# Patient Record
Sex: MALE | Race: BLACK OR AFRICAN AMERICAN | NOT HISPANIC OR LATINO | Employment: FULL TIME | URBAN - METROPOLITAN AREA
[De-identification: names, ages, dates, MRNs, and addresses within clinical notes are randomized per-mention and may not be internally consistent; named-entity substitution may affect disease eponyms.]

---

## 2019-04-19 ENCOUNTER — HOSPITAL ENCOUNTER (EMERGENCY)
Facility: OTHER | Age: 54
Discharge: HOME | End: 2019-04-19
Attending: EMERGENCY MEDICINE

## 2019-04-19 VITALS
DIASTOLIC BLOOD PRESSURE: 89 MMHG | OXYGEN SATURATION: 99 % | SYSTOLIC BLOOD PRESSURE: 164 MMHG | HEART RATE: 68 BPM | TEMPERATURE: 98 F | WEIGHT: 315 LBS | RESPIRATION RATE: 17 BRPM | BODY MASS INDEX: 41.25 KG/M2

## 2019-04-19 DIAGNOSIS — L03.011 PARONYCHIA OF FINGER OF RIGHT HAND: Primary | ICD-10-CM

## 2019-04-19 DIAGNOSIS — R73.9 HYPERGLYCEMIA: ICD-10-CM

## 2019-04-19 DIAGNOSIS — I10 HYPERTENSION, UNSPECIFIED TYPE: ICD-10-CM

## 2019-04-19 DIAGNOSIS — E66.9 OBESITY, UNSPECIFIED CLASSIFICATION, UNSPECIFIED OBESITY TYPE, UNSPECIFIED WHETHER SERIOUS COMORBIDITY PRESENT: ICD-10-CM

## 2019-04-19 LAB
ALBUMIN SERPL BCP-MCNC: 3.9 G/DL (ref 3.5–5.2)
ALP SERPL-CCNC: 116 U/L (ref 55–135)
ALT SERPL W/O P-5'-P-CCNC: 12 U/L (ref 10–44)
ANION GAP SERPL CALC-SCNC: 9 MMOL/L (ref 8–16)
AST SERPL-CCNC: 10 U/L (ref 10–40)
BASOPHILS # BLD AUTO: 0.03 K/UL (ref 0–0.2)
BASOPHILS NFR BLD: 0.4 % (ref 0–1.9)
BILIRUB SERPL-MCNC: 0.5 MG/DL (ref 0.1–1)
BUN SERPL-MCNC: 14 MG/DL (ref 6–20)
CALCIUM SERPL-MCNC: 9.9 MG/DL (ref 8.7–10.5)
CHLORIDE SERPL-SCNC: 101 MMOL/L (ref 95–110)
CO2 SERPL-SCNC: 28 MMOL/L (ref 23–29)
CREAT SERPL-MCNC: 1.1 MG/DL (ref 0.5–1.4)
DIFFERENTIAL METHOD: ABNORMAL
EOSINOPHIL # BLD AUTO: 0.2 K/UL (ref 0–0.5)
EOSINOPHIL NFR BLD: 2.4 % (ref 0–8)
ERYTHROCYTE [DISTWIDTH] IN BLOOD BY AUTOMATED COUNT: 12.7 % (ref 11.5–14.5)
EST. GFR  (AFRICAN AMERICAN): >60 ML/MIN/1.73 M^2
EST. GFR  (NON AFRICAN AMERICAN): >60 ML/MIN/1.73 M^2
GLUCOSE SERPL-MCNC: 335 MG/DL (ref 70–110)
HCT VFR BLD AUTO: 44.4 % (ref 40–54)
HGB BLD-MCNC: 15.2 G/DL (ref 14–18)
LYMPHOCYTES # BLD AUTO: 2.4 K/UL (ref 1–4.8)
LYMPHOCYTES NFR BLD: 29 % (ref 18–48)
MCH RBC QN AUTO: 27.8 PG (ref 27–31)
MCHC RBC AUTO-ENTMCNC: 34.2 G/DL (ref 32–36)
MCV RBC AUTO: 81 FL (ref 82–98)
MONOCYTES # BLD AUTO: 0.7 K/UL (ref 0.3–1)
MONOCYTES NFR BLD: 7.7 % (ref 4–15)
NEUTROPHILS # BLD AUTO: 5.1 K/UL (ref 1.8–7.7)
NEUTROPHILS NFR BLD: 60.4 % (ref 38–73)
PLATELET # BLD AUTO: 267 K/UL (ref 150–350)
PMV BLD AUTO: 10.6 FL (ref 9.2–12.9)
POCT GLUCOSE: 323 MG/DL (ref 70–110)
POTASSIUM SERPL-SCNC: 4.1 MMOL/L (ref 3.5–5.1)
PROT SERPL-MCNC: 8.5 G/DL (ref 6–8.4)
RBC # BLD AUTO: 5.47 M/UL (ref 4.6–6.2)
SODIUM SERPL-SCNC: 138 MMOL/L (ref 136–145)
WBC # BLD AUTO: 8.4 K/UL (ref 3.9–12.7)

## 2019-04-19 PROCEDURE — 90471 IMMUNIZATION ADMIN: CPT | Performed by: EMERGENCY MEDICINE

## 2019-04-19 PROCEDURE — 90715 TDAP VACCINE 7 YRS/> IM: CPT | Performed by: EMERGENCY MEDICINE

## 2019-04-19 PROCEDURE — 80053 COMPREHEN METABOLIC PANEL: CPT

## 2019-04-19 PROCEDURE — 99283 EMERGENCY DEPT VISIT LOW MDM: CPT | Mod: 25

## 2019-04-19 PROCEDURE — 85025 COMPLETE CBC W/AUTO DIFF WBC: CPT

## 2019-04-19 PROCEDURE — 82962 GLUCOSE BLOOD TEST: CPT

## 2019-04-19 PROCEDURE — 25000003 PHARM REV CODE 250: Performed by: EMERGENCY MEDICINE

## 2019-04-19 PROCEDURE — 10060 I&D ABSCESS SIMPLE/SINGLE: CPT

## 2019-04-19 PROCEDURE — S0077 INJECTION, CLINDAMYCIN PHOSP: HCPCS | Performed by: EMERGENCY MEDICINE

## 2019-04-19 PROCEDURE — 63600175 PHARM REV CODE 636 W HCPCS: Performed by: EMERGENCY MEDICINE

## 2019-04-19 RX ORDER — SULFAMETHOXAZOLE AND TRIMETHOPRIM 800; 160 MG/1; MG/1
1 TABLET ORAL 2 TIMES DAILY
Qty: 14 TABLET | Refills: 0 | Status: SHIPPED | OUTPATIENT
Start: 2019-04-19 | End: 2019-04-26

## 2019-04-19 RX ORDER — CLINDAMYCIN PHOSPHATE 600 MG/50ML
600 INJECTION, SOLUTION INTRAVENOUS
Status: COMPLETED | OUTPATIENT
Start: 2019-04-19 | End: 2019-04-19

## 2019-04-19 RX ORDER — HYDROCHLOROTHIAZIDE 12.5 MG/1
25 TABLET ORAL DAILY
Qty: 60 TABLET | Refills: 0 | Status: ON HOLD | OUTPATIENT
Start: 2019-04-19 | End: 2022-08-03 | Stop reason: CLARIF

## 2019-04-19 RX ORDER — CLINDAMYCIN PHOSPHATE 900 MG/50ML
900 INJECTION, SOLUTION INTRAVENOUS
Status: DISCONTINUED | OUTPATIENT
Start: 2019-04-19 | End: 2019-04-19

## 2019-04-19 RX ORDER — LIDOCAINE HYDROCHLORIDE 10 MG/ML
5 INJECTION INFILTRATION; PERINEURAL
Status: COMPLETED | OUTPATIENT
Start: 2019-04-19 | End: 2019-04-19

## 2019-04-19 RX ORDER — SULFAMETHOXAZOLE AND TRIMETHOPRIM 800; 160 MG/1; MG/1
1 TABLET ORAL
Status: COMPLETED | OUTPATIENT
Start: 2019-04-19 | End: 2019-04-19

## 2019-04-19 RX ORDER — AMLODIPINE BESYLATE 5 MG/1
10 TABLET ORAL
Status: COMPLETED | OUTPATIENT
Start: 2019-04-19 | End: 2019-04-19

## 2019-04-19 RX ADMIN — SULFAMETHOXAZOLE AND TRIMETHOPRIM 1 TABLET: 800; 160 TABLET ORAL at 02:04

## 2019-04-19 RX ADMIN — LIDOCAINE HYDROCHLORIDE 5 ML: 10 INJECTION, SOLUTION INFILTRATION; PERINEURAL at 01:04

## 2019-04-19 RX ADMIN — CLINDAMYCIN IN 5 PERCENT DEXTROSE 600 MG: 12 INJECTION, SOLUTION INTRAVENOUS at 02:04

## 2019-04-19 RX ADMIN — BACITRACIN ZINC, NEOMYCIN SULFATE, AND POLYMYXIN B SULFATE 1 EACH: 400; 3.5; 5 OINTMENT TOPICAL at 02:04

## 2019-04-19 RX ADMIN — AMLODIPINE BESYLATE 10 MG: 5 TABLET ORAL at 01:04

## 2019-04-19 RX ADMIN — SODIUM CHLORIDE 1000 ML: 0.9 INJECTION, SOLUTION INTRAVENOUS at 01:04

## 2019-04-19 RX ADMIN — CLOSTRIDIUM TETANI TOXOID ANTIGEN (FORMALDEHYDE INACTIVATED), CORYNEBACTERIUM DIPHTHERIAE TOXOID ANTIGEN (FORMALDEHYDE INACTIVATED), BORDETELLA PERTUSSIS TOXOID ANTIGEN (GLUTARALDEHYDE INACTIVATED), BORDETELLA PERTUSSIS FILAMENTOUS HEMAGGLUTININ ANTIGEN (FORMALDEHYDE INACTIVATED), BORDETELLA PERTUSSIS PERTACTIN ANTIGEN, AND BORDETELLA PERTUSSIS FIMBRIAE 2/3 ANTIGEN 0.5 ML: 5; 2; 2.5; 5; 3; 5 INJECTION, SUSPENSION INTRAMUSCULAR at 01:04

## 2019-04-19 NOTE — ED PROVIDER NOTES
Encounter Date: 4/19/2019    SCRIBE #1 NOTE: IAna, am scribing for, and in the presence of, Dr. Restrepo.       History     Chief Complaint   Patient presents with    paronychia     with swelling, drainage and redness x 1 week     Time seen by provider: 12:50 PM    This is a 53 y.o. male who presents with complaint of wound to right index finger s/p fall 6 days ago. Swelling got worse in last 2 days, and pt tried to tra He reports associated swelling. He has had similar episode with I&D. He denies fever or chills. He took ibuprofen last night. He is right hand dominant. He is not UTD on tetanus.    The history is provided by the patient.     Review of patient's allergies indicates:  No Known Allergies  History reviewed. No pertinent past medical history.  Past Surgical History:   Procedure Laterality Date    LEG SURGERY Left      History reviewed. No pertinent family history.  Social History     Tobacco Use    Smoking status: Never Smoker    Smokeless tobacco: Never Used   Substance Use Topics    Alcohol use: Yes    Drug use: No     Review of Systems   Constitutional: Negative for fever.   HENT: Negative for sore throat.    Respiratory: Negative for shortness of breath.    Cardiovascular: Negative for chest pain.   Gastrointestinal: Negative for nausea.   Genitourinary: Negative for dysuria.   Musculoskeletal: Negative for back pain.        Positive for finger swelling.   Skin: Positive for wound. Negative for rash.   Neurological: Negative for weakness.   Hematological: Does not bruise/bleed easily.       Physical Exam     Initial Vitals [04/19/19 1225]   BP Pulse Resp Temp SpO2   (!) 216/100 80 16 97.5 °F (36.4 °C) 97 %      MAP       --         Physical Exam    Nursing note and vitals reviewed.  Constitutional: He appears well-developed and well-nourished. He is not diaphoretic.  Non-toxic appearance. No distress.   Morbidly obese.   HENT:   Head: Normocephalic and atraumatic.   Neck: Normal  range of motion. Neck supple.   Pulmonary/Chest: No respiratory distress.   Musculoskeletal:   Diffuse edema to right index finger with fluctuant swelling distal to DIP joint, no tenderness on palpation of flexor tendon, no proximal streaking.   Neurological: He is alert and oriented to person, place, and time.   Skin: Skin is warm and dry.   Psychiatric: He has a normal mood and affect.         ED Course   I & D - Incision and Drainage  Date/Time: 4/19/2019 2:04 PM  Performed by: Nancy Restrepo MD  Authorized by: Nancy Restrepo MD   Consent Done: Not Needed  Type: abscess  Body area: upper extremity  Location details: right index finger  Anesthesia: local infiltration    Anesthesia:  Local Anesthetic: lidocaine 1% without epinephrine  Anesthetic total: 6 mL  Patient sedated: no  Scalpel size: 11  Incision type: single straight  Complexity: simple  Drainage: purulent  Drainage amount: moderate (4 cc)  Wound treatment: incision and  drainage (antibiotic ointment and gauze dressing)  Packing material: none  Complications: No  Specimens: No  Implants: No  Patient tolerance: Patient tolerated the procedure well with no immediate complications        Labs Reviewed   CBC W/ AUTO DIFFERENTIAL - Abnormal; Notable for the following components:       Result Value    MCV 81 (*)     All other components within normal limits   COMPREHENSIVE METABOLIC PANEL - Abnormal; Notable for the following components:    Glucose 335 (*)     Total Protein 8.5 (*)     All other components within normal limits   POCT GLUCOSE - Abnormal; Notable for the following components:    POCT Glucose 323 (*)     All other components within normal limits          Imaging Results    None          Medical Decision Making:   Initial Assessment:   54 yo M with remote hx of htn and dm but no longer on meds (reportedly not needed after loosing weight) here with painful swelling to R index finger. Pt is R hand dominant, fell on the hand recently and since  developed an abscess which he tried to drain. Pt is afebrile, morbidly obese, non toxic, paronychia present without proximal streaking or concern for tenoxynovitis. BP elevated and pt treated w bp meds, labs without dka and given dose IV abx sp I+D.   Clinical Tests:   Lab Tests: Reviewed and Ordered  ED Management:  Home meds restarted, pt encouraged to seek pcp follow up for wound care and bp moitoring.             Scribe Attestation:   Scribe #1: I performed the above scribed service and the documentation accurately describes the services I performed. I attest to the accuracy of the note.    Attending Attestation:           Physician Attestation for Scribe:  Physician Attestation Statement for Scribe #1: I, Dr. Restrepo, reviewed documentation, as scribed by Ana Araujo in my presence, and it is both accurate and complete.                    Clinical Impression:     1. Paronychia of finger of right hand    2. Hyperglycemia    3. Hypertension, unspecified type    4. Obesity, unspecified classification, unspecified obesity type, unspecified whether serious comorbidity present          Disposition:   Disposition: Discharged  Condition: Patti Restrepo MD  04/19/19 6367

## 2019-04-19 NOTE — ED NOTES
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 Pt unsure of what he scraped finger on. Pt with swelling and discoloration to R 2nd distal finger, nail remains in place. Pt AAOx4 and appropriate at this time. Respirations even and unlabored. No acute distress noted.   "

## 2022-08-02 ENCOUNTER — HOSPITAL ENCOUNTER (INPATIENT)
Facility: HOSPITAL | Age: 57
LOS: 13 days | Discharge: HOME HEALTH | DRG: 629 | End: 2022-08-16
Attending: EMERGENCY MEDICINE | Admitting: HOSPITALIST
Payer: COMMERCIAL

## 2022-08-02 DIAGNOSIS — R73.9 HYPERGLYCEMIA WITHOUT KETOSIS: ICD-10-CM

## 2022-08-02 DIAGNOSIS — E66.01 SEVERE OBESITY (BMI >= 40): ICD-10-CM

## 2022-08-02 DIAGNOSIS — S91.103A: ICD-10-CM

## 2022-08-02 DIAGNOSIS — M86.179 OTHER ACUTE OSTEOMYELITIS, UNSPECIFIED ANKLE AND FOOT: ICD-10-CM

## 2022-08-02 DIAGNOSIS — M86.9 OSTEOMYELITIS OF GREAT TOE OF RIGHT FOOT: Primary | ICD-10-CM

## 2022-08-02 DIAGNOSIS — Z01.810 PREOP CARDIOVASCULAR EXAM: ICD-10-CM

## 2022-08-02 DIAGNOSIS — M79.604 PAIN AND SWELLING OF RIGHT LOWER EXTREMITY: ICD-10-CM

## 2022-08-02 DIAGNOSIS — L03.115 CELLULITIS OF RIGHT LOWER EXTREMITY: ICD-10-CM

## 2022-08-02 DIAGNOSIS — R07.9 CHEST PAIN: ICD-10-CM

## 2022-08-02 DIAGNOSIS — M79.89 SWELLING OF RIGHT UPPER EXTREMITY: ICD-10-CM

## 2022-08-02 DIAGNOSIS — I10 PRIMARY HYPERTENSION: ICD-10-CM

## 2022-08-02 DIAGNOSIS — L02.413 ABSCESS OF RIGHT FOREARM: ICD-10-CM

## 2022-08-02 DIAGNOSIS — R22.31 LOCALIZED SWELLING OF RIGHT FOREARM: ICD-10-CM

## 2022-08-02 DIAGNOSIS — E11.65 UNCONTROLLED TYPE 2 DIABETES MELLITUS WITH HYPERGLYCEMIA, WITHOUT LONG-TERM CURRENT USE OF INSULIN: ICD-10-CM

## 2022-08-02 DIAGNOSIS — M79.89 PAIN AND SWELLING OF RIGHT LOWER EXTREMITY: ICD-10-CM

## 2022-08-02 LAB
ALBUMIN SERPL BCP-MCNC: 2.6 G/DL (ref 3.5–5.2)
ALP SERPL-CCNC: 138 U/L (ref 55–135)
ALT SERPL W/O P-5'-P-CCNC: 59 U/L (ref 10–44)
ANION GAP SERPL CALC-SCNC: 14 MMOL/L (ref 8–16)
ANISOCYTOSIS BLD QL SMEAR: SLIGHT
AST SERPL-CCNC: 54 U/L (ref 10–40)
BACTERIA #/AREA URNS AUTO: ABNORMAL /HPF
BASOPHILS # BLD AUTO: 0.06 K/UL (ref 0–0.2)
BASOPHILS NFR BLD: 0.5 % (ref 0–1.9)
BILIRUB SERPL-MCNC: 0.5 MG/DL (ref 0.1–1)
BILIRUB UR QL STRIP: NEGATIVE
BUN SERPL-MCNC: 18 MG/DL (ref 6–20)
CALCIUM SERPL-MCNC: 9.6 MG/DL (ref 8.7–10.5)
CHLORIDE SERPL-SCNC: 95 MMOL/L (ref 95–110)
CLARITY UR REFRACT.AUTO: ABNORMAL
CO2 SERPL-SCNC: 26 MMOL/L (ref 23–29)
COLOR UR AUTO: ABNORMAL
CREAT SERPL-MCNC: 1.1 MG/DL (ref 0.5–1.4)
CRP SERPL-MCNC: 167.8 MG/L (ref 0–8.2)
DIFFERENTIAL METHOD: ABNORMAL
EOSINOPHIL # BLD AUTO: 0.2 K/UL (ref 0–0.5)
EOSINOPHIL NFR BLD: 1.5 % (ref 0–8)
ERYTHROCYTE [DISTWIDTH] IN BLOOD BY AUTOMATED COUNT: 14.3 % (ref 11.5–14.5)
ERYTHROCYTE [SEDIMENTATION RATE] IN BLOOD BY PHOTOMETRIC METHOD: >120 MM/HR (ref 0–23)
EST. GFR  (NO RACE VARIABLE): >60 ML/MIN/1.73 M^2
GLUCOSE SERPL-MCNC: 319 MG/DL (ref 70–110)
GLUCOSE SERPL-MCNC: 338 MG/DL (ref 70–110)
GLUCOSE UR QL STRIP: ABNORMAL
HCT VFR BLD AUTO: 38.1 % (ref 40–54)
HGB BLD-MCNC: 12.9 G/DL (ref 14–18)
HGB UR QL STRIP: ABNORMAL
HYALINE CASTS UR QL AUTO: 0 /LPF
IMM GRANULOCYTES # BLD AUTO: 0.13 K/UL (ref 0–0.04)
IMM GRANULOCYTES NFR BLD AUTO: 1 % (ref 0–0.5)
KETONES UR QL STRIP: ABNORMAL
LACTATE SERPL-SCNC: 2.5 MMOL/L (ref 0.5–2.2)
LEUKOCYTE ESTERASE UR QL STRIP: ABNORMAL
LYMPHOCYTES # BLD AUTO: 2.3 K/UL (ref 1–4.8)
LYMPHOCYTES NFR BLD: 17.7 % (ref 18–48)
MAGNESIUM SERPL-MCNC: 1.5 MG/DL (ref 1.6–2.6)
MCH RBC QN AUTO: 27.3 PG (ref 27–31)
MCHC RBC AUTO-ENTMCNC: 33.9 G/DL (ref 32–36)
MCV RBC AUTO: 81 FL (ref 82–98)
MICROSCOPIC COMMENT: ABNORMAL
MONOCYTES # BLD AUTO: 1 K/UL (ref 0.3–1)
MONOCYTES NFR BLD: 7.3 % (ref 4–15)
NEUTROPHILS # BLD AUTO: 9.5 K/UL (ref 1.8–7.7)
NEUTROPHILS NFR BLD: 72 % (ref 38–73)
NITRITE UR QL STRIP: NEGATIVE
NRBC BLD-RTO: 0 /100 WBC
PH UR STRIP: 5 [PH] (ref 5–8)
PHOSPHATE SERPL-MCNC: 2.5 MG/DL (ref 2.7–4.5)
PLATELET # BLD AUTO: 437 K/UL (ref 150–450)
PLATELET BLD QL SMEAR: ABNORMAL
PMV BLD AUTO: 10 FL (ref 9.2–12.9)
POLYCHROMASIA BLD QL SMEAR: ABNORMAL
POTASSIUM SERPL-SCNC: 4.3 MMOL/L (ref 3.5–5.1)
PROT SERPL-MCNC: 9 G/DL (ref 6–8.4)
PROT UR QL STRIP: ABNORMAL
RBC # BLD AUTO: 4.73 M/UL (ref 4.6–6.2)
RBC #/AREA URNS AUTO: 55 /HPF (ref 0–4)
SMUDGE CELLS BLD QL SMEAR: PRESENT
SODIUM SERPL-SCNC: 135 MMOL/L (ref 136–145)
SP GR UR STRIP: 1.02 (ref 1–1.03)
SQUAMOUS #/AREA URNS AUTO: 5 /HPF
TOXIC GRANULES BLD QL SMEAR: PRESENT
URN SPEC COLLECT METH UR: ABNORMAL
WBC # BLD AUTO: 13.12 K/UL (ref 3.9–12.7)
WBC #/AREA URNS AUTO: 45 /HPF (ref 0–5)
WBC TOXIC VACUOLES BLD QL SMEAR: PRESENT

## 2022-08-02 PROCEDURE — 96365 THER/PROPH/DIAG IV INF INIT: CPT

## 2022-08-02 PROCEDURE — 99285 EMERGENCY DEPT VISIT HI MDM: CPT | Mod: ,,, | Performed by: EMERGENCY MEDICINE

## 2022-08-02 PROCEDURE — 86803 HEPATITIS C AB TEST: CPT | Performed by: PHYSICIAN ASSISTANT

## 2022-08-02 PROCEDURE — 63600175 PHARM REV CODE 636 W HCPCS: Performed by: EMERGENCY MEDICINE

## 2022-08-02 PROCEDURE — 83735 ASSAY OF MAGNESIUM: CPT | Performed by: EMERGENCY MEDICINE

## 2022-08-02 PROCEDURE — 87088 URINE BACTERIA CULTURE: CPT | Performed by: EMERGENCY MEDICINE

## 2022-08-02 PROCEDURE — 87086 URINE CULTURE/COLONY COUNT: CPT | Performed by: EMERGENCY MEDICINE

## 2022-08-02 PROCEDURE — 87389 HIV-1 AG W/HIV-1&-2 AB AG IA: CPT | Performed by: PHYSICIAN ASSISTANT

## 2022-08-02 PROCEDURE — 80053 COMPREHEN METABOLIC PANEL: CPT | Performed by: EMERGENCY MEDICINE

## 2022-08-02 PROCEDURE — 81001 URINALYSIS AUTO W/SCOPE: CPT | Performed by: EMERGENCY MEDICINE

## 2022-08-02 PROCEDURE — 96360 HYDRATION IV INFUSION INIT: CPT | Mod: 59

## 2022-08-02 PROCEDURE — 83605 ASSAY OF LACTIC ACID: CPT | Performed by: EMERGENCY MEDICINE

## 2022-08-02 PROCEDURE — 84100 ASSAY OF PHOSPHORUS: CPT | Performed by: EMERGENCY MEDICINE

## 2022-08-02 PROCEDURE — 96361 HYDRATE IV INFUSION ADD-ON: CPT | Mod: 59

## 2022-08-02 PROCEDURE — 99285 PR EMERGENCY DEPT VISIT,LEVEL V: ICD-10-PCS | Mod: ,,, | Performed by: EMERGENCY MEDICINE

## 2022-08-02 PROCEDURE — 85025 COMPLETE CBC W/AUTO DIFF WBC: CPT | Performed by: EMERGENCY MEDICINE

## 2022-08-02 PROCEDURE — 87077 CULTURE AEROBIC IDENTIFY: CPT | Performed by: EMERGENCY MEDICINE

## 2022-08-02 PROCEDURE — 87040 BLOOD CULTURE FOR BACTERIA: CPT | Mod: 59 | Performed by: EMERGENCY MEDICINE

## 2022-08-02 PROCEDURE — 25000003 PHARM REV CODE 250: Performed by: EMERGENCY MEDICINE

## 2022-08-02 PROCEDURE — 99285 EMERGENCY DEPT VISIT HI MDM: CPT | Mod: 25

## 2022-08-02 PROCEDURE — 85652 RBC SED RATE AUTOMATED: CPT | Performed by: EMERGENCY MEDICINE

## 2022-08-02 PROCEDURE — 86140 C-REACTIVE PROTEIN: CPT | Performed by: EMERGENCY MEDICINE

## 2022-08-02 PROCEDURE — 87186 SC STD MICRODIL/AGAR DIL: CPT | Performed by: EMERGENCY MEDICINE

## 2022-08-02 RX ORDER — AMLODIPINE BESYLATE 5 MG/1
5 TABLET ORAL
Status: COMPLETED | OUTPATIENT
Start: 2022-08-02 | End: 2022-08-02

## 2022-08-02 RX ORDER — INSULIN ASPART 100 [IU]/ML
6 INJECTION, SOLUTION INTRAVENOUS; SUBCUTANEOUS
Status: COMPLETED | OUTPATIENT
Start: 2022-08-03 | End: 2022-08-03

## 2022-08-02 RX ADMIN — SODIUM CHLORIDE, SODIUM LACTATE, POTASSIUM CHLORIDE, AND CALCIUM CHLORIDE 1000 ML: .6; .31; .03; .02 INJECTION, SOLUTION INTRAVENOUS at 07:08

## 2022-08-02 RX ADMIN — AMLODIPINE BESYLATE 5 MG: 5 TABLET ORAL at 11:08

## 2022-08-02 RX ADMIN — CEFTRIAXONE 1 G: 1 INJECTION, SOLUTION INTRAVENOUS at 07:08

## 2022-08-02 RX ADMIN — SODIUM CHLORIDE, SODIUM LACTATE, POTASSIUM CHLORIDE, AND CALCIUM CHLORIDE 1000 ML: .6; .31; .03; .02 INJECTION, SOLUTION INTRAVENOUS at 11:08

## 2022-08-02 NOTE — ED PROVIDER NOTES
Encounter Date: 8/2/2022    SCRIBE #1 NOTE: I, Mariia Araiza, am scribing for, and in the presence of,  Henry Garvin MD. I have scribed the following portions of the note - Other sections scribed: HPI, ROS.       History     Chief Complaint   Patient presents with    Swelling     Right arm and leg swelling that started Saturday. Denies pain. Denies medical hx.     Time patient was seen by the provider: 6:37 PM      The patient is a 56 y.o. male with co-morbidities including: DM who presents to the ED with a complaint of right forearm and leg swelling with onset 3 days ago. He reports he had a cold last week with congestion, fevers, and chills. The fevers and chills have resolved but he is still having some congestion. He got up 3 days ago and noticed his right leg and foot was swollen and warm. He also noticed his right forearm was swollen after sleeping on it. He has had previous infections to some of his fingers. He has a lesion on his toe which was a corn that fell off a few days ago with some drainage. He has not checked his blood sugars recently. He denies any diarrhea, thigh pain, SOB, pain or trauma to the hand, or history of blood clots. He has been urinating less but reports he has been drinking water less but reports he is thirsty more.    The history is provided by the patient and medical records. No  was used.     Review of patient's allergies indicates:  No Known Allergies  History reviewed. No pertinent past medical history.  Past Surgical History:   Procedure Laterality Date    LEG SURGERY Left      History reviewed. No pertinent family history.  Social History     Tobacco Use    Smoking status: Never Smoker    Smokeless tobacco: Never Used   Substance Use Topics    Alcohol use: Yes    Drug use: No     Review of Systems   Constitutional: Negative for chills and fever.   HENT: Positive for congestion. Negative for sore throat.    Respiratory: Negative for shortness of  breath.    Cardiovascular: Positive for leg swelling (right). Negative for chest pain.   Gastrointestinal: Negative for diarrhea and nausea.   Genitourinary: Negative for dysuria.   Musculoskeletal: Negative for arthralgias, back pain and myalgias.        + Right arm swelling   Skin: Positive for wound (right toe). Negative for rash.   Neurological: Negative for weakness.   Hematological: Does not bruise/bleed easily.       Physical Exam     Initial Vitals [08/02/22 1819]   BP Pulse Resp Temp SpO2   (!) 171/100 110 20 98.3 °F (36.8 °C) 97 %      MAP       --         Physical Exam    Vitals reviewed.  Constitutional:   56-year-old  man, no acute distress noted   HENT:   Head: Normocephalic and atraumatic.   Multiple dental erosions to the gingiva without sarah abscess, patient tolerating secretions   Eyes: EOM are normal. Pupils are equal, round, and reactive to light.   Neck: No tracheal deviation present.   Cardiovascular:   Mild tachycardia is noted with diminished bilateral pedal pulses   Pulmonary/Chest: Breath sounds normal. No stridor. No respiratory distress.   Abdominal:   Obese, soft, nondistended, nontender   Musculoskeletal:      Comments: Left lower extremity does not reveal any evidence of swelling or injury; right lower extremity exhibits 2+ pitting edema tracking to the level of the knee with associated induration and erythema; the right foot exhibits gross enlargement of the great toe with a 0.5 x 0.5 cm wound of the plantar surface of the great toe with clear drainage noted; there is a noted erosive lesion to the dorsum of the right 5th toe with serous drainage noted; the right upper extremity exhibits nontender swelling of the right forearm without external evidence of trauma or external skin change     Neurological: He is alert and oriented to person, place, and time.   Skin:   Erythema/induration noted to the right lower extremity tracking from the foot to the level of the knee    Psychiatric: He has a normal mood and affect. Thought content normal.         ED Course   Procedures  Labs Reviewed   CBC W/ AUTO DIFFERENTIAL - Abnormal; Notable for the following components:       Result Value    WBC 13.12 (*)     Hemoglobin 12.9 (*)     Hematocrit 38.1 (*)     MCV 81 (*)     Immature Granulocytes 1.0 (*)     Gran # (ANC) 9.5 (*)     Immature Grans (Abs) 0.13 (*)     Lymph % 17.7 (*)     Platelet Estimate Increased (*)     All other components within normal limits   URINALYSIS, REFLEX TO URINE CULTURE - Abnormal; Notable for the following components:    Appearance, UA Hazy (*)     Protein, UA 2+ (*)     Glucose, UA 2+ (*)     Ketones, UA Trace (*)     Occult Blood UA 3+ (*)     Leukocytes, UA 2+ (*)     All other components within normal limits    Narrative:     Specimen Source->Urine   LACTIC ACID, PLASMA - Abnormal; Notable for the following components:    Lactate (Lactic Acid) 2.5 (*)     All other components within normal limits   SEDIMENTATION RATE - Abnormal; Notable for the following components:    Sed Rate >120 (*)     All other components within normal limits   COMPREHENSIVE METABOLIC PANEL - Abnormal; Notable for the following components:    Sodium 135 (*)     Glucose 319 (*)     Total Protein 9.0 (*)     Albumin 2.6 (*)     Alkaline Phosphatase 138 (*)     AST 54 (*)     ALT 59 (*)     All other components within normal limits   C-REACTIVE PROTEIN - Abnormal; Notable for the following components:    .8 (*)     All other components within normal limits   MAGNESIUM - Abnormal; Notable for the following components:    Magnesium 1.5 (*)     All other components within normal limits   PHOSPHORUS - Abnormal; Notable for the following components:    Phosphorus 2.5 (*)     All other components within normal limits   URINALYSIS MICROSCOPIC - Abnormal; Notable for the following components:    RBC, UA 55 (*)     WBC, UA 45 (*)     All other components within normal limits    Narrative:      Specimen Source->Urine   CULTURE, BLOOD   CULTURE, BLOOD   CULTURE, URINE   HIV 1 / 2 ANTIBODY   HEPATITIS C ANTIBODY   POCT GLUCOSE MONITORING CONTINUOUS          Imaging Results          US Lower Extremity Veins Right (Final result)  Result time 08/02/22 21:32:11    Final result by Carlitos Duran MD (08/02/22 21:32:11)                 Impression:      No evidence of deep venous thrombosis in the right lower extremity.    Nonspecific mildly prominent right inguinal lymph node.      Electronically signed by: Carlitos Duran MD  Date:    08/02/2022  Time:    21:32             Narrative:    EXAMINATION:  US LOWER EXTREMITY VEINS RIGHT    CLINICAL HISTORY:  Pain in right leg    TECHNIQUE:  Duplex and color flow Doppler evaluation and graded compression of the right lower extremity veins was performed.    COMPARISON:  None    FINDINGS:  Right thigh veins: The common femoral, femoral, popliteal, upper greater saphenous, and deep femoral veins are patent and free of thrombus. The veins are normally compressible and have normal phasic flow and augmentation response.    Right calf veins: The visualized calf veins are patent.    Contralateral CFV: The contralateral (left) common femoral vein is patent and free of thrombus.    Miscellaneous: There is a mildly prominent right inguinal lymph node measuring 2.1 x 1.1 x 1.8 cm.                               US Upper Extremity Veins Right (Final result)  Result time 08/02/22 21:32:11    Final result by Renee Jones MD (08/02/22 21:32:11)                 Impression:      No evidence of right deep venous thrombosis.      Electronically signed by: Renee Jones  Date:    08/02/2022  Time:    21:32             Narrative:    EXAMINATION:  UPPER EXTREMITY VEINS RIGHT    CLINICAL HISTORY:  Right arm swelling.    TECHNIQUE:  Grayscale imaging of the right upper extremity to evaluate the deep and superficial venous system with color Doppler interrogation and Spectral  Doppler wave form analysis was performed.    COMPARISON:  None.    FINDINGS:  There is normal color Doppler interrogation, compression and distal augmentation of the right  internal jugular, subclavian, axillary, cephalic, and basilic veins.                               X-Ray Foot Complete Right (Final result)  Result time 08/02/22 20:51:19    Final result by Jesse Heredia DO (08/02/22 20:51:19)                 Impression:      Acute osteomyelitis of the great toe with suspected overlying fracture.    Soft tissue edema and soft tissue gas in the 5th toe concerning for infection with a gas-forming organism.    Consider further evaluation with MRI as clinically indicated.      Electronically signed by: Jesse Heredia  Date:    08/02/2022  Time:    20:51             Narrative:    EXAMINATION:  XR FOOT COMPLETE 3 VIEW RIGHT    CLINICAL HISTORY:  . Unspecified open wound of unspecified great toe without damage to nail, initial encounter    TECHNIQUE:  AP, lateral, and oblique views of the right foot were performed.    COMPARISON:  None    FINDINGS:  There are erosive changes of the right great toe compatible with acute osteomyelitis, with a suspected superimposed fracture.  There are severe degenerative changes of the great toe metatarsophalangeal joint    There is soft tissue edema of the great toe and the 5th toe.  There is soft tissue gas within the 5th toe.  There is dorsal osteophytic spurring.  There is soft tissue edema, predominantly dorsally.  There are vascular calcifications.                                 Medications   lactated ringers bolus 1,000 mL (1,000 mLs Intravenous New Bag 8/2/22 2315)   lactated ringers bolus 1,000 mL (0 mLs Intravenous Stopped 8/2/22 2307)   cefTRIAXone (ROCEPHIN) 1 g/50 mL D5W IVPB (0 g Intravenous Stopped 8/2/22 2042)   amLODIPine tablet 5 mg (5 mg Oral Given 8/2/22 2351)   insulin aspart U-100 pen 6 Units (6 Units Subcutaneous Given 8/3/22 0031)     Medical Decision Making:    History:   Old Medical Records: I decided to obtain old medical records.  Differential Diagnosis:   Sepsis, JOVANY, hyperglycemia, osteomyelitis, DVT  Clinical Tests:   Lab Tests: Ordered and Reviewed  Radiological Study: Ordered and Reviewed          Scribe Attestation:   Scribe #1: I performed the above scribed service and the documentation accurately describes the services I performed. I attest to the accuracy of the note.    Attending Attestation:             Attending ED Notes:   Emergency department evaluation reveals evidence of leukocytosis without a significant associated granulocytosis.  Urinalysis does reveal evidence of ketonuria as well as hematuria without sarah evidence of infectious process.  Metabolic profile reveals evidence of hyperglycemia without associated ketosis with a mild transaminitis but with preserved renal function.  The inflammatory markers ESR and CRP are significantly elevated in this patient presenting with diabetic foot wound in drainage.  Plain films obtained of the right foot reveal evidence of acute osteomyelitis of the great toe as well as concerns for gas in the 5th toe.  General surgery has urgently evaluated the patient the ED for potential necrotizing fasciitis which they do not feel is present at this time.  Blood cultures have been obtained, and Rocephin IVPB has been administered.  Ultrasound of the upper and lower extremities do not reveal presence of embolic disease.  Findings and concerns have been discussed with Hospital Medicine, and the patient will be admitted to their service in fair condition for further therapy and management.               Clinical Impression:   Final diagnoses:  [S91.103A] Open wound of great toe without damage to nail, initial encounter  [M79.604, M79.89] Pain and swelling of right lower extremity  [R22.31] Localized swelling of right forearm (Primary)  [M86.9] Osteomyelitis of great toe of right foot  [R73.9] Hyperglycemia without  ketosis          ED Disposition Condition    Admit               Henry Garvin MD  08/03/22 0053

## 2022-08-03 PROBLEM — M79.89 SWELLING OF RIGHT UPPER EXTREMITY: Status: ACTIVE | Noted: 2022-08-03

## 2022-08-03 PROBLEM — M86.9 OSTEOMYELITIS OF GREAT TOE OF RIGHT FOOT: Status: ACTIVE | Noted: 2022-08-03

## 2022-08-03 PROBLEM — L03.031 CELLULITIS OF TOE OF RIGHT FOOT: Status: ACTIVE | Noted: 2022-08-03

## 2022-08-03 PROBLEM — E11.65 UNCONTROLLED TYPE 2 DIABETES MELLITUS WITH HYPERGLYCEMIA, WITHOUT LONG-TERM CURRENT USE OF INSULIN: Status: ACTIVE | Noted: 2022-08-03

## 2022-08-03 PROBLEM — L03.115 CELLULITIS OF RIGHT LOWER EXTREMITY: Status: ACTIVE | Noted: 2022-08-03

## 2022-08-03 PROBLEM — I10 PRIMARY HYPERTENSION: Status: ACTIVE | Noted: 2022-08-03

## 2022-08-03 LAB
ALBUMIN SERPL BCP-MCNC: 2.3 G/DL (ref 3.5–5.2)
ALP SERPL-CCNC: 127 U/L (ref 55–135)
ALT SERPL W/O P-5'-P-CCNC: 55 U/L (ref 10–44)
ANION GAP SERPL CALC-SCNC: 11 MMOL/L (ref 8–16)
AST SERPL-CCNC: 54 U/L (ref 10–40)
BASOPHILS # BLD AUTO: 0.05 K/UL (ref 0–0.2)
BASOPHILS NFR BLD: 0.4 % (ref 0–1.9)
BILIRUB SERPL-MCNC: 0.5 MG/DL (ref 0.1–1)
BUN SERPL-MCNC: 17 MG/DL (ref 6–20)
CALCIUM SERPL-MCNC: 9.1 MG/DL (ref 8.7–10.5)
CHLORIDE SERPL-SCNC: 96 MMOL/L (ref 95–110)
CO2 SERPL-SCNC: 28 MMOL/L (ref 23–29)
CREAT SERPL-MCNC: 1 MG/DL (ref 0.5–1.4)
DIFFERENTIAL METHOD: ABNORMAL
EOSINOPHIL # BLD AUTO: 0.2 K/UL (ref 0–0.5)
EOSINOPHIL NFR BLD: 1.3 % (ref 0–8)
ERYTHROCYTE [DISTWIDTH] IN BLOOD BY AUTOMATED COUNT: 13.9 % (ref 11.5–14.5)
EST. GFR  (NO RACE VARIABLE): >60 ML/MIN/1.73 M^2
ESTIMATED AVG GLUCOSE: 309 MG/DL (ref 68–131)
GLUCOSE SERPL-MCNC: 325 MG/DL (ref 70–110)
GRAM STN SPEC: NORMAL
HBA1C MFR BLD: 12.4 % (ref 4–5.6)
HCT VFR BLD AUTO: 34.5 % (ref 40–54)
HCV AB SERPL QL IA: NEGATIVE
HGB BLD-MCNC: 11.4 G/DL (ref 14–18)
HIV 1+2 AB+HIV1 P24 AG SERPL QL IA: NORMAL
IMM GRANULOCYTES # BLD AUTO: 0.2 K/UL (ref 0–0.04)
IMM GRANULOCYTES NFR BLD AUTO: 1.5 % (ref 0–0.5)
LYMPHOCYTES # BLD AUTO: 1.6 K/UL (ref 1–4.8)
LYMPHOCYTES NFR BLD: 11.9 % (ref 18–48)
MAGNESIUM SERPL-MCNC: 1.6 MG/DL (ref 1.6–2.6)
MCH RBC QN AUTO: 27 PG (ref 27–31)
MCHC RBC AUTO-ENTMCNC: 33 G/DL (ref 32–36)
MCV RBC AUTO: 82 FL (ref 82–98)
MONOCYTES # BLD AUTO: 1.1 K/UL (ref 0.3–1)
MONOCYTES NFR BLD: 8.5 % (ref 4–15)
NEUTROPHILS # BLD AUTO: 10.2 K/UL (ref 1.8–7.7)
NEUTROPHILS NFR BLD: 76.4 % (ref 38–73)
NRBC BLD-RTO: 0 /100 WBC
PHOSPHATE SERPL-MCNC: 3 MG/DL (ref 2.7–4.5)
PLATELET # BLD AUTO: 391 K/UL (ref 150–450)
PMV BLD AUTO: 9.6 FL (ref 9.2–12.9)
POCT GLUCOSE: 265 MG/DL (ref 70–110)
POCT GLUCOSE: 293 MG/DL (ref 70–110)
POCT GLUCOSE: 294 MG/DL (ref 70–110)
POCT GLUCOSE: 311 MG/DL (ref 70–110)
POCT GLUCOSE: 337 MG/DL (ref 70–110)
POCT GLUCOSE: 338 MG/DL (ref 70–110)
POCT GLUCOSE: 424 MG/DL (ref 70–110)
POTASSIUM SERPL-SCNC: 3.9 MMOL/L (ref 3.5–5.1)
PROT SERPL-MCNC: 7.8 G/DL (ref 6–8.4)
RBC # BLD AUTO: 4.22 M/UL (ref 4.6–6.2)
SODIUM SERPL-SCNC: 135 MMOL/L (ref 136–145)
WBC # BLD AUTO: 13.32 K/UL (ref 3.9–12.7)

## 2022-08-03 PROCEDURE — 99223 PR INITIAL HOSPITAL CARE,LEVL III: ICD-10-PCS | Mod: ,,, | Performed by: INTERNAL MEDICINE

## 2022-08-03 PROCEDURE — 25000003 PHARM REV CODE 250: Performed by: INTERNAL MEDICINE

## 2022-08-03 PROCEDURE — A9585 GADOBUTROL INJECTION: HCPCS | Performed by: HOSPITALIST

## 2022-08-03 PROCEDURE — 87205 SMEAR GRAM STAIN: CPT

## 2022-08-03 PROCEDURE — 85025 COMPLETE CBC W/AUTO DIFF WBC: CPT | Performed by: INTERNAL MEDICINE

## 2022-08-03 PROCEDURE — 25000003 PHARM REV CODE 250: Performed by: EMERGENCY MEDICINE

## 2022-08-03 PROCEDURE — 63600175 PHARM REV CODE 636 W HCPCS: Performed by: EMERGENCY MEDICINE

## 2022-08-03 PROCEDURE — 25500020 PHARM REV CODE 255: Performed by: HOSPITALIST

## 2022-08-03 PROCEDURE — 87206 SMEAR FLUORESCENT/ACID STAI: CPT

## 2022-08-03 PROCEDURE — 87070 CULTURE OTHR SPECIMN AEROBIC: CPT

## 2022-08-03 PROCEDURE — 87015 SPECIMEN INFECT AGNT CONCNTJ: CPT

## 2022-08-03 PROCEDURE — 99223 1ST HOSP IP/OBS HIGH 75: CPT | Mod: ,,, | Performed by: PODIATRIST

## 2022-08-03 PROCEDURE — 99223 1ST HOSP IP/OBS HIGH 75: CPT | Mod: ,,, | Performed by: PHYSICIAN ASSISTANT

## 2022-08-03 PROCEDURE — 80053 COMPREHEN METABOLIC PANEL: CPT | Performed by: INTERNAL MEDICINE

## 2022-08-03 PROCEDURE — 11000001 HC ACUTE MED/SURG PRIVATE ROOM

## 2022-08-03 PROCEDURE — 87116 MYCOBACTERIA CULTURE: CPT

## 2022-08-03 PROCEDURE — 83036 HEMOGLOBIN GLYCOSYLATED A1C: CPT | Performed by: INTERNAL MEDICINE

## 2022-08-03 PROCEDURE — 83735 ASSAY OF MAGNESIUM: CPT | Performed by: INTERNAL MEDICINE

## 2022-08-03 PROCEDURE — 63600175 PHARM REV CODE 636 W HCPCS: Performed by: INTERNAL MEDICINE

## 2022-08-03 PROCEDURE — 87075 CULTR BACTERIA EXCEPT BLOOD: CPT

## 2022-08-03 PROCEDURE — 99223 PR INITIAL HOSPITAL CARE,LEVL III: ICD-10-PCS | Mod: ,,, | Performed by: PHYSICIAN ASSISTANT

## 2022-08-03 PROCEDURE — 99223 PR INITIAL HOSPITAL CARE,LEVL III: ICD-10-PCS | Mod: ,,, | Performed by: PODIATRIST

## 2022-08-03 PROCEDURE — 84100 ASSAY OF PHOSPHORUS: CPT | Performed by: INTERNAL MEDICINE

## 2022-08-03 PROCEDURE — 63600175 PHARM REV CODE 636 W HCPCS: Performed by: HOSPITALIST

## 2022-08-03 PROCEDURE — 99223 1ST HOSP IP/OBS HIGH 75: CPT | Mod: ,,, | Performed by: INTERNAL MEDICINE

## 2022-08-03 PROCEDURE — 96372 THER/PROPH/DIAG INJ SC/IM: CPT | Performed by: EMERGENCY MEDICINE

## 2022-08-03 PROCEDURE — C9399 UNCLASSIFIED DRUGS OR BIOLOG: HCPCS | Performed by: HOSPITALIST

## 2022-08-03 PROCEDURE — 25000003 PHARM REV CODE 250: Performed by: HOSPITALIST

## 2022-08-03 RX ORDER — GADOBUTROL 604.72 MG/ML
10 INJECTION INTRAVENOUS
Status: COMPLETED | OUTPATIENT
Start: 2022-08-03 | End: 2022-08-03

## 2022-08-03 RX ORDER — IBUPROFEN 200 MG
16 TABLET ORAL
Status: DISCONTINUED | OUTPATIENT
Start: 2022-08-03 | End: 2022-08-16 | Stop reason: HOSPADM

## 2022-08-03 RX ORDER — ACETAMINOPHEN 325 MG/1
975 TABLET ORAL 2 TIMES DAILY PRN
Status: ON HOLD | COMMUNITY
End: 2022-08-12 | Stop reason: HOSPADM

## 2022-08-03 RX ORDER — METRONIDAZOLE 500 MG/1
500 TABLET ORAL EVERY 8 HOURS
Status: DISCONTINUED | OUTPATIENT
Start: 2022-08-03 | End: 2022-08-03

## 2022-08-03 RX ORDER — ONDANSETRON 4 MG/1
4 TABLET, ORALLY DISINTEGRATING ORAL EVERY 8 HOURS PRN
Status: DISCONTINUED | OUTPATIENT
Start: 2022-08-03 | End: 2022-08-16 | Stop reason: HOSPADM

## 2022-08-03 RX ORDER — SODIUM CHLORIDE 0.9 % (FLUSH) 0.9 %
10 SYRINGE (ML) INJECTION
Status: DISCONTINUED | OUTPATIENT
Start: 2022-08-03 | End: 2022-08-16 | Stop reason: HOSPADM

## 2022-08-03 RX ORDER — PROCHLORPERAZINE EDISYLATE 5 MG/ML
5 INJECTION INTRAMUSCULAR; INTRAVENOUS EVERY 6 HOURS PRN
Status: DISCONTINUED | OUTPATIENT
Start: 2022-08-03 | End: 2022-08-16 | Stop reason: HOSPADM

## 2022-08-03 RX ORDER — NALOXONE HCL 0.4 MG/ML
0.02 VIAL (ML) INJECTION
Status: DISCONTINUED | OUTPATIENT
Start: 2022-08-03 | End: 2022-08-16 | Stop reason: HOSPADM

## 2022-08-03 RX ORDER — POLYETHYLENE GLYCOL 3350 17 G/17G
17 POWDER, FOR SOLUTION ORAL 2 TIMES DAILY PRN
Status: DISCONTINUED | OUTPATIENT
Start: 2022-08-03 | End: 2022-08-16 | Stop reason: HOSPADM

## 2022-08-03 RX ORDER — AMLODIPINE BESYLATE 5 MG/1
5 TABLET ORAL DAILY
Status: DISCONTINUED | OUTPATIENT
Start: 2022-08-03 | End: 2022-08-07

## 2022-08-03 RX ORDER — IBUPROFEN 200 MG
400 TABLET ORAL 2 TIMES DAILY PRN
Status: ON HOLD | COMMUNITY
End: 2022-08-12 | Stop reason: HOSPADM

## 2022-08-03 RX ORDER — LISINOPRIL 5 MG/1
5 TABLET ORAL DAILY
Status: DISCONTINUED | OUTPATIENT
Start: 2022-08-03 | End: 2022-08-05

## 2022-08-03 RX ORDER — INSULIN ASPART 100 [IU]/ML
0-5 INJECTION, SOLUTION INTRAVENOUS; SUBCUTANEOUS
Status: DISCONTINUED | OUTPATIENT
Start: 2022-08-03 | End: 2022-08-06

## 2022-08-03 RX ORDER — IBUPROFEN 200 MG
24 TABLET ORAL
Status: DISCONTINUED | OUTPATIENT
Start: 2022-08-03 | End: 2022-08-16 | Stop reason: HOSPADM

## 2022-08-03 RX ORDER — HYDRALAZINE HYDROCHLORIDE 25 MG/1
25 TABLET, FILM COATED ORAL EVERY 8 HOURS PRN
Status: DISCONTINUED | OUTPATIENT
Start: 2022-08-03 | End: 2022-08-16 | Stop reason: HOSPADM

## 2022-08-03 RX ORDER — GLUCAGON 1 MG
1 KIT INJECTION
Status: DISCONTINUED | OUTPATIENT
Start: 2022-08-03 | End: 2022-08-16 | Stop reason: HOSPADM

## 2022-08-03 RX ORDER — SIMETHICONE 80 MG
1 TABLET,CHEWABLE ORAL 4 TIMES DAILY PRN
Status: DISCONTINUED | OUTPATIENT
Start: 2022-08-03 | End: 2022-08-16 | Stop reason: HOSPADM

## 2022-08-03 RX ORDER — ACETAMINOPHEN 325 MG/1
650 TABLET ORAL EVERY 8 HOURS PRN
Status: DISCONTINUED | OUTPATIENT
Start: 2022-08-03 | End: 2022-08-16 | Stop reason: HOSPADM

## 2022-08-03 RX ORDER — IPRATROPIUM BROMIDE AND ALBUTEROL SULFATE 2.5; .5 MG/3ML; MG/3ML
3 SOLUTION RESPIRATORY (INHALATION) EVERY 6 HOURS PRN
Status: DISCONTINUED | OUTPATIENT
Start: 2022-08-03 | End: 2022-08-16 | Stop reason: HOSPADM

## 2022-08-03 RX ORDER — ENOXAPARIN SODIUM 100 MG/ML
40 INJECTION SUBCUTANEOUS EVERY 24 HOURS
Status: DISCONTINUED | OUTPATIENT
Start: 2022-08-03 | End: 2022-08-03

## 2022-08-03 RX ORDER — TALC
6 POWDER (GRAM) TOPICAL NIGHTLY PRN
Status: DISCONTINUED | OUTPATIENT
Start: 2022-08-03 | End: 2022-08-16 | Stop reason: HOSPADM

## 2022-08-03 RX ORDER — ENOXAPARIN SODIUM 100 MG/ML
40 INJECTION SUBCUTANEOUS EVERY 12 HOURS
Status: DISCONTINUED | OUTPATIENT
Start: 2022-08-03 | End: 2022-08-16 | Stop reason: HOSPADM

## 2022-08-03 RX ORDER — INSULIN ASPART 100 [IU]/ML
5 INJECTION, SOLUTION INTRAVENOUS; SUBCUTANEOUS
Status: DISCONTINUED | OUTPATIENT
Start: 2022-08-03 | End: 2022-08-05

## 2022-08-03 RX ADMIN — AMLODIPINE BESYLATE 5 MG: 5 TABLET ORAL at 09:08

## 2022-08-03 RX ADMIN — METRONIDAZOLE 500 MG: 500 TABLET ORAL at 03:08

## 2022-08-03 RX ADMIN — ENOXAPARIN SODIUM 40 MG: 40 INJECTION SUBCUTANEOUS at 07:08

## 2022-08-03 RX ADMIN — INSULIN ASPART 5 UNITS: 100 INJECTION, SOLUTION INTRAVENOUS; SUBCUTANEOUS at 11:08

## 2022-08-03 RX ADMIN — VANCOMYCIN HYDROCHLORIDE 2000 MG: 500 INJECTION, POWDER, LYOPHILIZED, FOR SOLUTION INTRAVENOUS at 03:08

## 2022-08-03 RX ADMIN — INSULIN ASPART 5 UNITS: 100 INJECTION, SOLUTION INTRAVENOUS; SUBCUTANEOUS at 04:08

## 2022-08-03 RX ADMIN — INSULIN ASPART 4 UNITS: 100 INJECTION, SOLUTION INTRAVENOUS; SUBCUTANEOUS at 11:08

## 2022-08-03 RX ADMIN — INSULIN DETEMIR 10 UNITS: 100 INJECTION, SOLUTION SUBCUTANEOUS at 10:08

## 2022-08-03 RX ADMIN — INSULIN ASPART 3 UNITS: 100 INJECTION, SOLUTION INTRAVENOUS; SUBCUTANEOUS at 05:08

## 2022-08-03 RX ADMIN — INSULIN ASPART 3 UNITS: 100 INJECTION, SOLUTION INTRAVENOUS; SUBCUTANEOUS at 08:08

## 2022-08-03 RX ADMIN — LISINOPRIL 5 MG: 5 TABLET ORAL at 03:08

## 2022-08-03 RX ADMIN — CEFTRIAXONE 2 G: 2 INJECTION, SOLUTION INTRAVENOUS at 07:08

## 2022-08-03 RX ADMIN — HYDRALAZINE HYDROCHLORIDE 25 MG: 25 TABLET, FILM COATED ORAL at 02:08

## 2022-08-03 RX ADMIN — INSULIN ASPART 6 UNITS: 100 INJECTION, SOLUTION INTRAVENOUS; SUBCUTANEOUS at 12:08

## 2022-08-03 RX ADMIN — INSULIN ASPART 3 UNITS: 100 INJECTION, SOLUTION INTRAVENOUS; SUBCUTANEOUS at 07:08

## 2022-08-03 RX ADMIN — GADOBUTROL 10 ML: 604.72 INJECTION INTRAVENOUS at 03:08
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
## 2022-08-03 NOTE — ASSESSMENT & PLAN NOTE
Mr. Caceres is a 55 y/o male with poorly controlled DMII, HTN admitted for osteomyelitis of right hallux and 5th toe.     Recommendations  1. Continue empiric vanc/ceftriaxone at this time   2. Agree with podiatry evaluation. Please send bone cultures for gram stain, aerobic, anaerobic, fungal, afb if able of 5th digit. Review MRI today.   3. Consider u/s soft tissue of RUE r/o deep infx   4. General surgery following, necrotizing fasciitis unlikely at this time  5. Glucose mgmt per primary

## 2022-08-03 NOTE — PROGRESS NOTES
Pharmacokinetic Initial Assessment: IV Vancomycin    Assessment/Plan:    Initiate intravenous vancomycin with loading dose of 2000 mg once followed by a maintenance dose of vancomycin 2000mg IV every 12 hours  Desired empiric serum trough concentration is 15 to 20 mcg/mL  Draw vancomycin trough level 60 min prior to fourth dose on 8/4 at approximately 1430  Pharmacy will continue to follow and monitor vancomycin.      Please contact pharmacy at extension [phone] with any questions regarding this assessment.     Thank you for the consult,   Becca Kenyon       Patient brief summary:  Renee Caceres is a 56 y.o. male initiated on antimicrobial therapy with IV Vancomycin for treatment of suspected bone/joint infection    Drug Allergies:   Review of patient's allergies indicates:  No Known Allergies    Actual Body Weight:   136.1kg    Renal Function:   Estimated Creatinine Clearance: 114.1 mL/min (based on SCr of 1.1 mg/dL).,     Dialysis Method (if applicable):  N/A    CBC (last 72 hours):  Recent Labs   Lab Result Units 08/02/22 1927 08/03/22  0423   WBC K/uL 13.12* 13.32*   Hemoglobin g/dL 12.9* 11.4*   Hematocrit % 38.1* 34.5*   Platelets K/uL 437 391   Gran % % 72.0 76.4*   Lymph % % 17.7* 11.9*   Mono % % 7.3 8.5   Eosinophil % % 1.5 1.3   Basophil % % 0.5 0.4   Differential Method  Automated Automated       Metabolic Panel (last 72 hours):  Recent Labs   Lab Result Units 08/02/22 2000 08/02/22  2215   Sodium mmol/L  --  135*   Potassium mmol/L  --  4.3   Chloride mmol/L  --  95   CO2 mmol/L  --  26   Glucose mg/dL  --  319*   Glucose, UA  2+*  --    BUN mg/dL  --  18   Creatinine mg/dL  --  1.1   Albumin g/dL  --  2.6*   Total Bilirubin mg/dL  --  0.5   Alkaline Phosphatase U/L  --  138*   AST U/L  --  54*   ALT U/L  --  59*   Magnesium mg/dL  --  1.5*   Phosphorus mg/dL  --  2.5*       Drug levels (last 3 results):  No results for input(s): VANCOMYCINRA, VANCORANDOM, VANCOMYCINPE, VANCOPEAK, VANCOMYCINTR,  [facility] in the last 72 hours.    Microbiologic Results:  Microbiology Results (last 7 days)     Procedure Component Value Units Date/Time    Blood culture #1 [[id number]] Collected: 08/02/22 1935    Order Status: Completed Specimen: Blood from Peripheral, Wrist, Left Updated: 08/03/22 0315     Blood Culture, Routine No Growth to date    Narrative:      Blood Culture #1    Blood culture #2 [[id number]] Collected: 08/02/22 1927    Order Status: Completed Specimen: Blood from Peripheral, Hand, Left Updated: 08/03/22 0315     Blood Culture, Routine No Growth to date    Narrative:      Blood Culture #2    Urine culture [[id number]] Collected: 08/02/22 2000    Order Status: No result Specimen: Urine Updated: 08/02/22 2031

## 2022-08-03 NOTE — HPI
Mr. Caceres is a 55 y/o male with poorly controlled DMII, HTN admitted for right 5th toe osteomyelitis. He works as a  at a restaurant and developed this wound in the last few weeks. He denies having any fever chills orn ight sweats. He was seen by general surgery and necrotizing  fasciitis was ruled out. No fevers. WBC 13K on admit. Podiatry consulted. He is on empiric abx at this time.

## 2022-08-03 NOTE — ED NOTES
Tried to draw more blood for hemolyzed specimen. IV came out . Transport here to take to [facility]. Pt transported to [facility] at this time. Will restart IV and get labs when pt returns

## 2022-08-03 NOTE — PROGRESS NOTES
Progress Note  Hospital Medicine      Patient Name: Renee Caceres  MRN: [id number]  Patient Class: IP- Inpatient  Admission Date: 8/2/2022    SUBJECTIVE:     Follow-up For:  Osteomyelitis of great toe of right foot     Interval history/ROS:   8/3: new admission for toe osteo - appears acute    HPI: 55 yo male with diabetes, HTN presenting 2/2 R foot swelling that started acutely 3 days ago and progressively has gotten worse. He states he had a wound on his right great toe and 5th toe that has been there for a while. Today it had not gotten any better so he decided to come in for evaluation. He has not been on any antibiotics for it. He states that he was on medications at one point but no longer takes them as he thought he had gotten them under control. In the ED, concern with elevated WBC, ESR, CRP, xr of foot showed osteo of great toe. General surgery consulted to rule nec fasc 2/2 gas in the 5th toe. Medicine called for admission. Rocephin started.        OBJECTIVE:     Body mass index is 43.65 kg/m².    Vital Signs Range (Last 24H):  Temp:  [97.9 °F (36.6 °C)-98.4 °F (36.9 °C)]   Pulse:  []   Resp:  [16-20]   BP: (140-193)/()   SpO2:  [93 %-98 %]     I & O (Last 24H):    Intake/Output Summary (Last 24 hours) at 8/3/2022 0932  Last data filed at 8/3/2022 0514  Gross per 24 hour   Intake --   Output 450 ml   Net -450 ml        Physical Exam:  Vitals reviewed.   Constitutional:       General: He is not in acute distress.     Appearance: He is well-developed.   HENT:      Head: Normocephalic and atraumatic.      Nose: Nose normal. No rhinorrhea.      Mouth/Throat:      Mouth: Mucous membranes are moist.   Eyes:      General: No scleral icterus.        Right eye: No discharge.         Left eye: No discharge.      Pupils: Pupils are equal, round, and reactive to light.   Neck:      Vascular: No JVD.   Cardiovascular:      Rate and Rhythm: Normal rate and regular rhythm.      Heart sounds: Normal heart  sounds. No murmur heard.    No friction rub.   Pulmonary:      Effort: Pulmonary effort is normal. No respiratory distress.      Breath sounds: Normal breath sounds. No wheezing.   Abdominal:      General: Bowel sounds are normal. There is no distension.      Palpations: Abdomen is soft.      Tenderness: There is no abdominal tenderness.   Musculoskeletal:         General: No deformity. Normal range of motion.      Cervical back: Normal range of motion and neck supple.   Skin:     General: Skin is warm and dry.      Comments: Right great toe with wound on bottom, right 5th toe with wound on lateral part of toe, Right upper extremity as well with swelling, no obvious skin changes   Neurological:      General: No focal deficit present.      Mental Status: He is alert and oriented to person, place, and time.   Psychiatric:         Mood and Affect: Mood normal.         Behavior: Behavior normal.            CRANIAL NERVES      CN III, IV, VI   Pupils are equal, round, and reactive to light.    Recent Labs   Lab 08/02/22 2215 08/03/22 0423   * 135*   K 4.3 3.9   CL 95 96   CO2 26 28   BUN 18 17   CREATININE 1.1 1.0   * 325*   CALCIUM 9.6 9.1   MG 1.5* 1.6   PHOS 2.5* 3.0     Recent Labs   Lab 08/02/22 2215 08/03/22  0423   ALKPHOS 138* 127   ALT 59* 55*   AST 54* 54*   ALBUMIN 2.6* 2.3*   PROT 9.0* 7.8   BILITOT 0.5 0.5       Recent Labs   Lab 08/02/22 1927 08/03/22  0423   WBC 13.12* 13.32*   HGB 12.9* 11.4*   HCT 38.1* 34.5*    391   GRAN 72.0  9.5* 76.4*  10.2*   LYMPH 17.7*  2.3 11.9*  1.6   MONO 7.3  1.0 8.5  1.1*       Recent Labs   Lab 08/02/22  2331 08/03/22  0251 08/03/22  0421 08/03/22  0740   POCTGLUCOSE 338* 265* 311* 294*       No results for input(s): TROPONINI in the last 168 hours.    Diagnostic Results:  Labs: Reviewed    amLODIPine, 5 mg, Oral, Daily  cefTRIAXone (ROCEPHIN) IVPB, 2 g, Intravenous, Q24H  enoxaparin, 40 mg, Subcutaneous, Q12H  insulin aspart U-100, 5 Units,  "Subcutaneous, TIDWM  insulin detemir U-100, 10 Units, Subcutaneous, Daily  lisinopriL, 5 mg, Oral, Daily  metroNIDAZOLE, 500 mg, Oral, Q8H  vancomycin (VANCOCIN) IVPB, 2,000 mg, Intravenous, Q12H        As Needed acetaminophen, albuterol-ipratropium, dextrose 10%, dextrose 10%, glucagon (human recombinant), glucose, glucose, hydrALAZINE, insulin aspart U-100, melatonin, naloxone, ondansetron, polyethylene glycol, prochlorperazine, simethicone, sodium chloride 0.9%, Pharmacy to dose Vancomycin consult **AND** vancomycin - pharmacy to dose    ASSESSMENT/PLAN:     Assessment: Renee Caceres is a 56 y.o. male here with:     Active Hospital Problems    Diagnosis  POA    *Osteomyelitis of great toe of right foot [M86.9]  Yes    Cellulitis of right lower extremity [L03.115]  Yes    Primary hypertension [I10]  Yes    Uncontrolled type 2 diabetes mellitus with hyperglycemia, without long-term current use of insulin [E11.65]  Yes    Swelling of right upper extremity [M79.89]  Yes      Resolved Hospital Problems   No resolved problems to display.        Plan:     Osteomyelitis of great toe of right foot  As per surgery recs,  "- No evidence of a necrotizing infection at physical exam.   - Patient with a chronic wound opened to air, this may be the reason there is some soft tissue gas seen in the foot Xray.   - Mild leukocytosis without a left shift. No crepitus at physical exam, no tenderness, no induration, no acute skin changes.   - Wound looks chronic in nature.   - Patient will need antibiotics for great toe osteomyelitis and RLE cellulitis   - Will need a podiatry consult for 5th toe and great toe wounds.   - Pitting edema may be related to cellulitis.   - No acute intervention needed, please call surgery for any questions or concerns."  Continue rocephin at this time, add vanc and flagyl   - podiatry consult  - ID assessment  - MRI   - vascular ultrasound SANDEE      Swelling of right upper extremity  U/S clear, " will continue abx, raise arm as well        Uncontrolled type 2 diabetes mellitus with hyperglycemia, without long-term current use of insulin  Patient's FSGs are uncontrolled due to hyperglycemia on current medication regimen.  Last A1c reviewed-         Lab Results   Component Value Date     HGBA1C 15.0 (H) 03/19/2015      Most recent fingerstick glucose reviewed-       Recent Labs   Lab 08/02/22  2331   POCTGLUCOSE 338*      Current correctional scale  Low  Maintain anti-hyperglycemic dose as follows-   Levemir 10  Novolog 5 TID     Primary hypertension  Uncontrolled, not on medication  Will start amlodipine and lisinopril   PRNs if needed  Urine protein ratio         Cellulitis of right lower extremity  Acute, recs per surgery as above, Continue rocephin add vanc and flagyl  U/S performed without clots        HIGH RISK CONDITION(S):  Patient has a condition that poses threat to life and bodily function: Sepsis      DVT ppx: lovenox      Manuela Amezcua MD

## 2022-08-03 NOTE — ASSESSMENT & PLAN NOTE
"As per surgery recs,  "- No evidence of a necrotizing infection at physical exam.   - Patient with a chronic wound opened to air, this may be the reason there is some soft tissue gas seen in the foot Xray.   - Mild leukocytosis without a left shift. No crepitus at physical exam, no tenderness, no induration, no acute skin changes.   - Wound looks chronic in nature.   - Patient will need antibiotics for great toe osteomyelitis and RLE cellulitis   - Will need a podiatry consult for 5th toe and great toe wounds.   - Pitting edema may be related to cellulitis.   - No acute intervention needed, please call surgery for any questions or concerns."  Continue rocephin at this time, add vanc  "

## 2022-08-03 NOTE — SUBJECTIVE & OBJECTIVE
"Past Medical History:   Diagnosis Date    Primary hypertension 8/3/2022       Past Surgical History:   Procedure Laterality Date    LEG SURGERY Left        Review of patient's allergies indicates:  No Known Allergies    Medications:  No medications prior to admission.     Antibiotics (From admission, onward)                Start     Stop Route Frequency Ordered    08/03/22 1900  cefTRIAXone (ROCEPHIN) 2 g/50 mL D5W IVPB        Question:  Is the patient competent?  Answer:  Yes    -- IV Every 24 hours (non-standard times) 08/03/22 0141    08/03/22 1530  vancomycin 2 g in dextrose 5 % 500 mL IVPB         -- IV Every 12 hours (non-standard times) 08/03/22 0456    08/03/22 1400  metroNIDAZOLE tablet 500 mg         -- Oral Every 8 hours 08/03/22 0935    08/03/22 0255  vancomycin - pharmacy to dose  (vancomycin IVPB)        "And" Linked Group Details    -- IV pharmacy to manage frequency 08/03/22 0155          Antifungals (From admission, onward)                None          Antivirals (From admission, onward)      None             Immunization History   Administered Date(s) Administered    COVID-19 Vaccine 08/16/2021, 09/28/2021    Tdap 04/19/2019       Family History       Problem Relation (Age of Onset)    Hypertension Brother          Social History     Socioeconomic History    Marital status: Single   Tobacco Use    Smoking status: Never Smoker    Smokeless tobacco: Never Used   Substance and Sexual Activity    Alcohol use: Yes    Drug use: No     Review of Systems   Constitutional:  Negative for activity change, appetite change, chills, diaphoresis, fatigue and fever.   Respiratory:  Negative for cough and shortness of breath.    Gastrointestinal:  Negative for abdominal pain, diarrhea, nausea and vomiting.   Genitourinary:  Negative for dysuria.   Musculoskeletal:  Negative for back pain.   Skin:  Positive for color change and wound. Negative for rash.   Neurological:  Negative for dizziness and headaches. "   Objective:     Vital Signs (Most Recent):  Temp: 97.5 °F (36.4 °C) (08/03/22 1120)  Pulse: 82 (08/03/22 1120)  Resp: 18 (08/03/22 1120)  BP: (!) 186/87 (08/03/22 1120)  SpO2: (!) 93 % (08/03/22 1120)   Vital Signs (24h Range):  Temp:  [97.5 °F (36.4 °C)-98.4 °F (36.9 °C)] 97.5 °F (36.4 °C)  Pulse:  [] 82  Resp:  [16-20] 18  SpO2:  [93 %-98 %] 93 %  BP: (140-193)/() 186/87     Weight: (!) 158.4 kg (349 lb 3.3 oz)  Body mass index is 43.65 kg/m².    Estimated Creatinine Clearance: 136.3 mL/min (based on SCr of 1 mg/dL).    Physical Exam  Vitals and nursing note reviewed.   Constitutional:       General: He is not in acute distress.     Appearance: He is well-developed. He is obese. He is not diaphoretic.   HENT:      Head: Normocephalic and atraumatic.   Eyes:      Pupils: Pupils are equal, round, and reactive to light.   Cardiovascular:      Rate and Rhythm: Normal rate and regular rhythm.      Heart sounds: Normal heart sounds. No murmur heard.    No friction rub. No gallop.   Pulmonary:      Effort: Pulmonary effort is normal. No respiratory distress.      Breath sounds: Normal breath sounds. No wheezing or rales.   Chest:      Chest wall: No tenderness.   Abdominal:      General: Bowel sounds are normal. There is no distension.      Palpations: There is no mass.      Tenderness: There is no abdominal tenderness. There is no guarding.   Musculoskeletal:         General: Swelling and tenderness (RUE) present. No deformity. Normal range of motion.      Cervical back: Normal range of motion and neck supple.      Right lower leg: Edema present.   Skin:     General: Skin is warm and dry.      Findings: Erythema (RLE) present. No rash.      Comments: Ascending cellulitis RLE   Neurological:      General: No focal deficit present.      Mental Status: He is alert and oriented to person, place, and time.   Psychiatric:         Behavior: Behavior normal.         Thought Content: Thought content normal.          Judgment: Judgment normal.             Significant Labs: All pertinent labs within the past 24 hours have been reviewed.    Significant Imaging: I have reviewed all pertinent imaging results/findings within the past 24 hours.

## 2022-08-03 NOTE — ASSESSMENT & PLAN NOTE
Patient's FSGs are uncontrolled due to hyperglycemia on current medication regimen.  Last A1c reviewed-   Lab Results   Component Value Date    HGBA1C 15.0 (H) 03/19/2015     Most recent fingerstick glucose reviewed-   Recent Labs   Lab 08/02/22  2331   POCTGLUCOSE 338*     Current correctional scale  Low  Maintain anti-hyperglycemic dose as follows-   Antihyperglycemics (From admission, onward)            Start     Stop Route Frequency Ordered    08/03/22 0240  insulin aspart U-100 pen 0-5 Units         -- SubQ Before meals & nightly PRN 08/03/22 0141        Hold Oral hypoglycemics while patient is in the hospital.

## 2022-08-03 NOTE — PLAN OF CARE
[facility] - Med Surg  Initial Discharge Assessment       Primary Care Provider: Primary Doctor No    Admission Diagnosis: Hyperglycemia without ketosis [R73.9]  Chest pain [R07.9]  Pain and swelling of right lower extremity [M79.604, M79.89]  Open wound of great toe without damage to nail, initial encounter [S91.103A]  Osteomyelitis of great toe of right foot [M86.9]  Localized swelling of right forearm [R22.31]    Admission Date: 8/2/2022  Expected Discharge Date:     Discharge Barriers Identified: None    Payor: BLUE CROSS BLUE SHIELD / Plan: [place] [place] PPO / Product Type: PPO /     Extended Emergency Contact Information  Primary Emergency Contact: Radha Caceres   [place]  Mobile Phone: [phone]  Relation: Mother    Discharge Plan A: Home with family  Discharge Plan B: Home with family      [id number] [address] [facility]  [address]  [facility] [address]  Phone: [phone] Fax: [phone]      Initial Assessment (most recent)     Adult Discharge Assessment - 08/03/22 0921        Discharge Assessment    Assessment Type Discharge Planning Assessment     Confirmed/corrected address, phone number and insurance Yes     Confirmed Demographics Correct on Facesheet     Source of Information patient     Communicated JOSÉ ANTONIO with patient/caregiver Yes     Reason For Admission osteomyelitis     Lives With other (see comments)   roommate    Do you expect to return to your current living situation? Yes     Do you have help at home or someone to help you manage your care at home? No     Prior to hospitilization cognitive status: Alert/Oriented     Current cognitive status: Alert/Oriented     Walking or Climbing Stairs Difficulty none     Dressing/Bathing Difficulty none     Equipment Currently Used at Home none     Readmission within 30 days? No     Patient currently being followed by outpatient case management? No      Do you currently have service(s) that help you manage your care at home? No     Do you take prescription medications? Yes     Do you have prescription coverage? Yes     Coverage bcbs     Do you have any problems affording any of your prescribed medications? No     Is the patient taking medications as prescribed? yes     Who is going to help you get home at discharge? n/a     How do you get to doctors appointments? family or friend will provide;car, drives self     Are you on dialysis? No     Do you take coumadin? No     Discharge Plan A Home with family     Discharge Plan B Home with family     DME Needed Upon Discharge  walker, rolling     Discharge Plan discussed with: Patient     Discharge Barriers Identified None                    Cm spoke to patient in the room.  He lives with a roommate at the address on the facesheet.  No home health. No home o2.  No dme.  But he would like a rolling walker at [place].  Cm will continue to follow-up. Pt drove here and will drive himself home.     DCP: home    Charu Moran RN [facility] [phone]

## 2022-08-03 NOTE — H&P
[facility] - Emergency Dept  [facility] Medicine  History & Physical    Patient Name: Renee Caceres  MRN: [id number]  Patient Class: IP- Inpatient  Admission Date: 8/2/2022  Attending Physician: Henry Garvin MD   Primary Care Provider: Primary Doctor No       Patient information was obtained from patient, past medical records and ER records.     Subjective:     Principal Problem:Osteomyelitis of great toe of right foot    Chief Complaint:   Chief Complaint   Patient presents with    Swelling     Right arm and leg swelling that started Saturday. Denies pain. Denies medical hx.        HPI: 55 yo male with diabetes, HTN presenting 2/2 R foot swelling that started acutely 3 days ago and progressively has gotten worse. He states he had a wound on his right great toe and 5th toe that has been there for a while. Today it had not gotten any better so he decided to come in for evaluation. He has not been on any antibiotics for it. He states that he was on medications at one point but no longer takes them as he thought he had gotten them under control. In the ED, concern with elevated WBC, ESR, CRP, xr of foot showed osteo of great toe. General surgery consulted to rule nec fasc 2/2 gas in the 5th toe. Medicine called for admission. Rocephin started.         Past Medical History:   Diagnosis Date    Primary hypertension 8/3/2022       Past Surgical History:   Procedure Laterality Date    LEG SURGERY Left        Review of patient's allergies indicates:  No Known Allergies    No current facility-administered medications on file prior to encounter.     Current Outpatient Medications on File Prior to Encounter   Medication Sig    hydroCHLOROthiazide (HYDRODIURIL) 12.5 MG Tab Take 2 tablets (25 mg total) by mouth once daily.    hydrocodone-acetaminophen 5-325mg (NORCO) 5-325 mg per tablet Take 2 tablets by mouth every 4 (four) hours as needed for Pain.    lisinopril (PRINIVIL,ZESTRIL) 5 MG tablet Take 1 tablet (5 mg  total) by mouth once daily.    metformin (GLUCOPHAGE) 500 MG tablet Take 1 tablet (500 mg total) by mouth 2 (two) times daily with meals.     Family History       Problem Relation (Age of Onset)    Hypertension Brother          Tobacco Use    Smoking status: Never Smoker    Smokeless tobacco: Never Used   Substance and Sexual Activity    Alcohol use: Yes    Drug use: No    Sexual activity: Not on file     Review of Systems   Constitutional:  Negative for activity change, appetite change, chills and fever.   HENT:  Negative for congestion, hearing loss and rhinorrhea.    Eyes:  Negative for discharge, itching and visual disturbance.   Respiratory:  Negative for apnea, cough and shortness of breath.    Cardiovascular:  Negative for chest pain, palpitations and leg swelling.   Gastrointestinal:  Negative for abdominal distention, abdominal pain, constipation, diarrhea, nausea and vomiting.   Endocrine: Negative for cold intolerance and heat intolerance.   Genitourinary:  Negative for dysuria and hematuria.   Musculoskeletal:  Negative for back pain, neck pain and neck stiffness.   Skin:  Positive for wound. Negative for rash.   Neurological:  Negative for dizziness, seizures, light-headedness and headaches.   Psychiatric/Behavioral:  Negative for agitation, confusion and suicidal ideas.    Objective:     Vital Signs (Most Recent):  Temp: 98.4 °F (36.9 °C) (08/02/22 2319)  Pulse: 83 (08/02/22 2319)  Resp: 16 (08/02/22 2319)  BP: (S) (!) 190/87 (MD notified) (08/02/22 2319)  SpO2: 98 % (08/02/22 2319)   Vital Signs (24h Range):  Temp:  [98.3 °F (36.8 °C)-98.4 °F (36.9 °C)] 98.4 °F (36.9 °C)  Pulse:  [] 83  Resp:  [16-20] 16  SpO2:  [97 %-98 %] 98 %  BP: (171-190)/() 190/87     Weight: 136.1 kg (300 lb)  Body mass index is 37.5 kg/m².    Physical Exam  Vitals reviewed.   Constitutional:       General: He is not in acute distress.     Appearance: He is well-developed.   HENT:      Head: Normocephalic and  atraumatic.      Nose: Nose normal. No rhinorrhea.      Mouth/Throat:      Mouth: Mucous membranes are moist.   Eyes:      General: No scleral icterus.        Right eye: No discharge.         Left eye: No discharge.      Pupils: Pupils are equal, round, and reactive to light.   Neck:      Vascular: No JVD.   Cardiovascular:      Rate and Rhythm: Normal rate and regular rhythm.      Heart sounds: Normal heart sounds. No murmur heard.    No friction rub.   Pulmonary:      Effort: Pulmonary effort is normal. No respiratory distress.      Breath sounds: Normal breath sounds. No wheezing.   Abdominal:      General: Bowel sounds are normal. There is no distension.      Palpations: Abdomen is soft.      Tenderness: There is no abdominal tenderness.   Musculoskeletal:         General: No deformity. Normal range of motion.      Cervical back: Normal range of motion and neck supple.   Skin:     General: Skin is warm and dry.      Comments: Right great toe with wound on bottom, right 5th toe with wound on lateral part of toe, Right upper extremity as well with swelling, no obvious skin changes   Neurological:      General: No focal deficit present.      Mental Status: He is alert and oriented to person, place, and time.   Psychiatric:         Mood and Affect: Mood normal.         Behavior: Behavior normal.         CRANIAL NERVES     CN III, IV, VI   Pupils are equal, round, and reactive to light.     Significant Labs: All pertinent labs within the past 24 hours have been reviewed.    CBC:   Recent Labs   Lab 08/02/22  1927   WBC 13.12*   HGB 12.9*   HCT 38.1*        CMP:   Recent Labs   Lab 08/02/22  2215   *   K 4.3   CL 95   CO2 26   *   BUN 18   CREATININE 1.1   CALCIUM 9.6   PROT 9.0*   ALBUMIN 2.6*   BILITOT 0.5   ALKPHOS 138*   AST 54*   ALT 59*   ANIONGAP 14     POCT Glucose:   Recent Labs   Lab 08/02/22  2331   POCTGLUCOSE 338*       Significant Imaging: I have reviewed all pertinent imaging  "results/findings within the past 24 hours.    Assessment/Plan:     * Osteomyelitis of great toe of right foot  As per surgery recs,  "- No evidence of a necrotizing infection at physical exam.   - Patient with a chronic wound opened to air, this may be the reason there is some soft tissue gas seen in the foot Xray.   - Mild leukocytosis without a left shift. No crepitus at physical exam, no tenderness, no induration, no acute skin changes.   - Wound looks chronic in nature.   - Patient will need antibiotics for great toe osteomyelitis and RLE cellulitis   - Will need a podiatry consult for 5th toe and great toe wounds.   - Pitting edema may be related to cellulitis.   - No acute intervention needed, please call surgery for any questions or concerns."  Continue rocephin at this time, add vanc    Swelling of right upper extremity  U/S clear, will continue abx, raise arm as well      Uncontrolled type 2 diabetes mellitus with hyperglycemia, without long-term current use of insulin  Patient's FSGs are uncontrolled due to hyperglycemia on current medication regimen.  Last A1c reviewed-   Lab Results   Component Value Date    HGBA1C 15.0 (H) 03/19/2015     Most recent fingerstick glucose reviewed-   Recent Labs   Lab 08/02/22  2331   POCTGLUCOSE 338*     Current correctional scale  Low  Maintain anti-hyperglycemic dose as follows-   Antihyperglycemics (From admission, onward)            Start     Stop Route Frequency Ordered    08/03/22 0240  insulin aspart U-100 pen 0-5 Units         -- SubQ Before meals & nightly PRN 08/03/22 0141        Hold Oral hypoglycemics while patient is in the hospital.    Primary hypertension  Uncontrolled, not on medication  Will start amlodipine  PRNs if needed      Cellulitis of right lower extremity  Acute, recs per surgery as above, Continue rocephin add vanc  U/S performed without clots    VTE Risk Mitigation (From admission, onward)         Ordered     enoxaparin injection 40 mg  Daily   "       08/03/22 0141     IP VTE HIGH RISK PATIENT  Once         08/03/22 0141     Place sequential compression device  Until discontinued         08/03/22 0141                   Artie Martin MD  Department of Hospital Medicine   [facility] - Emergency Dept

## 2022-08-03 NOTE — PHARMACY MED REC
"Admission Medication History     The home medication history was taken by Sanjuana Olvera.    You may go to "Admission" then "Reconcile Home Medications" tabs to review and/or act upon these items.      The home medication list has been updated by the Pharmacy department.    Please read ALL comments highlighted in yellow.    Please address this information as you see fit.     Feel free to contact us if you have any questions or require assistance.        Medications listed below were obtained from: Patient/family  PTA Medications   Medication Sig    acetaminophen (TYLENOL) 325 MG tablet Take 975 mg by mouth 2 (two) times daily as needed for Pain.    ibuprofen (ADVIL,MOTRIN) 200 MG tablet Take 400 mg by mouth 2 (two) times daily as needed for Pain.         Sanjuana Olvera  [id number]                  .          "

## 2022-08-03 NOTE — HPI
55 yo male with diabetes, HTN presenting 2/2 R foot swelling that started acutely 3 days ago and progressively has gotten worse. He states he had a wound on his right great toe and 5th toe that has been there for a while. Today it had not gotten any better so he decided to come in for evaluation. He has not been on any antibiotics for it. He states that he was on medications at one point but no longer takes them as he thought he had gotten them under control. In the ED, concern with elevated WBC, ESR, CRP, xr of foot showed osteo of great toe. General surgery consulted to rule nec fasc 2/2 gas in the 5th toe. Medicine called for admission. Rocephin started.

## 2022-08-03 NOTE — CONSULTS
[facility] Surg  Infectious Disease  Consult Note    Patient Name: Renee Caceres  MRN: [id number]  Admission Date: 8/2/2022  Hospital Length of Stay: 0 days  Attending Physician: Manuela Amezcua MD  Primary Care Provider: Primary Doctor No     Isolation Status: No active isolations    Inpatient consult to Infectious Diseases  Consult performed by: Zi Serna PA-C  Consult ordered by: Manuela Amezcua MD        Assessment/Plan:     * Osteomyelitis of fifth toe of right foot  Mr. Caceres is a 55 y/o male with poorly controlled DMII, HTN admitted for osteomyelitis of right hallux and 5th toe.     Recommendations  1. Continue empiric vanc/ceftriaxone at this time   2. Agree with podiatry evaluation. Please send bone cultures for gram stain, aerobic, anaerobic, fungal, afb if able of 5th digit. Review MRI today.   3. Consider u/s soft tissue of RUE r/o deep infx   4. General surgery following, necrotizing fasciitis unlikely at this time  5. Glucose mgmt per primary         Thank you for your consult. I will follow-up with patient. Please contact us if you have any additional questions.    Zi Serna PA-C  Infectious Disease  [facility] Surg    Subjective:     Principal Problem: Osteomyelitis of fifth toe of right foot    HPI: Mr. Caceres is a 55 y/o male with poorly controlled DMII, HTN admitted for right 5th toe osteomyelitis. He works as a  at a restaurant and developed this wound in the last few weeks. He denies having any fever chills orn ight sweats. He was seen by general surgery and necrotizing  fasciitis was ruled out. No fevers. WBC 13K on admit. Podiatry consulted. He is on empiric abx at this time.       Past Medical History:   Diagnosis Date    Primary hypertension 8/3/2022       Past Surgical History:   Procedure Laterality Date    LEG SURGERY Left        Review of patient's allergies indicates:  No Known Allergies    Medications:  No medications prior to admission.  "    Antibiotics (From admission, onward)                Start     Stop Route Frequency Ordered    08/03/22 1900  cefTRIAXone (ROCEPHIN) 2 g/50 mL D5W IVPB        Question:  Is the patient competent?  Answer:  Yes    -- IV Every 24 hours (non-standard times) 08/03/22 0141    08/03/22 1530  vancomycin 2 g in dextrose 5 % 500 mL IVPB         -- IV Every 12 hours (non-standard times) 08/03/22 0456    08/03/22 1400  metroNIDAZOLE tablet 500 mg         -- Oral Every 8 hours 08/03/22 0935    08/03/22 0255  vancomycin - pharmacy to dose  (vancomycin IVPB)        "And" Linked Group Details    -- IV pharmacy to manage frequency 08/03/22 0155          Antifungals (From admission, onward)                None          Antivirals (From admission, onward)      None             Immunization History   Administered Date(s) Administered    COVID-19 Vaccine 08/16/2021, 09/28/2021    Tdap 04/19/2019       Family History       Problem Relation (Age of Onset)    Hypertension Brother          Social History     Socioeconomic History    Marital status: Single   Tobacco Use    Smoking status: Never Smoker    Smokeless tobacco: Never Used   Substance and Sexual Activity    Alcohol use: Yes    Drug use: No     Review of Systems   Constitutional:  Negative for activity change, appetite change, chills, diaphoresis, fatigue and fever.   Respiratory:  Negative for cough and shortness of breath.    Gastrointestinal:  Negative for abdominal pain, diarrhea, nausea and vomiting.   Genitourinary:  Negative for dysuria.   Musculoskeletal:  Negative for back pain.   Skin:  Positive for color change and wound. Negative for rash.   Neurological:  Negative for dizziness and headaches.   Objective:     Vital Signs (Most Recent):  Temp: 97.5 °F (36.4 °C) (08/03/22 1120)  Pulse: 82 (08/03/22 1120)  Resp: 18 (08/03/22 1120)  BP: (!) 186/87 (08/03/22 1120)  SpO2: (!) 93 % (08/03/22 1120)   Vital Signs (24h Range):  Temp:  [97.5 °F (36.4 °C)-98.4 °F (36.9 " °C)] 97.5 °F (36.4 °C)  Pulse:  [] 82  Resp:  [16-20] 18  SpO2:  [93 %-98 %] 93 %  BP: (140-193)/() 186/87     Weight: (!) 158.4 kg (349 lb 3.3 oz)  Body mass index is 43.65 kg/m².    Estimated Creatinine Clearance: 136.3 mL/min (based on SCr of 1 mg/dL).    Physical Exam  Vitals and nursing note reviewed.   Constitutional:       General: He is not in acute distress.     Appearance: He is well-developed. He is obese. He is not diaphoretic.   HENT:      Head: Normocephalic and atraumatic.   Eyes:      Pupils: Pupils are equal, round, and reactive to light.   Cardiovascular:      Rate and Rhythm: Normal rate and regular rhythm.      Heart sounds: Normal heart sounds. No murmur heard.    No friction rub. No gallop.   Pulmonary:      Effort: Pulmonary effort is normal. No respiratory distress.      Breath sounds: Normal breath sounds. No wheezing or rales.   Chest:      Chest wall: No tenderness.   Abdominal:      General: Bowel sounds are normal. There is no distension.      Palpations: There is no mass.      Tenderness: There is no abdominal tenderness. There is no guarding.   Musculoskeletal:         General: Swelling and tenderness (RUE) present. No deformity. Normal range of motion.      Cervical back: Normal range of motion and neck supple.      Right lower leg: Edema present.   Skin:     General: Skin is warm and dry.      Findings: Erythema (RLE) present. No rash.      Comments: Ascending cellulitis RLE   Neurological:      General: No focal deficit present.      Mental Status: He is alert and oriented to person, place, and time.   Psychiatric:         Behavior: Behavior normal.         Thought Content: Thought content normal.         Judgment: Judgment normal.             Significant Labs: All pertinent labs within the past 24 hours have been reviewed.    Significant Imaging: I have reviewed all pertinent imaging results/findings within the past 24 hours.

## 2022-08-03 NOTE — SUBJECTIVE & OBJECTIVE
Past Medical History:   Diagnosis Date    Primary hypertension 8/3/2022       Past Surgical History:   Procedure Laterality Date    LEG SURGERY Left        Review of patient's allergies indicates:  No Known Allergies    No current facility-administered medications on file prior to encounter.     Current Outpatient Medications on File Prior to Encounter   Medication Sig    hydroCHLOROthiazide (HYDRODIURIL) 12.5 MG Tab Take 2 tablets (25 mg total) by mouth once daily.    hydrocodone-acetaminophen 5-325mg (NORCO) 5-325 mg per tablet Take 2 tablets by mouth every 4 (four) hours as needed for Pain.    lisinopril (PRINIVIL,ZESTRIL) 5 MG tablet Take 1 tablet (5 mg total) by mouth once daily.    metformin (GLUCOPHAGE) 500 MG tablet Take 1 tablet (500 mg total) by mouth 2 (two) times daily with meals.     Family History       Problem Relation (Age of Onset)    Hypertension Brother          Tobacco Use    Smoking status: Never Smoker    Smokeless tobacco: Never Used   Substance and Sexual Activity    Alcohol use: Yes    Drug use: No    Sexual activity: Not on file     Review of Systems   Constitutional:  Negative for activity change, appetite change, chills and fever.   HENT:  Negative for congestion, hearing loss and rhinorrhea.    Eyes:  Negative for discharge, itching and visual disturbance.   Respiratory:  Negative for apnea, cough and shortness of breath.    Cardiovascular:  Negative for chest pain, palpitations and leg swelling.   Gastrointestinal:  Negative for abdominal distention, abdominal pain, constipation, diarrhea, nausea and vomiting.   Endocrine: Negative for cold intolerance and heat intolerance.   Genitourinary:  Negative for dysuria and hematuria.   Musculoskeletal:  Negative for back pain, neck pain and neck stiffness.   Skin:  Positive for wound. Negative for rash.   Neurological:  Negative for dizziness, seizures, light-headedness and headaches.   Psychiatric/Behavioral:  Negative for agitation,  confusion and suicidal ideas.    Objective:     Vital Signs (Most Recent):  Temp: 98.4 °F (36.9 °C) (08/02/22 2319)  Pulse: 83 (08/02/22 2319)  Resp: 16 (08/02/22 2319)  BP: (S) (!) 190/87 (MD notified) (08/02/22 2319)  SpO2: 98 % (08/02/22 2319)   Vital Signs (24h Range):  Temp:  [98.3 °F (36.8 °C)-98.4 °F (36.9 °C)] 98.4 °F (36.9 °C)  Pulse:  [] 83  Resp:  [16-20] 16  SpO2:  [97 %-98 %] 98 %  BP: (171-190)/() 190/87     Weight: 136.1 kg (300 lb)  Body mass index is 37.5 kg/m².    Physical Exam  Vitals reviewed.   Constitutional:       General: He is not in acute distress.     Appearance: He is well-developed.   HENT:      Head: Normocephalic and atraumatic.      Nose: Nose normal. No rhinorrhea.      Mouth/Throat:      Mouth: Mucous membranes are moist.   Eyes:      General: No scleral icterus.        Right eye: No discharge.         Left eye: No discharge.      Pupils: Pupils are equal, round, and reactive to light.   Neck:      Vascular: No JVD.   Cardiovascular:      Rate and Rhythm: Normal rate and regular rhythm.      Heart sounds: Normal heart sounds. No murmur heard.    No friction rub.   Pulmonary:      Effort: Pulmonary effort is normal. No respiratory distress.      Breath sounds: Normal breath sounds. No wheezing.   Abdominal:      General: Bowel sounds are normal. There is no distension.      Palpations: Abdomen is soft.      Tenderness: There is no abdominal tenderness.   Musculoskeletal:         General: No deformity. Normal range of motion.      Cervical back: Normal range of motion and neck supple.   Skin:     General: Skin is warm and dry.      Comments: Right great toe with wound on bottom, right 5th toe with wound on lateral part of toe, Right upper extremity as well with swelling, no obvious skin changes   Neurological:      General: No focal deficit present.      Mental Status: He is alert and oriented to person, place, and time.   Psychiatric:         Mood and Affect: Mood normal.          Behavior: Behavior normal.         CRANIAL NERVES     CN III, IV, VI   Pupils are equal, round, and reactive to light.     Significant Labs: All pertinent labs within the past 24 hours have been reviewed.    CBC:   Recent Labs   Lab 08/02/22  1927   WBC 13.12*   HGB 12.9*   HCT 38.1*        CMP:   Recent Labs   Lab 08/02/22  2215   *   K 4.3   CL 95   CO2 26   *   BUN 18   CREATININE 1.1   CALCIUM 9.6   PROT 9.0*   ALBUMIN 2.6*   BILITOT 0.5   ALKPHOS 138*   AST 54*   ALT 59*   ANIONGAP 14     POCT Glucose:   Recent Labs   Lab 08/02/22  2331   POCTGLUCOSE 338*       Significant Imaging: I have reviewed all pertinent imaging results/findings within the past 24 hours.

## 2022-08-04 LAB
ALBUMIN SERPL BCP-MCNC: 2.3 G/DL (ref 3.5–5.2)
ALP SERPL-CCNC: 142 U/L (ref 55–135)
ALT SERPL W/O P-5'-P-CCNC: 52 U/L (ref 10–44)
ANION GAP SERPL CALC-SCNC: 10 MMOL/L (ref 8–16)
AST SERPL-CCNC: 42 U/L (ref 10–40)
BASOPHILS # BLD AUTO: 0.07 K/UL (ref 0–0.2)
BASOPHILS NFR BLD: 0.5 % (ref 0–1.9)
BILIRUB SERPL-MCNC: 0.4 MG/DL (ref 0.1–1)
BUN SERPL-MCNC: 11 MG/DL (ref 6–20)
CALCIUM SERPL-MCNC: 9.3 MG/DL (ref 8.7–10.5)
CHLORIDE SERPL-SCNC: 96 MMOL/L (ref 95–110)
CO2 SERPL-SCNC: 28 MMOL/L (ref 23–29)
CREAT SERPL-MCNC: 1 MG/DL (ref 0.5–1.4)
DIFFERENTIAL METHOD: ABNORMAL
EOSINOPHIL # BLD AUTO: 0.3 K/UL (ref 0–0.5)
EOSINOPHIL NFR BLD: 1.9 % (ref 0–8)
ERYTHROCYTE [DISTWIDTH] IN BLOOD BY AUTOMATED COUNT: 14.2 % (ref 11.5–14.5)
EST. GFR  (NO RACE VARIABLE): >60 ML/MIN/1.73 M^2
GLUCOSE SERPL-MCNC: 264 MG/DL (ref 70–110)
GRAM STN SPEC: NORMAL
GRAM STN SPEC: NORMAL
HCT VFR BLD AUTO: 34.7 % (ref 40–54)
HGB BLD-MCNC: 11.4 G/DL (ref 14–18)
IMM GRANULOCYTES # BLD AUTO: 0.15 K/UL (ref 0–0.04)
IMM GRANULOCYTES NFR BLD AUTO: 1 % (ref 0–0.5)
LYMPHOCYTES # BLD AUTO: 2.1 K/UL (ref 1–4.8)
LYMPHOCYTES NFR BLD: 14.1 % (ref 18–48)
MAGNESIUM SERPL-MCNC: 1.6 MG/DL (ref 1.6–2.6)
MCH RBC QN AUTO: 27 PG (ref 27–31)
MCHC RBC AUTO-ENTMCNC: 32.9 G/DL (ref 32–36)
MCV RBC AUTO: 82 FL (ref 82–98)
MONOCYTES # BLD AUTO: 1.3 K/UL (ref 0.3–1)
MONOCYTES NFR BLD: 8.4 % (ref 4–15)
NEUTROPHILS # BLD AUTO: 11.2 K/UL (ref 1.8–7.7)
NEUTROPHILS NFR BLD: 74.1 % (ref 38–73)
NRBC BLD-RTO: 0 /100 WBC
PHOSPHATE SERPL-MCNC: 3.6 MG/DL (ref 2.7–4.5)
PLATELET # BLD AUTO: 397 K/UL (ref 150–450)
PMV BLD AUTO: 9.3 FL (ref 9.2–12.9)
POCT GLUCOSE: 219 MG/DL (ref 70–110)
POCT GLUCOSE: 236 MG/DL (ref 70–110)
POCT GLUCOSE: 256 MG/DL (ref 70–110)
POCT GLUCOSE: 256 MG/DL (ref 70–110)
POTASSIUM SERPL-SCNC: 3.9 MMOL/L (ref 3.5–5.1)
PROT SERPL-MCNC: 7.9 G/DL (ref 6–8.4)
RBC # BLD AUTO: 4.23 M/UL (ref 4.6–6.2)
SODIUM SERPL-SCNC: 134 MMOL/L (ref 136–145)
URATE SERPL-MCNC: 8.2 MG/DL (ref 3.4–7)
VANCOMYCIN TROUGH SERPL-MCNC: 21.7 UG/ML (ref 10–22)
WBC # BLD AUTO: 15.04 K/UL (ref 3.9–12.7)

## 2022-08-04 PROCEDURE — 84100 ASSAY OF PHOSPHORUS: CPT | Performed by: INTERNAL MEDICINE

## 2022-08-04 PROCEDURE — 99233 PR SUBSEQUENT HOSPITAL CARE,LEVL III: ICD-10-PCS | Mod: ,,, | Performed by: PHYSICIAN ASSISTANT

## 2022-08-04 PROCEDURE — 87102 FUNGUS ISOLATION CULTURE: CPT

## 2022-08-04 PROCEDURE — 20220 BONE BIOPSY TROCAR/NDL SUPFC: CPT | Mod: ,,, | Performed by: PODIATRIST

## 2022-08-04 PROCEDURE — 99233 PR SUBSEQUENT HOSPITAL CARE,LEVL III: ICD-10-PCS | Mod: ,,, | Performed by: STUDENT IN AN ORGANIZED HEALTH CARE EDUCATION/TRAINING PROGRAM

## 2022-08-04 PROCEDURE — 87206 SMEAR FLUORESCENT/ACID STAI: CPT

## 2022-08-04 PROCEDURE — 85025 COMPLETE CBC W/AUTO DIFF WBC: CPT | Performed by: INTERNAL MEDICINE

## 2022-08-04 PROCEDURE — 99233 PR SUBSEQUENT HOSPITAL CARE,LEVL III: ICD-10-PCS | Mod: 25,,, | Performed by: PODIATRIST

## 2022-08-04 PROCEDURE — 94761 N-INVAS EAR/PLS OXIMETRY MLT: CPT

## 2022-08-04 PROCEDURE — 36415 COLL VENOUS BLD VENIPUNCTURE: CPT

## 2022-08-04 PROCEDURE — 11000001 HC ACUTE MED/SURG PRIVATE ROOM

## 2022-08-04 PROCEDURE — 80202 ASSAY OF VANCOMYCIN: CPT | Performed by: INTERNAL MEDICINE

## 2022-08-04 PROCEDURE — 99233 SBSQ HOSP IP/OBS HIGH 50: CPT | Mod: ,,, | Performed by: PHYSICIAN ASSISTANT

## 2022-08-04 PROCEDURE — 25000003 PHARM REV CODE 250: Performed by: STUDENT IN AN ORGANIZED HEALTH CARE EDUCATION/TRAINING PROGRAM

## 2022-08-04 PROCEDURE — 99233 SBSQ HOSP IP/OBS HIGH 50: CPT | Mod: 25,,, | Performed by: PODIATRIST

## 2022-08-04 PROCEDURE — 84550 ASSAY OF BLOOD/URIC ACID: CPT

## 2022-08-04 PROCEDURE — 83735 ASSAY OF MAGNESIUM: CPT | Performed by: INTERNAL MEDICINE

## 2022-08-04 PROCEDURE — 25000003 PHARM REV CODE 250: Performed by: INTERNAL MEDICINE

## 2022-08-04 PROCEDURE — 87070 CULTURE OTHR SPECIMN AEROBIC: CPT

## 2022-08-04 PROCEDURE — 80053 COMPREHEN METABOLIC PANEL: CPT | Performed by: INTERNAL MEDICINE

## 2022-08-04 PROCEDURE — 87116 MYCOBACTERIA CULTURE: CPT

## 2022-08-04 PROCEDURE — 87205 SMEAR GRAM STAIN: CPT

## 2022-08-04 PROCEDURE — 63600175 PHARM REV CODE 636 W HCPCS: Performed by: STUDENT IN AN ORGANIZED HEALTH CARE EDUCATION/TRAINING PROGRAM

## 2022-08-04 PROCEDURE — 20220 PR BONE BIOPSY,TROCAR/NEEDLE SUPERF: ICD-10-PCS | Mod: ,,, | Performed by: PODIATRIST

## 2022-08-04 PROCEDURE — 36415 COLL VENOUS BLD VENIPUNCTURE: CPT | Performed by: INTERNAL MEDICINE

## 2022-08-04 PROCEDURE — 87077 CULTURE AEROBIC IDENTIFY: CPT

## 2022-08-04 PROCEDURE — C9399 UNCLASSIFIED DRUGS OR BIOLOG: HCPCS | Performed by: STUDENT IN AN ORGANIZED HEALTH CARE EDUCATION/TRAINING PROGRAM

## 2022-08-04 PROCEDURE — 63600175 PHARM REV CODE 636 W HCPCS: Performed by: HOSPITALIST

## 2022-08-04 PROCEDURE — 87075 CULTR BACTERIA EXCEPT BLOOD: CPT

## 2022-08-04 PROCEDURE — 87186 SC STD MICRODIL/AGAR DIL: CPT

## 2022-08-04 PROCEDURE — 63600175 PHARM REV CODE 636 W HCPCS: Performed by: INTERNAL MEDICINE

## 2022-08-04 PROCEDURE — 99233 SBSQ HOSP IP/OBS HIGH 50: CPT | Mod: ,,, | Performed by: STUDENT IN AN ORGANIZED HEALTH CARE EDUCATION/TRAINING PROGRAM

## 2022-08-04 RX ADMIN — INSULIN ASPART 3 UNITS: 100 INJECTION, SOLUTION INTRAVENOUS; SUBCUTANEOUS at 12:08

## 2022-08-04 RX ADMIN — LISINOPRIL 5 MG: 5 TABLET ORAL at 08:08

## 2022-08-04 RX ADMIN — VANCOMYCIN HYDROCHLORIDE 1750 MG: 500 INJECTION, POWDER, LYOPHILIZED, FOR SOLUTION INTRAVENOUS at 06:08

## 2022-08-04 RX ADMIN — INSULIN ASPART 5 UNITS: 100 INJECTION, SOLUTION INTRAVENOUS; SUBCUTANEOUS at 04:08

## 2022-08-04 RX ADMIN — INSULIN ASPART 5 UNITS: 100 INJECTION, SOLUTION INTRAVENOUS; SUBCUTANEOUS at 08:08

## 2022-08-04 RX ADMIN — VANCOMYCIN HYDROCHLORIDE 2000 MG: 500 INJECTION, POWDER, LYOPHILIZED, FOR SOLUTION INTRAVENOUS at 04:08

## 2022-08-04 RX ADMIN — CEFTRIAXONE 2 G: 2 INJECTION, SOLUTION INTRAVENOUS at 09:08

## 2022-08-04 RX ADMIN — INSULIN ASPART 5 UNITS: 100 INJECTION, SOLUTION INTRAVENOUS; SUBCUTANEOUS at 12:08

## 2022-08-04 RX ADMIN — AMLODIPINE BESYLATE 5 MG: 5 TABLET ORAL at 08:08

## 2022-08-04 RX ADMIN — INSULIN ASPART 3 UNITS: 100 INJECTION, SOLUTION INTRAVENOUS; SUBCUTANEOUS at 08:08

## 2022-08-04 RX ADMIN — ENOXAPARIN SODIUM 40 MG: 40 INJECTION SUBCUTANEOUS at 09:08

## 2022-08-04 RX ADMIN — INSULIN ASPART 2 UNITS: 100 INJECTION, SOLUTION INTRAVENOUS; SUBCUTANEOUS at 04:08

## 2022-08-04 RX ADMIN — INSULIN DETEMIR 15 UNITS: 100 INJECTION, SOLUTION SUBCUTANEOUS at 08:08

## 2022-08-04 RX ADMIN — ENOXAPARIN SODIUM 40 MG: 40 INJECTION SUBCUTANEOUS at 08:08

## 2022-08-04 NOTE — ASSESSMENT & PLAN NOTE
Uncontrolled, not on medication  - Continue amlodipine 5mg daily  - Continue lisinopril 5mg daily

## 2022-08-04 NOTE — ASSESSMENT & PLAN NOTE
Patient's FSGs are uncontrolled due to hyperglycemia on current medication regimen.  Last A1c reviewed-   Lab Results   Component Value Date    HGBA1C 12.4 (H) 08/03/2022     Most recent fingerstick glucose reviewed-   Recent Labs   Lab 08/03/22  1629 08/03/22  1921 08/04/22  0713 08/04/22  1144   POCTGLUCOSE 293* 424* 256* 256*     Current correctional scale  Low  Maintain anti-hyperglycemic dose as follows-   Antihyperglycemics (From admission, onward)            Start     Stop Route Frequency Ordered    08/04/22 0900  insulin detemir U-100 pen 15 Units         -- SubQ Daily 08/04/22 0738    08/03/22 1130  insulin aspart U-100 pen 5 Units         -- SubQ 3 times daily with meals 08/03/22 0936    08/03/22 0240  insulin aspart U-100 pen 0-5 Units         -- SubQ Before meals & nightly PRN 08/03/22 0141        Hold Oral hypoglycemics while patient is in the hospital.

## 2022-08-04 NOTE — SUBJECTIVE & OBJECTIVE
Subjective:     Interval History: NAEON. Afebrile. Dressing clean, dry, and intact. WBC up trend to 15.04 from 13.3. No new pedal complaints. Plan for bone biopsy today, R great toe distal phalanx.     Scheduled Meds:   amLODIPine  5 mg Oral Daily    cefTRIAXone (ROCEPHIN) IVPB  2 g Intravenous Q24H    enoxaparin  40 mg Subcutaneous Q12H    insulin aspart U-100  5 Units Subcutaneous TIDWM    insulin detemir U-100  15 Units Subcutaneous Daily    lisinopriL  5 mg Oral Daily    vancomycin (VANCOCIN) IVPB  1,750 mg Intravenous Q12H     Continuous Infusions:  PRN Meds:acetaminophen, albuterol-ipratropium, dextrose 10%, dextrose 10%, glucagon (human recombinant), glucose, glucose, hydrALAZINE, insulin aspart U-100, melatonin, naloxone, ondansetron, polyethylene glycol, prochlorperazine, simethicone, sodium chloride 0.9%, Pharmacy to dose Vancomycin consult **AND** vancomycin - pharmacy to dose    Review of Systems   Constitutional:  Negative for activity change, appetite change, chills and fever.   HENT:  Negative for congestion, hearing loss and rhinorrhea.    Eyes:  Negative for discharge, itching and visual disturbance.   Respiratory:  Negative for apnea, cough and shortness of breath.    Cardiovascular:  Negative for chest pain, palpitations and leg swelling.   Gastrointestinal:  Negative for abdominal distention, abdominal pain, constipation, diarrhea, nausea and vomiting.   Endocrine: Negative for cold intolerance and heat intolerance.   Genitourinary:  Negative for dysuria and hematuria.   Musculoskeletal:  Negative for back pain, neck pain and neck stiffness.   Skin:  Positive for wound. Negative for rash.   Neurological:  Negative for dizziness, seizures, light-headedness and headaches.   Psychiatric/Behavioral:  Negative for agitation, confusion and suicidal ideas.    Objective:     Vital Signs (Most Recent):  Temp: 98 °F (36.7 °C) (08/04/22 1507)  Pulse: 77 (08/04/22 1507)  Resp: 16 (08/04/22 1507)  BP: (!)  178/88 (08/04/22 1507)  SpO2: 97 % (08/04/22 1507)   Vital Signs (24h Range):  Temp:  [97.4 °F (36.3 °C)-98.6 °F (37 °C)] 98 °F (36.7 °C)  Pulse:  [76-88] 77  Resp:  [16-18] 16  SpO2:  [93 %-97 %] 97 %  BP: (147-178)/(76-88) 178/88     Weight: (!) 158.4 kg (349 lb 3.3 oz)  Body mass index is 43.65 kg/m².    Foot Exam    General  Orientation: alert and oriented to person, place, and time       Right Foot/Ankle     Inspection and Palpation  Ecchymosis: none  Skin Exam: callus, drainage, skin changes, abnormal color and ulcer;     Neurovascular  Saphenous nerve sensation: diminished  Tibial nerve sensation: diminished  Superficial peroneal nerve sensation: diminished  Deep peroneal nerve sensation: diminished  Sural nerve sensation: diminished    Comments  Right foot: swelling noted diffusely across right foot especially when compared to left foot. Hyperkeratotic wound noted at plantar aspect of hallux. Post debridement it had some mild purulent drainage was expressed.  Wound does not probe to bone. Mild Erythema noted to right foot.    Superficial Wound also noted to 5th digit on the dorsal aspect. No drainage or fluctuance noted.      DP and PT pulses can not be palpated due to diffuse swelling. Dystrophic, elongated fungal nails noted on 1-5 digits.    Left Foot/Ankle      Inspection and Palpation  Ecchymosis: none  Tenderness: none   Swelling: none   Skin Exam: skin intact;     Neurovascular  Dorsalis pedis: 2+  Posterior tibial: 1+  Saphenous nerve sensation: diminished  Tibial nerve sensation: diminished  Superficial peroneal nerve sensation: diminished  Deep peroneal nerve sensation: diminished  Sural nerve sensation: diminished    Comments  Left foot: Xerosis noted through out left foot. Dystrophic, elongated fungal nails noted on 1-5 digits.    Laboratory:  A1C:   Recent Labs   Lab 08/03/22  0423   HGBA1C 12.4*     CBC:   Recent Labs   Lab 08/04/22  0552   WBC 15.04*   RBC 4.23*   HGB 11.4*   HCT 34.7*   PLT  397   MCV 82   MCH 27.0   MCHC 32.9     CMP:   Recent Labs   Lab 08/04/22  0552   *   CALCIUM 9.3   ALBUMIN 2.3*   PROT 7.9   *   K 3.9   CO2 28   CL 96   BUN 11   CREATININE 1.0   ALKPHOS 142*   ALT 52*   AST 42*   BILITOT 0.4     CRP:   Recent Labs   Lab 08/02/22  2215   .8*     ESR:   Recent Labs   Lab 08/02/22  1927   SEDRATE >120*     Microbiology Results (last 7 days)       Procedure Component Value Units Date/Time    AFB Culture & Smear [[id number]] Collected: 08/03/22 1241    Order Status: Completed Specimen: Wound from Toe, Right Foot Updated: 08/04/22 1609     AFB CULTURE STAIN No acid fast bacilli seen.    Narrative:      Right toe    Gram stain [[id number]] Collected: 08/04/22 1145    Order Status: Completed Specimen: Bone from Toe, Right Foot Updated: 08/04/22 1446     Gram Stain Result No WBC's      No organisms seen    Narrative:      R Great toe bone    Aerobic culture [[id number]] Collected: 08/03/22 1241    Order Status: Completed Specimen: Wound from Toe, Right Foot Updated: 08/04/22 1413     Aerobic Bacterial Culture No significant isolate to date    Fungus culture [[id number]] Collected: 08/04/22 1145    Order Status: Sent Specimen: Bone from Toe, Right Foot Updated: 08/04/22 1216    Aerobic culture [[id number]] Collected: 08/04/22 1145    Order Status: Sent Specimen: Bone from Toe, Right Foot Updated: 08/04/22 1216    AFB Culture & Smear [[id number]] Collected: 08/04/22 1145    Order Status: Sent Specimen: Bone from Toe, Right Foot Updated: 08/04/22 1215    Culture, Anaerobe [[id number]] Collected: 08/04/22 1145    Order Status: Sent Specimen: Bone from Toe, Right Foot Updated: 08/04/22 1215    Culture, Anaerobe [[id number]] Collected: 08/03/22 1241    Order Status: Completed Specimen: Wound from Toe, Right Foot Updated: 08/04/22 0741     Anaerobic Culture Culture in progress    Urine culture [[id number]]  (Abnormal) Collected: 08/02/22 2000    Order Status: Completed Specimen: Urine  Updated: 08/03/22 2314     Urine Culture, Routine GRAM NEGATIVE MARIE  10,000 - 49,999 cfu/ml  Identification and susceptibility pending      Narrative:      Specimen Source->Urine    Blood culture #1 [[id number]] Collected: 08/02/22 1935    Order Status: Completed Specimen: Blood from Peripheral, Wrist, Left Updated: 08/03/22 2022     Blood Culture, Routine No Growth to date      No Growth to date    Narrative:      Blood Culture #1    Blood culture #2 [[id number]] Collected: 08/02/22 1927    Order Status: Completed Specimen: Blood from Peripheral, Hand, Left Updated: 08/03/22 2022     Blood Culture, Routine No Growth to date      No Growth to date    Narrative:      Blood Culture #2    Gram stain [[id number]] Collected: 08/03/22 1241    Order Status: Completed Specimen: Wound from Toe, Right Foot Updated: 08/03/22 1813     Gram Stain Result Rare WBC's      Rare Gram positive cocci      Rare Gram negative rods    Narrative:      Right toe          Specimen (24h ago, onward)                 Start     Ordered    08/04/22 1138  Specimen to Pathology, Surgery Orthopedics  Once        Comments: Pre-op Diagnosis: Osteomyelitis R great toe  Number of specimens:1Name of specimens:Right great toe bone     References:    Click here for ordering Quick Tip   Question Answer Comment   Procedure Type: Orthopedics    Which provider would you like to cc? SUKHWINDER DYER    Release to patient Immediate        08/04/22 1138                    Diagnostic Results:  I have reviewed all pertinent imaging results/findings within the past 24 hours.    Clinical Findings:

## 2022-08-04 NOTE — SUBJECTIVE & OBJECTIVE
Interval History: No acute events overnight. Patient anticipating bone bx later today. Denies any issues at this time. Reports improving foot pain.    Review of Systems   Constitutional:  Negative for chills and fever.   HENT:  Negative for congestion and sore throat.    Eyes:  Negative for photophobia and visual disturbance.   Respiratory:  Negative for cough and shortness of breath.    Cardiovascular:  Negative for chest pain and palpitations.   Gastrointestinal:  Negative for abdominal pain, blood in stool, constipation, diarrhea, nausea and vomiting.   Endocrine: Negative for cold intolerance and heat intolerance.   Genitourinary:  Negative for dysuria and hematuria.   Musculoskeletal:  Negative for arthralgias and myalgias.   Skin:  Negative for rash and wound.   Allergic/Immunologic: Negative for environmental allergies and food allergies.   Neurological:  Negative for seizures and numbness.   Hematological:  Negative for adenopathy. Does not bruise/bleed easily.   Psychiatric/Behavioral:  Negative for hallucinations and suicidal ideas.    Objective:     Vital Signs (Most Recent):  Temp: 97.4 °F (36.3 °C) (08/04/22 1148)  Pulse: 76 (08/04/22 1148)  Resp: 16 (08/04/22 1148)  BP: (!) 159/76 (08/04/22 1148)  SpO2: 96 % (08/04/22 1148)   Vital Signs (24h Range):  Temp:  [97 °F (36.1 °C)-98.6 °F (37 °C)] 97.4 °F (36.3 °C)  Pulse:  [76-88] 76  Resp:  [16-18] 16  SpO2:  [93 %-96 %] 96 %  BP: (147-178)/(76-85) 159/76     Weight: (!) 158.4 kg (349 lb 3.3 oz)  Body mass index is 43.65 kg/m².    Intake/Output Summary (Last 24 hours) at 8/4/2022 1409  Last data filed at 8/4/2022 0600  Gross per 24 hour   Intake 200 ml   Output 1450 ml   Net -1250 ml      Physical Exam  Constitutional:       Appearance: He is well-developed.   HENT:      Head: Normocephalic and atraumatic.   Eyes:      Conjunctiva/sclera: Conjunctivae normal.      Pupils: Pupils are equal, round, and reactive to light.   Neck:      Thyroid: No thyromegaly.       Vascular: No JVD.   Cardiovascular:      Rate and Rhythm: Normal rate and regular rhythm.      Heart sounds: Normal heart sounds. No murmur heard.    No friction rub. No gallop.   Pulmonary:      Effort: Pulmonary effort is normal. No respiratory distress.      Breath sounds: Normal breath sounds. No wheezing or rales.   Abdominal:      General: Bowel sounds are normal. There is no distension.      Palpations: Abdomen is soft. There is no mass.      Tenderness: There is no abdominal tenderness. There is no guarding or rebound.      Hernia: No hernia is present.   Musculoskeletal:         General: No deformity. Normal range of motion.      Cervical back: Normal range of motion and neck supple.      Comments: R foot wrapped   Skin:     General: Skin is warm and dry.   Neurological:      Mental Status: He is alert and oriented to person, place, and time.      Cranial Nerves: No cranial nerve deficit.   Psychiatric:         Behavior: Behavior normal.       Significant Labs: All pertinent labs within the past 24 hours have been reviewed.  CBC:   Recent Labs   Lab 08/02/22  1927 08/03/22  0423 08/04/22  0552   WBC 13.12* 13.32* 15.04*   HGB 12.9* 11.4* 11.4*   HCT 38.1* 34.5* 34.7*    391 397     CMP:   Recent Labs   Lab 08/02/22  2215 08/03/22  0423 08/04/22  0552   * 135* 134*   K 4.3 3.9 3.9   CL 95 96 96   CO2 26 28 28   * 325* 264*   BUN 18 17 11   CREATININE 1.1 1.0 1.0   CALCIUM 9.6 9.1 9.3   PROT 9.0* 7.8 7.9   ALBUMIN 2.6* 2.3* 2.3*   BILITOT 0.5 0.5 0.4   ALKPHOS 138* 127 142*   AST 54* 54* 42*   ALT 59* 55* 52*   ANIONGAP 14 11 10     Coagulation: No results for input(s): PT, INR, APTT in the last 48 hours.    Significant Imaging: I have reviewed all pertinent imaging results/findings within the past 24 hours.

## 2022-08-04 NOTE — PROGRESS NOTES
[facility]  Infectious Disease  Progress Note    Patient Name: Renee Caceres  MRN: [id number]  Admission Date: 8/2/2022  Length of Stay: 1 days  Attending Physician: Chencho Greer MD  Primary Care Provider: Primary Doctor No    Isolation Status: No active isolations  Assessment/Plan:      * Osteomyelitis of fifth toe of right foot  Mr. Caceres is a 57 y/o male with poorly controlled DMII, HTN admitted for osteomyelitis of right hallux and 5th toe.     Recommendations  1. Continue empiric vanc/ceftriaxone at this time   2. Podiatry following. Right hallux bone biopsy today. Wound cultures pending.   3. Consider u/s soft tissue of RUE r/o deep infx as with significant swelling.   4. Glucose mgmt per primary   5. ID will follow.         Thank you for your consult. I will follow-up with patient. Please contact us if you have any additional questions.    Zi Serna PA-C  Infectious Disease  [facility]    Subjective:     Principal Problem:Osteomyelitis of fifth toe of right foot    HPI: Mr. Caceres is a 57 y/o male with poorly controlled DMII, HTN admitted for right 5th toe osteomyelitis. He works as a  at a restaurant and developed this wound in the last few weeks. He denies having any fever chills orn ight sweats. He was seen by general surgery and necrotizing  fasciitis was ruled out. No fevers. WBC 13K on admit. Podiatry consulted. He is on empiric abx at this time.     Interval History:   NAEON  Bone biopsy done today     Review of Systems   Constitutional:  Negative for chills, diaphoresis, fatigue and fever.   HENT:  Negative for congestion, ear pain, nosebleeds, rhinorrhea and sore throat.    Eyes:  Negative for pain.   Respiratory:  Negative for cough, shortness of breath and wheezing.    Cardiovascular:  Positive for leg swelling. Negative for chest pain.   Gastrointestinal:  Negative for abdominal pain, constipation, diarrhea, nausea and vomiting.   Genitourinary:  Negative  for dysuria, frequency and urgency.   Musculoskeletal:  Negative for arthralgias, back pain and neck pain.   Skin:  Positive for wound.   Neurological:  Negative for weakness, numbness and headaches.   Objective:     Vital Signs (Most Recent):  Temp: 97.4 °F (36.3 °C) (08/04/22 1148)  Pulse: 76 (08/04/22 1148)  Resp: 16 (08/04/22 1148)  BP: (!) 159/76 (08/04/22 1148)  SpO2: 96 % (08/04/22 1148)   Vital Signs (24h Range):  Temp:  [97 °F (36.1 °C)-98.6 °F (37 °C)] 97.4 °F (36.3 °C)  Pulse:  [76-88] 76  Resp:  [16-18] 16  SpO2:  [93 %-96 %] 96 %  BP: (147-178)/(76-85) 159/76     Weight: (!) 158.4 kg (349 lb 3.3 oz)  Body mass index is 43.65 kg/m².    Estimated Creatinine Clearance: 136.3 mL/min (based on SCr of 1 mg/dL).    Physical Exam  Vitals and nursing note reviewed.   Constitutional:       General: He is not in acute distress.     Appearance: He is well-developed. He is obese. He is not diaphoretic.   HENT:      Head: Normocephalic and atraumatic.   Eyes:      Pupils: Pupils are equal, round, and reactive to light.   Cardiovascular:      Rate and Rhythm: Normal rate and regular rhythm.      Heart sounds: Normal heart sounds. No murmur heard.    No friction rub. No gallop.   Pulmonary:      Effort: Pulmonary effort is normal. No respiratory distress.      Breath sounds: Normal breath sounds. No wheezing or rales.   Chest:      Chest wall: No tenderness.   Abdominal:      General: Bowel sounds are normal. There is no distension.      Palpations: There is no mass.      Tenderness: There is no abdominal tenderness. There is no guarding.   Musculoskeletal:         General: Swelling (right forearm) present. No deformity. Normal range of motion.      Cervical back: Normal range of motion and neck supple.   Skin:     General: Skin is warm and dry.      Findings: Erythema present. No rash.   Neurological:      Mental Status: He is alert and oriented to person, place, and time.   Psychiatric:         Behavior: Behavior  normal.         Thought Content: Thought content normal.         Judgment: Judgment normal.             Significant Labs: All pertinent labs within the past 24 hours have been reviewed.    Significant Imaging: I have reviewed all pertinent imaging results/findings within the past 24 hours.

## 2022-08-04 NOTE — CONSULTS
[facility]  Podiatry  Consult Note    Patient Name: Renee Caceres  MRN: [id number]  Admission Date: 8/2/2022  Hospital Length of Stay: 0 days  Attending Physician: Manuela Amezcua MD  Primary Care Provider: Primary Doctor No     Inpatient consult to Podiatry  Consult performed by: Marissa Serrano MD  Consult ordered by: Artie Martin MD  Reason for consult: Swelling of right foot  Assessment/Recommendations:   Patient has some mild purulent drainage at right great toe. Right foot is abnormally swollen and has some mild tenderness.  Based on Mri( 8-3-2022)  which showed OM like changes at right hallux. Clinical signs alongside Leukocytosis, and elevated levels of esr and crp further support  diagnosis of OM of the great toe.   -Discussed Amputation vs long term ABX to patient today  -Patient would like to try long term antibiotics first, instead of amputation  -Plan for bone biopsy tomorrow, so appropriate ABX can be used for patient  -Continue ABX per primary or ID  -Wounds dressed today with betadine paint, 4x4 gauze, and ace wraps.   -Podiatry will follow        Subjective:     History of Present Illness:  55 yo male with diabetes, HTN presenting 2/2 R foot swelling that started acutely 3 days ago and progressively has gotten worse. He states he had a wound on his right great toe and 5th toe that has been there for a while. Today it had not gotten any better so he decided to come in for evaluation. He has not been on any antibiotics for it. He states that he was on medications at one point but no longer takes them as he thought he had gotten them under control. In the ED, concern with elevated WBC, ESR, CRP, xr of foot showed osteo of great toe. General surgery consulted to rule nec fasc 2/2 gas in the 5th toe. Medicine called for admission. Rocephin started.     Podiatry was consulted today for OM of the 1st great toe and 5th toe of the right foot. Patient says he feels ok today. Patient admits it  sometimes hurts when he ambulates on his right foot. Patient denies noticing any drainage. Hypertensive otherwise Vitals stable. No new pedal complaints.Patient denies, fever, chills, nausea, or vomiting.       Scheduled Meds:   amLODIPine  5 mg Oral Daily    cefTRIAXone (ROCEPHIN) IVPB  2 g Intravenous Q24H    enoxaparin  40 mg Subcutaneous Q12H    insulin aspart U-100  5 Units Subcutaneous TIDWM    insulin detemir U-100  10 Units Subcutaneous Daily    lisinopriL  5 mg Oral Daily    vancomycin (VANCOCIN) IVPB  2,000 mg Intravenous Q12H     Continuous Infusions:  PRN Meds:acetaminophen, albuterol-ipratropium, dextrose 10%, dextrose 10%, glucagon (human recombinant), glucose, glucose, hydrALAZINE, insulin aspart U-100, melatonin, naloxone, ondansetron, polyethylene glycol, prochlorperazine, simethicone, sodium chloride 0.9%, Pharmacy to dose Vancomycin consult **AND** vancomycin - pharmacy to dose    Review of patient's allergies indicates:  No Known Allergies     Past Medical History:   Diagnosis Date    Primary hypertension 8/3/2022     Past Surgical History:   Procedure Laterality Date    LEG SURGERY Left        Family History       Problem Relation (Age of Onset)    Hypertension Brother          Tobacco Use    Smoking status: Never Smoker    Smokeless tobacco: Never Used   Substance and Sexual Activity    Alcohol use: Yes    Drug use: No    Sexual activity: Not on file     Review of Systems   Constitutional:  Negative for activity change, appetite change, chills and fever.   HENT:  Negative for congestion, hearing loss and rhinorrhea.    Eyes:  Negative for discharge, itching and visual disturbance.   Respiratory:  Negative for apnea, cough and shortness of breath.    Cardiovascular:  Negative for chest pain, palpitations and leg swelling.   Gastrointestinal:  Negative for abdominal distention, abdominal pain, constipation, diarrhea, nausea and vomiting.   Endocrine: Negative for cold intolerance  and heat intolerance.   Genitourinary:  Negative for dysuria and hematuria.   Musculoskeletal:  Negative for back pain, neck pain and neck stiffness.   Skin:  Positive for wound. Negative for rash.   Neurological:  Negative for dizziness, seizures, light-headedness and headaches.   Psychiatric/Behavioral:  Negative for agitation, confusion and suicidal ideas.    Objective:     Vital Signs (Most Recent):  Temp: 97.9 °F (36.6 °C) (08/03/22 1938)  Pulse: 86 (08/03/22 1938)  Resp: 18 (08/03/22 1938)  BP: (!) 159/83 (08/03/22 1938)  SpO2: 96 % (08/03/22 1938)   Vital Signs (24h Range):  Temp:  [97 °F (36.1 °C)-98.4 °F (36.9 °C)] 97.9 °F (36.6 °C)  Pulse:  [82-87] 86  Resp:  [16-20] 18  SpO2:  [93 %-98 %] 96 %  BP: (140-193)/(63-97) 159/83     Weight: (!) 158.4 kg (349 lb 3.3 oz)  Body mass index is 43.65 kg/m².    Foot Exam    General  Orientation: alert and oriented to person, place, and time       Right Foot/Ankle     Inspection and Palpation  Ecchymosis: none  Skin Exam: callus, drainage, skin changes, abnormal color and ulcer;     Neurovascular  Saphenous nerve sensation: diminished  Tibial nerve sensation: diminished  Superficial peroneal nerve sensation: diminished  Deep peroneal nerve sensation: diminished  Sural nerve sensation: diminished    Comments  Right foot: swelling noted diffusely across right foot especially when compared to left foot. Hyperkeratotic wound noted at plantar aspect of hallux. Post debridement it had some mild purulent drainage was expressed.  Wound does not probe to bone. Mild Erythema noted to right foot.    Superficial Wound also noted to 5th digit on the dorsal aspect. No drainage or fluctuance noted.      DP and PT pulses can not be palpated due to diffuse swelling. Dystrophic, elongated fungal nails noted on 1-5 digits.    Left Foot/Ankle      Inspection and Palpation  Ecchymosis: none  Tenderness: none   Swelling: none   Skin Exam: skin intact;     Neurovascular  Dorsalis pedis:  2+  Posterior tibial: 1+  Saphenous nerve sensation: diminished  Tibial nerve sensation: diminished  Superficial peroneal nerve sensation: diminished  Deep peroneal nerve sensation: diminished  Sural nerve sensation: diminished    Comments  Left foot: Xerosis noted through out left foot. Dystrophic, elongated fungal nails noted on 1-5 digits.    8/3/2022    8/3/2022    8/3/2022        Laboratory:  CBC:   Recent Labs   Lab 08/03/22 0423   WBC 13.32*   RBC 4.22*   HGB 11.4*   HCT 34.5*      MCV 82   MCH 27.0   MCHC 33.0     CMP:   Recent Labs   Lab 08/03/22 0423   *   CALCIUM 9.1   ALBUMIN 2.3*   PROT 7.8   *   K 3.9   CO2 28   CL 96   BUN 17   CREATININE 1.0   ALKPHOS 127   ALT 55*   AST 54*   BILITOT 0.5     CRP:   Recent Labs   Lab 08/02/22 2215   .8*     ESR:   Recent Labs   Lab 08/02/22 1927   SEDRATE >120*     Microbiology Results (last 7 days)       Procedure Component Value Units Date/Time    Gram stain [[id number]] Collected: 08/03/22 1241    Order Status: Completed Specimen: Wound from Toe, Right Foot Updated: 08/03/22 1813     Gram Stain Result Rare WBC's      Rare Gram positive cocci      Rare Gram negative rods    Narrative:      Right toe    Culture, Anaerobe [[id number]] Collected: 08/03/22 1241    Order Status: Sent Specimen: Wound from Toe, Right Foot Updated: 08/03/22 1422    AFB Culture & Smear [[id number]] Collected: 08/03/22 1241    Order Status: Sent Specimen: Wound from Toe, Right Foot Updated: 08/03/22 1422    Aerobic culture [[id number]] Collected: 08/03/22 1241    Order Status: Sent Specimen: Wound from Toe, Right Foot Updated: 08/03/22 1421    Blood culture #1 [[id number]] Collected: 08/02/22 1935    Order Status: Completed Specimen: Blood from Peripheral, Wrist, Left Updated: 08/03/22 0315     Blood Culture, Routine No Growth to date    Narrative:      Blood Culture #1    Blood culture #2 [[id number]] Collected: 08/02/22 1927    Order Status: Completed Specimen:  Blood from Peripheral, Hand, Left Updated: 08/03/22 0315     Blood Culture, Routine No Growth to date    Narrative:      Blood Culture #2    Urine culture [[id number]] Collected: 08/02/22 2000    Order Status: No result Specimen: Urine Updated: 08/02/22 2031          Specimen (24h ago, onward)                None                Assessment/Plan:     Swelling of right upper extremity        Cellulitis of right lower extremity  Patient has some mild purulent drainage at right great toe. Right foot is abnormally swollen and has some mild tenderness.  Based on Mri( 8-3-2022)  which showed OM like changes at right hallux. Clinical signs alongside Leukocytosis, and elevated levels of esr and crp further support  diagnosis of OM of the great toe.   -Discussed Amputation vs long term ABX to patient today  -Patient would like to try long term antibiotics first, instead of amputation  -Plan for bone biopsy tomorrow, so appropriate ABX can be used for patient  -Continue ABX per primary or ID  -Wounds dressed today with betadine paint, 4x4 gauze, and ace wraps.   -Podiatry will follow          Thank you for your consult. I will follow-up with patient. Please contact us if you have any additional questions.    Marissa Serrano DPM PGY-1  Podiatric Medicine & Surgery  [facility]  Secure Chat Preferred  Mobile: [phone]  Pager: [phone]

## 2022-08-04 NOTE — ASSESSMENT & PLAN NOTE
As per surgery recs, no evidence of a necrotizing infection at physical exam. Patient with a chronic wound opened to air, this may be the reason there is some soft tissue gas seen in the foot Xray. Wound looks chronic in nature.   - Continue ceftriaxone, vanc  - Podiatry consulted, patient s/p bone bx today  - MRI consistent with osteo  - ID consulted  - Unable to get vascular SANDEE 2/2 pain from patient.

## 2022-08-04 NOTE — ASSESSMENT & PLAN NOTE
Mr. Caceres is a 57 y/o male with poorly controlled DMII, HTN admitted for osteomyelitis of right hallux and 5th toe.     Recommendations  1. Continue empiric vanc/ceftriaxone at this time   2. Podiatry following. Right hallux bone biopsy today. Wound cultures pending.   3. Consider u/s soft tissue of RUE r/o deep infx as with significant swelling.   4. Glucose mgmt per primary   5. ID will follow.

## 2022-08-04 NOTE — PROGRESS NOTES
Pharmacokinetic Assessment Follow Up: IV Vancomycin    Vancomycin serum concentration assessment(s):    · Vancomycin trough=21.7 mcg/mL, drawn appropriately    Vancomycin Regimen Plan:    · Vancomycin trough slightly above goal, will decrease from 2g IV q12h to 1.75g IV q12h.  · Next level on 8/6 at 0500.  · Renal function stable.     Drug levels (last 3 results):  Recent Labs   Lab Result Units 08/04/22  1447   Vancomycin-Trough ug/mL 21.7       Pharmacy will continue to follow and monitor vancomycin.    Thank you for the consult,   Pam Cisneros, PharmD, [facility]  [id number]       Patient brief summary:  Renee Caceres is a 56 y.o. male initiated on antimicrobial therapy with IV Vancomycin for treatment of bone/joint infection    Actual Body Weight:   158kg    Renal Function:   Estimated Creatinine Clearance: 136.3 mL/min (based on SCr of 1 mg/dL).,       CBC (last 72 hours):  Recent Labs   Lab Result Units 08/02/22 1927 08/03/22  0423 08/04/22  0552   WBC K/uL 13.12* 13.32* 15.04*   Hemoglobin g/dL 12.9* 11.4* 11.4*   Hemoglobin A1C %  --  12.4*  --    Hematocrit % 38.1* 34.5* 34.7*   Platelets K/uL 437 391 397   Gran % % 72.0 76.4* 74.1*   Lymph % % 17.7* 11.9* 14.1*   Mono % % 7.3 8.5 8.4   Eosinophil % % 1.5 1.3 1.9   Basophil % % 0.5 0.4 0.5   Differential Method  Automated Automated Automated       Metabolic Panel (last 72 hours):  Recent Labs   Lab Result Units 08/02/22 2000 08/02/22 2215 08/03/22 0423 08/04/22  0552   Sodium mmol/L  --  135* 135* 134*   Potassium mmol/L  --  4.3 3.9 3.9   Chloride mmol/L  --  95 96 96   CO2 mmol/L  --  26 28 28   Glucose mg/dL  --  319* 325* 264*   Glucose, UA  2+*  --   --   --    BUN mg/dL  --  18 17 11   Creatinine mg/dL  --  1.1 1.0 1.0   Albumin g/dL  --  2.6* 2.3* 2.3*   Total Bilirubin mg/dL  --  0.5 0.5 0.4   Alkaline Phosphatase U/L  --  138* 127 142*   AST U/L  --  54* 54* 42*   ALT U/L  --  59* 55* 52*   Magnesium mg/dL  --  1.5* 1.6 1.6   Phosphorus mg/dL  --   2.5* 3.0 3.6

## 2022-08-04 NOTE — ASSESSMENT & PLAN NOTE
Patient has some mild purulent drainage at right great toe. Right foot is abnormally swollen and has some mild tenderness.  Based on Mri( 8-3-2022)  which showed OM like changes at right hallux. Clinical signs alongside Leukocytosis, and elevated levels of esr and crp further support  diagnosis of OM of the great toe.   -Discussed Amputation vs long term ABX to patient today  -Patient would like to try long term antibiotics first, instead of amputation  -Plan for bone biopsy tomorrow, so appropriate ABX can be used for patient  -Continue ABX per primary or ID  -Wounds dressed today with betadine paint, 4x4 gauze, and ace wraps.   -Podiatry will follow

## 2022-08-04 NOTE — HPI
55 yo male with diabetes, HTN presenting 2/2 R foot swelling that started acutely 3 days ago and progressively has gotten worse. He states he had a wound on his right great toe and 5th toe that has been there for a while. Today it had not gotten any better so he decided to come in for evaluation. He has not been on any antibiotics for it. He states that he was on medications at one point but no longer takes them as he thought he had gotten them under control. In the ED, concern with elevated WBC, ESR, CRP, xr of foot showed osteo of great toe. General surgery consulted to rule nec fasc 2/2 gas in the 5th toe. Medicine called for admission. Rocephin started.     Podiatry was consulted today for OM of the 1st great toe and 5th toe of the right foot. Patient says he feels ok today. Patient admits it sometimes hurts when he ambulates on his right foot. Patient denies noticing any drainage. Hypertensive otherwise Vitals stable. No new pedal complaints.Patient denies, fever, chills, nausea, or vomiting.

## 2022-08-04 NOTE — PROGRESS NOTES
[facility] Medicine  Progress Note    Patient Name: Renee Caceres  MRN: [id number]  Patient Class: IP- Inpatient   Admission Date: 8/2/2022  Length of Stay: 1 days  Attending Physician: Chencho Greer MD  Primary Care Provider: Primary Doctor No        Subjective:     Principal Problem:Osteomyelitis of fifth toe of right foot        HPI:  55 yo male with diabetes, HTN presenting 2/2 R foot swelling that started acutely 3 days ago and progressively has gotten worse. He states he had a wound on his right great toe and 5th toe that has been there for a while. Today it had not gotten any better so he decided to come in for evaluation. He has not been on any antibiotics for it. He states that he was on medications at one point but no longer takes them as he thought he had gotten them under control. In the ED, concern with elevated WBC, ESR, CRP, xr of foot showed osteo of great toe. General surgery consulted to rule nec fasc 2/2 gas in the 5th toe. Medicine called for admission. Rocephin started.         Overview/Hospital Course:  No notes on file    Interval History: No acute events overnight. Patient anticipating bone bx later today. Denies any issues at this time. Reports improving foot pain.    Review of Systems   Constitutional:  Negative for chills and fever.   HENT:  Negative for congestion and sore throat.    Eyes:  Negative for photophobia and visual disturbance.   Respiratory:  Negative for cough and shortness of breath.    Cardiovascular:  Negative for chest pain and palpitations.   Gastrointestinal:  Negative for abdominal pain, blood in stool, constipation, diarrhea, nausea and vomiting.   Endocrine: Negative for cold intolerance and heat intolerance.   Genitourinary:  Negative for dysuria and hematuria.   Musculoskeletal:  Negative for arthralgias and myalgias.   Skin:  Negative for rash and wound.   Allergic/Immunologic: Negative for environmental allergies and food allergies.    Neurological:  Negative for seizures and numbness.   Hematological:  Negative for adenopathy. Does not bruise/bleed easily.   Psychiatric/Behavioral:  Negative for hallucinations and suicidal ideas.    Objective:     Vital Signs (Most Recent):  Temp: 97.4 °F (36.3 °C) (08/04/22 1148)  Pulse: 76 (08/04/22 1148)  Resp: 16 (08/04/22 1148)  BP: (!) 159/76 (08/04/22 1148)  SpO2: 96 % (08/04/22 1148)   Vital Signs (24h Range):  Temp:  [97 °F (36.1 °C)-98.6 °F (37 °C)] 97.4 °F (36.3 °C)  Pulse:  [76-88] 76  Resp:  [16-18] 16  SpO2:  [93 %-96 %] 96 %  BP: (147-178)/(76-85) 159/76     Weight: (!) 158.4 kg (349 lb 3.3 oz)  Body mass index is 43.65 kg/m².    Intake/Output Summary (Last 24 hours) at 8/4/2022 1409  Last data filed at 8/4/2022 0600  Gross per 24 hour   Intake 200 ml   Output 1450 ml   Net -1250 ml      Physical Exam  Constitutional:       Appearance: He is well-developed.   HENT:      Head: Normocephalic and atraumatic.   Eyes:      Conjunctiva/sclera: Conjunctivae normal.      Pupils: Pupils are equal, round, and reactive to light.   Neck:      Thyroid: No thyromegaly.      Vascular: No JVD.   Cardiovascular:      Rate and Rhythm: Normal rate and regular rhythm.      Heart sounds: Normal heart sounds. No murmur heard.    No friction rub. No gallop.   Pulmonary:      Effort: Pulmonary effort is normal. No respiratory distress.      Breath sounds: Normal breath sounds. No wheezing or rales.   Abdominal:      General: Bowel sounds are normal. There is no distension.      Palpations: Abdomen is soft. There is no mass.      Tenderness: There is no abdominal tenderness. There is no guarding or rebound.      Hernia: No hernia is present.   Musculoskeletal:         General: No deformity. Normal range of motion.      Cervical back: Normal range of motion and neck supple.      Comments: R foot wrapped   Skin:     General: Skin is warm and dry.   Neurological:      Mental Status: He is alert and oriented to person,  place, and time.      Cranial Nerves: No cranial nerve deficit.   Psychiatric:         Behavior: Behavior normal.       Significant Labs: All pertinent labs within the past 24 hours have been reviewed.  CBC:   Recent Labs   Lab 08/02/22 1927 08/03/22 0423 08/04/22  0552   WBC 13.12* 13.32* 15.04*   HGB 12.9* 11.4* 11.4*   HCT 38.1* 34.5* 34.7*    391 397     CMP:   Recent Labs   Lab 08/02/22 2215 08/03/22  0423 08/04/22  0552   * 135* 134*   K 4.3 3.9 3.9   CL 95 96 96   CO2 26 28 28   * 325* 264*   BUN 18 17 11   CREATININE 1.1 1.0 1.0   CALCIUM 9.6 9.1 9.3   PROT 9.0* 7.8 7.9   ALBUMIN 2.6* 2.3* 2.3*   BILITOT 0.5 0.5 0.4   ALKPHOS 138* 127 142*   AST 54* 54* 42*   ALT 59* 55* 52*   ANIONGAP 14 11 10     Coagulation: No results for input(s): PT, INR, APTT in the last 48 hours.    Significant Imaging: I have reviewed all pertinent imaging results/findings within the past 24 hours.      Assessment/Plan:      * Osteomyelitis of fifth toe of right foot  As per surgery recs, no evidence of a necrotizing infection at physical exam. Patient with a chronic wound opened to air, this may be the reason there is some soft tissue gas seen in the foot Xray. Wound looks chronic in nature.   - Continue ceftriaxone, vanc  - Podiatry consulted, patient s/p bone bx today  - MRI consistent with osteo  - ID consulted  - Unable to get vascular SANDEE 2/2 pain from patient.     Swelling of right upper extremity  Improved with elevation  - Negative for DVT        Uncontrolled type 2 diabetes mellitus with hyperglycemia, without long-term current use of insulin  Patient's FSGs are uncontrolled due to hyperglycemia on current medication regimen.  Last A1c reviewed-   Lab Results   Component Value Date    HGBA1C 12.4 (H) 08/03/2022     Most recent fingerstick glucose reviewed-   Recent Labs   Lab 08/03/22  1629 08/03/22  1921 08/04/22  0713 08/04/22  1144   POCTGLUCOSE 293* 424* 256* 256*     Current correctional scale   Low  Maintain anti-hyperglycemic dose as follows-   Antihyperglycemics (From admission, onward)            Start     Stop Route Frequency Ordered    08/04/22 0900  insulin detemir U-100 pen 15 Units         -- SubQ Daily 08/04/22 0738    08/03/22 1130  insulin aspart U-100 pen 5 Units         -- SubQ 3 times daily with meals 08/03/22 0936    08/03/22 0240  insulin aspart U-100 pen 0-5 Units         -- SubQ Before meals & nightly PRN 08/03/22 0141        Hold Oral hypoglycemics while patient is in the hospital.    Primary hypertension  Uncontrolled, not on medication  - Continue amlodipine 5mg daily  - Continue lisinopril 5mg daily      Cellulitis of right lower extremity  Acute, recs per surgery as above, Continue rocephin add vanc  U/S performed without clots      VTE Risk Mitigation (From admission, onward)         Ordered     enoxaparin injection 40 mg  Every 12 hours         08/03/22 0833     IP VTE HIGH RISK PATIENT  Once         08/03/22 0141     Place sequential compression device  Until discontinued         08/03/22 0141                Discharge Planning   JOSÉ ANTONIO: 8/8/2022     Code Status: Full Code   Is the patient medically ready for discharge?:     Reason for patient still in hospital (select all that apply): Patient trending condition  Discharge Plan A: Home with family                  Chencho Greer MD  Department of Hospital Medicine   [facility] Surg

## 2022-08-04 NOTE — ASSESSMENT & PLAN NOTE
IDSA moderate diabetic foot infection, R great toe with osteomyelitis on radiograph and MRI, R 1st MTP joint space narrowing with gout like appearance.     -Discussed once more Amputation vs long term ABX to patient today  -Patient confirmed would like to try long term antibiotics first, instead of amputation  -Informed written and verbal consent obtained from patient for bedside bone of R 1st distal phalanx.   -Continue ABX ID, too be optimized pending cultures (recs appreciated)   -Wounds dressed   -Nursing wound care routine, ordered   -UA, ordered   -Post op shoe, ordered   -Podiatry will follow    Final recommendations   -WB: RLE heel touch for short distance and transfer in football dressing and post op shoe, refer to PT to determine if assistive device is necessary    -Wound care: Recommend Home health wound care MWF, iodosorb to distal R great to and lateral aspect of great toe at boarder of interspace. Follow with 4x4, Kerlix x 2, and secure with ace.   -ABx: 6 weeks extended course for osteomyelitis per ID   -Recommend outpatient ambulatory referral to follow up with Dr. Sanchez at [facility] podiatry clinic 2 weeks after discharge, please see ID follow as well

## 2022-08-04 NOTE — CONSULTS
"Nutrition-Related Diabetes Education      Time Spent: 5 minutes    Learners: Patient     Current HbA1c: 12.4%    Is patient aware of their A1c and their goal A1c?       ___x____ yes      Nutrition Education with handouts: "CHO Counting for People with DM" and "Meal Planning Method Using the Plate Method". Pt did not want RD to go over information provided within handouts and stated he would read them and reach out if he had any questions. RD left handouts at bedside and briefly explained what each handout went-over.    Comments: RD consulted for DM. Spoke with pt at bedside. States intake % with 3 meals/day on general diet. States he currently does not drink any soft drinks. States intake now % d/t personal preferences. No issues with n/v/d/c/chewing/swallowing. Unsure UBW. No wt hx. NFPE not warranted at this time d/t pt appearing well-nourished. Provided pt with handouts mentioned above. LBM 8/3.      Follow up: Yes    Please consult as needed.  Thank you!    Kimberlee BOOTHE  "

## 2022-08-04 NOTE — PROGRESS NOTES
[facility] Surg  Podiatry  Progress Note    Patient Name: Renee Caceres  MRN: [id number]  Admission Date: 8/2/2022  Hospital Length of Stay: 1 days  Attending Physician: Chencho Greer MD  Primary Care Provider: Primary Doctor No     Subjective:     Interval History: NAEON. Afebrile. Dressing clean, dry, and intact. WBC up trend to 15.04 from 13.3. No new pedal complaints. Plan for bone biopsy today, R great toe distal phalanx.     Scheduled Meds:   amLODIPine  5 mg Oral Daily    cefTRIAXone (ROCEPHIN) IVPB  2 g Intravenous Q24H    enoxaparin  40 mg Subcutaneous Q12H    insulin aspart U-100  5 Units Subcutaneous TIDWM    insulin detemir U-100  15 Units Subcutaneous Daily    lisinopriL  5 mg Oral Daily    vancomycin (VANCOCIN) IVPB  1,750 mg Intravenous Q12H     Continuous Infusions:  PRN Meds:acetaminophen, albuterol-ipratropium, dextrose 10%, dextrose 10%, glucagon (human recombinant), glucose, glucose, hydrALAZINE, insulin aspart U-100, melatonin, naloxone, ondansetron, polyethylene glycol, prochlorperazine, simethicone, sodium chloride 0.9%, Pharmacy to dose Vancomycin consult **AND** vancomycin - pharmacy to dose    Review of Systems   Constitutional:  Negative for activity change, appetite change, chills and fever.   HENT:  Negative for congestion, hearing loss and rhinorrhea.    Eyes:  Negative for discharge, itching and visual disturbance.   Respiratory:  Negative for apnea, cough and shortness of breath.    Cardiovascular:  Negative for chest pain, palpitations and leg swelling.   Gastrointestinal:  Negative for abdominal distention, abdominal pain, constipation, diarrhea, nausea and vomiting.   Endocrine: Negative for cold intolerance and heat intolerance.   Genitourinary:  Negative for dysuria and hematuria.   Musculoskeletal:  Negative for back pain, neck pain and neck stiffness.   Skin:  Positive for wound. Negative for rash.   Neurological:  Negative for dizziness, seizures,  light-headedness and headaches.   Psychiatric/Behavioral:  Negative for agitation, confusion and suicidal ideas.    Objective:     Vital Signs (Most Recent):  Temp: 98 °F (36.7 °C) (08/04/22 1507)  Pulse: 77 (08/04/22 1507)  Resp: 16 (08/04/22 1507)  BP: (!) 178/88 (08/04/22 1507)  SpO2: 97 % (08/04/22 1507)   Vital Signs (24h Range):  Temp:  [97.4 °F (36.3 °C)-98.6 °F (37 °C)] 98 °F (36.7 °C)  Pulse:  [76-88] 77  Resp:  [16-18] 16  SpO2:  [93 %-97 %] 97 %  BP: (147-178)/(76-88) 178/88     Weight: (!) 158.4 kg (349 lb 3.3 oz)  Body mass index is 43.65 kg/m².    Foot Exam    General  Orientation: alert and oriented to person, place, and time       Right Foot/Ankle     Inspection and Palpation  Ecchymosis: none  Skin Exam: callus, drainage, skin changes, abnormal color and ulcer;     Neurovascular  Saphenous nerve sensation: diminished  Tibial nerve sensation: diminished  Superficial peroneal nerve sensation: diminished  Deep peroneal nerve sensation: diminished  Sural nerve sensation: diminished    Comments  Right foot: swelling noted diffusely across right foot especially when compared to left foot. Hyperkeratotic wound noted at plantar aspect of hallux. Post debridement it had some mild purulent drainage was expressed.  Wound does not probe to bone. Mild Erythema noted to right foot.    Superficial Wound also noted to 5th digit on the dorsal aspect. No drainage or fluctuance noted.      DP and PT pulses can not be palpated due to diffuse swelling. Dystrophic, elongated fungal nails noted on 1-5 digits.    Left Foot/Ankle      Inspection and Palpation  Ecchymosis: none  Tenderness: none   Swelling: none   Skin Exam: skin intact;     Neurovascular  Dorsalis pedis: 2+  Posterior tibial: 1+  Saphenous nerve sensation: diminished  Tibial nerve sensation: diminished  Superficial peroneal nerve sensation: diminished  Deep peroneal nerve sensation: diminished  Sural nerve sensation: diminished    Comments  Left foot:  Xerosis noted through out left foot. Dystrophic, elongated fungal nails noted on 1-5 digits.    Laboratory:  A1C:   Recent Labs   Lab 08/03/22  0423   HGBA1C 12.4*     CBC:   Recent Labs   Lab 08/04/22  0552   WBC 15.04*   RBC 4.23*   HGB 11.4*   HCT 34.7*      MCV 82   MCH 27.0   MCHC 32.9     CMP:   Recent Labs   Lab 08/04/22  0552   *   CALCIUM 9.3   ALBUMIN 2.3*   PROT 7.9   *   K 3.9   CO2 28   CL 96   BUN 11   CREATININE 1.0   ALKPHOS 142*   ALT 52*   AST 42*   BILITOT 0.4     CRP:   Recent Labs   Lab 08/02/22  2215   .8*     ESR:   Recent Labs   Lab 08/02/22  1927   SEDRATE >120*     Microbiology Results (last 7 days)       Procedure Component Value Units Date/Time    AFB Culture & Smear [[id number]] Collected: 08/03/22 1241    Order Status: Completed Specimen: Wound from Toe, Right Foot Updated: 08/04/22 1609     AFB CULTURE STAIN No acid fast bacilli seen.    Narrative:      Right toe    Gram stain [[id number]] Collected: 08/04/22 1145    Order Status: Completed Specimen: Bone from Toe, Right Foot Updated: 08/04/22 1446     Gram Stain Result No WBC's      No organisms seen    Narrative:      R Great toe bone    Aerobic culture [[id number]] Collected: 08/03/22 1241    Order Status: Completed Specimen: Wound from Toe, Right Foot Updated: 08/04/22 1413     Aerobic Bacterial Culture No significant isolate to date    Fungus culture [[id number]] Collected: 08/04/22 1145    Order Status: Sent Specimen: Bone from Toe, Right Foot Updated: 08/04/22 1216    Aerobic culture [[id number]] Collected: 08/04/22 1145    Order Status: Sent Specimen: Bone from Toe, Right Foot Updated: 08/04/22 1216    AFB Culture & Smear [[id number]] Collected: 08/04/22 1145    Order Status: Sent Specimen: Bone from Toe, Right Foot Updated: 08/04/22 1215    Culture, Anaerobe [[id number]] Collected: 08/04/22 1145    Order Status: Sent Specimen: Bone from Toe, Right Foot Updated: 08/04/22 1215    Culture, Anaerobe [[id number]]  Collected: 08/03/22 1241    Order Status: Completed Specimen: Wound from Toe, Right Foot Updated: 08/04/22 0741     Anaerobic Culture Culture in progress    Urine culture [[id number]]  (Abnormal) Collected: 08/02/22 2000    Order Status: Completed Specimen: Urine Updated: 08/03/22 2314     Urine Culture, Routine GRAM NEGATIVE MARIE  10,000 - 49,999 cfu/ml  Identification and susceptibility pending      Narrative:      Specimen Source->Urine    Blood culture #1 [[id number]] Collected: 08/02/22 1935    Order Status: Completed Specimen: Blood from Peripheral, Wrist, Left Updated: 08/03/22 2022     Blood Culture, Routine No Growth to date      No Growth to date    Narrative:      Blood Culture #1    Blood culture #2 [[id number]] Collected: 08/02/22 1927    Order Status: Completed Specimen: Blood from Peripheral, Hand, Left Updated: 08/03/22 2022     Blood Culture, Routine No Growth to date      No Growth to date    Narrative:      Blood Culture #2    Gram stain [[id number]] Collected: 08/03/22 1241    Order Status: Completed Specimen: Wound from Toe, Right Foot Updated: 08/03/22 1813     Gram Stain Result Rare WBC's      Rare Gram positive cocci      Rare Gram negative rods    Narrative:      Right toe          Specimen (24h ago, onward)                 Start     Ordered    08/04/22 1138  Specimen to Pathology, Surgery Orthopedics  Once        Comments: Pre-op Diagnosis: Osteomyelitis R great toe  Number of specimens:1Name of specimens:Right great toe bone     References:    Click here for ordering Quick Tip   Question Answer Comment   Procedure Type: Orthopedics    Which provider would you like to cc? SUKHWINDER DYER    Release to patient Immediate        08/04/22 1138                    Diagnostic Results:  I have reviewed all pertinent imaging results/findings within the past 24 hours.    Clinical Findings:        Assessment/Plan:     Cellulitis of right lower extremity  IDSA moderate diabetic foot infection, R  great toe with osteomyelitis on radiograph and MRI, R 1st MTP joint space narrowing with gout like appearance.     -Discussed once more Amputation vs long term ABX to patient today  -Patient confirmed would like to try long term antibiotics first, instead of amputation  -Informed written and verbal consent obtained from patient for bedside bone of R 1st distal phalanx.   -Continue ABX ID, too be optimized pending cultures (recs appreciated)   -Wounds dressed   -Nursing wound care routine, ordered   -UA, ordered   -Post op shoe, ordered   -Podiatry will follow    Final recommendations   -WB: RLE heel touch for short distance and transfer in football dressing and post op shoe, refer to PT to determine if assistive device is necessary    -Wound care: Recommend Home health wound care MWF, iodosorb to distal R great to and lateral aspect of great toe at boarder of interspace, and R 5th toe. Follow with 4x4, Kerlix x 2, and secure with ace.   -ABx: 6 weeks extended course for osteomyelitis per ID   -Recommend outpatient ambulatory referral [place] [facility] [place], please see ID follow as well         Bone Biopsy  Procedure Note          Indication/Diagnosis: r hallux ulcer    Consent Type:  Patient gives verbal consent    Time Out Completed: yes    Aseptic Technique: Betadine    Anesthesia: None    Instrumentation: Jamshidi Trochar    Procedure Site: plantar r hallux    Complications: none    EBL : 2cc    Specimen : yes path/micro         Dalton Webster DPM  Podiatry  [facility] - Med Surg

## 2022-08-04 NOTE — SUBJECTIVE & OBJECTIVE
Interval History:   NAEON  Bone biopsy done today     Review of Systems   Constitutional:  Negative for chills, diaphoresis, fatigue and fever.   HENT:  Negative for congestion, ear pain, nosebleeds, rhinorrhea and sore throat.    Eyes:  Negative for pain.   Respiratory:  Negative for cough, shortness of breath and wheezing.    Cardiovascular:  Positive for leg swelling. Negative for chest pain.   Gastrointestinal:  Negative for abdominal pain, constipation, diarrhea, nausea and vomiting.   Genitourinary:  Negative for dysuria, frequency and urgency.   Musculoskeletal:  Negative for arthralgias, back pain and neck pain.   Skin:  Positive for wound.   Neurological:  Negative for weakness, numbness and headaches.   Objective:     Vital Signs (Most Recent):  Temp: 97.4 °F (36.3 °C) (08/04/22 1148)  Pulse: 76 (08/04/22 1148)  Resp: 16 (08/04/22 1148)  BP: (!) 159/76 (08/04/22 1148)  SpO2: 96 % (08/04/22 1148)   Vital Signs (24h Range):  Temp:  [97 °F (36.1 °C)-98.6 °F (37 °C)] 97.4 °F (36.3 °C)  Pulse:  [76-88] 76  Resp:  [16-18] 16  SpO2:  [93 %-96 %] 96 %  BP: (147-178)/(76-85) 159/76     Weight: (!) 158.4 kg (349 lb 3.3 oz)  Body mass index is 43.65 kg/m².    Estimated Creatinine Clearance: 136.3 mL/min (based on SCr of 1 mg/dL).    Physical Exam  Vitals and nursing note reviewed.   Constitutional:       General: He is not in acute distress.     Appearance: He is well-developed. He is obese. He is not diaphoretic.   HENT:      Head: Normocephalic and atraumatic.   Eyes:      Pupils: Pupils are equal, round, and reactive to light.   Cardiovascular:      Rate and Rhythm: Normal rate and regular rhythm.      Heart sounds: Normal heart sounds. No murmur heard.    No friction rub. No gallop.   Pulmonary:      Effort: Pulmonary effort is normal. No respiratory distress.      Breath sounds: Normal breath sounds. No wheezing or rales.   Chest:      Chest wall: No tenderness.   Abdominal:      General: Bowel sounds are  normal. There is no distension.      Palpations: There is no mass.      Tenderness: There is no abdominal tenderness. There is no guarding.   Musculoskeletal:         General: Swelling (right forearm) present. No deformity. Normal range of motion.      Cervical back: Normal range of motion and neck supple.   Skin:     General: Skin is warm and dry.      Findings: Erythema present. No rash.   Neurological:      Mental Status: He is alert and oriented to person, place, and time.   Psychiatric:         Behavior: Behavior normal.         Thought Content: Thought content normal.         Judgment: Judgment normal.             Significant Labs: All pertinent labs within the past 24 hours have been reviewed.    Significant Imaging: I have reviewed all pertinent imaging results/findings within the past 24 hours.

## 2022-08-04 NOTE — SUBJECTIVE & OBJECTIVE
Scheduled Meds:   amLODIPine  5 mg Oral Daily    cefTRIAXone (ROCEPHIN) IVPB  2 g Intravenous Q24H    enoxaparin  40 mg Subcutaneous Q12H    insulin aspart U-100  5 Units Subcutaneous TIDWM    insulin detemir U-100  10 Units Subcutaneous Daily    lisinopriL  5 mg Oral Daily    vancomycin (VANCOCIN) IVPB  2,000 mg Intravenous Q12H     Continuous Infusions:  PRN Meds:acetaminophen, albuterol-ipratropium, dextrose 10%, dextrose 10%, glucagon (human recombinant), glucose, glucose, hydrALAZINE, insulin aspart U-100, melatonin, naloxone, ondansetron, polyethylene glycol, prochlorperazine, simethicone, sodium chloride 0.9%, Pharmacy to dose Vancomycin consult **AND** vancomycin - pharmacy to dose    Review of patient's allergies indicates:  No Known Allergies     Past Medical History:   Diagnosis Date    Primary hypertension 8/3/2022     Past Surgical History:   Procedure Laterality Date    LEG SURGERY Left        Family History       Problem Relation (Age of Onset)    Hypertension Brother          Tobacco Use    Smoking status: Never Smoker    Smokeless tobacco: Never Used   Substance and Sexual Activity    Alcohol use: Yes    Drug use: No    Sexual activity: Not on file     Review of Systems   Constitutional:  Negative for activity change, appetite change, chills and fever.   HENT:  Negative for congestion, hearing loss and rhinorrhea.    Eyes:  Negative for discharge, itching and visual disturbance.   Respiratory:  Negative for apnea, cough and shortness of breath.    Cardiovascular:  Negative for chest pain, palpitations and leg swelling.   Gastrointestinal:  Negative for abdominal distention, abdominal pain, constipation, diarrhea, nausea and vomiting.   Endocrine: Negative for cold intolerance and heat intolerance.   Genitourinary:  Negative for dysuria and hematuria.   Musculoskeletal:  Negative for back pain, neck pain and neck stiffness.   Skin:  Positive for wound. Negative for rash.   Neurological:  Negative for  dizziness, seizures, light-headedness and headaches.   Psychiatric/Behavioral:  Negative for agitation, confusion and suicidal ideas.    Objective:     Vital Signs (Most Recent):  Temp: 97.9 °F (36.6 °C) (08/03/22 1938)  Pulse: 86 (08/03/22 1938)  Resp: 18 (08/03/22 1938)  BP: (!) 159/83 (08/03/22 1938)  SpO2: 96 % (08/03/22 1938)   Vital Signs (24h Range):  Temp:  [97 °F (36.1 °C)-98.4 °F (36.9 °C)] 97.9 °F (36.6 °C)  Pulse:  [82-87] 86  Resp:  [16-20] 18  SpO2:  [93 %-98 %] 96 %  BP: (140-193)/(63-97) 159/83     Weight: (!) 158.4 kg (349 lb 3.3 oz)  Body mass index is 43.65 kg/m².    Foot Exam    General  Orientation: alert and oriented to person, place, and time       Right Foot/Ankle     Inspection and Palpation  Ecchymosis: none  Skin Exam: callus, drainage, skin changes, abnormal color and ulcer;     Neurovascular  Saphenous nerve sensation: diminished  Tibial nerve sensation: diminished  Superficial peroneal nerve sensation: diminished  Deep peroneal nerve sensation: diminished  Sural nerve sensation: diminished    Comments  Right foot: swelling noted diffusely across right foot especially when compared to left foot. Hyperkeratotic wound noted at plantar aspect of hallux. Post debridement it had some mild purulent drainage was expressed.  Wound does not probe to bone. Mild Erythema noted to right foot.    Superficial Wound also noted to 5th digit on the dorsal aspect. No drainage or fluctuance noted.      DP and PT pulses can not be palpated due to diffuse swelling. Dystrophic, elongated fungal nails noted on 1-5 digits.    Left Foot/Ankle      Inspection and Palpation  Ecchymosis: none  Tenderness: none   Swelling: none   Skin Exam: skin intact;     Neurovascular  Dorsalis pedis: 2+  Posterior tibial: 1+  Saphenous nerve sensation: diminished  Tibial nerve sensation: diminished  Superficial peroneal nerve sensation: diminished  Deep peroneal nerve sensation: diminished  Sural nerve sensation:  diminished    Comments  Left foot: Xerosis noted through out left foot. Dystrophic, elongated fungal nails noted on 1-5 digits.    8/3/2022    8/3/2022    8/3/2022        Laboratory:  CBC:   Recent Labs   Lab 08/03/22 0423   WBC 13.32*   RBC 4.22*   HGB 11.4*   HCT 34.5*      MCV 82   MCH 27.0   MCHC 33.0     CMP:   Recent Labs   Lab 08/03/22 0423   *   CALCIUM 9.1   ALBUMIN 2.3*   PROT 7.8   *   K 3.9   CO2 28   CL 96   BUN 17   CREATININE 1.0   ALKPHOS 127   ALT 55*   AST 54*   BILITOT 0.5     CRP:   Recent Labs   Lab 08/02/22 2215   .8*     ESR:   Recent Labs   Lab 08/02/22 1927   SEDRATE >120*     Microbiology Results (last 7 days)       Procedure Component Value Units Date/Time    Gram stain [[id number]] Collected: 08/03/22 1241    Order Status: Completed Specimen: Wound from Toe, Right Foot Updated: 08/03/22 1813     Gram Stain Result Rare WBC's      Rare Gram positive cocci      Rare Gram negative rods    Narrative:      Right toe    Culture, Anaerobe [[id number]] Collected: 08/03/22 1241    Order Status: Sent Specimen: Wound from Toe, Right Foot Updated: 08/03/22 1422    AFB Culture & Smear [[id number]] Collected: 08/03/22 1241    Order Status: Sent Specimen: Wound from Toe, Right Foot Updated: 08/03/22 1422    Aerobic culture [[id number]] Collected: 08/03/22 1241    Order Status: Sent Specimen: Wound from Toe, Right Foot Updated: 08/03/22 1421    Blood culture #1 [[id number]] Collected: 08/02/22 1935    Order Status: Completed Specimen: Blood from Peripheral, Wrist, Left Updated: 08/03/22 0315     Blood Culture, Routine No Growth to date    Narrative:      Blood Culture #1    Blood culture #2 [[id number]] Collected: 08/02/22 1927    Order Status: Completed Specimen: Blood from Peripheral, Hand, Left Updated: 08/03/22 0315     Blood Culture, Routine No Growth to date    Narrative:      Blood Culture #2    Urine culture [[id number]] Collected: 08/02/22 2000    Order Status: No result  Specimen: Urine Updated: 08/02/22 2031          Specimen (24h ago, onward)                None

## 2022-08-05 PROBLEM — E11.621: Status: ACTIVE | Noted: 2022-08-05

## 2022-08-05 PROBLEM — L97.519: Status: ACTIVE | Noted: 2022-08-05

## 2022-08-05 LAB
ALBUMIN SERPL BCP-MCNC: 2.4 G/DL (ref 3.5–5.2)
ALP SERPL-CCNC: 144 U/L (ref 55–135)
ALT SERPL W/O P-5'-P-CCNC: 49 U/L (ref 10–44)
ANION GAP SERPL CALC-SCNC: 9 MMOL/L (ref 8–16)
AST SERPL-CCNC: 34 U/L (ref 10–40)
BACTERIA SPEC AEROBE CULT: NORMAL
BACTERIA UR CULT: ABNORMAL
BASOPHILS # BLD AUTO: 0.07 K/UL (ref 0–0.2)
BASOPHILS NFR BLD: 0.5 % (ref 0–1.9)
BILIRUB SERPL-MCNC: 0.4 MG/DL (ref 0.1–1)
BUN SERPL-MCNC: 11 MG/DL (ref 6–20)
CALCIUM SERPL-MCNC: 9.3 MG/DL (ref 8.7–10.5)
CHLORIDE SERPL-SCNC: 96 MMOL/L (ref 95–110)
CO2 SERPL-SCNC: 28 MMOL/L (ref 23–29)
CREAT SERPL-MCNC: 1.2 MG/DL (ref 0.5–1.4)
DIFFERENTIAL METHOD: ABNORMAL
EOSINOPHIL # BLD AUTO: 0.3 K/UL (ref 0–0.5)
EOSINOPHIL NFR BLD: 1.9 % (ref 0–8)
ERYTHROCYTE [DISTWIDTH] IN BLOOD BY AUTOMATED COUNT: 14.3 % (ref 11.5–14.5)
EST. GFR  (NO RACE VARIABLE): >60 ML/MIN/1.73 M^2
GLUCOSE SERPL-MCNC: 227 MG/DL (ref 70–110)
HCT VFR BLD AUTO: 35.2 % (ref 40–54)
HGB BLD-MCNC: 11.4 G/DL (ref 14–18)
IMM GRANULOCYTES # BLD AUTO: 0.19 K/UL (ref 0–0.04)
IMM GRANULOCYTES NFR BLD AUTO: 1.3 % (ref 0–0.5)
LYMPHOCYTES # BLD AUTO: 2.2 K/UL (ref 1–4.8)
LYMPHOCYTES NFR BLD: 14.5 % (ref 18–48)
MAGNESIUM SERPL-MCNC: 1.6 MG/DL (ref 1.6–2.6)
MCH RBC QN AUTO: 27 PG (ref 27–31)
MCHC RBC AUTO-ENTMCNC: 32.4 G/DL (ref 32–36)
MCV RBC AUTO: 83 FL (ref 82–98)
MONOCYTES # BLD AUTO: 1.2 K/UL (ref 0.3–1)
MONOCYTES NFR BLD: 8.3 % (ref 4–15)
NEUTROPHILS # BLD AUTO: 11 K/UL (ref 1.8–7.7)
NEUTROPHILS NFR BLD: 73.5 % (ref 38–73)
NRBC BLD-RTO: 0 /100 WBC
PHOSPHATE SERPL-MCNC: 3.8 MG/DL (ref 2.7–4.5)
PLATELET # BLD AUTO: 449 K/UL (ref 150–450)
PMV BLD AUTO: 9.8 FL (ref 9.2–12.9)
POCT GLUCOSE: 268 MG/DL (ref 70–110)
POCT GLUCOSE: 269 MG/DL (ref 70–110)
POCT GLUCOSE: 278 MG/DL (ref 70–110)
POCT GLUCOSE: 313 MG/DL (ref 70–110)
POTASSIUM SERPL-SCNC: 3.8 MMOL/L (ref 3.5–5.1)
PROT SERPL-MCNC: 8.3 G/DL (ref 6–8.4)
RBC # BLD AUTO: 4.23 M/UL (ref 4.6–6.2)
SODIUM SERPL-SCNC: 133 MMOL/L (ref 136–145)
WBC # BLD AUTO: 14.87 K/UL (ref 3.9–12.7)

## 2022-08-05 PROCEDURE — 80053 COMPREHEN METABOLIC PANEL: CPT | Performed by: INTERNAL MEDICINE

## 2022-08-05 PROCEDURE — 99233 PR SUBSEQUENT HOSPITAL CARE,LEVL III: ICD-10-PCS | Mod: ,,, | Performed by: PHYSICIAN ASSISTANT

## 2022-08-05 PROCEDURE — 99233 PR SUBSEQUENT HOSPITAL CARE,LEVL III: ICD-10-PCS | Mod: 95,,, | Performed by: INTERNAL MEDICINE

## 2022-08-05 PROCEDURE — 63600175 PHARM REV CODE 636 W HCPCS: Performed by: HOSPITALIST

## 2022-08-05 PROCEDURE — 11000001 HC ACUTE MED/SURG PRIVATE ROOM

## 2022-08-05 PROCEDURE — 63600175 PHARM REV CODE 636 W HCPCS: Performed by: STUDENT IN AN ORGANIZED HEALTH CARE EDUCATION/TRAINING PROGRAM

## 2022-08-05 PROCEDURE — 36415 COLL VENOUS BLD VENIPUNCTURE: CPT | Performed by: INTERNAL MEDICINE

## 2022-08-05 PROCEDURE — 25000003 PHARM REV CODE 250: Performed by: INTERNAL MEDICINE

## 2022-08-05 PROCEDURE — 84100 ASSAY OF PHOSPHORUS: CPT | Performed by: INTERNAL MEDICINE

## 2022-08-05 PROCEDURE — 99233 SBSQ HOSP IP/OBS HIGH 50: CPT | Mod: 95,,, | Performed by: INTERNAL MEDICINE

## 2022-08-05 PROCEDURE — 99233 SBSQ HOSP IP/OBS HIGH 50: CPT | Mod: ,,, | Performed by: PHYSICIAN ASSISTANT

## 2022-08-05 PROCEDURE — 94761 N-INVAS EAR/PLS OXIMETRY MLT: CPT

## 2022-08-05 PROCEDURE — 63600175 PHARM REV CODE 636 W HCPCS: Performed by: INTERNAL MEDICINE

## 2022-08-05 PROCEDURE — 83735 ASSAY OF MAGNESIUM: CPT | Performed by: INTERNAL MEDICINE

## 2022-08-05 PROCEDURE — 85025 COMPLETE CBC W/AUTO DIFF WBC: CPT | Performed by: INTERNAL MEDICINE

## 2022-08-05 PROCEDURE — 25000003 PHARM REV CODE 250: Performed by: STUDENT IN AN ORGANIZED HEALTH CARE EDUCATION/TRAINING PROGRAM

## 2022-08-05 RX ORDER — INSULIN ASPART 100 [IU]/ML
10 INJECTION, SOLUTION INTRAVENOUS; SUBCUTANEOUS
Status: DISCONTINUED | OUTPATIENT
Start: 2022-08-06 | End: 2022-08-06

## 2022-08-05 RX ORDER — INSULIN ASPART 100 [IU]/ML
8 INJECTION, SOLUTION INTRAVENOUS; SUBCUTANEOUS
Status: DISCONTINUED | OUTPATIENT
Start: 2022-08-05 | End: 2022-08-05

## 2022-08-05 RX ORDER — LISINOPRIL 20 MG/1
20 TABLET ORAL DAILY
Status: DISCONTINUED | OUTPATIENT
Start: 2022-08-06 | End: 2022-08-08

## 2022-08-05 RX ADMIN — INSULIN ASPART 2 UNITS: 100 INJECTION, SOLUTION INTRAVENOUS; SUBCUTANEOUS at 09:08

## 2022-08-05 RX ADMIN — LISINOPRIL 5 MG: 5 TABLET ORAL at 09:08

## 2022-08-05 RX ADMIN — VANCOMYCIN HYDROCHLORIDE 1750 MG: 500 INJECTION, POWDER, LYOPHILIZED, FOR SOLUTION INTRAVENOUS at 06:08

## 2022-08-05 RX ADMIN — INSULIN ASPART 8 UNITS: 100 INJECTION, SOLUTION INTRAVENOUS; SUBCUTANEOUS at 12:08

## 2022-08-05 RX ADMIN — INSULIN ASPART 3 UNITS: 100 INJECTION, SOLUTION INTRAVENOUS; SUBCUTANEOUS at 06:08

## 2022-08-05 RX ADMIN — INSULIN DETEMIR 15 UNITS: 100 INJECTION, SOLUTION SUBCUTANEOUS at 09:08

## 2022-08-05 RX ADMIN — ENOXAPARIN SODIUM 40 MG: 40 INJECTION SUBCUTANEOUS at 09:08

## 2022-08-05 RX ADMIN — CEFTRIAXONE 2 G: 2 INJECTION, SOLUTION INTRAVENOUS at 06:08

## 2022-08-05 RX ADMIN — POLYETHYLENE GLYCOL 3350 17 G: 17 POWDER, FOR SOLUTION ORAL at 09:08

## 2022-08-05 RX ADMIN — ENOXAPARIN SODIUM 40 MG: 40 INJECTION SUBCUTANEOUS at 08:08

## 2022-08-05 RX ADMIN — INSULIN ASPART 8 UNITS: 100 INJECTION, SOLUTION INTRAVENOUS; SUBCUTANEOUS at 06:08

## 2022-08-05 RX ADMIN — AMLODIPINE BESYLATE 5 MG: 5 TABLET ORAL at 09:08

## 2022-08-05 RX ADMIN — INSULIN ASPART 3 UNITS: 100 INJECTION, SOLUTION INTRAVENOUS; SUBCUTANEOUS at 12:08

## 2022-08-05 RX ADMIN — INSULIN ASPART 5 UNITS: 100 INJECTION, SOLUTION INTRAVENOUS; SUBCUTANEOUS at 09:08

## 2022-08-05 RX ADMIN — INSULIN ASPART 3 UNITS: 100 INJECTION, SOLUTION INTRAVENOUS; SUBCUTANEOUS at 09:08

## 2022-08-05 NOTE — CONSULTS
[facility] Medicine  Telemedicine Consult Note    Patient Name: Renee Caceres  MRN: [id number]  Admission Date: 8/2/2022  Hospital Length of Stay: 1 days  Attending Physician: Chencho Greer MD   Primary Care Provider: Primary Doctor No       Thank you for your consult to [facility]. We have reviewed the patient chart. This patient does meet criteria for [facility] service at this time. Will assume care on 08/05/22 at 7AM.       Maria E Flores MD  Department of Hospital Medicine   [facility]

## 2022-08-05 NOTE — SUBJECTIVE & OBJECTIVE
Interval History:   NAEON  Bone biopsy done 8/4  Bone cultures no significant isolate  Continues to have RUE swelling     Review of Systems   Constitutional:  Negative for chills, diaphoresis, fatigue and fever.   HENT:  Negative for congestion, ear pain, nosebleeds, rhinorrhea and sore throat.    Eyes:  Negative for pain.   Respiratory:  Negative for cough, shortness of breath and wheezing.    Cardiovascular:  Positive for leg swelling. Negative for chest pain.   Gastrointestinal:  Negative for abdominal pain, constipation, diarrhea, nausea and vomiting.   Genitourinary:  Negative for dysuria, frequency and urgency.   Musculoskeletal:  Negative for arthralgias, back pain and neck pain.   Skin:  Positive for wound.   Neurological:  Negative for weakness, numbness and headaches.   Objective:     Vital Signs (Most Recent):  Temp: 98 °F (36.7 °C) (08/05/22 1100)  Pulse: 89 (08/05/22 1100)  Resp: 16 (08/05/22 1100)  BP: (!) 155/76 (08/05/22 1100)  SpO2: 97 % (08/05/22 1100)   Vital Signs (24h Range):  Temp:  [97.6 °F (36.4 °C)-98.7 °F (37.1 °C)] 98 °F (36.7 °C)  Pulse:  [77-91] 89  Resp:  [16-18] 16  SpO2:  [92 %-97 %] 97 %  BP: (155-178)/(74-88) 155/76     Weight: (!) 158.4 kg (349 lb 3.3 oz)  Body mass index is 43.65 kg/m².    Estimated Creatinine Clearance: 113.6 mL/min (based on SCr of 1.2 mg/dL).    Physical Exam  Vitals and nursing note reviewed.   Constitutional:       General: He is not in acute distress.     Appearance: He is well-developed. He is obese. He is not diaphoretic.   HENT:      Head: Normocephalic and atraumatic.   Eyes:      Pupils: Pupils are equal, round, and reactive to light.   Cardiovascular:      Rate and Rhythm: Normal rate and regular rhythm.      Heart sounds: Normal heart sounds. No murmur heard.    No friction rub. No gallop.   Pulmonary:      Effort: Pulmonary effort is normal. No respiratory distress.      Breath sounds: Normal breath sounds. No wheezing or rales.   Chest:      Chest  wall: No tenderness.   Abdominal:      General: Bowel sounds are normal. There is no distension.      Palpations: There is no mass.      Tenderness: There is no abdominal tenderness. There is no guarding.   Musculoskeletal:         General: Swelling (right forearm) present. No deformity. Normal range of motion.      Cervical back: Normal range of motion and neck supple.   Skin:     General: Skin is warm and dry.      Findings: Erythema present. No rash.   Neurological:      Mental Status: He is alert and oriented to person, place, and time.   Psychiatric:         Behavior: Behavior normal.         Thought Content: Thought content normal.         Judgment: Judgment normal.             Significant Labs: All pertinent labs within the past 24 hours have been reviewed.    Significant Imaging: I have reviewed all pertinent imaging results/findings within the past 24 hours.

## 2022-08-05 NOTE — SUBJECTIVE & OBJECTIVE
Telemedicine  This service was provided by Virtual Visit.    Patient was transferred to [facility] on:  8/5/2022    Chief Complaint   Patient presents with    Swelling     Right arm and leg swelling that started Saturday. Denies pain. Denies medical hx.     The patient location is: 1/651 A   Admitted 8/2/2022  6:24 PM  Present with the patient at the time of the telemed/virtual assessment: Telepresenter    Interval History / Events Overnight:   The patient is able to provide adequate history. Additional history was obtained from past medical records. No significant events reported by Nursing.  BP elevated.  Patient complains of R UE swelling. Symptoms have been unchanged since yesterday. Associated symptoms include: fatigue. Symptoms are  unimproved .     Lab test(s) reviewed: hyperglycemia  Radiographic tests reviewed RUE US    Review of Systems   Constitutional:  Negative for fever.   Respiratory:  Negative for shortness of breath.      Objective:     Vital Signs (Most Recent):  Temp: 98 °F (36.7 °C) (08/05/22 1100)  Pulse: 89 (08/05/22 1100)  Resp: 16 (08/05/22 1100)  BP: (!) 155/76 (08/05/22 1100)  SpO2: 97 % (08/05/22 1100)   Vital Signs (24h Range):  Temp:  [97.4 °F (36.3 °C)-98.7 °F (37.1 °C)] 98 °F (36.7 °C)  Pulse:  [76-91] 89  Resp:  [16-18] 16  SpO2:  [92 %-97 %] 97 %  BP: (155-178)/(74-88) 155/76     Weight: (!) 158.4 kg (349 lb 3.3 oz)  Body mass index is 43.65 kg/m².    Intake/Output Summary (Last 24 hours) at 8/5/2022 1120  Last data filed at 8/5/2022 0600  Gross per 24 hour   Intake --   Output 875 ml   Net -875 ml      Physical Exam  Constitutional:       General: He is not in acute distress.     Appearance: Normal appearance. He is morbidly obese.   Eyes:      General: Lids are normal. No scleral icterus.        Right eye: No discharge.         Left eye: No discharge.      Conjunctiva/sclera: Conjunctivae normal.   Neck:      Trachea: Phonation normal.   Cardiovascular:      Rate  and Rhythm: Normal rate.      Comments: Monitor / Vital signs reviewed at time of visit  Pulmonary:      Effort: Pulmonary effort is normal. No tachypnea, accessory muscle usage or respiratory distress.   Abdominal:      General: There is no distension.   Musculoskeletal:      Right forearm: Swelling and edema present.   Neurological:      Mental Status: He is alert. He is not disoriented.   Psychiatric:         Attention and Perception: Attention normal.         Mood and Affect: Affect normal.         Behavior: Behavior is cooperative.       Significant Labs:   Recent Labs   Lab 08/03/22 0423   HGBA1C 12.4*     Recent Labs   Lab 08/05/22  1103 08/05/22  1730 08/05/22  2019   POCTGLUCOSE 268* 269* 313*     Recent Labs   Lab 08/03/22 0423 08/04/22  0552 08/05/22  0436   WBC 13.32* 15.04* 14.87*   HGB 11.4* 11.4* 11.4*   HCT 34.5* 34.7* 35.2*    397 449     Recent Labs   Lab 08/03/22 0423 08/04/22 0552 08/05/22  0436   GRAN 76.4*  10.2* 74.1*  11.2* 73.5*  11.0*   LYMPH 11.9*  1.6 14.1*  2.1 14.5*  2.2   MONO 8.5  1.1* 8.4  1.3* 8.3  1.2*   EOS 0.2 0.3 0.3     Recent Labs   Lab 08/03/22 0423 08/04/22  0552 08/05/22  0436   * 134* 133*   K 3.9 3.9 3.8   CL 96 96 96   CO2 28 28 28   BUN 17 11 11   CREATININE 1.0 1.0 1.2   * 264* 227*   CALCIUM 9.1 9.3 9.3   ALBUMIN 2.3* 2.3* 2.4*   MG 1.6 1.6 1.6   PHOS 3.0 3.6 3.8     Recent Labs   Lab 08/02/22 2215 08/03/22 0423 08/04/22 0552 08/05/22  0436   ALKPHOS 138* 127 142* 144*   ALT 59* 55* 52* 49*   AST 54* 54* 42* 34   PROT 9.0* 7.8 7.9 8.3   BILITOT 0.5 0.5 0.4 0.4   .8*  --   --   --      Recent Labs   Lab 08/02/22  1927   LACTATE 2.5*   SEDRATE >120*     Microbiology Results (last 7 days)       Procedure Component Value Units Date/Time    AFB Culture & Smear [[id number]] Collected: 08/04/22 1145    Order Status: Completed Specimen: Bone from Toe, Right Foot Updated: 08/05/22 2127     AFB Culture & Smear Culture in progress      AFB CULTURE STAIN No acid fast bacilli seen.    Narrative:      R Great toe bone    Blood culture #1 [[id number]] Collected: 08/02/22 1935    Order Status: Completed Specimen: Blood from Peripheral, Wrist, Left Updated: 08/05/22 2022     Blood Culture, Routine No Growth to date      No Growth to date      No Growth to date      No Growth to date    Narrative:      Blood Culture #1    Blood culture #2 [[id number]] Collected: 08/02/22 1927    Order Status: Completed Specimen: Blood from Peripheral, Hand, Left Updated: 08/05/22 2022     Blood Culture, Routine No Growth to date      No Growth to date      No Growth to date      No Growth to date    Narrative:      Blood Culture #2    Aerobic culture [[id number]] Collected: 08/04/22 1145    Order Status: Completed Specimen: Bone from Toe, Right Foot Updated: 08/05/22 1141     Aerobic Bacterial Culture No significant isolate to date    Narrative:      R great toe bone    Culture, Anaerobe [[id number]] Collected: 08/03/22 1241    Order Status: Completed Specimen: Wound from Toe, Right Foot Updated: 08/05/22 1044     Anaerobic Culture Culture in progress    Aerobic culture [[id number]] Collected: 08/03/22 1241    Order Status: Completed Specimen: Wound from Toe, Right Foot Updated: 08/05/22 0911     Aerobic Bacterial Culture Skin sabine,  no predominant organism    Culture, Anaerobe [[id number]] Collected: 08/04/22 1145    Order Status: Completed Specimen: Bone from Toe, Right Foot Updated: 08/05/22 0722     Anaerobic Culture Culture in progress    Narrative:      R Great toe bone    Urine culture [[id number]]  (Abnormal)  (Susceptibility) Collected: 08/02/22 2000    Order Status: Completed Specimen: Urine Updated: 08/05/22 0142     Urine Culture, Routine PROTEUS PENNERI  10,000 - 49,999 cfu/ml      Narrative:      Specimen Source->Urine    AFB Culture & Smear [[id number]] Collected: 08/03/22 1241    Order Status: Completed Specimen: Wound from Toe, Right Foot Updated: 08/04/22 2127      AFB Culture & Smear Culture in progress     AFB CULTURE STAIN No acid fast bacilli seen.    Narrative:      Right toe    Gram stain [[id number]] Collected: 08/04/22 1145    Order Status: Completed Specimen: Bone from Toe, Right Foot Updated: 08/04/22 1446     Gram Stain Result No WBC's      No organisms seen    Narrative:      R Great toe bone    Fungus culture [[id number]] Collected: 08/04/22 1145    Order Status: Sent Specimen: Bone from Toe, Right Foot Updated: 08/04/22 1216    Gram stain [[id number]] Collected: 08/03/22 1241    Order Status: Completed Specimen: Wound from Toe, Right Foot Updated: 08/03/22 1813     Gram Stain Result Rare WBC's      Rare Gram positive cocci      Rare Gram negative rods    Narrative:      Right toe          No results found for: [id number]    US Soft Tissue Upper Extremity, Right  Narrative: EXAMINATION:  US SOFT TISSUE, UPPER EXTREMITY, RIGHT    CLINICAL HISTORY:  swelling, negative for DVT. Looking for underlying fluid collection;    TECHNIQUE:  Ultrasound of the right upper extremity.    COMPARISON:  None    FINDINGS:  Diffuse soft tissue edema without organized fluid collection.  No fluid collection seen.  There is edematous soft tissue in the right forearm.  There is no evidence of a painful mass.  No significant abnormality seen.  Impression: As above.    Mild soft tissue edema but no fluid collection seen.    Electronically signed by resident: Jessica Quijano  Date:    08/04/2022  Time:    19:30    Electronically signed by: Raphael Valdez MD  Date:    08/05/2022  Time:    08:04      Labs and Imaging within the last 24 hours listed above were reviewed.       Diet: Diet diabetic [facility]; 2000 Calorie  Significant LDAs:   IV Access Type: Peripheral  Urinary Catheter Indication if present: Patient Does Not Have Urinary Catheter  Other Lines/Tubes/Drains:    HIGH RISK CONDITION(S):   Patient is currently on drug therapy requiring intensive monitoring for toxicity:  Vancomycin     Goals of Care:    Previous admission:  4/19/19  Likely prognosis:  Fair  Code Status: Full Code  Comfort Only: No  Hospice: No  Goals at discharge: remain at home, with physician follow-up    Discharge Planning   JOSÉ ANTONIO: 8/8/2022     Code Status: Full Code   Is the patient medically ready for discharge?: No    Reason for patient still in hospital (select all that apply): Patient trending condition, Laboratory test, and Consult recommendations  Discharge Plan A: Home with family

## 2022-08-05 NOTE — NURSING
Pt in bed, sleeping, easy to arouse. No s/s of distress.  U/S RUE ordered.    Pt in bed in lowest setting, wheels locked, sr upx2, call light within reach.

## 2022-08-05 NOTE — PROGRESS NOTES
[facility]  Infectious Disease  Progress Note    Patient Name: Renee Caceres  MRN: [id number]  Admission Date: 8/2/2022  Length of Stay: 2 days  Attending Physician: Maria E Flores MD  Primary Care Provider: Primary Doctor No    Isolation Status: No active isolations  Assessment/Plan:      * Osteomyelitis of great toe of right foot  Mr. Caceres is a 57 y/o male with poorly controlled DMII, HTN admitted for osteomyelitis of right hallux and 5th toe.     Recommendations  1. Continue empiric vanc/ceftriaxone at this time   2. Podiatry following. Right hallux bone biopsy. Bone cultures without significant isolate. Will ask microlab for further workup.    3. Consider u/s soft tissue of RUE r/o deep infx as with significant swelling.   4. Glucose mgmt per primary   5. ID will follow. Discussed with primary team.       Thank you for your consult. I will follow-up with patient. Please contact us if you have any additional questions.    Zi Serna PA-C  Infectious Disease  [facility] Surg    Subjective:     Principal Problem:Osteomyelitis of great toe of right foot    HPI: Mr. Caceres is a 57 y/o male with poorly controlled DMII, HTN admitted for right 5th toe osteomyelitis. He works as a  at a restaurant and developed this wound in the last few weeks. He denies having any fever chills orn ight sweats. He was seen by general surgery and necrotizing  fasciitis was ruled out. No fevers. WBC 13K on admit. Podiatry consulted. He is on empiric abx at this time.     Interval History:   NAEON  Bone biopsy done 8/4  Bone cultures no significant isolate  Continues to have RUE swelling     Review of Systems   Constitutional:  Negative for chills, diaphoresis, fatigue and fever.   HENT:  Negative for congestion, ear pain, nosebleeds, rhinorrhea and sore throat.    Eyes:  Negative for pain.   Respiratory:  Negative for cough, shortness of breath and wheezing.    Cardiovascular:  Positive for leg  swelling. Negative for chest pain.   Gastrointestinal:  Negative for abdominal pain, constipation, diarrhea, nausea and vomiting.   Genitourinary:  Negative for dysuria, frequency and urgency.   Musculoskeletal:  Negative for arthralgias, back pain and neck pain.   Skin:  Positive for wound.   Neurological:  Negative for weakness, numbness and headaches.   Objective:     Vital Signs (Most Recent):  Temp: 98 °F (36.7 °C) (08/05/22 1100)  Pulse: 89 (08/05/22 1100)  Resp: 16 (08/05/22 1100)  BP: (!) 155/76 (08/05/22 1100)  SpO2: 97 % (08/05/22 1100)   Vital Signs (24h Range):  Temp:  [97.6 °F (36.4 °C)-98.7 °F (37.1 °C)] 98 °F (36.7 °C)  Pulse:  [77-91] 89  Resp:  [16-18] 16  SpO2:  [92 %-97 %] 97 %  BP: (155-178)/(74-88) 155/76     Weight: (!) 158.4 kg (349 lb 3.3 oz)  Body mass index is 43.65 kg/m².    Estimated Creatinine Clearance: 113.6 mL/min (based on SCr of 1.2 mg/dL).    Physical Exam  Vitals and nursing note reviewed.   Constitutional:       General: He is not in acute distress.     Appearance: He is well-developed. He is obese. He is not diaphoretic.   HENT:      Head: Normocephalic and atraumatic.   Eyes:      Pupils: Pupils are equal, round, and reactive to light.   Cardiovascular:      Rate and Rhythm: Normal rate and regular rhythm.      Heart sounds: Normal heart sounds. No murmur heard.    No friction rub. No gallop.   Pulmonary:      Effort: Pulmonary effort is normal. No respiratory distress.      Breath sounds: Normal breath sounds. No wheezing or rales.   Chest:      Chest wall: No tenderness.   Abdominal:      General: Bowel sounds are normal. There is no distension.      Palpations: There is no mass.      Tenderness: There is no abdominal tenderness. There is no guarding.   Musculoskeletal:         General: Swelling (right forearm) present. No deformity. Normal range of motion.      Cervical back: Normal range of motion and neck supple.   Skin:     General: Skin is warm and dry.      Findings:  Erythema present. No rash.   Neurological:      Mental Status: He is alert and oriented to person, place, and time.   Psychiatric:         Behavior: Behavior normal.         Thought Content: Thought content normal.         Judgment: Judgment normal.             Significant Labs: All pertinent labs within the past 24 hours have been reviewed.    Significant Imaging: I have reviewed all pertinent imaging results/findings within the past 24 hours.

## 2022-08-05 NOTE — ASSESSMENT & PLAN NOTE
Mr. Caceres is a 55 y/o male with poorly controlled DMII, HTN admitted for osteomyelitis of right hallux and 5th toe.     Recommendations  1. Continue empiric vanc/ceftriaxone at this time   2. Podiatry following. Right hallux bone biopsy. Bone cultures without significant isolate. Will ask microlab for further workup.    3. Consider u/s soft tissue of RUE r/o deep infx as with significant swelling.   4. Glucose mgmt per primary   5. ID will follow. Discussed with primary team.

## 2022-08-06 PROBLEM — E66.01 SEVERE OBESITY (BMI >= 40): Status: ACTIVE | Noted: 2022-08-06

## 2022-08-06 LAB
POCT GLUCOSE: 157 MG/DL (ref 70–110)
POCT GLUCOSE: 169 MG/DL (ref 70–110)
POCT GLUCOSE: 227 MG/DL (ref 70–110)
POCT GLUCOSE: 236 MG/DL (ref 70–110)
POCT GLUCOSE: 278 MG/DL (ref 70–110)
VANCOMYCIN TROUGH SERPL-MCNC: 27 UG/ML (ref 10–22)

## 2022-08-06 PROCEDURE — 63600175 PHARM REV CODE 636 W HCPCS: Performed by: HOSPITALIST

## 2022-08-06 PROCEDURE — 99222 PR INITIAL HOSPITAL CARE,LEVL II: ICD-10-PCS | Mod: ,,, | Performed by: NURSE PRACTITIONER

## 2022-08-06 PROCEDURE — 63600175 PHARM REV CODE 636 W HCPCS: Performed by: STUDENT IN AN ORGANIZED HEALTH CARE EDUCATION/TRAINING PROGRAM

## 2022-08-06 PROCEDURE — 80202 ASSAY OF VANCOMYCIN: CPT | Performed by: STUDENT IN AN ORGANIZED HEALTH CARE EDUCATION/TRAINING PROGRAM

## 2022-08-06 PROCEDURE — 99233 SBSQ HOSP IP/OBS HIGH 50: CPT | Mod: ,,, | Performed by: PHYSICIAN ASSISTANT

## 2022-08-06 PROCEDURE — 99233 PR SUBSEQUENT HOSPITAL CARE,LEVL III: ICD-10-PCS | Mod: 95,,, | Performed by: INTERNAL MEDICINE

## 2022-08-06 PROCEDURE — 99233 SBSQ HOSP IP/OBS HIGH 50: CPT | Mod: 95,,, | Performed by: INTERNAL MEDICINE

## 2022-08-06 PROCEDURE — 11000001 HC ACUTE MED/SURG PRIVATE ROOM

## 2022-08-06 PROCEDURE — 25000003 PHARM REV CODE 250: Performed by: STUDENT IN AN ORGANIZED HEALTH CARE EDUCATION/TRAINING PROGRAM

## 2022-08-06 PROCEDURE — 25000003 PHARM REV CODE 250: Performed by: INTERNAL MEDICINE

## 2022-08-06 PROCEDURE — 63600175 PHARM REV CODE 636 W HCPCS: Performed by: INTERNAL MEDICINE

## 2022-08-06 PROCEDURE — 99233 PR SUBSEQUENT HOSPITAL CARE,LEVL III: ICD-10-PCS | Mod: ,,, | Performed by: PHYSICIAN ASSISTANT

## 2022-08-06 PROCEDURE — 36415 COLL VENOUS BLD VENIPUNCTURE: CPT | Performed by: STUDENT IN AN ORGANIZED HEALTH CARE EDUCATION/TRAINING PROGRAM

## 2022-08-06 PROCEDURE — 99222 1ST HOSP IP/OBS MODERATE 55: CPT | Mod: ,,, | Performed by: NURSE PRACTITIONER

## 2022-08-06 PROCEDURE — 63600175 PHARM REV CODE 636 W HCPCS: Performed by: NURSE PRACTITIONER

## 2022-08-06 RX ORDER — INSULIN ASPART 100 [IU]/ML
1-10 INJECTION, SOLUTION INTRAVENOUS; SUBCUTANEOUS
Status: DISCONTINUED | OUTPATIENT
Start: 2022-08-06 | End: 2022-08-07

## 2022-08-06 RX ORDER — VANCOMYCIN HCL IN 5 % DEXTROSE 1G/250ML
1000 PLASTIC BAG, INJECTION (ML) INTRAVENOUS
Status: DISCONTINUED | OUTPATIENT
Start: 2022-08-06 | End: 2022-08-07

## 2022-08-06 RX ORDER — INSULIN ASPART 100 [IU]/ML
12 INJECTION, SOLUTION INTRAVENOUS; SUBCUTANEOUS
Status: DISCONTINUED | OUTPATIENT
Start: 2022-08-06 | End: 2022-08-16 | Stop reason: HOSPADM

## 2022-08-06 RX ADMIN — INSULIN ASPART 4 UNITS: 100 INJECTION, SOLUTION INTRAVENOUS; SUBCUTANEOUS at 11:08

## 2022-08-06 RX ADMIN — INSULIN ASPART 12 UNITS: 100 INJECTION, SOLUTION INTRAVENOUS; SUBCUTANEOUS at 11:08

## 2022-08-06 RX ADMIN — VANCOMYCIN HYDROCHLORIDE 1000 MG: 1 INJECTION, POWDER, LYOPHILIZED, FOR SOLUTION INTRAVENOUS at 06:08

## 2022-08-06 RX ADMIN — LISINOPRIL 20 MG: 20 TABLET ORAL at 08:08

## 2022-08-06 RX ADMIN — ENOXAPARIN SODIUM 40 MG: 40 INJECTION SUBCUTANEOUS at 08:08

## 2022-08-06 RX ADMIN — CEFTRIAXONE 2 G: 2 INJECTION, SOLUTION INTRAVENOUS at 09:08

## 2022-08-06 RX ADMIN — VANCOMYCIN HYDROCHLORIDE 1750 MG: 500 INJECTION, POWDER, LYOPHILIZED, FOR SOLUTION INTRAVENOUS at 05:08

## 2022-08-06 RX ADMIN — INSULIN ASPART 10 UNITS: 100 INJECTION, SOLUTION INTRAVENOUS; SUBCUTANEOUS at 08:08

## 2022-08-06 RX ADMIN — INSULIN ASPART 12 UNITS: 100 INJECTION, SOLUTION INTRAVENOUS; SUBCUTANEOUS at 05:08

## 2022-08-06 RX ADMIN — AMLODIPINE BESYLATE 5 MG: 5 TABLET ORAL at 08:08

## 2022-08-06 RX ADMIN — INSULIN DETEMIR 32 UNITS: 100 INJECTION, SOLUTION SUBCUTANEOUS at 08:08

## 2022-08-06 RX ADMIN — INSULIN ASPART 1 UNITS: 100 INJECTION, SOLUTION INTRAVENOUS; SUBCUTANEOUS at 09:08

## 2022-08-06 NOTE — ASSESSMENT & PLAN NOTE
Patient's FSGs are uncontrolled due to hyperglycemia on current medication regimen.  Last A1c reviewed- uncontrolled since 2010 per available records.  Current correctional scale  Low  Maintain anti-hyperglycemic management as follows-   Antihyperglycemics (From admission, onward)            Start     Stop Route Frequency Ordered    08/06/22 0900  insulin detemir U-100 pen 22 Units         -- SubQ Daily 08/05/22 2148    08/06/22 0715  insulin aspart U-100 pen 10 Units         -- SubQ 3 times daily with meals 08/05/22 2148 08/03/22 0240  insulin aspart U-100 pen 0-5 Units         -- SubQ Before meals & nightly PRN 08/03/22 0141      Hold Oral hypoglycemics while patient is in the hospital.  Consult Endocrinology for management and discharge recommendations, follow up.

## 2022-08-06 NOTE — ASSESSMENT & PLAN NOTE
Per surgery, no evidence of a necrotizing infection at physical exam. Patient with a chronic wound opened to air, this may be the reason there is some soft tissue gas seen in the foot Xray. Wound looks chronic in nature.   - Continue ceftriaxone, vancomycin  - Podiatry consulted, patient s/p bone bx   - MRI consistent with osteomyelitis  - ID consulted: Right hallux bone biopsy. Bone cultures without significant isolate.  - Unable to get vascular SANDEE due to pain.

## 2022-08-06 NOTE — ASSESSMENT & PLAN NOTE
Per surgery, no evidence of a necrotizing infection at physical exam. Patient with a chronic wound opened to air, this may be the reason there is some soft tissue gas seen in the foot Xray. Wound looks chronic in nature.   - Continue ceftriaxone, vancomycin  - Podiatry consulted, patient s/p bone bx   - MRI consistent with osteomyelitis  - ID consulted: Right hallux bone biopsy. Bone cultures pending.  - Unable to get vascular SANDEE due to pain.

## 2022-08-06 NOTE — ASSESSMENT & PLAN NOTE
Uncontrolled, not on medication at home  - Continued amlodipine 5mg daily  - Continued lisinopril 5mg daily; increased to 20 mg for 8/6  - BP remains elevated; increase lisinopril to 40 mg

## 2022-08-06 NOTE — ASSESSMENT & PLAN NOTE
Patient's FSGs are uncontrolled due to hyperglycemia on current medication regimen.  Last A1c reviewed- uncontrolled since 2010 per available records.  Current correctional scale  Low  Maintain anti-hyperglycemic management as follows-   Antihyperglycemics (From admission, onward)            Start     Stop Route Frequency Ordered    08/06/22 1130  insulin aspart U-100 pen 12 Units         -- SubQ 3 times daily with meals 08/06/22 0835    08/06/22 0936  insulin aspart U-100 pen 1-10 Units         -- SubQ Before meals & nightly PRN 08/06/22 0836    08/06/22 0900  insulin detemir U-100 pen 32 Units         -- SubQ Daily 08/06/22 0835      Hold Oral hypoglycemics while patient is in the hospital.  Consulted Endocrinology for management and discharge recommendations, follow up.  Management per Endocrinology.

## 2022-08-06 NOTE — SUBJECTIVE & OBJECTIVE
Interval History:   NAEON  Bone biopsy done 8/4  Bone cultures +Staph species  Continues to have RUE swelling and RLE swelling    Review of Systems   Constitutional:  Negative for chills, diaphoresis, fatigue and fever.   HENT:  Negative for congestion, ear pain, nosebleeds, rhinorrhea and sore throat.    Eyes:  Negative for pain.   Respiratory:  Negative for cough, shortness of breath and wheezing.    Cardiovascular:  Positive for leg swelling. Negative for chest pain.   Gastrointestinal:  Negative for abdominal pain, constipation, diarrhea, nausea and vomiting.   Genitourinary:  Negative for dysuria, frequency and urgency.   Musculoskeletal:  Negative for arthralgias, back pain and neck pain.   Skin:  Positive for wound.   Neurological:  Negative for weakness, numbness and headaches.   Objective:     Vital Signs (Most Recent):  Temp: 96.5 °F (35.8 °C) (08/06/22 1122)  Pulse: 80 (08/06/22 1122)  Resp: 16 (08/06/22 1122)  BP: (!) 153/81 (08/06/22 1122)  SpO2: 98 % (08/06/22 1122)   Vital Signs (24h Range):  Temp:  [96.5 °F (35.8 °C)-98.8 °F (37.1 °C)] 96.5 °F (35.8 °C)  Pulse:  [75-85] 80  Resp:  [16-18] 16  SpO2:  [93 %-99 %] 98 %  BP: (152-164)/(72-81) 153/81     Weight: (!) 158.4 kg (349 lb 3.3 oz)  Body mass index is 43.65 kg/m².    Estimated Creatinine Clearance: 113.6 mL/min (based on SCr of 1.2 mg/dL).    Physical Exam  Vitals and nursing note reviewed.   Constitutional:       General: He is not in acute distress.     Appearance: He is well-developed. He is obese. He is not diaphoretic.   HENT:      Head: Normocephalic and atraumatic.   Eyes:      Pupils: Pupils are equal, round, and reactive to light.   Cardiovascular:      Rate and Rhythm: Normal rate and regular rhythm.      Heart sounds: Normal heart sounds. No murmur heard.    No friction rub. No gallop.   Pulmonary:      Effort: Pulmonary effort is normal. No respiratory distress.      Breath sounds: Normal breath sounds. No wheezing or rales.   Chest:       Chest wall: No tenderness.   Abdominal:      General: Bowel sounds are normal. There is no distension.      Palpations: There is no mass.      Tenderness: There is no abdominal tenderness. There is no guarding.   Musculoskeletal:         General: Swelling (right forearm) present. No deformity. Normal range of motion.      Cervical back: Normal range of motion and neck supple.   Skin:     General: Skin is warm and dry.      Findings: Erythema present. No rash.   Neurological:      Mental Status: He is alert and oriented to person, place, and time.   Psychiatric:         Behavior: Behavior normal.         Thought Content: Thought content normal.         Judgment: Judgment normal.             Significant Labs: All pertinent labs within the past 24 hours have been reviewed.    Significant Imaging: I have reviewed all pertinent imaging results/findings within the past 24 hours.

## 2022-08-06 NOTE — ASSESSMENT & PLAN NOTE
Improved with elevation but forearm remains significantly swollen  - Soft tissue US: Diffuse soft tissue edema without organized fluid collection.

## 2022-08-06 NOTE — ASSESSMENT & PLAN NOTE
Endocrinology consulted for BG management.   BG goal 140-180    Weight-based at 0.5 units/kg/day   - Levemir Flex Pen 32 units daily  - Novolog (aspart) insulin 12 Units SQ TIDWM and prn for BG excursions [facility] (150/25) SSI  - BG checks AC/HS  - Hypoglycemia protocol in place    ** Please notify Endocrine for any change and/or advance in diet**  ** Please call Endocrine for any BG related issues **    Discharge Planning:   TBD. Please notify endocrinology prior to discharge.

## 2022-08-06 NOTE — ASSESSMENT & PLAN NOTE
Acute  Continue Rocephin and vancomycin  LE U/S performed: No evidence of deep venous thrombosis in the right lower extremity.

## 2022-08-06 NOTE — PROGRESS NOTES
Pharmacokinetic Assessment Follow Up: IV Vancomycin    Vancomycin serum concentration assessment(s):    The trough level was drawn correctly and can be used to guide therapy at this time. The measurement is above the desired definitive target range of 15 to 20 mcg/mL.    Vancomycin Regimen Plan:    Change regimen to Vancomycin 1000 mg IV every 12 hours with next serum trough concentration measured at 1700 prior to 3rd dose on 8/7/2022    Drug levels (last 3 results):  Recent Labs   Lab Result Units 08/04/22  1447 08/06/22  0546   Vancomycin-Trough ug/mL 21.7 27.0*       Pharmacy will continue to follow and monitor vancomycin.    Please contact pharmacy at extension [phone] for questions regarding this assessment.    Thank you for the consult,   Antonio Sandra       Patient brief summary:  Renee Caceres is a 56 y.o. male initiated on antimicrobial therapy with IV Vancomycin for treatment of bone/joint infection      Drug Allergies:   Review of patient's allergies indicates:  No Known Allergies    Actual Body Weight:   158.4 kg    Renal Function:   Estimated Creatinine Clearance: 113.6 mL/min (based on SCr of 1.2 mg/dL).,     Dialysis Method (if applicable):  N/A    CBC (last 72 hours):  Recent Labs   Lab Result Units 08/04/22  0552 08/05/22  0436   WBC K/uL 15.04* 14.87*   Hemoglobin g/dL 11.4* 11.4*   Hematocrit % 34.7* 35.2*   Platelets K/uL 397 449   Gran % % 74.1* 73.5*   Lymph % % 14.1* 14.5*   Mono % % 8.4 8.3   Eosinophil % % 1.9 1.9   Basophil % % 0.5 0.5   Differential Method  Automated Automated       Metabolic Panel (last 72 hours):  Recent Labs   Lab Result Units 08/04/22  0552 08/05/22  0436   Sodium mmol/L 134* 133*   Potassium mmol/L 3.9 3.8   Chloride mmol/L 96 96   CO2 mmol/L 28 28   Glucose mg/dL 264* 227*   BUN mg/dL 11 11   Creatinine mg/dL 1.0 1.2   Albumin g/dL 2.3* 2.4*   Total Bilirubin mg/dL 0.4 0.4   Alkaline Phosphatase U/L 142* 144*   AST U/L 42* 34   ALT U/L 52* 49*   Magnesium mg/dL 1.6 1.6    Phosphorus mg/dL 3.6 3.8       Vancomycin Administrations:  vancomycin given in the last 96 hours                   vancomycin 1.75 g in 5 % dextrose 500 mL IVPB (mg) 1,750 mg New Bag 08/06/22 0558     1,750 mg New Bag 08/05/22 1842     1,750 mg New Bag  0607     1,750 mg New Bag 08/04/22 1808    vancomycin 2 g in dextrose 5 % 500 mL IVPB (mg) 2,000 mg New Bag 08/04/22 0415     2,000 mg New Bag 08/03/22 1530    vancomycin 2 g in dextrose 5 % 500 mL IVPB (mg) 2,000 mg New Bag 08/03/22 0330                Microbiologic Results:  Microbiology Results (last 7 days)     Procedure Component Value Units Date/Time    Aerobic culture [[id number]]  (Abnormal) Collected: 08/04/22 1145    Order Status: Completed Specimen: Bone from Toe, Right Foot Updated: 08/06/22 1313     Aerobic Bacterial Culture STAPHYLOCOCCUS COHNII  Moderate  Susceptibility pending  Skin sabine also present      Narrative:      R great toe bone    AFB Culture & Smear [[id number]] Collected: 08/04/22 1145    Order Status: Completed Specimen: Bone from Toe, Right Foot Updated: 08/05/22 2127     AFB Culture & Smear Culture in progress     AFB CULTURE STAIN No acid fast bacilli seen.    Narrative:      R Great toe bone    Blood culture #1 [[id number]] Collected: 08/02/22 1935    Order Status: Completed Specimen: Blood from Peripheral, Wrist, Left Updated: 08/05/22 2022     Blood Culture, Routine No Growth to date      No Growth to date      No Growth to date      No Growth to date    Narrative:      Blood Culture #1    Blood culture #2 [[id number]] Collected: 08/02/22 1927    Order Status: Completed Specimen: Blood from Peripheral, Hand, Left Updated: 08/05/22 2022     Blood Culture, Routine No Growth to date      No Growth to date      No Growth to date      No Growth to date    Narrative:      Blood Culture #2    Culture, Anaerobe [[id number]] Collected: 08/03/22 1241    Order Status: Completed Specimen: Wound from Toe, Right Foot Updated: 08/05/22 1044      Anaerobic Culture Culture in progress    Aerobic culture [[id number]] Collected: 08/03/22 1241    Order Status: Completed Specimen: Wound from Toe, Right Foot Updated: 08/05/22 0911     Aerobic Bacterial Culture Skin sabine,  no predominant organism    Culture, Anaerobe [[id number]] Collected: 08/04/22 1145    Order Status: Completed Specimen: Bone from Toe, Right Foot Updated: 08/05/22 0722     Anaerobic Culture Culture in progress    Narrative:      R Great toe bone    Urine culture [[id number]]  (Abnormal)  (Susceptibility) Collected: 08/02/22 2000    Order Status: Completed Specimen: Urine Updated: 08/05/22 0142     Urine Culture, Routine PROTEUS PENNERI  10,000 - 49,999 cfu/ml      Narrative:      Specimen Source->Urine    AFB Culture & Smear [[id number]] Collected: 08/03/22 1241    Order Status: Completed Specimen: Wound from Toe, Right Foot Updated: 08/04/22 2127     AFB Culture & Smear Culture in progress     AFB CULTURE STAIN No acid fast bacilli seen.    Narrative:      Right toe    Gram stain [[id number]] Collected: 08/04/22 1145    Order Status: Completed Specimen: Bone from Toe, Right Foot Updated: 08/04/22 1446     Gram Stain Result No WBC's      No organisms seen    Narrative:      R Great toe bone    Fungus culture [[id number]] Collected: 08/04/22 1145    Order Status: Sent Specimen: Bone from Toe, Right Foot Updated: 08/04/22 1216    Gram stain [[id number]] Collected: 08/03/22 1241    Order Status: Completed Specimen: Wound from Toe, Right Foot Updated: 08/03/22 1813     Gram Stain Result Rare WBC's      Rare Gram positive cocci      Rare Gram negative rods    Narrative:      Right toe

## 2022-08-06 NOTE — PROGRESS NOTES
[facility]  Infectious Disease  Progress Note    Patient Name: Renee Caceres  MRN: [id number]  Admission Date: 8/2/2022  Length of Stay: 3 days  Attending Physician: Maria E Flores MD  Primary Care Provider: Primary Doctor No    Isolation Status: No active isolations  Assessment/Plan:      * Osteomyelitis of great toe of right foot  Mr. Caceres is a 55 y/o male with poorly controlled DMII, HTN admitted for osteomyelitis of right hallux and 5th toe.     Recommendations  1. Continue empiric vanc/ceftriaxone at this time   2. Podiatry following. Right hallux bone biopsy. Bone cultures +Staph species.  4. Glucose mgmt per primary   5. SANDEE studies   6. ID will follow culture data for final abx recommendations.  Discussed with primary team.         Thank you for your consult. I will follow-up with patient. Please contact us if you have any additional questions.    Zi Serna PA-C  Infectious Disease  [facility]    Subjective:     Principal Problem:Osteomyelitis of great toe of right foot    HPI: Mr. Caceres is a 55 y/o male with poorly controlled DMII, HTN admitted for right 5th toe osteomyelitis. He works as a  at a restaurant and developed this wound in the last few weeks. He denies having any fever chills orn ight sweats. He was seen by general surgery and necrotizing  fasciitis was ruled out. No fevers. WBC 13K on admit. Podiatry consulted. He is on empiric abx at this time.     Interval History:   NAEON  Bone biopsy done 8/4  Bone cultures +Staph species  Continues to have RUE swelling and RLE swelling    Review of Systems   Constitutional:  Negative for chills, diaphoresis, fatigue and fever.   HENT:  Negative for congestion, ear pain, nosebleeds, rhinorrhea and sore throat.    Eyes:  Negative for pain.   Respiratory:  Negative for cough, shortness of breath and wheezing.    Cardiovascular:  Positive for leg swelling. Negative for chest pain.   Gastrointestinal:  Negative for  abdominal pain, constipation, diarrhea, nausea and vomiting.   Genitourinary:  Negative for dysuria, frequency and urgency.   Musculoskeletal:  Negative for arthralgias, back pain and neck pain.   Skin:  Positive for wound.   Neurological:  Negative for weakness, numbness and headaches.   Objective:     Vital Signs (Most Recent):  Temp: 96.5 °F (35.8 °C) (08/06/22 1122)  Pulse: 80 (08/06/22 1122)  Resp: 16 (08/06/22 1122)  BP: (!) 153/81 (08/06/22 1122)  SpO2: 98 % (08/06/22 1122)   Vital Signs (24h Range):  Temp:  [96.5 °F (35.8 °C)-98.8 °F (37.1 °C)] 96.5 °F (35.8 °C)  Pulse:  [75-85] 80  Resp:  [16-18] 16  SpO2:  [93 %-99 %] 98 %  BP: (152-164)/(72-81) 153/81     Weight: (!) 158.4 kg (349 lb 3.3 oz)  Body mass index is 43.65 kg/m².    Estimated Creatinine Clearance: 113.6 mL/min (based on SCr of 1.2 mg/dL).    Physical Exam  Vitals and nursing note reviewed.   Constitutional:       General: He is not in acute distress.     Appearance: He is well-developed. He is obese. He is not diaphoretic.   HENT:      Head: Normocephalic and atraumatic.   Eyes:      Pupils: Pupils are equal, round, and reactive to light.   Cardiovascular:      Rate and Rhythm: Normal rate and regular rhythm.      Heart sounds: Normal heart sounds. No murmur heard.    No friction rub. No gallop.   Pulmonary:      Effort: Pulmonary effort is normal. No respiratory distress.      Breath sounds: Normal breath sounds. No wheezing or rales.   Chest:      Chest wall: No tenderness.   Abdominal:      General: Bowel sounds are normal. There is no distension.      Palpations: There is no mass.      Tenderness: There is no abdominal tenderness. There is no guarding.   Musculoskeletal:         General: Swelling (right forearm) present. No deformity. Normal range of motion.      Cervical back: Normal range of motion and neck supple.   Skin:     General: Skin is warm and dry.      Findings: Erythema present. No rash.   Neurological:      Mental Status: He  is alert and oriented to person, place, and time.   Psychiatric:         Behavior: Behavior normal.         Thought Content: Thought content normal.         Judgment: Judgment normal.             Significant Labs: All pertinent labs within the past 24 hours have been reviewed.    Significant Imaging: I have reviewed all pertinent imaging results/findings within the past 24 hours.

## 2022-08-06 NOTE — HPI
Reason for Consult: Management of T2DM, Hyperglycemia     Diabetes diagnosis year: 2010    Home Diabetes Medications:  No current medication use. Patient states > 3 years since he has been on Metformin.     How often checking glucose at home?  Patient not checking his BG at home.     BG readings on regimen: ANURAG  Hypoglycemia on the regimen?  No  Missed doses on regimen?  Yes    Diabetes Complications include:     Hyperglycemia, Diabetic peripheral neuropathy , Diabetic dermatitis, and Foot ulcer      Complicating diabetes co morbidities:   Infection; HTN      HPI: 55 yo male with diabetes, HTN presenting 2/2 R foot swelling that started acutely 3 days ago and progressively has gotten worse. He states he had a wound on his right great toe and 5th toe that has been there for a while. Today it had not gotten any better so he decided to come in for evaluation. He has not been on any antibiotics for it. He states that he was on medications at one point but no longer takes them as he thought he had gotten them under control. In the ED, concern with elevated WBC, ESR, CRP, xr of foot showed osteo of great toe. General surgery consulted to rule nec fasc 2/2 gas in the 5th toe. Medicine called for admission. Rocephin started. Endocrine consulted to manage hyperglycemia and type 2 diabetes.       Hemoglobin A1C   Date Value Ref Range Status   08/03/2022 12.4 (H) 4.0 - 5.6 % Final     Comment:     ADA Screening Guidelines:  5.7-6.4%  Consistent with prediabetes  >or=6.5%  Consistent with diabetes    High levels of fetal hemoglobin interfere with the HbA1C  assay. Heterozygous hemoglobin variants (HbS, HgC, etc)do  not significantly interfere with this assay.   However, presence of multiple variants may affect accuracy.     03/19/2015 15.0 (H) 4.5 - 6.2 % Final   01/26/2010 15.4 (H) 4.0 - 6.2 % Final

## 2022-08-06 NOTE — SUBJECTIVE & OBJECTIVE
Interval HPI:   Overnight events: No acute events overnight. Patient on the [facility] in room 651/651 A. Blood glucose stable. BG above goal on current insulin regimen (SSI, prandial, and basal insulin ). Steroid use- None.    Renal function- Normal   Vasopressors-  None       Diet diabetic [facility];  Calorie     Eatin%  Nausea: No  Hypoglycemia and intervention: No  Fever: No  TPN and/or TF: No    PMH, PSH, FH, SH updated and reviewed     ROS:  Review of Systems  Constitutional: Negative for weight changes.  Eyes: Positive for visual disturbance.  Respiratory: Negative for cough.   Cardiovascular: Negative for chest pain.  Gastrointestinal: Negative for nausea.  Endocrine: Positive for polyuria, polydipsia.  Musculoskeletal: Negative for back pain.  Skin: Negative for rash.  Neurological: Negative for syncope.  Psychiatric/Behavioral: Negative for depression.    Current Medications and/or Treatments Impacting Glycemic Control  Immunotherapy:    Immunosuppressants       None          Steroids:   Hormones (From admission, onward)                Start     Stop Route Frequency Ordered    22 0239  melatonin tablet 6 mg         -- Oral Nightly PRN 22 0141          Pressors:    Autonomic Drugs (From admission, onward)                None          Hyperglycemia/Diabetes Medications:   Antihyperglycemics (From admission, onward)                Start     Stop Route Frequency Ordered    22 1130  insulin aspart U-100 pen 12 Units         -- SubQ 3 times daily with meals 22 0835    22 0936  insulin aspart U-100 pen 1-10 Units         -- SubQ Before meals & nightly PRN 22 0836    22 0900  insulin detemir U-100 pen 32 Units         -- SubQ Daily 22 0835             PHYSICAL EXAMINATION:  Vitals:    22 1122   BP: (!) 153/81   Pulse: 80   Resp: 16   Temp: 96.5 °F (35.8 °C)     Body mass index is 43.65 kg/m².    Physical Exam  Constitutional: Well developed, well  nourished, NAD.  ENT: External ears no masses with nose patent; normal hearing.  Neck: Supple; trachea midline.  Cardiovascular: Normal heart sounds, no LE edema. DP +2 bilaterally.  Lungs: Normal effort; lungs anterior bilaterally clear to auscultation.  Abdomen: Soft, no masses, no hernias.  MS: No clubbing or cyanosis of nails noted; normal gait or unable to assess gait.  Skin: No rashes, lesions, or ulcers; no nodules.  Psychiatric: Good judgement and insight; normal mood and affect.  Neurological: Cranial nerves are grossly intact.   Foot: Nails in poor condition, no amputations noted.

## 2022-08-06 NOTE — ASSESSMENT & PLAN NOTE
Mr. Caceres is a 55 y/o male with poorly controlled DMII, HTN admitted for osteomyelitis of right hallux and 5th toe.     Recommendations  1. Continue empiric vanc/ceftriaxone at this time   2. Podiatry following. Right hallux bone biopsy. Bone cultures +Staph species.  4. Glucose mgmt per primary   5. SANDEE studies   6. ID will follow culture data for final abx recommendations.  Discussed with primary team.

## 2022-08-06 NOTE — PROGRESS NOTES
[facility]  Telemedicine Progress Note    Patient Name: Renee Caceres  MRN: [id number]  Patient Class: IP- Inpatient   Admission Date: 8/2/2022  Length of Stay: 2 days  Attending Physician: Maria E Flores MD  Primary Care Provider: Primary Doctor No    Subjective:     Principal Problem:Osteomyelitis of great toe of right foot     HPI:  55 yo male with diabetes, HTN presenting 2/2 R foot swelling that started acutely 3 days ago and progressively has gotten worse. He states he had a wound on his right great toe and 5th toe that has been there for a while. Today it had not gotten any better so he decided to come in for evaluation. He has not been on any antibiotics for it. He states that he was on medications at one point but no longer takes them as he thought he had gotten them under control. In the ED, concern with elevated WBC, ESR, CRP, xr of foot showed osteo of great toe. General surgery consulted to rule nec fasc 2/2 gas in the 5th toe. Medicine called for admission. Rocephin started.       Overview/Hospital Course:  No notes on file    Telemedicine  This service was provided by Virtual Visit.    Patient was transferred to [facility] on:  8/5/2022    Chief Complaint   Patient presents with    Swelling     Right arm and leg swelling that started Saturday. Denies pain. Denies medical hx.     The patient location is: [address] [place] [address] A   Admitted 8/2/2022  6:24 PM  Present with the patient at the time of the telemed/virtual assessment: Telepresenter    Interval History / Events Overnight:   The patient is able to provide adequate history. Additional history was obtained from past medical records. No significant events reported by Nursing.  BP elevated.  Patient complains of R UE swelling. Symptoms have been unchanged since yesterday. Associated symptoms include: fatigue. Symptoms are  unimproved .     Lab test(s) reviewed: hyperglycemia  Radiographic tests reviewed MICHELLE  US    Review of Systems   Constitutional:  Negative for fever.   Respiratory:  Negative for shortness of breath.      Objective:     Vital Signs (Most Recent):  Temp: 98 °F (36.7 °C) (08/05/22 1100)  Pulse: 89 (08/05/22 1100)  Resp: 16 (08/05/22 1100)  BP: (!) 155/76 (08/05/22 1100)  SpO2: 97 % (08/05/22 1100)   Vital Signs (24h Range):  Temp:  [97.4 °F (36.3 °C)-98.7 °F (37.1 °C)] 98 °F (36.7 °C)  Pulse:  [76-91] 89  Resp:  [16-18] 16  SpO2:  [92 %-97 %] 97 %  BP: (155-178)/(74-88) 155/76     Weight: (!) 158.4 kg (349 lb 3.3 oz)  Body mass index is 43.65 kg/m².    Intake/Output Summary (Last 24 hours) at 8/5/2022 1120  Last data filed at 8/5/2022 0600  Gross per 24 hour   Intake --   Output 875 ml   Net -875 ml      Physical Exam  Constitutional:       General: He is not in acute distress.     Appearance: Normal appearance. He is morbidly obese.   Eyes:      General: Lids are normal. No scleral icterus.        Right eye: No discharge.         Left eye: No discharge.      Conjunctiva/sclera: Conjunctivae normal.   Neck:      Trachea: Phonation normal.   Cardiovascular:      Rate and Rhythm: Normal rate.      Comments: Monitor / Vital signs reviewed at time of visit  Pulmonary:      Effort: Pulmonary effort is normal. No tachypnea, accessory muscle usage or respiratory distress.   Abdominal:      General: There is no distension.   Musculoskeletal:      Right forearm: Swelling and edema present.   Neurological:      Mental Status: He is alert. He is not disoriented.   Psychiatric:         Attention and Perception: Attention normal.         Mood and Affect: Affect normal.         Behavior: Behavior is cooperative.       Significant Labs:   Recent Labs   Lab 08/03/22 0423   HGBA1C 12.4*     Recent Labs   Lab 08/05/22  1103 08/05/22  1730 08/05/22  2019   POCTGLUCOSE 268* 269* 313*     Recent Labs   Lab 08/03/22  0423 08/04/22  0552 08/05/22  0436   WBC 13.32* 15.04* 14.87*   HGB 11.4* 11.4* 11.4*   HCT 34.5* 34.7*  35.2*    397 449     Recent Labs   Lab 08/03/22  0423 08/04/22  0552 08/05/22  0436   GRAN 76.4*  10.2* 74.1*  11.2* 73.5*  11.0*   LYMPH 11.9*  1.6 14.1*  2.1 14.5*  2.2   MONO 8.5  1.1* 8.4  1.3* 8.3  1.2*   EOS 0.2 0.3 0.3     Recent Labs   Lab 08/03/22 0423 08/04/22  0552 08/05/22  0436   * 134* 133*   K 3.9 3.9 3.8   CL 96 96 96   CO2 28 28 28   BUN 17 11 11   CREATININE 1.0 1.0 1.2   * 264* 227*   CALCIUM 9.1 9.3 9.3   ALBUMIN 2.3* 2.3* 2.4*   MG 1.6 1.6 1.6   PHOS 3.0 3.6 3.8     Recent Labs   Lab 08/02/22 2215 08/03/22 0423 08/04/22 0552 08/05/22 0436   ALKPHOS 138* 127 142* 144*   ALT 59* 55* 52* 49*   AST 54* 54* 42* 34   PROT 9.0* 7.8 7.9 8.3   BILITOT 0.5 0.5 0.4 0.4   .8*  --   --   --      Recent Labs   Lab 08/02/22 1927   LACTATE 2.5*   SEDRATE >120*     Microbiology Results (last 7 days)       Procedure Component Value Units Date/Time    AFB Culture & Smear [[id number]] Collected: 08/04/22 1145    Order Status: Completed Specimen: Bone from Toe, Right Foot Updated: 08/05/22 2127     AFB Culture & Smear Culture in progress     AFB CULTURE STAIN No acid fast bacilli seen.    Narrative:      R Great toe bone    Blood culture #1 [[id number]] Collected: 08/02/22 1935    Order Status: Completed Specimen: Blood from Peripheral, Wrist, Left Updated: 08/05/22 2022     Blood Culture, Routine No Growth to date      No Growth to date      No Growth to date      No Growth to date    Narrative:      Blood Culture #1    Blood culture #2 [[id number]] Collected: 08/02/22 1927    Order Status: Completed Specimen: Blood from Peripheral, Hand, Left Updated: 08/05/22 2022     Blood Culture, Routine No Growth to date      No Growth to date      No Growth to date      No Growth to date    Narrative:      Blood Culture #2    Aerobic culture [[id number]] Collected: 08/04/22 1145    Order Status: Completed Specimen: Bone from Toe, Right Foot Updated: 08/05/22 1141     Aerobic Bacterial  Culture No significant isolate to date    Narrative:      R great toe bone    Culture, Anaerobe [[id number]] Collected: 08/03/22 1241    Order Status: Completed Specimen: Wound from Toe, Right Foot Updated: 08/05/22 1044     Anaerobic Culture Culture in progress    Aerobic culture [[id number]] Collected: 08/03/22 1241    Order Status: Completed Specimen: Wound from Toe, Right Foot Updated: 08/05/22 0911     Aerobic Bacterial Culture Skin sabine,  no predominant organism    Culture, Anaerobe [[id number]] Collected: 08/04/22 1145    Order Status: Completed Specimen: Bone from Toe, Right Foot Updated: 08/05/22 0722     Anaerobic Culture Culture in progress    Narrative:      R Great toe bone    Urine culture [[id number]]  (Abnormal)  (Susceptibility) Collected: 08/02/22 2000    Order Status: Completed Specimen: Urine Updated: 08/05/22 0142     Urine Culture, Routine PROTEUS PENNERI  10,000 - 49,999 cfu/ml      Narrative:      Specimen Source->Urine    AFB Culture & Smear [[id number]] Collected: 08/03/22 1241    Order Status: Completed Specimen: Wound from Toe, Right Foot Updated: 08/04/22 2127     AFB Culture & Smear Culture in progress     AFB CULTURE STAIN No acid fast bacilli seen.    Narrative:      Right toe    Gram stain [[id number]] Collected: 08/04/22 1145    Order Status: Completed Specimen: Bone from Toe, Right Foot Updated: 08/04/22 1446     Gram Stain Result No WBC's      No organisms seen    Narrative:      R Great toe bone    Fungus culture [[id number]] Collected: 08/04/22 1145    Order Status: Sent Specimen: Bone from Toe, Right Foot Updated: 08/04/22 1216    Gram stain [[id number]] Collected: 08/03/22 1241    Order Status: Completed Specimen: Wound from Toe, Right Foot Updated: 08/03/22 1813     Gram Stain Result Rare WBC's      Rare Gram positive cocci      Rare Gram negative rods    Narrative:      Right toe          No results found for: [id number]    US Soft Tissue Upper Extremity, Right  Narrative:  EXAMINATION:  US SOFT TISSUE, UPPER EXTREMITY, RIGHT    CLINICAL HISTORY:  swelling, negative for DVT. Looking for underlying fluid collection;    TECHNIQUE:  Ultrasound of the right upper extremity.    COMPARISON:  None    FINDINGS:  Diffuse soft tissue edema without organized fluid collection.  No fluid collection seen.  There is edematous soft tissue in the right forearm.  There is no evidence of a painful mass.  No significant abnormality seen.  Impression: As above.    Mild soft tissue edema but no fluid collection seen.    Electronically signed by resident: Jessica Quijano  Date:    08/04/2022  Time:    19:30    Electronically signed by: Raphael Valdez MD  Date:    08/05/2022  Time:    08:04      Labs and Imaging within the last 24 hours listed above were reviewed.       Diet: Diet diabetic [facility]; 2000 Calorie  Significant LDAs:   IV Access Type: Peripheral  Urinary Catheter Indication if present: Patient Does Not Have Urinary Catheter  Other Lines/Tubes/Drains:    HIGH RISK CONDITION(S):   Patient is currently on drug therapy requiring intensive monitoring for toxicity: Vancomycin     Goals of Care:    Previous admission:  4/19/19  Likely prognosis:  Fair  Code Status: Full Code  Comfort Only: No  Hospice: No  Goals at discharge: remain at home, with physician follow-up    Discharge Planning   JOSÉ ANTONIO: 8/8/2022     Code Status: Full Code   Is the patient medically ready for discharge?: No    Reason for patient still in hospital (select all that apply): Patient trending condition, Laboratory test, and Consult recommendations  Discharge Plan A: Home with family        Assessment/Plan:      * Osteomyelitis of great toe of right foot  Per surgery, no evidence of a necrotizing infection at physical exam. Patient with a chronic wound opened to air, this may be the reason there is some soft tissue gas seen in the foot Xray. Wound looks chronic in nature.   - Continue ceftriaxone, vancomycin  - Podiatry consulted,  patient s/p bone bx   - MRI consistent with osteomyelitis  - ID consulted: Right hallux bone biopsy. Bone cultures without significant isolate.  - Unable to get vascular SANDEE due to pain.     Ulcer of right fifth toe due to diabetes mellitus  Possible osteomyelitis, continuing wound care and antibiotics    Swelling of right upper extremity  Improved with elevation but forearm remains significantly swollen  - Soft tissue US: Diffuse soft tissue edema without organized fluid collection.     Uncontrolled type 2 diabetes mellitus with hyperglycemia, without long-term current use of insulin  Patient's FSGs are uncontrolled due to hyperglycemia on current medication regimen.  Last A1c reviewed- uncontrolled since 2010 per available records.  Current correctional scale  Low  Maintain anti-hyperglycemic management as follows-   Antihyperglycemics (From admission, onward)            Start     Stop Route Frequency Ordered    08/06/22 0900  insulin detemir U-100 pen 22 Units         -- SubQ Daily 08/05/22 2148 08/06/22 0715  insulin aspart U-100 pen 10 Units         -- SubQ 3 times daily with meals 08/05/22 2148 08/03/22 0240  insulin aspart U-100 pen 0-5 Units         -- SubQ Before meals & nightly PRN 08/03/22 0141      Hold Oral hypoglycemics while patient is in the hospital.  Consult Endocrinology for management and discharge recommendations, follow up.    Primary hypertension  Uncontrolled, not on medication at home  - Continued amlodipine 5mg daily  - Continued lisinopril 5mg daily; increased to 20 mg for 8/6    Cellulitis of right lower extremity  Acute  Continue Rocephin and vancomycin  LE U/S performed: No evidence of deep venous thrombosis in the right lower extremity.        Active Hospital Problems    Diagnosis  POA    *Osteomyelitis of great toe of right foot [M86.9]  Yes    Ulcer of right fifth toe due to diabetes mellitus [E11.621, L97.519]  Yes    Cellulitis of right lower extremity [L03.115]  Yes     Primary hypertension [I10]  Yes    Uncontrolled type 2 diabetes mellitus with hyperglycemia, without long-term current use of insulin [E11.65]  Yes    Swelling of right upper extremity [M79.89]  Yes      Resolved Hospital Problems   No resolved problems to display.       Inpatient Medications Prescribed for Management of Current Problems:     Scheduled Meds:    amLODIPine  5 mg Oral Daily    cefTRIAXone (ROCEPHIN) IVPB  2 g Intravenous Q24H    enoxaparin  40 mg Subcutaneous Q12H    [START ON 8/6/2022] insulin aspart U-100  10 Units Subcutaneous TIDWM    [START ON 8/6/2022] insulin detemir U-100  22 Units Subcutaneous Daily    [START ON 8/6/2022] lisinopriL  20 mg Oral Daily    vancomycin (VANCOCIN) IVPB  1,750 mg Intravenous Q12H     Continuous Infusions:   As Needed: acetaminophen, albuterol-ipratropium, cadexomer iodine, dextrose 10%, dextrose 10%, glucagon (human recombinant), glucose, glucose, hydrALAZINE, insulin aspart U-100, melatonin, naloxone, ondansetron, polyethylene glycol, prochlorperazine, simethicone, sodium chloride 0.9%, Pharmacy to dose Vancomycin consult **AND** vancomycin - pharmacy to dose    VTE Risk Mitigation (From admission, onward)         Ordered     enoxaparin injection 40 mg  Every 12 hours         08/03/22 0833     IP VTE HIGH RISK PATIENT  Once         08/03/22 0141     Place sequential compression device  Until discontinued         08/03/22 0141              I have assessed these finding virtually using telemed platform and with assistance of bedside nurse     The attending portion of this evaluation, treatment, and documentation was performed per Maria E Flores MD via Telemedicine AudioVisual using the secure I-[employer] software platform with 2 way audio/video. The provider was located off-site and the patient is located in the hospital. The aforementioned video software was utilized to document the relevant history and physical exam.    Maria E Flores MD  Department of  [facility]   [facility]

## 2022-08-06 NOTE — PROGRESS NOTES
[facility] Medicine  Telemedicine Progress Note    Patient Name: Renee Caceres  MRN: [id number]  Patient Class: IP- Inpatient   Admission Date: 8/2/2022  Length of Stay: 3 days  Attending Physician: Maria E Flores MD  Primary Care Provider: Primary Doctor No      Subjective:     Principal Problem:Osteomyelitis of great toe of right foot    HPI:  55 yo male with diabetes, HTN presenting 2/2 R foot swelling that started acutely 3 days ago and progressively has gotten worse. He states he had a wound on his right great toe and 5th toe that has been there for a while. Today it had not gotten any better so he decided to come in for evaluation. He has not been on any antibiotics for it. He states that he was on medications at one point but no longer takes them as he thought he had gotten them under control. In the ED, concern with elevated WBC, ESR, CRP, xr of foot showed osteo of great toe. General surgery consulted to rule nec fasc 2/2 gas in the 5th toe. Medicine called for admission. Rocephin started.       Overview/Hospital Course:  No notes on file    Telemedicine  This service was provided by Virtual Visit.    Patient was transferred to [facility] on:  8/5/2022    Chief Complaint   Patient presents with    Swelling     Right arm and leg swelling that started Saturday. Denies pain. Denies medical hx.     The patient location is: [address] [place] [address] A   Admitted 8/2/2022  6:24 PM  Present with the patient at the time of the telemed/virtual assessment: Telepresenter    Interval History / Events Overnight:   The patient is able to provide adequate history. Additional history was obtained from past medical records. No significant events reported by Nursing.  BP elevated.  Patient complains of R UE swelling. Symptoms have been unchanged since yesterday. Associated symptoms include: fatigue. Symptoms are  unimproved .     Lab test(s) reviewed: hyperglycemia    Review of Systems    Constitutional:  Negative for fever.   Respiratory:  Negative for shortness of breath.      Objective:     Vital Signs (Most Recent):  Temp: 96.5 °F (35.8 °C) (08/06/22 0728)  Pulse: 85 (08/06/22 0728)  Resp: 18 (08/06/22 0728)  BP: (!) 161/79 (08/06/22 0728)  SpO2: 99 % (08/06/22 0728)   Vital Signs (24h Range):  Temp:  [96.5 °F (35.8 °C)-98.8 °F (37.1 °C)] 96.5 °F (35.8 °C)  Pulse:  [75-85] 85  Resp:  [16-18] 18  SpO2:  [93 %-99 %] 99 %  BP: (152-164)/(72-79) 161/79     Weight: (!) 158.4 kg (349 lb 3.3 oz)  Body mass index is 43.65 kg/m².    Intake/Output Summary (Last 24 hours) at 8/6/2022 1105  Last data filed at 8/6/2022 0400  Gross per 24 hour   Intake --   Output 900 ml   Net -900 ml        Physical Exam  Constitutional:       General: He is not in acute distress.     Appearance: Normal appearance. He is morbidly obese.   Eyes:      General: Lids are normal. No scleral icterus.        Right eye: No discharge.         Left eye: No discharge.      Conjunctiva/sclera: Conjunctivae normal.   Neck:      Trachea: Phonation normal.   Cardiovascular:      Rate and Rhythm: Normal rate.      Comments: Monitor / Vital signs reviewed at time of visit  Pulmonary:      Effort: Pulmonary effort is normal. No tachypnea, accessory muscle usage or respiratory distress.   Abdominal:      General: There is no distension.   Musculoskeletal:      Right forearm: Swelling and edema present.   Neurological:      Mental Status: He is alert. He is not disoriented.   Psychiatric:         Attention and Perception: Attention normal.         Mood and Affect: Affect normal.         Behavior: Behavior is cooperative.       Significant Labs:   Recent Labs   Lab 08/03/22 0423   HGBA1C 12.4*       Recent Labs   Lab 08/05/22  2019 08/06/22  0713 08/06/22  0850   POCTGLUCOSE 313* 236* 278*       Recent Labs   Lab 08/03/22 0423 08/04/22  0552 08/05/22  0436   WBC 13.32* 15.04* 14.87*   HGB 11.4* 11.4* 11.4*   HCT 34.5* 34.7* 35.2*     397 449       Recent Labs   Lab 08/03/22 0423 08/04/22  0552 08/05/22  0436   GRAN 76.4*  10.2* 74.1*  11.2* 73.5*  11.0*   LYMPH 11.9*  1.6 14.1*  2.1 14.5*  2.2   MONO 8.5  1.1* 8.4  1.3* 8.3  1.2*   EOS 0.2 0.3 0.3       Recent Labs   Lab 08/03/22 0423 08/04/22 0552 08/05/22 0436   * 134* 133*   K 3.9 3.9 3.8   CL 96 96 96   CO2 28 28 28   BUN 17 11 11   CREATININE 1.0 1.0 1.2   * 264* 227*   CALCIUM 9.1 9.3 9.3   ALBUMIN 2.3* 2.3* 2.4*   MG 1.6 1.6 1.6   PHOS 3.0 3.6 3.8       Recent Labs   Lab 08/02/22 2215 08/03/22 0423 08/04/22 0552 08/05/22 0436   ALKPHOS 138* 127 142* 144*   ALT 59* 55* 52* 49*   AST 54* 54* 42* 34   PROT 9.0* 7.8 7.9 8.3   BILITOT 0.5 0.5 0.4 0.4   .8*  --   --   --        Recent Labs   Lab 08/02/22 1927   LACTATE 2.5*   SEDRATE >120*       Microbiology Results (last 7 days)       Procedure Component Value Units Date/Time    Aerobic culture [[id number]]  (Abnormal) Collected: 08/04/22 1145    Order Status: Completed Specimen: Bone from Toe, Right Foot Updated: 08/06/22 1040     Aerobic Bacterial Culture STAPHYLOCOCCUS SPECIES  Moderate  Identification and susceptibility pending  Skin sabine also present      Narrative:      R great toe bone    AFB Culture & Smear [[id number]] Collected: 08/04/22 1145    Order Status: Completed Specimen: Bone from Toe, Right Foot Updated: 08/05/22 2127     AFB Culture & Smear Culture in progress     AFB CULTURE STAIN No acid fast bacilli seen.    Narrative:      R Great toe bone    Blood culture #1 [[id number]] Collected: 08/02/22 1935    Order Status: Completed Specimen: Blood from Peripheral, Wrist, Left Updated: 08/05/22 2022     Blood Culture, Routine No Growth to date      No Growth to date      No Growth to date      No Growth to date    Narrative:      Blood Culture #1    Blood culture #2 [[id number]] Collected: 08/02/22 1927    Order Status: Completed Specimen: Blood from Peripheral, Hand, Left Updated: 08/05/22 2022      Blood Culture, Routine No Growth to date      No Growth to date      No Growth to date      No Growth to date    Narrative:      Blood Culture #2    Culture, Anaerobe [[id number]] Collected: 08/03/22 1241    Order Status: Completed Specimen: Wound from Toe, Right Foot Updated: 08/05/22 1044     Anaerobic Culture Culture in progress    Aerobic culture [[id number]] Collected: 08/03/22 1241    Order Status: Completed Specimen: Wound from Toe, Right Foot Updated: 08/05/22 0911     Aerobic Bacterial Culture Skin sabine,  no predominant organism    Culture, Anaerobe [[id number]] Collected: 08/04/22 1145    Order Status: Completed Specimen: Bone from Toe, Right Foot Updated: 08/05/22 0722     Anaerobic Culture Culture in progress    Narrative:      R Great toe bone    Urine culture [[id number]]  (Abnormal)  (Susceptibility) Collected: 08/02/22 2000    Order Status: Completed Specimen: Urine Updated: 08/05/22 0142     Urine Culture, Routine PROTEUS PENNERI  10,000 - 49,999 cfu/ml      Narrative:      Specimen Source->Urine    AFB Culture & Smear [[id number]] Collected: 08/03/22 1241    Order Status: Completed Specimen: Wound from Toe, Right Foot Updated: 08/04/22 2127     AFB Culture & Smear Culture in progress     AFB CULTURE STAIN No acid fast bacilli seen.    Narrative:      Right toe    Gram stain [[id number]] Collected: 08/04/22 1145    Order Status: Completed Specimen: Bone from Toe, Right Foot Updated: 08/04/22 1446     Gram Stain Result No WBC's      No organisms seen    Narrative:      R Great toe bone    Fungus culture [[id number]] Collected: 08/04/22 1145    Order Status: Sent Specimen: Bone from Toe, Right Foot Updated: 08/04/22 1216    Gram stain [[id number]] Collected: 08/03/22 1241    Order Status: Completed Specimen: Wound from Toe, Right Foot Updated: 08/03/22 1813     Gram Stain Result Rare WBC's      Rare Gram positive cocci      Rare Gram negative rods    Narrative:      Right toe          No results found  for: [id number]    US Soft Tissue Upper Extremity, Right  Narrative: EXAMINATION:  US SOFT TISSUE, UPPER EXTREMITY, RIGHT    CLINICAL HISTORY:  swelling, negative for DVT. Looking for underlying fluid collection;    TECHNIQUE:  Ultrasound of the right upper extremity.    COMPARISON:  None    FINDINGS:  Diffuse soft tissue edema without organized fluid collection.  No fluid collection seen.  There is edematous soft tissue in the right forearm.  There is no evidence of a painful mass.  No significant abnormality seen.  Impression: As above.    Mild soft tissue edema but no fluid collection seen.    Electronically signed by resident: Jessica Quijano  Date:    08/04/2022  Time:    19:30    Electronically signed by: Raphael Valdez MD  Date:    08/05/2022  Time:    08:04      Labs and Imaging within the last 24 hours listed above were reviewed.       Diet: Diet diabetic [facility]; 2000 Calorie  Significant LDAs:   IV Access Type: Peripheral  Urinary Catheter Indication if present: Patient Does Not Have Urinary Catheter  Other Lines/Tubes/Drains:    HIGH RISK CONDITION(S):   Patient is currently on drug therapy requiring intensive monitoring for toxicity: Vancomycin     Goals of Care:    Previous admission:  4/19/19  Likely prognosis:  Fair  Code Status: Full Code  Comfort Only: No  Hospice: No  Goals at discharge: remain at home, with physician follow-up    Discharge Planning   JOSÉ ANTONIO: 8/8/2022     Code Status: Full Code   Is the patient medically ready for discharge?: No    Reason for patient still in hospital (select all that apply): Patient trending condition, Laboratory test, and Consult recommendations  Discharge Plan A: Home with family        Assessment/Plan:      * Osteomyelitis of great toe of right foot  Per surgery, no evidence of a necrotizing infection at physical exam. Patient with a chronic wound opened to air, this may be the reason there is some soft tissue gas seen in the foot Xray. Wound looks chronic  in nature.   - Continue ceftriaxone, vancomycin  - Podiatry consulted, patient s/p bone bx   - MRI consistent with osteomyelitis  - ID consulted: Right hallux bone biopsy. Bone cultures pending.  - Unable to get vascular SANDEE due to pain.     Ulcer of right fifth toe due to diabetes mellitus  Possible osteomyelitis, continuing wound care and antibiotics    Swelling of right upper extremity  Improved with elevation but forearm remains significantly swollen  - Soft tissue US: Diffuse soft tissue edema without organized fluid collection.     Uncontrolled type 2 diabetes mellitus with hyperglycemia, without long-term current use of insulin  Patient's FSGs are uncontrolled due to hyperglycemia on current medication regimen.  Last A1c reviewed- uncontrolled since 2010 per available records.  Current correctional scale  Low  Maintain anti-hyperglycemic management as follows-   Antihyperglycemics (From admission, onward)            Start     Stop Route Frequency Ordered    08/06/22 1130  insulin aspart U-100 pen 12 Units         -- SubQ 3 times daily with meals 08/06/22 0835    08/06/22 0936  insulin aspart U-100 pen 1-10 Units         -- SubQ Before meals & nightly PRN 08/06/22 0836    08/06/22 0900  insulin detemir U-100 pen 32 Units         -- SubQ Daily 08/06/22 0835      Hold Oral hypoglycemics while patient is in the hospital.  Consulted Endocrinology for management and discharge recommendations, follow up.  Management per Endocrinology.    Primary hypertension  Uncontrolled, not on medication at home  - Continued amlodipine 5mg daily  - Continued lisinopril 5mg daily; increased to 20 mg for 8/6  - BP remains elevated; increase lisinopril to 40 mg    Cellulitis of right lower extremity  Acute  Continue Rocephin and vancomycin  LE U/S performed: No evidence of deep venous thrombosis in the right lower extremity.        Active Hospital Problems    Diagnosis  POA    *Osteomyelitis of great toe of right foot [M86.9]  Yes     Severe obesity (BMI >= 40) [E66.01]  Yes    Ulcer of right fifth toe due to diabetes mellitus [E11.621, L97.519]  Yes    Cellulitis of right lower extremity [L03.115]  Yes    Primary hypertension [I10]  Yes    Uncontrolled type 2 diabetes mellitus with hyperglycemia, without long-term current use of insulin [E11.65]  Yes    Swelling of right upper extremity [M79.89]  Yes      Resolved Hospital Problems   No resolved problems to display.       Inpatient Medications Prescribed for Management of Current Problems:     Scheduled Meds:    amLODIPine  5 mg Oral Daily    cefTRIAXone (ROCEPHIN) IVPB  2 g Intravenous Q24H    enoxaparin  40 mg Subcutaneous Q12H    insulin aspart U-100  12 Units Subcutaneous TIDWM    insulin detemir U-100  32 Units Subcutaneous Daily    lisinopriL  20 mg Oral Daily    vancomycin (VANCOCIN) IVPB  1,000 mg Intravenous Q12H     Continuous Infusions:   As Needed: acetaminophen, albuterol-ipratropium, cadexomer iodine, dextrose 10%, dextrose 10%, glucagon (human recombinant), glucose, glucose, hydrALAZINE, insulin aspart U-100, melatonin, naloxone, ondansetron, polyethylene glycol, prochlorperazine, simethicone, sodium chloride 0.9%, Pharmacy to dose Vancomycin consult **AND** vancomycin - pharmacy to dose    VTE Risk Mitigation (From admission, onward)         Ordered     enoxaparin injection 40 mg  Every 12 hours         08/03/22 0833     IP VTE HIGH RISK PATIENT  Once         08/03/22 0141     Place sequential compression device  Until discontinued         08/03/22 0141              I have assessed these finding virtually using telemed platform and with assistance of bedside nurse     The attending portion of this evaluation, treatment, and documentation was performed per Maria E Flores MD via Telemedicine AudioVisual using the secure [employer] software platform with 2 way audio/video. The provider was located off-site and the patient is located in the hospital. The aforementioned  video software was utilized to document the relevant history and physical exam    Maria E Flores MD  Department of Hospital Medicine   [facility]

## 2022-08-06 NOTE — PLAN OF CARE
Patient sleeping between care. Denies any pain or discomfort. Will continue to monitor.     Problem: Adult Inpatient Plan of Care  Goal: Plan of Care Review  Outcome: Ongoing, Progressing  Goal: Patient-Specific Goal (Individualized)  Outcome: Ongoing, Progressing  Goal: Absence of Hospital-Acquired Illness or Injury  Outcome: Ongoing, Progressing  Goal: Optimal Comfort and Wellbeing  Outcome: Ongoing, Progressing  Goal: Readiness for Transition of Care  Outcome: Ongoing, Progressing     Problem: Diabetes Comorbidity  Goal: Blood Glucose Level Within Targeted Range  Outcome: Ongoing, Progressing     Problem: Bariatric Environmental Safety  Goal: Safety Maintained with Care  Outcome: Ongoing, Progressing

## 2022-08-06 NOTE — PLAN OF CARE
Problem: Adult Inpatient Plan of Care  Goal: Plan of Care Review  Outcome: Ongoing, Progressing  Goal: Patient-Specific Goal (Individualized)  Outcome: Ongoing, Progressing  Goal: Absence of Hospital-Acquired Illness or Injury  Outcome: Ongoing, Progressing  Goal: Optimal Comfort and Wellbeing  Outcome: Ongoing, Progressing  Goal: Readiness for Transition of Care  Outcome: Ongoing, Progressing     Problem: Diabetes Comorbidity  Goal: Blood Glucose Level Within Targeted Range  Outcome: Ongoing, Progressing     Problem: Bariatric Environmental Safety  Goal: Safety Maintained with Care  Outcome: Ongoing, Progressing

## 2022-08-06 NOTE — ASSESSMENT & PLAN NOTE
Body mass index is 43.65 kg/m².  Increased abdominal adiposity can increase insulin resistance.

## 2022-08-06 NOTE — SUBJECTIVE & OBJECTIVE
Telemedicine  This service was provided by Virtual Visit.    Patient was transferred to [facility] on:  8/5/2022    Chief Complaint   Patient presents with    Swelling     Right arm and leg swelling that started Saturday. Denies pain. Denies medical hx.     The patient location is: 1/651 A   Admitted 8/2/2022  6:24 PM  Present with the patient at the time of the telemed/virtual assessment: Telepresenter    Interval History / Events Overnight:   The patient is able to provide adequate history. Additional history was obtained from past medical records. No significant events reported by Nursing.  BP elevated.  Patient complains of R UE swelling. Symptoms have been unchanged since yesterday. Associated symptoms include: fatigue. Symptoms are  unimproved .     Lab test(s) reviewed: hyperglycemia    Review of Systems   Constitutional:  Negative for fever.   Respiratory:  Negative for shortness of breath.      Objective:     Vital Signs (Most Recent):  Temp: 96.5 °F (35.8 °C) (08/06/22 0728)  Pulse: 85 (08/06/22 0728)  Resp: 18 (08/06/22 0728)  BP: (!) 161/79 (08/06/22 0728)  SpO2: 99 % (08/06/22 0728)   Vital Signs (24h Range):  Temp:  [96.5 °F (35.8 °C)-98.8 °F (37.1 °C)] 96.5 °F (35.8 °C)  Pulse:  [75-85] 85  Resp:  [16-18] 18  SpO2:  [93 %-99 %] 99 %  BP: (152-164)/(72-79) 161/79     Weight: (!) 158.4 kg (349 lb 3.3 oz)  Body mass index is 43.65 kg/m².    Intake/Output Summary (Last 24 hours) at 8/6/2022 1105  Last data filed at 8/6/2022 0400  Gross per 24 hour   Intake --   Output 900 ml   Net -900 ml        Physical Exam  Constitutional:       General: He is not in acute distress.     Appearance: Normal appearance. He is morbidly obese.   Eyes:      General: Lids are normal. No scleral icterus.        Right eye: No discharge.         Left eye: No discharge.      Conjunctiva/sclera: Conjunctivae normal.   Neck:      Trachea: Phonation normal.   Cardiovascular:      Rate and Rhythm: Normal rate.       Comments: Monitor / Vital signs reviewed at time of visit  Pulmonary:      Effort: Pulmonary effort is normal. No tachypnea, accessory muscle usage or respiratory distress.   Abdominal:      General: There is no distension.   Musculoskeletal:      Right forearm: Swelling and edema present.   Neurological:      Mental Status: He is alert. He is not disoriented.   Psychiatric:         Attention and Perception: Attention normal.         Mood and Affect: Affect normal.         Behavior: Behavior is cooperative.       Significant Labs:   Recent Labs   Lab 08/03/22 0423   HGBA1C 12.4*       Recent Labs   Lab 08/05/22 2019 08/06/22  0713 08/06/22  0850   POCTGLUCOSE 313* 236* 278*       Recent Labs   Lab 08/03/22 0423 08/04/22 0552 08/05/22  0436   WBC 13.32* 15.04* 14.87*   HGB 11.4* 11.4* 11.4*   HCT 34.5* 34.7* 35.2*    397 449       Recent Labs   Lab 08/03/22 0423 08/04/22 0552 08/05/22  0436   GRAN 76.4*  10.2* 74.1*  11.2* 73.5*  11.0*   LYMPH 11.9*  1.6 14.1*  2.1 14.5*  2.2   MONO 8.5  1.1* 8.4  1.3* 8.3  1.2*   EOS 0.2 0.3 0.3       Recent Labs   Lab 08/03/22 0423 08/04/22 0552 08/05/22  0436   * 134* 133*   K 3.9 3.9 3.8   CL 96 96 96   CO2 28 28 28   BUN 17 11 11   CREATININE 1.0 1.0 1.2   * 264* 227*   CALCIUM 9.1 9.3 9.3   ALBUMIN 2.3* 2.3* 2.4*   MG 1.6 1.6 1.6   PHOS 3.0 3.6 3.8       Recent Labs   Lab 08/02/22 2215 08/03/22 0423 08/04/22 0552 08/05/22  0436   ALKPHOS 138* 127 142* 144*   ALT 59* 55* 52* 49*   AST 54* 54* 42* 34   PROT 9.0* 7.8 7.9 8.3   BILITOT 0.5 0.5 0.4 0.4   .8*  --   --   --        Recent Labs   Lab 08/02/22  1927   LACTATE 2.5*   SEDRATE >120*       Microbiology Results (last 7 days)       Procedure Component Value Units Date/Time    Aerobic culture [[id number]]  (Abnormal) Collected: 08/04/22 1145    Order Status: Completed Specimen: Bone from Toe, Right Foot Updated: 08/06/22 1040     Aerobic Bacterial Culture STAPHYLOCOCCUS  SPECIES  Moderate  Identification and susceptibility pending  Skin sabine also present      Narrative:      R great toe bone    AFB Culture & Smear [[id number]] Collected: 08/04/22 1145    Order Status: Completed Specimen: Bone from Toe, Right Foot Updated: 08/05/22 2127     AFB Culture & Smear Culture in progress     AFB CULTURE STAIN No acid fast bacilli seen.    Narrative:      R Great toe bone    Blood culture #1 [[id number]] Collected: 08/02/22 1935    Order Status: Completed Specimen: Blood from Peripheral, Wrist, Left Updated: 08/05/22 2022     Blood Culture, Routine No Growth to date      No Growth to date      No Growth to date      No Growth to date    Narrative:      Blood Culture #1    Blood culture #2 [[id number]] Collected: 08/02/22 1927    Order Status: Completed Specimen: Blood from Peripheral, Hand, Left Updated: 08/05/22 2022     Blood Culture, Routine No Growth to date      No Growth to date      No Growth to date      No Growth to date    Narrative:      Blood Culture #2    Culture, Anaerobe [[id number]] Collected: 08/03/22 1241    Order Status: Completed Specimen: Wound from Toe, Right Foot Updated: 08/05/22 1044     Anaerobic Culture Culture in progress    Aerobic culture [[id number]] Collected: 08/03/22 1241    Order Status: Completed Specimen: Wound from Toe, Right Foot Updated: 08/05/22 0911     Aerobic Bacterial Culture Skin sabine,  no predominant organism    Culture, Anaerobe [[id number]] Collected: 08/04/22 1145    Order Status: Completed Specimen: Bone from Toe, Right Foot Updated: 08/05/22 0722     Anaerobic Culture Culture in progress    Narrative:      R Great toe bone    Urine culture [[id number]]  (Abnormal)  (Susceptibility) Collected: 08/02/22 2000    Order Status: Completed Specimen: Urine Updated: 08/05/22 0142     Urine Culture, Routine PROTEUS PENNERI  10,000 - 49,999 cfu/ml      Narrative:      Specimen Source->Urine    AFB Culture & Smear [[id number]] Collected: 08/03/22 1241     Order Status: Completed Specimen: Wound from Toe, Right Foot Updated: 08/04/22 2127     AFB Culture & Smear Culture in progress     AFB CULTURE STAIN No acid fast bacilli seen.    Narrative:      Right toe    Gram stain [[id number]] Collected: 08/04/22 1145    Order Status: Completed Specimen: Bone from Toe, Right Foot Updated: 08/04/22 1446     Gram Stain Result No WBC's      No organisms seen    Narrative:      R Great toe bone    Fungus culture [[id number]] Collected: 08/04/22 1145    Order Status: Sent Specimen: Bone from Toe, Right Foot Updated: 08/04/22 1216    Gram stain [[id number]] Collected: 08/03/22 1241    Order Status: Completed Specimen: Wound from Toe, Right Foot Updated: 08/03/22 1813     Gram Stain Result Rare WBC's      Rare Gram positive cocci      Rare Gram negative rods    Narrative:      Right toe          No results found for: [id number]    US Soft Tissue Upper Extremity, Right  Narrative: EXAMINATION:  US SOFT TISSUE, UPPER EXTREMITY, RIGHT    CLINICAL HISTORY:  swelling, negative for DVT. Looking for underlying fluid collection;    TECHNIQUE:  Ultrasound of the right upper extremity.    COMPARISON:  None    FINDINGS:  Diffuse soft tissue edema without organized fluid collection.  No fluid collection seen.  There is edematous soft tissue in the right forearm.  There is no evidence of a painful mass.  No significant abnormality seen.  Impression: As above.    Mild soft tissue edema but no fluid collection seen.    Electronically signed by resident: Jessica Quijano  Date:    08/04/2022  Time:    19:30    Electronically signed by: Raphael Valdez MD  Date:    08/05/2022  Time:    08:04      Labs and Imaging within the last 24 hours listed above were reviewed.       Diet: Diet diabetic [facility]; 2000 Calorie  Significant LDAs:   IV Access Type: Peripheral  Urinary Catheter Indication if present: Patient Does Not Have Urinary Catheter  Other Lines/Tubes/Drains:    HIGH RISK CONDITION(S):    Patient is currently on drug therapy requiring intensive monitoring for toxicity: Vancomycin     Goals of Care:    Previous admission:  4/19/19  Likely prognosis:  Fair  Code Status: Full Code  Comfort Only: No  Hospice: No  Goals at discharge: remain at home, with physician follow-up    Discharge Planning   JOSÉ ANTONIO: 8/8/2022     Code Status: Full Code   Is the patient medically ready for discharge?: No    Reason for patient still in hospital (select all that apply): Patient trending condition, Laboratory test, and Consult recommendations  Discharge Plan A: Home with family

## 2022-08-06 NOTE — HOSPITAL COURSE
No evidence of a necrotizing infection on physical exam. Podiatry and ID consulted. Hyperglycemia managed by Endocrinology.  Bone culture of right foot showed Staph Cohnii.  6 weeks antibiotics with Vanc recommended by ID.  Patient also with right forearm abscess requiring I&D by Ortho on 8/10/2022.  Cultures with Strep Dysgalactiae.  ID reviewed and patient to be treated with Vanc for 6 weeks as previously recommended.

## 2022-08-06 NOTE — CONSULTS
[facility]  Endocrinology  Diabetes Consult Note    Consult Requested by: Maria E Flores MD   Reason for admit: Osteomyelitis of great toe of right foot    HISTORY OF PRESENT ILLNESS:  Reason for Consult: Management of T2DM, Hyperglycemia     Diabetes diagnosis year: 2010    Home Diabetes Medications:  No current medication use. Patient states > 3 years since he has been on Metformin.     How often checking glucose at home?  Patient not checking his BG at home.     BG readings on regimen: ANURAG  Hypoglycemia on the regimen?  No  Missed doses on regimen?  Yes    Diabetes Complications include:     Hyperglycemia, Diabetic peripheral neuropathy , Diabetic dermatitis, and Foot ulcer      Complicating diabetes co morbidities:   Infection; HTN      HPI: 55 yo male with diabetes, HTN presenting 2/2 R foot swelling that started acutely 3 days ago and progressively has gotten worse. He states he had a wound on his right great toe and 5th toe that has been there for a while. Today it had not gotten any better so he decided to come in for evaluation. He has not been on any antibiotics for it. He states that he was on medications at one point but no longer takes them as he thought he had gotten them under control. In the ED, concern with elevated WBC, ESR, CRP, xr of foot showed osteo of great toe. General surgery consulted to rule nec fasc 2/2 gas in the 5th toe. Medicine called for admission. Rocephin started. Endocrine consulted to manage hyperglycemia and type 2 diabetes.       Hemoglobin A1C   Date Value Ref Range Status   08/03/2022 12.4 (H) 4.0 - 5.6 % Final     Comment:     ADA Screening Guidelines:  5.7-6.4%  Consistent with prediabetes  >or=6.5%  Consistent with diabetes    High levels of fetal hemoglobin interfere with the HbA1C  assay. Heterozygous hemoglobin variants (HbS, HgC, etc)do  not significantly interfere with this assay.   However, presence of multiple variants may affect accuracy.     03/19/2015  15.0 (H) 4.5 - 6.2 % Final   2010 15.4 (H) 4.0 - 6.2 % Final          Interval HPI:   Overnight events: No acute events overnight. Patient on the [facility] in room 651/651 A. Blood glucose stable. BG above goal on current insulin regimen (SSI, prandial, and basal insulin ). Steroid use- None.    Renal function- Normal   Vasopressors-  None       Diet diabetic [facility];  Calorie     Eatin%  Nausea: No  Hypoglycemia and intervention: No  Fever: No  TPN and/or TF: No    PMH, PSH, FH, SH updated and reviewed     ROS:  Review of Systems  Constitutional: Negative for weight changes.  Eyes: Positive for visual disturbance.  Respiratory: Negative for cough.   Cardiovascular: Negative for chest pain.  Gastrointestinal: Negative for nausea.  Endocrine: Positive for polyuria, polydipsia.  Musculoskeletal: Negative for back pain.  Skin: Negative for rash.  Neurological: Negative for syncope.  Psychiatric/Behavioral: Negative for depression.    Current Medications and/or Treatments Impacting Glycemic Control  Immunotherapy:    Immunosuppressants       None          Steroids:   Hormones (From admission, onward)                Start     Stop Route Frequency Ordered    22 0239  melatonin tablet 6 mg         -- Oral Nightly PRN 22 0141          Pressors:    Autonomic Drugs (From admission, onward)                None          Hyperglycemia/Diabetes Medications:   Antihyperglycemics (From admission, onward)                Start     Stop Route Frequency Ordered    22 1130  insulin aspart U-100 pen 12 Units         -- SubQ 3 times daily with meals 22 0835    22 0936  insulin aspart U-100 pen 1-10 Units         -- SubQ Before meals & nightly PRN 22 0836    22 0900  insulin detemir U-100 pen 32 Units         -- SubQ Daily 22 0835             PHYSICAL EXAMINATION:  Vitals:    22 1122   BP: (!) 153/81   Pulse: 80   Resp: 16   Temp: 96.5 °F (35.8 °C)     Body mass  index is 43.65 kg/m².    Physical Exam  Constitutional: Well developed, well nourished, NAD.  ENT: External ears no masses with nose patent; normal hearing.  Neck: Supple; trachea midline.  Cardiovascular: Normal heart sounds, no LE edema. DP +2 bilaterally.  Lungs: Normal effort; lungs anterior bilaterally clear to auscultation.  Abdomen: Soft, no masses, no hernias.  MS: No clubbing or cyanosis of nails noted; normal gait or unable to assess gait.  Skin: No rashes, lesions, or ulcers; no nodules.  Psychiatric: Good judgement and insight; normal mood and affect.  Neurological: Cranial nerves are grossly intact.   Foot: Nails in poor condition, no amputations noted.        Labs Reviewed and Include   No results for input(s): GLU, CALCIUM, ALBUMIN, PROT, NA, K, CO2, CL, BUN, CREATININE, ALKPHOS, ALT, AST, BILITOT in the last 24 hours.  Lab Results   Component Value Date    WBC 14.87 (H) 08/05/2022    HGB 11.4 (L) 08/05/2022    HCT 35.2 (L) 08/05/2022    MCV 83 08/05/2022     08/05/2022     No results for input(s): TSH, FREET4 in the last 168 hours.  Lab Results   Component Value Date    HGBA1C 12.4 (H) 08/03/2022       Nutritional status:   Body mass index is 43.65 kg/m².  Lab Results   Component Value Date    ALBUMIN 2.4 (L) 08/05/2022    ALBUMIN 2.3 (L) 08/04/2022    ALBUMIN 2.3 (L) 08/03/2022     Lab Results   Component Value Date    PREALBUMIN 6 (L) 02/01/2010       Estimated Creatinine Clearance: 113.6 mL/min (based on SCr of 1.2 mg/dL).    Accu-Checks  Recent Labs     08/04/22  1144 08/04/22  1632 08/04/22  1912 08/05/22  0730 08/05/22  1103 08/05/22  1730 08/05/22  2019 08/06/22  0713 08/06/22  0850 08/06/22  1122   POCTGLUCOSE 256* 219* 236* 278* 268* 269* 313* 236* 278* 227*        ASSESSMENT and PLAN    * Osteomyelitis of great toe of right foot  Infection may elevate BG readings  On IV antibiotics  Managed per primary team  Avoid hypoglycemia        Uncontrolled type 2 diabetes mellitus with  hyperglycemia, without long-term current use of insulin  Endocrinology consulted for BG management.   BG goal 140-180    Weight-based at 0.5 units/kg/day   - Levemir Flex Pen 32 units daily  - Novolog (aspart) insulin 12 Units SQ TIDWM and prn for BG excursions [facility] (150/25) SSI  - BG checks AC/HS  - Hypoglycemia protocol in place    ** Please notify Endocrine for any change and/or advance in diet**  ** Please call Endocrine for any BG related issues **    Discharge Planning:   TBD. Please notify endocrinology prior to discharge.        Primary hypertension  Willy contribute to insulin resistance.       Severe obesity (BMI >= 40)  Body mass index is 43.65 kg/m².  Increased abdominal adiposity can increase insulin resistance.           Plan discussed with patient, family, and RN at bedside.        Brandon Gage, DNP, FNP  Endocrinology  [facility] - Med Surg

## 2022-08-06 NOTE — ASSESSMENT & PLAN NOTE
Uncontrolled, not on medication at home  - Continued amlodipine 5mg daily  - Continued lisinopril 5mg daily; increased to 20 mg for 8/6

## 2022-08-07 LAB
ALBUMIN SERPL BCP-MCNC: 2.3 G/DL (ref 3.5–5.2)
ANION GAP SERPL CALC-SCNC: 10 MMOL/L (ref 8–16)
BACTERIA BLD CULT: NORMAL
BACTERIA BLD CULT: NORMAL
BUN SERPL-MCNC: 13 MG/DL (ref 6–20)
CALCIUM SERPL-MCNC: 9.6 MG/DL (ref 8.7–10.5)
CHLORIDE SERPL-SCNC: 99 MMOL/L (ref 95–110)
CO2 SERPL-SCNC: 28 MMOL/L (ref 23–29)
CREAT SERPL-MCNC: 1.2 MG/DL (ref 0.5–1.4)
EST. GFR  (NO RACE VARIABLE): >60 ML/MIN/1.73 M^2
GLUCOSE SERPL-MCNC: 154 MG/DL (ref 70–110)
PHOSPHATE SERPL-MCNC: 3.8 MG/DL (ref 2.7–4.5)
POCT GLUCOSE: 135 MG/DL (ref 70–110)
POCT GLUCOSE: 159 MG/DL (ref 70–110)
POCT GLUCOSE: 178 MG/DL (ref 70–110)
POCT GLUCOSE: 180 MG/DL (ref 70–110)
POTASSIUM SERPL-SCNC: 4.2 MMOL/L (ref 3.5–5.1)
SODIUM SERPL-SCNC: 137 MMOL/L (ref 136–145)
VANCOMYCIN TROUGH SERPL-MCNC: 23.1 UG/ML (ref 10–22)

## 2022-08-07 PROCEDURE — 99233 SBSQ HOSP IP/OBS HIGH 50: CPT | Mod: 95,,, | Performed by: INTERNAL MEDICINE

## 2022-08-07 PROCEDURE — 99232 PR SUBSEQUENT HOSPITAL CARE,LEVL II: ICD-10-PCS | Mod: ,,, | Performed by: NURSE PRACTITIONER

## 2022-08-07 PROCEDURE — 36415 COLL VENOUS BLD VENIPUNCTURE: CPT | Performed by: INTERNAL MEDICINE

## 2022-08-07 PROCEDURE — 63600175 PHARM REV CODE 636 W HCPCS: Performed by: INTERNAL MEDICINE

## 2022-08-07 PROCEDURE — 63600175 PHARM REV CODE 636 W HCPCS: Performed by: HOSPITALIST

## 2022-08-07 PROCEDURE — 99233 PR SUBSEQUENT HOSPITAL CARE,LEVL III: ICD-10-PCS | Mod: 95,,, | Performed by: INTERNAL MEDICINE

## 2022-08-07 PROCEDURE — 11000001 HC ACUTE MED/SURG PRIVATE ROOM

## 2022-08-07 PROCEDURE — 25000003 PHARM REV CODE 250: Performed by: INTERNAL MEDICINE

## 2022-08-07 PROCEDURE — 99232 SBSQ HOSP IP/OBS MODERATE 35: CPT | Mod: ,,, | Performed by: NURSE PRACTITIONER

## 2022-08-07 PROCEDURE — 99233 PR SUBSEQUENT HOSPITAL CARE,LEVL III: ICD-10-PCS | Mod: ,,, | Performed by: PHYSICIAN ASSISTANT

## 2022-08-07 PROCEDURE — 80202 ASSAY OF VANCOMYCIN: CPT | Performed by: INTERNAL MEDICINE

## 2022-08-07 PROCEDURE — 80069 RENAL FUNCTION PANEL: CPT | Performed by: INTERNAL MEDICINE

## 2022-08-07 PROCEDURE — 99233 SBSQ HOSP IP/OBS HIGH 50: CPT | Mod: ,,, | Performed by: PHYSICIAN ASSISTANT

## 2022-08-07 RX ORDER — AMLODIPINE BESYLATE 10 MG/1
10 TABLET ORAL DAILY
Status: DISCONTINUED | OUTPATIENT
Start: 2022-08-08 | End: 2022-08-12

## 2022-08-07 RX ORDER — INSULIN ASPART 100 [IU]/ML
0-5 INJECTION, SOLUTION INTRAVENOUS; SUBCUTANEOUS
Status: DISCONTINUED | OUTPATIENT
Start: 2022-08-07 | End: 2022-08-16 | Stop reason: HOSPADM

## 2022-08-07 RX ADMIN — ENOXAPARIN SODIUM 40 MG: 40 INJECTION SUBCUTANEOUS at 08:08

## 2022-08-07 RX ADMIN — INSULIN ASPART 12 UNITS: 100 INJECTION, SOLUTION INTRAVENOUS; SUBCUTANEOUS at 04:08

## 2022-08-07 RX ADMIN — LISINOPRIL 20 MG: 20 TABLET ORAL at 08:08

## 2022-08-07 RX ADMIN — ACETAMINOPHEN 650 MG: 325 TABLET ORAL at 04:08

## 2022-08-07 RX ADMIN — VANCOMYCIN HYDROCHLORIDE 1000 MG: 1 INJECTION, POWDER, LYOPHILIZED, FOR SOLUTION INTRAVENOUS at 06:08

## 2022-08-07 RX ADMIN — INSULIN DETEMIR 32 UNITS: 100 INJECTION, SOLUTION SUBCUTANEOUS at 08:08

## 2022-08-07 RX ADMIN — INSULIN ASPART 12 UNITS: 100 INJECTION, SOLUTION INTRAVENOUS; SUBCUTANEOUS at 08:08

## 2022-08-07 RX ADMIN — AMLODIPINE BESYLATE 5 MG: 5 TABLET ORAL at 08:08

## 2022-08-07 RX ADMIN — INSULIN ASPART 12 UNITS: 100 INJECTION, SOLUTION INTRAVENOUS; SUBCUTANEOUS at 11:08

## 2022-08-07 NOTE — SUBJECTIVE & OBJECTIVE
"Interval HPI:   Overnight events: No acute events overnight. Patient on the [facility] in room 651/651 A. Blood glucose improving. BG at and above goal on current insulin regimen (SSI, prandial, and basal insulin ). Steroid use- None.    Renal function- Normal   Vasopressors-  None       Diet diabetic [facility];  Calorie     Eatin%  Nausea: No  Hypoglycemia and intervention: No  Fever: No  TPN and/or TF: No    BP (!) 147/76 (BP Location: Left arm, Patient Position: Lying)   Pulse 91   Temp 98.5 °F (36.9 °C) (Oral)   Resp 18   Ht 6' 3" (1.905 m)   Wt (!) 158.4 kg (349 lb 3.3 oz)   SpO2 98%   BMI 43.65 kg/m²     Labs Reviewed and Include    No results for input(s): GLU, CALCIUM, ALBUMIN, PROT, NA, K, CO2, CL, BUN, CREATININE, ALKPHOS, ALT, AST, BILITOT in the last 24 hours.  Lab Results   Component Value Date    WBC 14.87 (H) 2022    HGB 11.4 (L) 2022    HCT 35.2 (L) 2022    MCV 83 2022     2022     No results for input(s): TSH, FREET4 in the last 168 hours.  Lab Results   Component Value Date    HGBA1C 12.4 (H) 2022       Nutritional status:   Body mass index is 43.65 kg/m².  Lab Results   Component Value Date    ALBUMIN 2.4 (L) 2022    ALBUMIN 2.3 (L) 2022    ALBUMIN 2.3 (L) 2022     Lab Results   Component Value Date    PREALBUMIN 6 (L) 2010       Estimated Creatinine Clearance: 113.6 mL/min (based on SCr of 1.2 mg/dL).    Accu-Checks  Recent Labs     22  0730 22  1103 22  1730 22  2019 22  0713 22  0850 22  1122 22  1525 22  2047 22  0731   POCTGLUCOSE 278* 268* 269* 313* 236* 278* 227* 169* 157* 178*       Current Medications and/or Treatments Impacting Glycemic Control  Immunotherapy:    Immunosuppressants       None          Steroids:   Hormones (From admission, onward)                Start     Stop Route Frequency Ordered    22 0239  melatonin tablet 6 mg      "    -- Oral Nightly PRN 08/03/22 0141          Pressors:    Autonomic Drugs (From admission, onward)                None          Hyperglycemia/Diabetes Medications:   Antihyperglycemics (From admission, onward)                Start     Stop Route Frequency Ordered    08/07/22 1035  insulin aspart U-100 pen 0-5 Units         -- SubQ Before meals & nightly PRN 08/07/22 0935    08/06/22 1130  insulin aspart U-100 pen 12 Units         -- SubQ 3 times daily with meals 08/06/22 0835    08/06/22 0900  insulin detemir U-100 pen 32 Units         -- SubQ Daily 08/06/22 0835

## 2022-08-07 NOTE — ASSESSMENT & PLAN NOTE
Mr. Caceres is a 57 y/o male with poorly controlled DMII, HTN admitted for osteomyelitis of right hallux and 5th toe.     Recommendations  1. Continue vancomycin. D/c ceftriaxone as cultures +staph species   2. Podiatry following. Right hallux bone biopsy. Bone cultures +Staph species.  4. Glucose mgmt per primary   5. SANDEE studies   6. Judicial leg elevation   7. Anticipate long term IV abx therapy  8. ID will follow culture data for final abx recommendations.

## 2022-08-07 NOTE — SUBJECTIVE & OBJECTIVE
Telemedicine  This service was provided by Virtual Visit.    Patient was transferred to [facility] on:  8/5/2022    Chief Complaint   Patient presents with    Swelling     Right arm and leg swelling that started Saturday. Denies pain. Denies medical hx.     The patient location is: 1/651 A   Admitted 8/2/2022  6:24 PM  Present with the patient at the time of the telemed/virtual assessment: Telepresenter    Interval History / Events Overnight:   The patient is able to provide adequate history. Additional history was obtained from past medical records. No significant events reported by Nursing.  BP slightly elevated.  Patient complains of R UE swelling. Symptoms have improved since yesterday. Associated symptoms include: fatigue. Symptoms are decreasing in severity.     Lab test(s) reviewed: hyperglycemia    Review of Systems   Constitutional:  Negative for fever.   Respiratory:  Negative for shortness of breath.      Objective:     Vital Signs (Most Recent):  Temp: 98.5 °F (36.9 °C) (08/07/22 0717)  Pulse: 91 (08/07/22 0717)  Resp: 18 (08/07/22 0717)  BP: (!) 147/76 (08/07/22 0717)  SpO2: 98 % (08/07/22 0717)   Vital Signs (24h Range):  Temp:  [96.5 °F (35.8 °C)-98.9 °F (37.2 °C)] 98.5 °F (36.9 °C)  Pulse:  [78-91] 91  Resp:  [16-20] 18  SpO2:  [92 %-98 %] 98 %  BP: (140-166)/(74-81) 147/76     Weight: (!) 158.4 kg (349 lb 3.3 oz)  Body mass index is 43.65 kg/m².    Intake/Output Summary (Last 24 hours) at 8/7/2022 1033  Last data filed at 8/6/2022 1600  Gross per 24 hour   Intake --   Output 1200 ml   Net -1200 ml        Physical Exam  Constitutional:       General: He is not in acute distress.     Appearance: Normal appearance. He is morbidly obese.   Eyes:      General: Lids are normal. No scleral icterus.        Right eye: No discharge.         Left eye: No discharge.      Conjunctiva/sclera: Conjunctivae normal.   Neck:      Trachea: Phonation normal.   Cardiovascular:      Rate and Rhythm:  Normal rate.      Comments: Monitor / Vital signs reviewed at time of visit  Pulmonary:      Effort: Pulmonary effort is normal. No tachypnea, accessory muscle usage or respiratory distress.   Abdominal:      General: There is no distension.   Musculoskeletal:      Right forearm: Swelling and edema present.   Neurological:      Mental Status: He is alert. He is not disoriented.   Psychiatric:         Attention and Perception: Attention normal.         Mood and Affect: Affect normal.         Behavior: Behavior is cooperative.       Significant Labs:   Recent Labs   Lab 08/03/22  0423   HGBA1C 12.4*       Recent Labs   Lab 08/06/22  1525 08/06/22  2047 08/07/22  0731   POCTGLUCOSE 169* 157* 178*       Recent Labs   Lab 08/03/22 0423 08/04/22  0552 08/05/22  0436   WBC 13.32* 15.04* 14.87*   HGB 11.4* 11.4* 11.4*   HCT 34.5* 34.7* 35.2*    397 449       Recent Labs   Lab 08/03/22 0423 08/04/22  0552 08/05/22  0436   GRAN 76.4*  10.2* 74.1*  11.2* 73.5*  11.0*   LYMPH 11.9*  1.6 14.1*  2.1 14.5*  2.2   MONO 8.5  1.1* 8.4  1.3* 8.3  1.2*   EOS 0.2 0.3 0.3       Recent Labs   Lab 08/03/22 0423 08/04/22  0552 08/05/22  0436   * 134* 133*   K 3.9 3.9 3.8   CL 96 96 96   CO2 28 28 28   BUN 17 11 11   CREATININE 1.0 1.0 1.2   * 264* 227*   CALCIUM 9.1 9.3 9.3   ALBUMIN 2.3* 2.3* 2.4*   MG 1.6 1.6 1.6   PHOS 3.0 3.6 3.8       Recent Labs   Lab 08/02/22  2215 08/03/22 0423 08/04/22  0552 08/05/22  0436   ALKPHOS 138* 127 142* 144*   ALT 59* 55* 52* 49*   AST 54* 54* 42* 34   PROT 9.0* 7.8 7.9 8.3   BILITOT 0.5 0.5 0.4 0.4   .8*  --   --   --        Recent Labs   Lab 08/02/22 1927   LACTATE 2.5*   SEDRATE >120*       Microbiology Results (last 7 days)       Procedure Component Value Units Date/Time    Blood culture #1 [[id number]] Collected: 08/02/22 1935    Order Status: Completed Specimen: Blood from Peripheral, Wrist, Left Updated: 08/06/22 2022     Blood Culture, Routine No Growth to  date      No Growth to date      No Growth to date      No Growth to date      No Growth to date    Narrative:      Blood Culture #1    Blood culture #2 [[id number]] Collected: 08/02/22 1927    Order Status: Completed Specimen: Blood from Peripheral, Hand, Left Updated: 08/06/22 2022     Blood Culture, Routine No Growth to date      No Growth to date      No Growth to date      No Growth to date      No Growth to date    Narrative:      Blood Culture #2    Aerobic culture [[id number]]  (Abnormal) Collected: 08/04/22 1145    Order Status: Completed Specimen: Bone from Toe, Right Foot Updated: 08/06/22 1313     Aerobic Bacterial Culture STAPHYLOCOCCUS COHNII  Moderate  Susceptibility pending  Skin sabine also present      Narrative:      R great toe bone    AFB Culture & Smear [[id number]] Collected: 08/04/22 1145    Order Status: Completed Specimen: Bone from Toe, Right Foot Updated: 08/05/22 2127     AFB Culture & Smear Culture in progress     AFB CULTURE STAIN No acid fast bacilli seen.    Narrative:      R Great toe bone    Culture, Anaerobe [[id number]] Collected: 08/03/22 1241    Order Status: Completed Specimen: Wound from Toe, Right Foot Updated: 08/05/22 1044     Anaerobic Culture Culture in progress    Aerobic culture [[id number]] Collected: 08/03/22 1241    Order Status: Completed Specimen: Wound from Toe, Right Foot Updated: 08/05/22 0911     Aerobic Bacterial Culture Skin sabine,  no predominant organism    Culture, Anaerobe [[id number]] Collected: 08/04/22 1145    Order Status: Completed Specimen: Bone from Toe, Right Foot Updated: 08/05/22 0722     Anaerobic Culture Culture in progress    Narrative:      R Great toe bone    Urine culture [[id number]]  (Abnormal)  (Susceptibility) Collected: 08/02/22 2000    Order Status: Completed Specimen: Urine Updated: 08/05/22 0142     Urine Culture, Routine PROTEUS PENNERI  10,000 - 49,999 cfu/ml      Narrative:      Specimen Source->Urine    AFB Culture & Smear [[id number]]  Collected: 08/03/22 1241    Order Status: Completed Specimen: Wound from Toe, Right Foot Updated: 08/04/22 2127     AFB Culture & Smear Culture in progress     AFB CULTURE STAIN No acid fast bacilli seen.    Narrative:      Right toe    Gram stain [[id number]] Collected: 08/04/22 1145    Order Status: Completed Specimen: Bone from Toe, Right Foot Updated: 08/04/22 1446     Gram Stain Result No WBC's      No organisms seen    Narrative:      R Great toe bone    Fungus culture [[id number]] Collected: 08/04/22 1145    Order Status: Sent Specimen: Bone from Toe, Right Foot Updated: 08/04/22 1216    Gram stain [[id number]] Collected: 08/03/22 1241    Order Status: Completed Specimen: Wound from Toe, Right Foot Updated: 08/03/22 1813     Gram Stain Result Rare WBC's      Rare Gram positive cocci      Rare Gram negative rods    Narrative:      Right toe          No results found for: [id number]    US Soft Tissue Upper Extremity, Right  Narrative: EXAMINATION:  US SOFT TISSUE, UPPER EXTREMITY, RIGHT    CLINICAL HISTORY:  swelling, negative for DVT. Looking for underlying fluid collection;    TECHNIQUE:  Ultrasound of the right upper extremity.    COMPARISON:  None    FINDINGS:  Diffuse soft tissue edema without organized fluid collection.  No fluid collection seen.  There is edematous soft tissue in the right forearm.  There is no evidence of a painful mass.  No significant abnormality seen.  Impression: As above.    Mild soft tissue edema but no fluid collection seen.    Electronically signed by resident: Jessica Quijano  Date:    08/04/2022  Time:    19:30    Electronically signed by: Raphael Valdez MD  Date:    08/05/2022  Time:    08:04      Labs and Imaging within the last 24 hours listed above were reviewed.       Diet: Diet diabetic [facility]; 2000 Calorie  Significant LDAs:   IV Access Type: Peripheral  Urinary Catheter Indication if present: Patient Does Not Have Urinary Catheter  Other  Lines/Tubes/Drains:    HIGH RISK CONDITION(S):   Patient is currently on drug therapy requiring intensive monitoring for toxicity: Vancomycin     Goals of Care:    Previous admission:  4/19/19  Likely prognosis:  Fair  Code Status: Full Code  Comfort Only: No  Hospice: No  Goals at discharge: remain at home, with physician follow-up    Discharge Planning   JOSÉ ANTONIO: 8/8/2022     Code Status: Full Code   Is the patient medically ready for discharge?: No    Reason for patient still in hospital (select all that apply): Patient trending condition, Laboratory test, and Consult recommendations  Discharge Plan A: Home with family

## 2022-08-07 NOTE — PROGRESS NOTES
[facility]  Infectious Disease  Progress Note    Patient Name: Renee Caceres  MRN: [id number]  Admission Date: 8/2/2022  Length of Stay: 4 days  Attending Physician: Maria E Flores MD  Primary Care Provider: Primary Doctor No    Isolation Status: No active isolations  Assessment/Plan:      * Osteomyelitis of great toe of right foot  Mr. Caceres is a 57 y/o male with poorly controlled DMII, HTN admitted for osteomyelitis of right hallux and 5th toe.     Recommendations  1. Continue vancomycin. D/c ceftriaxone as cultures +staph species   2. Podiatry following. Right hallux bone biopsy. Bone cultures +Staph species.  4. Glucose mgmt per primary   5. SANDEE studies   6. Judicial leg elevation   7. Anticipate long term IV abx therapy  8. ID will follow culture data for final abx recommendations.          Thank you for your consult. I will follow-up with patient. Please contact us if you have any additional questions.    Zi Serna PA-C  Infectious Disease  [facility]    Subjective:     Principal Problem:Osteomyelitis of great toe of right foot    HPI: Mr. Caceres is a 57 y/o male with poorly controlled DMII, HTN admitted for right 5th toe osteomyelitis. He works as a  at a restaurant and developed this wound in the last few weeks. He denies having any fever chills orn ight sweats. He was seen by general surgery and necrotizing  fasciitis was ruled out. No fevers. WBC 13K on admit. Podiatry consulted. He is on empiric abx at this time.     Interval History:   NAEON  Bone biopsy done 8/4  Bone cultures +Staph species  Continues to have RUE swelling and RLE swelling    Review of Systems   Constitutional:  Negative for chills, diaphoresis, fatigue and fever.   HENT:  Negative for congestion, ear pain, nosebleeds, rhinorrhea and sore throat.    Eyes:  Negative for pain.   Respiratory:  Negative for cough, shortness of breath and wheezing.    Cardiovascular:  Positive for leg swelling.  Negative for chest pain.   Gastrointestinal:  Negative for abdominal pain, constipation, diarrhea, nausea and vomiting.   Genitourinary:  Negative for dysuria, frequency and urgency.   Musculoskeletal:  Negative for arthralgias, back pain and neck pain.   Skin:  Positive for wound.   Neurological:  Negative for weakness, numbness and headaches.   Objective:     Vital Signs (Most Recent):  Temp: 98.2 °F (36.8 °C) (08/07/22 1106)  Pulse: 79 (08/07/22 1106)  Resp: 16 (08/07/22 1106)  BP: (!) 161/78 (08/07/22 1106)  SpO2: 98 % (08/07/22 1106)   Vital Signs (24h Range):  Temp:  [97.1 °F (36.2 °C)-98.9 °F (37.2 °C)] 98.2 °F (36.8 °C)  Pulse:  [78-91] 79  Resp:  [16-20] 16  SpO2:  [92 %-98 %] 98 %  BP: (140-166)/(74-80) 161/78     Weight: (!) 158.4 kg (349 lb 3.3 oz)  Body mass index is 43.65 kg/m².    Estimated Creatinine Clearance: 113.6 mL/min (based on SCr of 1.2 mg/dL).    Physical Exam  Vitals and nursing note reviewed.   Constitutional:       General: He is not in acute distress.     Appearance: He is well-developed. He is obese. He is not diaphoretic.   HENT:      Head: Normocephalic and atraumatic.   Eyes:      Pupils: Pupils are equal, round, and reactive to light.   Cardiovascular:      Rate and Rhythm: Normal rate and regular rhythm.      Heart sounds: Normal heart sounds. No murmur heard.    No friction rub. No gallop.   Pulmonary:      Effort: Pulmonary effort is normal. No respiratory distress.      Breath sounds: Normal breath sounds. No wheezing or rales.   Chest:      Chest wall: No tenderness.   Abdominal:      General: Bowel sounds are normal. There is no distension.      Palpations: There is no mass.      Tenderness: There is no abdominal tenderness. There is no guarding.   Musculoskeletal:         General: Swelling (right forearm) present. No deformity. Normal range of motion.      Cervical back: Normal range of motion and neck supple.      Right lower leg: Edema present.   Skin:     General: Skin is  warm and dry.      Findings: Erythema present. No rash.   Neurological:      Mental Status: He is alert and oriented to person, place, and time.   Psychiatric:         Behavior: Behavior normal.         Thought Content: Thought content normal.         Judgment: Judgment normal.             Significant Labs: All pertinent labs within the past 24 hours have been reviewed.    Significant Imaging: I have reviewed all pertinent imaging results/findings within the past 24 hours.

## 2022-08-07 NOTE — SUBJECTIVE & OBJECTIVE
Interval History:   NAEON  Bone biopsy done 8/4  Bone cultures +Staph species  Continues to have RUE swelling and RLE swelling    Review of Systems   Constitutional:  Negative for chills, diaphoresis, fatigue and fever.   HENT:  Negative for congestion, ear pain, nosebleeds, rhinorrhea and sore throat.    Eyes:  Negative for pain.   Respiratory:  Negative for cough, shortness of breath and wheezing.    Cardiovascular:  Positive for leg swelling. Negative for chest pain.   Gastrointestinal:  Negative for abdominal pain, constipation, diarrhea, nausea and vomiting.   Genitourinary:  Negative for dysuria, frequency and urgency.   Musculoskeletal:  Negative for arthralgias, back pain and neck pain.   Skin:  Positive for wound.   Neurological:  Negative for weakness, numbness and headaches.   Objective:     Vital Signs (Most Recent):  Temp: 98.2 °F (36.8 °C) (08/07/22 1106)  Pulse: 79 (08/07/22 1106)  Resp: 16 (08/07/22 1106)  BP: (!) 161/78 (08/07/22 1106)  SpO2: 98 % (08/07/22 1106)   Vital Signs (24h Range):  Temp:  [97.1 °F (36.2 °C)-98.9 °F (37.2 °C)] 98.2 °F (36.8 °C)  Pulse:  [78-91] 79  Resp:  [16-20] 16  SpO2:  [92 %-98 %] 98 %  BP: (140-166)/(74-80) 161/78     Weight: (!) 158.4 kg (349 lb 3.3 oz)  Body mass index is 43.65 kg/m².    Estimated Creatinine Clearance: 113.6 mL/min (based on SCr of 1.2 mg/dL).    Physical Exam  Vitals and nursing note reviewed.   Constitutional:       General: He is not in acute distress.     Appearance: He is well-developed. He is obese. He is not diaphoretic.   HENT:      Head: Normocephalic and atraumatic.   Eyes:      Pupils: Pupils are equal, round, and reactive to light.   Cardiovascular:      Rate and Rhythm: Normal rate and regular rhythm.      Heart sounds: Normal heart sounds. No murmur heard.    No friction rub. No gallop.   Pulmonary:      Effort: Pulmonary effort is normal. No respiratory distress.      Breath sounds: Normal breath sounds. No wheezing or rales.   Chest:       Chest wall: No tenderness.   Abdominal:      General: Bowel sounds are normal. There is no distension.      Palpations: There is no mass.      Tenderness: There is no abdominal tenderness. There is no guarding.   Musculoskeletal:         General: Swelling (right forearm) present. No deformity. Normal range of motion.      Cervical back: Normal range of motion and neck supple.      Right lower leg: Edema present.   Skin:     General: Skin is warm and dry.      Findings: Erythema present. No rash.   Neurological:      Mental Status: He is alert and oriented to person, place, and time.   Psychiatric:         Behavior: Behavior normal.         Thought Content: Thought content normal.         Judgment: Judgment normal.             Significant Labs: All pertinent labs within the past 24 hours have been reviewed.    Significant Imaging: I have reviewed all pertinent imaging results/findings within the past 24 hours.

## 2022-08-07 NOTE — PROGRESS NOTES
[facility] - Med Surg  Endocrinology  Progress Note    Admit Date: 8/2/2022     Reason for Consult: Management of T2DM, Hyperglycemia     Diabetes diagnosis year: 2010    Home Diabetes Medications:  No current medication use. Patient states > 3 years since he has been on Metformin.     How often checking glucose at home?  Patient not checking his BG at home.     BG readings on regimen: ANURAG  Hypoglycemia on the regimen?  No  Missed doses on regimen?  Yes    Diabetes Complications include:     Hyperglycemia, Diabetic peripheral neuropathy , Diabetic dermatitis, and Foot ulcer      Complicating diabetes co morbidities:   Infection; HTN      HPI: 55 yo male with diabetes, HTN presenting 2/2 R foot swelling that started acutely 3 days ago and progressively has gotten worse. He states he had a wound on his right great toe and 5th toe that has been there for a while. Today it had not gotten any better so he decided to come in for evaluation. He has not been on any antibiotics for it. He states that he was on medications at one point but no longer takes them as he thought he had gotten them under control. In the ED, concern with elevated WBC, ESR, CRP, xr of foot showed osteo of great toe. General surgery consulted to rule nec fasc 2/2 gas in the 5th toe. Medicine called for admission. Rocephin started. Endocrine consulted to manage hyperglycemia and type 2 diabetes.       Hemoglobin A1C   Date Value Ref Range Status   08/03/2022 12.4 (H) 4.0 - 5.6 % Final     Comment:     ADA Screening Guidelines:  5.7-6.4%  Consistent with prediabetes  >or=6.5%  Consistent with diabetes    High levels of fetal hemoglobin interfere with the HbA1C  assay. Heterozygous hemoglobin variants (HbS, HgC, etc)do  not significantly interfere with this assay.   However, presence of multiple variants may affect accuracy.     03/19/2015 15.0 (H) 4.5 - 6.2 % Final   01/26/2010 15.4 (H) 4.0 - 6.2 % Final          Interval HPI:   Overnight events: No acute  "events overnight. Patient on the [facility] in room 651/651 A. Blood glucose improving. BG at and above goal on current insulin regimen (SSI, prandial, and basal insulin ). Steroid use- None.    Renal function- Normal   Vasopressors-  None       Diet diabetic [facility];  Calorie     Eatin%  Nausea: No  Hypoglycemia and intervention: No  Fever: No  TPN and/or TF: No    BP (!) 147/76 (BP Location: Left arm, Patient Position: Lying)   Pulse 91   Temp 98.5 °F (36.9 °C) (Oral)   Resp 18   Ht 6' 3" (1.905 m)   Wt (!) 158.4 kg (349 lb 3.3 oz)   SpO2 98%   BMI 43.65 kg/m²     Labs Reviewed and Include    No results for input(s): GLU, CALCIUM, ALBUMIN, PROT, NA, K, CO2, CL, BUN, CREATININE, ALKPHOS, ALT, AST, BILITOT in the last 24 hours.  Lab Results   Component Value Date    WBC 14.87 (H) 2022    HGB 11.4 (L) 2022    HCT 35.2 (L) 2022    MCV 83 2022     2022     No results for input(s): TSH, FREET4 in the last 168 hours.  Lab Results   Component Value Date    HGBA1C 12.4 (H) 2022       Nutritional status:   Body mass index is 43.65 kg/m².  Lab Results   Component Value Date    ALBUMIN 2.4 (L) 2022    ALBUMIN 2.3 (L) 2022    ALBUMIN 2.3 (L) 2022     Lab Results   Component Value Date    PREALBUMIN 6 (L) 2010       Estimated Creatinine Clearance: 113.6 mL/min (based on SCr of 1.2 mg/dL).    Accu-Checks  Recent Labs     22  0730 22  1103 22  1730 22  2019 22  0713 22  0850 22  1122 22  1525 22  2047 22  0731   POCTGLUCOSE 278* 268* 269* 313* 236* 278* 227* 169* 157* 178*       Current Medications and/or Treatments Impacting Glycemic Control  Immunotherapy:    Immunosuppressants       None          Steroids:   Hormones (From admission, onward)                Start     Stop Route Frequency Ordered    22 0239  melatonin tablet 6 mg         -- Oral Nightly PRN 22 0141      "     Pressors:    Autonomic Drugs (From admission, onward)                None          Hyperglycemia/Diabetes Medications:   Antihyperglycemics (From admission, onward)                Start     Stop Route Frequency Ordered    08/07/22 1035  insulin aspart U-100 pen 0-5 Units         -- SubQ Before meals & nightly PRN 08/07/22 0935    08/06/22 1130  insulin aspart U-100 pen 12 Units         -- SubQ 3 times daily with meals 08/06/22 0835    08/06/22 0900  insulin detemir U-100 pen 32 Units         -- SubQ Daily 08/06/22 0835            ASSESSMENT and PLAN    * Osteomyelitis of great toe of right foot  Infection may elevate BG readings  On IV antibiotics  Managed per primary team  Avoid hypoglycemia        Uncontrolled type 2 diabetes mellitus with hyperglycemia, without long-term current use of insulin  Endocrinology consulted for BG management.   BG goal 140-180    Weight-based at 0.5 units/kg/day   - Levemir Flex Pen 32 units daily  - Novolog (aspart) insulin 12 Units SQ TIDWM and prn for BG excursions LDC (150/50) SSI  - BG checks AC/HS  - Hypoglycemia protocol in place    ** Please notify Endocrine for any change and/or advance in diet**  ** Please call Endocrine for any BG related issues **    Discharge Planning:   TBD. Please notify endocrinology prior to discharge.        Primary hypertension  Willy contribute to insulin resistance.       Severe obesity (BMI >= 40)  Body mass index is 43.65 kg/m².  Increased abdominal adiposity can increase insulin resistance.            Brandon Gage, DNP, FNP  Endocrinology  [facility] - Med Surg

## 2022-08-07 NOTE — ASSESSMENT & PLAN NOTE
Endocrinology consulted for BG management.   BG goal 140-180    Weight-based at 0.5 units/kg/day   - Levemir Flex Pen 32 units daily  - Novolog (aspart) insulin 12 Units SQ TIDWM and prn for BG excursions LDC (150/50) SSI  - BG checks AC/HS  - Hypoglycemia protocol in place    ** Please notify Endocrine for any change and/or advance in diet**  ** Please call Endocrine for any BG related issues **    Discharge Planning:   TBD. Please notify endocrinology prior to discharge.

## 2022-08-07 NOTE — PLAN OF CARE
Problem: Adult Inpatient Plan of Care  Goal: Plan of Care Review  Outcome: Ongoing, Progressing  Goal: Patient-Specific Goal (Individualized)  Outcome: Ongoing, Progressing  Goal: Optimal Comfort and Wellbeing  Outcome: Ongoing, Progressing     Problem: Diabetes Comorbidity  Goal: Blood Glucose Level Within Targeted Range  Outcome: Ongoing, Progressing     Problem: Bariatric Environmental Safety  Goal: Safety Maintained with Care  Outcome: Ongoing, Progressing

## 2022-08-07 NOTE — PROGRESS NOTES
Pharmacokinetic Assessment Follow Up: IV Vancomycin    Vancomycin serum concentration assessment(s):    The trough level was drawn correctly and can be used to guide therapy at this time. The measurement is above the desired definitive target range of 15 to 20 mcg/mL.    Vancomycin Regimen Plan:    Discontinue the scheduled vancomycin regimen and re-dose when the random level is less than 20 mcg/mL, next level to be drawn at 0530 on 8/8.    Drug levels (last 3 results):  Recent Labs   Lab Result Units 08/06/22  0546 08/07/22  1803   Vancomycin-Trough ug/mL 27.0* 23.1*       Pharmacy will continue to follow and monitor vancomycin.    Please contact pharmacy at extension [phone] for questions regarding this assessment.    Thank you for the consult,   Syd Cai       Patient brief summary:  Renee Caceres is a 56 y.o. male initiated on antimicrobial therapy with IV Vancomycin for treatment of bone/joint infection    The patient's current regimen is vancomycin 1000mg IV every 12 hours.     Drug Allergies:   Review of patient's allergies indicates:  No Known Allergies    Actual Body Weight:   158.4kg    Renal Function:   Estimated Creatinine Clearance: 113.6 mL/min (based on SCr of 1.2 mg/dL).,     Dialysis Method (if applicable):  N/A    CBC (last 72 hours):  Recent Labs   Lab Result Units 08/05/22  0436   WBC K/uL 14.87*   Hemoglobin g/dL 11.4*   Hematocrit % 35.2*   Platelets K/uL 449   Gran % % 73.5*   Lymph % % 14.5*   Mono % % 8.3   Eosinophil % % 1.9   Basophil % % 0.5   Differential Method  Automated       Metabolic Panel (last 72 hours):  Recent Labs   Lab Result Units 08/05/22  0436 08/07/22  1803   Sodium mmol/L 133* 137   Potassium mmol/L 3.8 4.2   Chloride mmol/L 96 99   CO2 mmol/L 28 28   Glucose mg/dL 227* 154*   BUN mg/dL 11 13   Creatinine mg/dL 1.2 1.2   Albumin g/dL 2.4* 2.3*   Total Bilirubin mg/dL 0.4  --    Alkaline Phosphatase U/L 144*  --    AST U/L 34  --    ALT U/L 49*  --    Magnesium mg/dL  1.6  --    Phosphorus mg/dL 3.8 3.8       Vancomycin Administrations:  vancomycin given in the last 96 hours                   vancomycin in dextrose 5 % 1 gram/250 mL IVPB 1,000 mg (mg) 1,000 mg New Bag 08/07/22 0634     1,000 mg New Bag 08/06/22 1829    vancomycin 1.75 g in 5 % dextrose 500 mL IVPB (mg) 1,750 mg New Bag 08/06/22 0558     1,750 mg New Bag 08/05/22 1842     1,750 mg New Bag  0607     1,750 mg New Bag 08/04/22 1808    vancomycin 2 g in dextrose 5 % 500 mL IVPB (mg) 2,000 mg New Bag 08/04/22 0415                Microbiologic Results:  Microbiology Results (last 7 days)     Procedure Component Value Units Date/Time    Aerobic culture [[id number]]  (Abnormal) Collected: 08/04/22 1145    Order Status: Completed Specimen: Bone from Toe, Right Foot Updated: 08/07/22 1530     Aerobic Bacterial Culture STAPHYLOCOCCUS COHNII  Moderate  Susceptibility pending  Skin sabine also present      Narrative:      R great toe bone    Blood culture #1 [[id number]] Collected: 08/02/22 1935    Order Status: Completed Specimen: Blood from Peripheral, Wrist, Left Updated: 08/06/22 2022     Blood Culture, Routine No Growth to date      No Growth to date      No Growth to date      No Growth to date      No Growth to date    Narrative:      Blood Culture #1    Blood culture #2 [[id number]] Collected: 08/02/22 1927    Order Status: Completed Specimen: Blood from Peripheral, Hand, Left Updated: 08/06/22 2022     Blood Culture, Routine No Growth to date      No Growth to date      No Growth to date      No Growth to date      No Growth to date    Narrative:      Blood Culture #2    AFB Culture & Smear [[id number]] Collected: 08/04/22 1145    Order Status: Completed Specimen: Bone from Toe, Right Foot Updated: 08/05/22 2127     AFB Culture & Smear Culture in progress     AFB CULTURE STAIN No acid fast bacilli seen.    Narrative:      R Great toe bone    Culture, Anaerobe [[id number]] Collected: 08/03/22 1241    Order Status: Completed  Specimen: Wound from Toe, Right Foot Updated: 08/05/22 1044     Anaerobic Culture Culture in progress    Aerobic culture [[id number]] Collected: 08/03/22 1241    Order Status: Completed Specimen: Wound from Toe, Right Foot Updated: 08/05/22 0911     Aerobic Bacterial Culture Skin sabine,  no predominant organism    Culture, Anaerobe [[id number]] Collected: 08/04/22 1145    Order Status: Completed Specimen: Bone from Toe, Right Foot Updated: 08/05/22 0722     Anaerobic Culture Culture in progress    Narrative:      R Great toe bone    Urine culture [[id number]]  (Abnormal)  (Susceptibility) Collected: 08/02/22 2000    Order Status: Completed Specimen: Urine Updated: 08/05/22 0142     Urine Culture, Routine PROTEUS PENNERI  10,000 - 49,999 cfu/ml      Narrative:      Specimen Source->Urine    AFB Culture & Smear [[id number]] Collected: 08/03/22 1241    Order Status: Completed Specimen: Wound from Toe, Right Foot Updated: 08/04/22 2127     AFB Culture & Smear Culture in progress     AFB CULTURE STAIN No acid fast bacilli seen.    Narrative:      Right toe    Gram stain [[id number]] Collected: 08/04/22 1145    Order Status: Completed Specimen: Bone from Toe, Right Foot Updated: 08/04/22 1446     Gram Stain Result No WBC's      No organisms seen    Narrative:      R Great toe bone    Fungus culture [[id number]] Collected: 08/04/22 1145    Order Status: Sent Specimen: Bone from Toe, Right Foot Updated: 08/04/22 1216    Gram stain [[id number]] Collected: 08/03/22 1241    Order Status: Completed Specimen: Wound from Toe, Right Foot Updated: 08/03/22 1813     Gram Stain Result Rare WBC's      Rare Gram positive cocci      Rare Gram negative rods    Narrative:      Right toe

## 2022-08-07 NOTE — PLAN OF CARE
Patient had an uneventful day  Problem: Adult Inpatient Plan of Care  Goal: Plan of Care Review  Outcome: Ongoing, Progressing  Goal: Patient-Specific Goal (Individualized)  Outcome: Ongoing, Progressing  Goal: Absence of Hospital-Acquired Illness or Injury  Outcome: Ongoing, Progressing  Goal: Optimal Comfort and Wellbeing  Outcome: Ongoing, Progressing  Goal: Readiness for Transition of Care  Outcome: Ongoing, Progressing     Problem: Diabetes Comorbidity  Goal: Blood Glucose Level Within Targeted Range  Outcome: Ongoing, Progressing     Problem: Bariatric Environmental Safety  Goal: Safety Maintained with Care  Outcome: Ongoing, Progressing

## 2022-08-08 LAB
ALBUMIN SERPL BCP-MCNC: 2.2 G/DL (ref 3.5–5.2)
ALP SERPL-CCNC: 89 U/L (ref 55–135)
ALT SERPL W/O P-5'-P-CCNC: 54 U/L (ref 10–44)
ANION GAP SERPL CALC-SCNC: 8 MMOL/L (ref 8–16)
AST SERPL-CCNC: 43 U/L (ref 10–40)
BACTERIA SPEC AEROBE CULT: ABNORMAL
BACTERIA SPEC ANAEROBE CULT: NORMAL
BACTERIA SPEC ANAEROBE CULT: NORMAL
BASOPHILS # BLD AUTO: 0.07 K/UL (ref 0–0.2)
BASOPHILS NFR BLD: 0.7 % (ref 0–1.9)
BILIRUB SERPL-MCNC: 0.4 MG/DL (ref 0.1–1)
BUN SERPL-MCNC: 14 MG/DL (ref 6–20)
CALCIUM SERPL-MCNC: 9.4 MG/DL (ref 8.7–10.5)
CHLORIDE SERPL-SCNC: 96 MMOL/L (ref 95–110)
CO2 SERPL-SCNC: 27 MMOL/L (ref 23–29)
CREAT SERPL-MCNC: 1.2 MG/DL (ref 0.5–1.4)
DIFFERENTIAL METHOD: ABNORMAL
EOSINOPHIL # BLD AUTO: 0.2 K/UL (ref 0–0.5)
EOSINOPHIL NFR BLD: 1.8 % (ref 0–8)
ERYTHROCYTE [DISTWIDTH] IN BLOOD BY AUTOMATED COUNT: 14.1 % (ref 11.5–14.5)
EST. GFR  (NO RACE VARIABLE): >60 ML/MIN/1.73 M^2
GLUCOSE SERPL-MCNC: 166 MG/DL (ref 70–110)
HCT VFR BLD AUTO: 33.5 % (ref 40–54)
HGB BLD-MCNC: 10.6 G/DL (ref 14–18)
IMM GRANULOCYTES # BLD AUTO: 0.08 K/UL (ref 0–0.04)
IMM GRANULOCYTES NFR BLD AUTO: 0.8 % (ref 0–0.5)
LYMPHOCYTES # BLD AUTO: 1.7 K/UL (ref 1–4.8)
LYMPHOCYTES NFR BLD: 16.4 % (ref 18–48)
MAGNESIUM SERPL-MCNC: 1.7 MG/DL (ref 1.6–2.6)
MCH RBC QN AUTO: 26.6 PG (ref 27–31)
MCHC RBC AUTO-ENTMCNC: 31.6 G/DL (ref 32–36)
MCV RBC AUTO: 84 FL (ref 82–98)
MONOCYTES # BLD AUTO: 1 K/UL (ref 0.3–1)
MONOCYTES NFR BLD: 9.5 % (ref 4–15)
NEUTROPHILS # BLD AUTO: 7.1 K/UL (ref 1.8–7.7)
NEUTROPHILS NFR BLD: 70.8 % (ref 38–73)
NRBC BLD-RTO: 0 /100 WBC
PHOSPHATE SERPL-MCNC: 4 MG/DL (ref 2.7–4.5)
PLATELET # BLD AUTO: 533 K/UL (ref 150–450)
PMV BLD AUTO: 9.8 FL (ref 9.2–12.9)
POCT GLUCOSE: 169 MG/DL (ref 70–110)
POCT GLUCOSE: 171 MG/DL (ref 70–110)
POCT GLUCOSE: 185 MG/DL (ref 70–110)
POCT GLUCOSE: 198 MG/DL (ref 70–110)
POTASSIUM SERPL-SCNC: 3.9 MMOL/L (ref 3.5–5.1)
PROT SERPL-MCNC: 7.9 G/DL (ref 6–8.4)
RBC # BLD AUTO: 3.98 M/UL (ref 4.6–6.2)
SODIUM SERPL-SCNC: 131 MMOL/L (ref 136–145)
VANCOMYCIN SERPL-MCNC: 15.8 UG/ML
WBC # BLD AUTO: 10.04 K/UL (ref 3.9–12.7)

## 2022-08-08 PROCEDURE — 36415 COLL VENOUS BLD VENIPUNCTURE: CPT | Performed by: INTERNAL MEDICINE

## 2022-08-08 PROCEDURE — 25000003 PHARM REV CODE 250: Performed by: INTERNAL MEDICINE

## 2022-08-08 PROCEDURE — 80053 COMPREHEN METABOLIC PANEL: CPT | Performed by: INTERNAL MEDICINE

## 2022-08-08 PROCEDURE — 99233 PR SUBSEQUENT HOSPITAL CARE,LEVL III: ICD-10-PCS | Mod: 95,,, | Performed by: INTERNAL MEDICINE

## 2022-08-08 PROCEDURE — 84100 ASSAY OF PHOSPHORUS: CPT | Performed by: INTERNAL MEDICINE

## 2022-08-08 PROCEDURE — 85025 COMPLETE CBC W/AUTO DIFF WBC: CPT | Performed by: INTERNAL MEDICINE

## 2022-08-08 PROCEDURE — 63600175 PHARM REV CODE 636 W HCPCS: Performed by: NURSE PRACTITIONER

## 2022-08-08 PROCEDURE — 99232 PR SUBSEQUENT HOSPITAL CARE,LEVL II: ICD-10-PCS | Mod: ,,, | Performed by: NURSE PRACTITIONER

## 2022-08-08 PROCEDURE — 63600175 PHARM REV CODE 636 W HCPCS: Performed by: HOSPITALIST

## 2022-08-08 PROCEDURE — 63600175 PHARM REV CODE 636 W HCPCS: Performed by: INTERNAL MEDICINE

## 2022-08-08 PROCEDURE — 99233 SBSQ HOSP IP/OBS HIGH 50: CPT | Mod: ,,, | Performed by: PHYSICIAN ASSISTANT

## 2022-08-08 PROCEDURE — 99232 SBSQ HOSP IP/OBS MODERATE 35: CPT | Mod: ,,, | Performed by: NURSE PRACTITIONER

## 2022-08-08 PROCEDURE — 83735 ASSAY OF MAGNESIUM: CPT | Performed by: INTERNAL MEDICINE

## 2022-08-08 PROCEDURE — 11000001 HC ACUTE MED/SURG PRIVATE ROOM

## 2022-08-08 PROCEDURE — 99233 SBSQ HOSP IP/OBS HIGH 50: CPT | Mod: 95,,, | Performed by: INTERNAL MEDICINE

## 2022-08-08 PROCEDURE — 99233 PR SUBSEQUENT HOSPITAL CARE,LEVL III: ICD-10-PCS | Mod: ,,, | Performed by: PHYSICIAN ASSISTANT

## 2022-08-08 PROCEDURE — 80202 ASSAY OF VANCOMYCIN: CPT | Performed by: INTERNAL MEDICINE

## 2022-08-08 RX ORDER — AMOXICILLIN 250 MG
1 CAPSULE ORAL 2 TIMES DAILY
Status: DISCONTINUED | OUTPATIENT
Start: 2022-08-08 | End: 2022-08-16 | Stop reason: HOSPADM

## 2022-08-08 RX ORDER — POLYETHYLENE GLYCOL 3350 17 G/17G
17 POWDER, FOR SOLUTION ORAL DAILY
Status: DISCONTINUED | OUTPATIENT
Start: 2022-08-08 | End: 2022-08-09

## 2022-08-08 RX ORDER — LISINOPRIL 20 MG/1
40 TABLET ORAL DAILY
Status: DISCONTINUED | OUTPATIENT
Start: 2022-08-09 | End: 2022-08-09

## 2022-08-08 RX ADMIN — VANCOMYCIN HYDROCHLORIDE 1250 MG: 1.25 INJECTION, POWDER, LYOPHILIZED, FOR SOLUTION INTRAVENOUS at 09:08

## 2022-08-08 RX ADMIN — ACETAMINOPHEN 650 MG: 325 TABLET ORAL at 10:08

## 2022-08-08 RX ADMIN — ENOXAPARIN SODIUM 40 MG: 40 INJECTION SUBCUTANEOUS at 08:08

## 2022-08-08 RX ADMIN — INSULIN ASPART 12 UNITS: 100 INJECTION, SOLUTION INTRAVENOUS; SUBCUTANEOUS at 08:08

## 2022-08-08 RX ADMIN — ACETAMINOPHEN 650 MG: 325 TABLET ORAL at 04:08

## 2022-08-08 RX ADMIN — INSULIN DETEMIR 32 UNITS: 100 INJECTION, SOLUTION SUBCUTANEOUS at 08:08

## 2022-08-08 RX ADMIN — INSULIN ASPART 12 UNITS: 100 INJECTION, SOLUTION INTRAVENOUS; SUBCUTANEOUS at 12:08

## 2022-08-08 RX ADMIN — INSULIN ASPART 12 UNITS: 100 INJECTION, SOLUTION INTRAVENOUS; SUBCUTANEOUS at 04:08

## 2022-08-08 RX ADMIN — AMLODIPINE BESYLATE 10 MG: 10 TABLET ORAL at 08:08

## 2022-08-08 RX ADMIN — SENNOSIDES AND DOCUSATE SODIUM 1 TABLET: 50; 8.6 TABLET ORAL at 08:08

## 2022-08-08 RX ADMIN — LISINOPRIL 20 MG: 20 TABLET ORAL at 08:08

## 2022-08-08 RX ADMIN — VANCOMYCIN HYDROCHLORIDE 1250 MG: 1.25 INJECTION, POWDER, LYOPHILIZED, FOR SOLUTION INTRAVENOUS at 08:08

## 2022-08-08 NOTE — ASSESSMENT & PLAN NOTE
Patient's FSGs are uncontrolled due to hyperglycemia on current medication regimen.  Last A1c reviewed- uncontrolled since 2010 per available records.  Current correctional scale  Low  Endocrinology consulted and managing with anti-hyperglycemics as follows-   Antihyperglycemics (From admission, onward)            Start     Stop Route Frequency Ordered    08/07/22 1035  insulin aspart U-100 pen 0-5 Units         -- SubQ Before meals & nightly PRN 08/07/22 0935    08/06/22 1130  insulin aspart U-100 pen 12 Units         -- SubQ 3 times daily with meals 08/06/22 0835    08/06/22 0900  insulin detemir U-100 pen 32 Units         -- SubQ Daily 08/06/22 0835      Hold Oral hypoglycemics while patient is in the hospital.  Consulted Endocrinology for management and discharge recommendations, follow up.  Management per Endocrinology.

## 2022-08-08 NOTE — PROGRESS NOTES
[facility] (formerly [facility])[3]  Telemedicine Progress Note    Patient Name: Renee Caceres  MRN: [id number]  Patient Class: IP- Inpatient   Admission Date: 8/2/2022  Length of Stay: 4 days  Attending Physician: Maria E Flores MD  Primary Care Provider: Primary Doctor No      Subjective:     Principal Problem:Osteomyelitis of great toe of right foot    HPI:  55 yo male with diabetes, HTN presenting 2/2 R foot swelling that started acutely 3 days ago and progressively has gotten worse. He states he had a wound on his right great toe and 5th toe that has been there for a while. Today it had not gotten any better so he decided to come in for evaluation. He has not been on any antibiotics for it. He states that he was on medications at one point but no longer takes them as he thought he had gotten them under control. In the ED, concern with elevated WBC, ESR, CRP, xr of foot showed osteo of great toe. General surgery consulted to rule nec fasc 2/2 gas in the 5th toe. Medicine called for admission. Rocephin started.       Overview/Hospital Course:  No evidence of a necrotizing infection on physical exam. Podiatry and ID consulted. Hyperglycemia managed by Endocrinology.      Telemedicine  This service was provided by Virtual Visit.    Patient was transferred to [facility] on:  8/5/2022    Chief Complaint   Patient presents with    Swelling     Right arm and leg swelling that started Saturday. Denies pain. Denies medical hx.     The patient location is: [address] [place] [address] A   Admitted 8/2/2022  6:24 PM  Present with the patient at the time of the telemed/virtual assessment: Telepresenter    Interval History / Events Overnight:   The patient is able to provide adequate history. Additional history was obtained from past medical records. No significant events reported by Nursing.  BP slightly elevated.  Patient complains of R UE swelling. Symptoms have improved since yesterday. Associated symptoms include:  fatigue. Symptoms are decreasing in severity.     Lab test(s) reviewed: hyperglycemia    Review of Systems   Constitutional:  Negative for fever.   Respiratory:  Negative for shortness of breath.      Objective:     Vital Signs (Most Recent):  Temp: 98.5 °F (36.9 °C) (08/07/22 0717)  Pulse: 91 (08/07/22 0717)  Resp: 18 (08/07/22 0717)  BP: (!) 147/76 (08/07/22 0717)  SpO2: 98 % (08/07/22 0717)   Vital Signs (24h Range):  Temp:  [96.5 °F (35.8 °C)-98.9 °F (37.2 °C)] 98.5 °F (36.9 °C)  Pulse:  [78-91] 91  Resp:  [16-20] 18  SpO2:  [92 %-98 %] 98 %  BP: (140-166)/(74-81) 147/76     Weight: (!) 158.4 kg (349 lb 3.3 oz)  Body mass index is 43.65 kg/m².    Intake/Output Summary (Last 24 hours) at 8/7/2022 1033  Last data filed at 8/6/2022 1600  Gross per 24 hour   Intake --   Output 1200 ml   Net -1200 ml        Physical Exam  Constitutional:       General: He is not in acute distress.     Appearance: Normal appearance. He is morbidly obese.   Eyes:      General: Lids are normal. No scleral icterus.        Right eye: No discharge.         Left eye: No discharge.      Conjunctiva/sclera: Conjunctivae normal.   Neck:      Trachea: Phonation normal.   Cardiovascular:      Rate and Rhythm: Normal rate.      Comments: Monitor / Vital signs reviewed at time of visit  Pulmonary:      Effort: Pulmonary effort is normal. No tachypnea, accessory muscle usage or respiratory distress.   Abdominal:      General: There is no distension.   Musculoskeletal:      Right forearm: Swelling and edema present.   Neurological:      Mental Status: He is alert. He is not disoriented.   Psychiatric:         Attention and Perception: Attention normal.         Mood and Affect: Affect normal.         Behavior: Behavior is cooperative.       Significant Labs:   Recent Labs   Lab 08/03/22  0423   HGBA1C 12.4*       Recent Labs   Lab 08/06/22  1525 08/06/22  2047 08/07/22  0731   POCTGLUCOSE 169* 157* 178*       Recent Labs   Lab 08/03/22  0421  08/04/22 0552 08/05/22 0436   WBC 13.32* 15.04* 14.87*   HGB 11.4* 11.4* 11.4*   HCT 34.5* 34.7* 35.2*    397 449       Recent Labs   Lab 08/03/22 0423 08/04/22 0552 08/05/22 0436   GRAN 76.4*  10.2* 74.1*  11.2* 73.5*  11.0*   LYMPH 11.9*  1.6 14.1*  2.1 14.5*  2.2   MONO 8.5  1.1* 8.4  1.3* 8.3  1.2*   EOS 0.2 0.3 0.3       Recent Labs   Lab 08/03/22 0423 08/04/22 0552 08/05/22 0436   * 134* 133*   K 3.9 3.9 3.8   CL 96 96 96   CO2 28 28 28   BUN 17 11 11   CREATININE 1.0 1.0 1.2   * 264* 227*   CALCIUM 9.1 9.3 9.3   ALBUMIN 2.3* 2.3* 2.4*   MG 1.6 1.6 1.6   PHOS 3.0 3.6 3.8       Recent Labs   Lab 08/02/22 2215 08/03/22 0423 08/04/22 0552 08/05/22 0436   ALKPHOS 138* 127 142* 144*   ALT 59* 55* 52* 49*   AST 54* 54* 42* 34   PROT 9.0* 7.8 7.9 8.3   BILITOT 0.5 0.5 0.4 0.4   .8*  --   --   --        Recent Labs   Lab 08/02/22 1927   LACTATE 2.5*   SEDRATE >120*       Microbiology Results (last 7 days)       Procedure Component Value Units Date/Time    Blood culture #1 [[id number]] Collected: 08/02/22 1935    Order Status: Completed Specimen: Blood from Peripheral, Wrist, Left Updated: 08/06/22 2022     Blood Culture, Routine No Growth to date      No Growth to date      No Growth to date      No Growth to date      No Growth to date    Narrative:      Blood Culture #1    Blood culture #2 [[id number]] Collected: 08/02/22 1927    Order Status: Completed Specimen: Blood from Peripheral, Hand, Left Updated: 08/06/22 2022     Blood Culture, Routine No Growth to date      No Growth to date      No Growth to date      No Growth to date      No Growth to date    Narrative:      Blood Culture #2    Aerobic culture [[id number]]  (Abnormal) Collected: 08/04/22 1145    Order Status: Completed Specimen: Bone from Toe, Right Foot Updated: 08/06/22 1313     Aerobic Bacterial Culture STAPHYLOCOCCUS COHNII  Moderate  Susceptibility pending  Skin sabine also present      Narrative:       R great toe bone    AFB Culture & Smear [[id number]] Collected: 08/04/22 1145    Order Status: Completed Specimen: Bone from Toe, Right Foot Updated: 08/05/22 2127     AFB Culture & Smear Culture in progress     AFB CULTURE STAIN No acid fast bacilli seen.    Narrative:      R Great toe bone    Culture, Anaerobe [[id number]] Collected: 08/03/22 1241    Order Status: Completed Specimen: Wound from Toe, Right Foot Updated: 08/05/22 1044     Anaerobic Culture Culture in progress    Aerobic culture [[id number]] Collected: 08/03/22 1241    Order Status: Completed Specimen: Wound from Toe, Right Foot Updated: 08/05/22 0911     Aerobic Bacterial Culture Skin sabine,  no predominant organism    Culture, Anaerobe [[id number]] Collected: 08/04/22 1145    Order Status: Completed Specimen: Bone from Toe, Right Foot Updated: 08/05/22 0722     Anaerobic Culture Culture in progress    Narrative:      R Great toe bone    Urine culture [[id number]]  (Abnormal)  (Susceptibility) Collected: 08/02/22 2000    Order Status: Completed Specimen: Urine Updated: 08/05/22 0142     Urine Culture, Routine PROTEUS PENNERI  10,000 - 49,999 cfu/ml      Narrative:      Specimen Source->Urine    AFB Culture & Smear [[id number]] Collected: 08/03/22 1241    Order Status: Completed Specimen: Wound from Toe, Right Foot Updated: 08/04/22 2127     AFB Culture & Smear Culture in progress     AFB CULTURE STAIN No acid fast bacilli seen.    Narrative:      Right toe    Gram stain [[id number]] Collected: 08/04/22 1145    Order Status: Completed Specimen: Bone from Toe, Right Foot Updated: 08/04/22 1446     Gram Stain Result No WBC's      No organisms seen    Narrative:      R Great toe bone    Fungus culture [[id number]] Collected: 08/04/22 1145    Order Status: Sent Specimen: Bone from Toe, Right Foot Updated: 08/04/22 1216    Gram stain [[id number]] Collected: 08/03/22 1241    Order Status: Completed Specimen: Wound from Toe, Right Foot Updated: 08/03/22 1813      Gram Stain Result Rare WBC's      Rare Gram positive cocci      Rare Gram negative rods    Narrative:      Right toe          No results found for: [id number]    US Soft Tissue Upper Extremity, Right  Narrative: EXAMINATION:  US SOFT TISSUE, UPPER EXTREMITY, RIGHT    CLINICAL HISTORY:  swelling, negative for DVT. Looking for underlying fluid collection;    TECHNIQUE:  Ultrasound of the right upper extremity.    COMPARISON:  None    FINDINGS:  Diffuse soft tissue edema without organized fluid collection.  No fluid collection seen.  There is edematous soft tissue in the right forearm.  There is no evidence of a painful mass.  No significant abnormality seen.  Impression: As above.    Mild soft tissue edema but no fluid collection seen.    Electronically signed by resident: Jessica Quijano  Date:    08/04/2022  Time:    19:30    Electronically signed by: Raphael Valdez MD  Date:    08/05/2022  Time:    08:04      Labs and Imaging within the last 24 hours listed above were reviewed.       Diet: Diet diabetic [facility]; 2000 Calorie  Significant LDAs:   IV Access Type: Peripheral  Urinary Catheter Indication if present: Patient Does Not Have Urinary Catheter  Other Lines/Tubes/Drains:    HIGH RISK CONDITION(S):   Patient is currently on drug therapy requiring intensive monitoring for toxicity: Vancomycin     Goals of Care:    Previous admission:  4/19/19  Likely prognosis:  Fair  Code Status: Full Code  Comfort Only: No  Hospice: No  Goals at discharge: remain at home, with physician follow-up    Discharge Planning   JOSÉ ANTONIO: 8/8/2022     Code Status: Full Code   Is the patient medically ready for discharge?: No    Reason for patient still in hospital (select all that apply): Patient trending condition, Laboratory test, and Consult recommendations  Discharge Plan A: Home with family        Assessment/Plan:      * Osteomyelitis of great toe of right foot  Per surgery, no evidence of a necrotizing infection at physical  exam. Patient with a chronic wound opened to air, this may be the reason there is some soft tissue gas seen in the foot Xray. Wound looks chronic in nature.   - Continue ceftriaxone, vancomycin  - Podiatry consulted, patient s/p bone bx   - MRI consistent with osteomyelitis  - ID consulted: Right hallux bone biopsy. Bone cultures pending.  - Unable to get vascular SANDEE due to pain.     Ulcer of right fifth toe due to diabetes mellitus  Possible osteomyelitis, continuing wound care and antibiotics    Swelling of right upper extremity  Improved with elevation but forearm remains significantly swollen  - Soft tissue US: Diffuse soft tissue edema without organized fluid collection.   - Improving    Uncontrolled type 2 diabetes mellitus with hyperglycemia, without long-term current use of insulin  Patient's FSGs are uncontrolled due to hyperglycemia on current medication regimen.  Last A1c reviewed- uncontrolled since 2010 per available records.  Current correctional scale  Low  Endocrinology consulted and managing with anti-hyperglycemics as follows-   Antihyperglycemics (From admission, onward)            Start     Stop Route Frequency Ordered    08/07/22 1035  insulin aspart U-100 pen 0-5 Units         -- SubQ Before meals & nightly PRN 08/07/22 0935    08/06/22 1130  insulin aspart U-100 pen 12 Units         -- SubQ 3 times daily with meals 08/06/22 0835    08/06/22 0900  insulin detemir U-100 pen 32 Units         -- SubQ Daily 08/06/22 0835      Hold Oral hypoglycemics while patient is in the hospital.  Consulted Endocrinology for management and discharge recommendations, follow up.  Management per Endocrinology.    Primary hypertension  Uncontrolled, not on medication at home  - Continued amlodipine 5mg daily  - Continued lisinopril 5mg daily; increased to 20 mg for 8/6  - BP remains elevated; increased lisinopril to 40 mg. Increase amlodipine.    Cellulitis of right lower extremity  Acute  Continue Rocephin and  vancomycin  LE U/S performed: No evidence of deep venous thrombosis in the right lower extremity.        Active Hospital Problems    Diagnosis  POA    *Osteomyelitis of great toe of right foot [M86.9]  Yes    Severe obesity (BMI >= 40) [E66.01]  Yes    Ulcer of right fifth toe due to diabetes mellitus [E11.621, L97.519]  Yes    Cellulitis of right lower extremity [L03.115]  Yes    Primary hypertension [I10]  Yes    Uncontrolled type 2 diabetes mellitus with hyperglycemia, without long-term current use of insulin [E11.65]  Yes    Swelling of right upper extremity [M79.89]  Yes      Resolved Hospital Problems   No resolved problems to display.       Inpatient Medications Prescribed for Management of Current Problems:     Scheduled Meds:    [START ON 8/8/2022] amLODIPine  10 mg Oral Daily    enoxaparin  40 mg Subcutaneous Q12H    insulin aspart U-100  12 Units Subcutaneous TIDWM    insulin detemir U-100  32 Units Subcutaneous Daily    lisinopriL  20 mg Oral Daily     Continuous Infusions:   As Needed: acetaminophen, albuterol-ipratropium, cadexomer iodine, dextrose 10%, dextrose 10%, glucagon (human recombinant), glucose, glucose, hydrALAZINE, insulin aspart U-100, melatonin, naloxone, ondansetron, polyethylene glycol, prochlorperazine, simethicone, sodium chloride 0.9%, Pharmacy to dose Vancomycin consult **AND** vancomycin - pharmacy to dose    VTE Risk Mitigation (From admission, onward)         Ordered     enoxaparin injection 40 mg  Every 12 hours         08/03/22 0833     IP VTE HIGH RISK PATIENT  Once         08/03/22 0141     Place sequential compression device  Until discontinued         08/03/22 0141              I have assessed these finding virtually using telemed platform and with assistance of bedside nurse     The attending portion of this evaluation, treatment, and documentation was performed per Maria E Flores MD via Telemedicine AudioVisual using the secure [employer] software platform with  2 way audio/video. The provider was located off-site and the patient is located in the hospital. The aforementioned video software was utilized to document the relevant history and physical exam    Maria E Flores MD  Department of Hospital Medicine   [facility]

## 2022-08-08 NOTE — ASSESSMENT & PLAN NOTE
Improved with elevation but forearm remains significantly swollen  - Soft tissue US: Diffuse soft tissue edema without organized fluid collection.   - Improving

## 2022-08-08 NOTE — ASSESSMENT & PLAN NOTE
Mr. Caceres is a 57 y/o male with poorly controlled DMII, HTN admitted for osteomyelitis of right hallux and 5th toe.     Recommendations  1. Continue vancomycin.  2. Podiatry following. Right hallux bone biopsy. Bone cultures +Staph cohnii. Had purulent drainage on dressing changes today. Discussed with podiatry. No tracking or deep pockets. F/u closely. If with persistent purulent drainage may need I&D  4. Glucose mgmt per primary   5. SANDEE studies   6. Judicial leg elevation ankle>knee    7. Anticipate 6 weeks of IV abx therapy. Pending clinical improvement may transition to oral doxycycline sooner.   8. Discussed with ID staff and primary team.     Outpatient Antibiotic Therapy Plan:    Please send referral to [facility].    1) Infection:  osteomyelitis    2) Discharge Antibiotics:    Intravenous antibiotics:  Vancomycin    3) Therapy Duration:  6 weeks     Estimated end date of IV antibiotics: 9/15/22    4) Outpatient Weekly Labs:    Order the following labs to be drawn on Mondays:    CBC   CMP    CRP     CPK (when on Daptomycin)   Vancomycin trough. Target 15-20    If discharged on vancomycin IV, order the following additional labs to be drawn on Thursdays:   CMP    Vancomycin trough. Target 15-20    If vancomycin trough is not at target (15-20) prior to discharge, schedule vancomycin trough to be drawn before their fourth outpatient dose.    5) Fax Lab Results to Infectious Diseases Provider: [facility] Fax Number: [phone]    6) Outpatient Infectious Diseases Follow-up     Follow-up appointment will be arranged by the ID clinic and will be found in the patient's appointments tab.     Prior to discharge, please ensure the patient's follow-up has been scheduled.     If there is still no follow-up scheduled prior to discharge, please send an EPIC message to Melinda Mckinney in Infectious Diseases.

## 2022-08-08 NOTE — SUBJECTIVE & OBJECTIVE
"Interval HPI:   Overnight events: No acute events overnight. Patient on the [facility] in room 651/651 A. Blood glucose improving. BG at and above goal on current insulin regimen (SSI, prandial, and basal insulin ). Steroid use- None.    Renal function- Normal   Vasopressors-  None       Diet diabetic [facility];  Calorie     Eatin%  Nausea: No  Hypoglycemia and intervention: No  Fever: No  TPN and/or TF: No    BP (!) 151/80 (BP Location: Left arm, Patient Position: Lying)   Pulse 92   Temp 97.9 °F (36.6 °C) (Oral)   Resp 16   Ht 6' 3" (1.905 m)   Wt (!) 158.4 kg (349 lb 3.3 oz)   SpO2 98%   BMI 43.65 kg/m²     Labs Reviewed and Include    Recent Labs   Lab 22  0528   *   CALCIUM 9.4   ALBUMIN 2.2*   PROT 7.9   *   K 3.9   CO2 27   CL 96   BUN 14   CREATININE 1.2   ALKPHOS 89   ALT 54*   AST 43*   BILITOT 0.4     Lab Results   Component Value Date    WBC 10.04 2022    HGB 10.6 (L) 2022    HCT 33.5 (L) 2022    MCV 84 2022     (H) 2022     No results for input(s): TSH, FREET4 in the last 168 hours.  Lab Results   Component Value Date    HGBA1C 12.4 (H) 2022       Nutritional status:   Body mass index is 43.65 kg/m².  Lab Results   Component Value Date    ALBUMIN 2.2 (L) 2022    ALBUMIN 2.3 (L) 2022    ALBUMIN 2.4 (L) 2022     Lab Results   Component Value Date    PREALBUMIN 6 (L) 2010       Estimated Creatinine Clearance: 113.6 mL/min (based on SCr of 1.2 mg/dL).    Accu-Checks  Recent Labs     22  0713 22  0850 22  1122 22  1525 22  2047 22  0731 22  1103 22  1544 22   POCTGLUCOSE 313* 236* 278* 227* 169* 157* 178* 180* 159* 135*       Current Medications and/or Treatments Impacting Glycemic Control  Immunotherapy:    Immunosuppressants       None          Steroids:   Hormones (From admission, onward)                Start     Stop Route " Frequency Ordered    08/03/22 0239  melatonin tablet 6 mg         -- Oral Nightly PRN 08/03/22 0141          Pressors:    Autonomic Drugs (From admission, onward)                None          Hyperglycemia/Diabetes Medications:   Antihyperglycemics (From admission, onward)                Start     Stop Route Frequency Ordered    08/07/22 1035  insulin aspart U-100 pen 0-5 Units         -- SubQ Before meals & nightly PRN 08/07/22 0935    08/06/22 1130  insulin aspart U-100 pen 12 Units         -- SubQ 3 times daily with meals 08/06/22 0835    08/06/22 0900  insulin detemir U-100 pen 32 Units         -- SubQ Daily 08/06/22 0835

## 2022-08-08 NOTE — ASSESSMENT & PLAN NOTE
Uncontrolled, not on medication at home  - Continued amlodipine 5mg daily  - Continued lisinopril 5mg daily; increased to 20 mg for 8/6  - BP remains elevated; increased lisinopril to 40 mg. Increase amlodipine.

## 2022-08-08 NOTE — PLAN OF CARE
Problem: Adult Inpatient Plan of Care  Goal: Plan of Care Review  Outcome: Ongoing, Progressing  Goal: Patient-Specific Goal (Individualized)  Outcome: Ongoing, Progressing  Goal: Optimal Comfort and Wellbeing  Outcome: Ongoing, Progressing     Problem: Diabetes Comorbidity  Goal: Blood Glucose Level Within Targeted Range  Outcome: Ongoing, Progressing

## 2022-08-08 NOTE — PROGRESS NOTES
Pharmacokinetic Assessment Follow Up: IV Vancomycin    Vancomycin serum concentration assessment(s):    The random level was drawn correctly and can be used to guide therapy at this time. The measurement is within the desired definitive target range of 15 to 20 mcg/mL.    Vancomycin Regimen Plan:    Patient remained supratherapeutic on vancomycin 1 g IV q12h however, the vancomycin trough levels were trending down  Serum creatinine remains at baseline  Will restart patient on scheduled vancomycin 1.25 g IV q12h  Plan for a follow up trough prior to fifth dose on 8/10 at 0800    Drug levels (last 3 results):  Recent Labs   Lab Result Units 08/06/22  0546 08/07/22  1803 08/08/22  0528   Vancomycin, Random ug/mL  --   --  15.8   Vancomycin-Trough ug/mL 27.0* 23.1*  --        Pharmacy will continue to follow and monitor vancomycin.    Please contact pharmacy at extension [phone] for questions regarding this assessment.    Thank you for the consult,   Juan Yi, PharmD, [facility]      Patient brief summary:  Renee Caceres is a 56 y.o. male initiated on antimicrobial therapy with IV Vancomycin for treatment of bone/joint infection    Drug Allergies:   Review of patient's allergies indicates:  No Known Allergies    Actual Body Weight:   158.4 kg    Renal Function:   Estimated Creatinine Clearance: 113.6 mL/min (based on SCr of 1.2 mg/dL).,     Dialysis Method (if applicable):  N/A    CBC (last 72 hours):  No results for input(s): WHITE BLOOD CELL COUNT, HEMOGLOBIN, HEMATOCRIT, PLATELETS, GRAN%, LYMPH%, MONO%, EOSINOPHIL%, BASOPHIL%, DIFFERENTIAL METHOD in the last 72 hours.    Metabolic Panel (last 72 hours):  Recent Labs   Lab Result Units 08/07/22  1803 08/08/22  0528   Sodium mmol/L 137 131*   Potassium mmol/L 4.2 3.9   Chloride mmol/L 99 96   CO2 mmol/L 28 27   Glucose mg/dL 154* 166*   BUN mg/dL 13 14   Creatinine mg/dL 1.2 1.2   Albumin g/dL 2.3* 2.2*   Total Bilirubin mg/dL  --  0.4   Alkaline Phosphatase U/L  --   89   AST U/L  --  43*   ALT U/L  --  54*   Magnesium mg/dL  --  1.7   Phosphorus mg/dL 3.8 4.0       Vancomycin Administrations:  vancomycin given in the last 96 hours                   vancomycin in dextrose 5 % 1 gram/250 mL IVPB 1,000 mg (mg) 1,000 mg New Bag 08/07/22 0634     1,000 mg New Bag 08/06/22 1829    vancomycin 1.75 g in 5 % dextrose 500 mL IVPB (mg) 1,750 mg New Bag 08/06/22 0558     1,750 mg New Bag 08/05/22 1842     1,750 mg New Bag  0607     1,750 mg New Bag 08/04/22 1808                Microbiologic Results:  Microbiology Results (last 7 days)     Procedure Component Value Units Date/Time    Blood culture #1 [[id number]] Collected: 08/02/22 1935    Order Status: Completed Specimen: Blood from Peripheral, Wrist, Left Updated: 08/07/22 2022     Blood Culture, Routine No growth after 5 days.    Narrative:      Blood Culture #1    Blood culture #2 [[id number]] Collected: 08/02/22 1927    Order Status: Completed Specimen: Blood from Peripheral, Hand, Left Updated: 08/07/22 2022     Blood Culture, Routine No growth after 5 days.    Narrative:      Blood Culture #2    Aerobic culture [[id number]]  (Abnormal) Collected: 08/04/22 1145    Order Status: Completed Specimen: Bone from Toe, Right Foot Updated: 08/07/22 1530     Aerobic Bacterial Culture STAPHYLOCOCCUS COHNII  Moderate  Susceptibility pending  Skin sabine also present      Narrative:      R great toe bone    AFB Culture & Smear [[id number]] Collected: 08/04/22 1145    Order Status: Completed Specimen: Bone from Toe, Right Foot Updated: 08/05/22 2127     AFB Culture & Smear Culture in progress     AFB CULTURE STAIN No acid fast bacilli seen.    Narrative:      R Great toe bone    Culture, Anaerobe [[id number]] Collected: 08/03/22 1241    Order Status: Completed Specimen: Wound from Toe, Right Foot Updated: 08/05/22 1044     Anaerobic Culture Culture in progress    Aerobic culture [[id number]] Collected: 08/03/22 1241    Order Status: Completed Specimen:  Wound from Toe, Right Foot Updated: 08/05/22 0911     Aerobic Bacterial Culture Skin sabine,  no predominant organism    Culture, Anaerobe [[id number]] Collected: 08/04/22 1145    Order Status: Completed Specimen: Bone from Toe, Right Foot Updated: 08/05/22 0722     Anaerobic Culture Culture in progress    Narrative:      R Great toe bone    Urine culture [[id number]]  (Abnormal)  (Susceptibility) Collected: 08/02/22 2000    Order Status: Completed Specimen: Urine Updated: 08/05/22 0142     Urine Culture, Routine PROTEUS PENNERI  10,000 - 49,999 cfu/ml      Narrative:      Specimen Source->Urine    AFB Culture & Smear [[id number]] Collected: 08/03/22 1241    Order Status: Completed Specimen: Wound from Toe, Right Foot Updated: 08/04/22 2127     AFB Culture & Smear Culture in progress     AFB CULTURE STAIN No acid fast bacilli seen.    Narrative:      Right toe    Gram stain [[id number]] Collected: 08/04/22 1145    Order Status: Completed Specimen: Bone from Toe, Right Foot Updated: 08/04/22 1446     Gram Stain Result No WBC's      No organisms seen    Narrative:      R Great toe bone    Fungus culture [[id number]] Collected: 08/04/22 1145    Order Status: Sent Specimen: Bone from Toe, Right Foot Updated: 08/04/22 1216    Gram stain [[id number]] Collected: 08/03/22 1241    Order Status: Completed Specimen: Wound from Toe, Right Foot Updated: 08/03/22 1813     Gram Stain Result Rare WBC's      Rare Gram positive cocci      Rare Gram negative rods    Narrative:      Right toe

## 2022-08-08 NOTE — SUBJECTIVE & OBJECTIVE
Telemedicine  This service was provided by Virtual Visit.    Patient was transferred to [facility] on:  8/5/2022    Chief Complaint   Patient presents with    Swelling     Right arm and leg swelling that started Saturday. Denies pain. Denies medical hx.     The patient location is: 1/651 A   Admitted 8/2/2022  6:24 PM  Present with the patient at the time of the telemed/virtual assessment: Telepresenter    Interval History / Events Overnight:   The patient is able to provide adequate history. Additional history was obtained from past medical records. No significant events reported by Nursing.  BP elevated.  Patient complains of R UE swelling. Symptoms have worsened since yesterday. Associated symptoms include: fatigue. Symptoms are increasing in severity.     Lab test(s) reviewed: sCr stable    Review of Systems   Constitutional:  Negative for fever.   Respiratory:  Negative for shortness of breath.      Objective:     Vital Signs (Most Recent):  Temp: 98.1 °F (36.7 °C) (08/08/22 1136)  Pulse: 77 (08/08/22 1136)  Resp: 16 (08/08/22 1136)  BP: (!) 159/75 (08/08/22 1136)  SpO2: 98 % (08/08/22 1136)   Vital Signs (24h Range):  Temp:  [96.2 °F (35.7 °C)-98.4 °F (36.9 °C)] 98.1 °F (36.7 °C)  Pulse:  [77-97] 77  Resp:  [16-20] 16  SpO2:  [86 %-99 %] 98 %  BP: (148-160)/(75-85) 159/75     Weight: (!) 158.4 kg (349 lb 3.3 oz)  Body mass index is 43.65 kg/m².  No intake or output data in the 24 hours ending 08/08/22 1239     Physical Exam  Constitutional:       General: He is not in acute distress.     Appearance: Normal appearance. He is morbidly obese.   Eyes:      General: Lids are normal. No scleral icterus.        Right eye: No discharge.         Left eye: No discharge.      Conjunctiva/sclera: Conjunctivae normal.   Neck:      Trachea: Phonation normal.   Cardiovascular:      Rate and Rhythm: Normal rate.      Comments: Monitor / Vital signs reviewed at time of visit  Pulmonary:      Effort: Pulmonary  effort is normal. No tachypnea, accessory muscle usage or respiratory distress.   Abdominal:      General: There is no distension.   Musculoskeletal:      Right forearm: Swelling and edema present.   Neurological:      Mental Status: He is alert. He is not disoriented.   Psychiatric:         Attention and Perception: Attention normal.         Mood and Affect: Affect normal.         Behavior: Behavior is cooperative.       Significant Labs:   Recent Labs   Lab 08/03/22  0423   HGBA1C 12.4*       Recent Labs   Lab 08/07/22  1103 08/07/22  1544 08/07/22  2055   POCTGLUCOSE 180* 159* 135*       Recent Labs   Lab 08/04/22  0552 08/05/22  0436 08/08/22  0528   WBC 15.04* 14.87* 10.04   HGB 11.4* 11.4* 10.6*   HCT 34.7* 35.2* 33.5*    449 533*       Recent Labs   Lab 08/04/22  0552 08/05/22  0436 08/08/22  0528   GRAN 74.1*  11.2* 73.5*  11.0* 70.8  7.1   LYMPH 14.1*  2.1 14.5*  2.2 16.4*  1.7   MONO 8.4  1.3* 8.3  1.2* 9.5  1.0   EOS 0.3 0.3 0.2       Recent Labs   Lab 08/04/22  0552 08/05/22  0436 08/07/22  1803 08/08/22  0528   * 133* 137 131*   K 3.9 3.8 4.2 3.9   CL 96 96 99 96   CO2 28 28 28 27   BUN 11 11 13 14   CREATININE 1.0 1.2 1.2 1.2   * 227* 154* 166*   CALCIUM 9.3 9.3 9.6 9.4   ALBUMIN 2.3* 2.4* 2.3* 2.2*   MG 1.6 1.6  --  1.7   PHOS 3.6 3.8 3.8 4.0       Recent Labs   Lab 08/02/22  2215 08/03/22  0423 08/04/22  0552 08/05/22  0436 08/08/22  0528   ALKPHOS 138*   < > 142* 144* 89   ALT 59*   < > 52* 49* 54*   AST 54*   < > 42* 34 43*   PROT 9.0*   < > 7.9 8.3 7.9   BILITOT 0.5   < > 0.4 0.4 0.4   .8*  --   --   --   --     < > = values in this interval not displayed.       Recent Labs   Lab 08/02/22 1927   LACTATE 2.5*   SEDRATE >120*       Microbiology Results (last 7 days)       Procedure Component Value Units Date/Time    Aerobic culture [[id number]]  (Abnormal)  (Susceptibility) Collected: 08/04/22 1145    Order Status: Completed Specimen: Bone from Toe, Right Foot  Updated: 08/08/22 1228     Aerobic Bacterial Culture STAPHYLOCOCCUS COHNII SUBSPECIES COHNII  Moderate  Skin sabine also present  Skin sabine also present      Narrative:      R great toe bone    Culture, Anaerobe [[id number]] Collected: 08/03/22 1241    Order Status: Completed Specimen: Wound from Toe, Right Foot Updated: 08/08/22 1155     Anaerobic Culture No anaerobes isolated    Culture, Anaerobe [[id number]] Collected: 08/04/22 1145    Order Status: Completed Specimen: Bone from Toe, Right Foot Updated: 08/08/22 0949     Anaerobic Culture No anaerobes isolated    Narrative:      R Great toe bone    Blood culture #1 [[id number]] Collected: 08/02/22 1935    Order Status: Completed Specimen: Blood from Peripheral, Wrist, Left Updated: 08/07/22 2022     Blood Culture, Routine No growth after 5 days.    Narrative:      Blood Culture #1    Blood culture #2 [[id number]] Collected: 08/02/22 1927    Order Status: Completed Specimen: Blood from Peripheral, Hand, Left Updated: 08/07/22 2022     Blood Culture, Routine No growth after 5 days.    Narrative:      Blood Culture #2    AFB Culture & Smear [[id number]] Collected: 08/04/22 1145    Order Status: Completed Specimen: Bone from Toe, Right Foot Updated: 08/05/22 2127     AFB Culture & Smear Culture in progress     AFB CULTURE STAIN No acid fast bacilli seen.    Narrative:      R Great toe bone    Aerobic culture [[id number]] Collected: 08/03/22 1241    Order Status: Completed Specimen: Wound from Toe, Right Foot Updated: 08/05/22 0911     Aerobic Bacterial Culture Skin sabine,  no predominant organism    Urine culture [[id number]]  (Abnormal)  (Susceptibility) Collected: 08/02/22 2000    Order Status: Completed Specimen: Urine Updated: 08/05/22 0142     Urine Culture, Routine PROTEUS PENNERI  10,000 - 49,999 cfu/ml      Narrative:      Specimen Source->Urine    AFB Culture & Smear [[id number]] Collected: 08/03/22 1241    Order Status: Completed Specimen: Wound from Toe, Right  Foot Updated: 08/04/22 2127     AFB Culture & Smear Culture in progress     AFB CULTURE STAIN No acid fast bacilli seen.    Narrative:      Right toe    Gram stain [[id number]] Collected: 08/04/22 1145    Order Status: Completed Specimen: Bone from Toe, Right Foot Updated: 08/04/22 1446     Gram Stain Result No WBC's      No organisms seen    Narrative:      R Great toe bone    Fungus culture [[id number]] Collected: 08/04/22 1145    Order Status: Sent Specimen: Bone from Toe, Right Foot Updated: 08/04/22 1216    Gram stain [[id number]] Collected: 08/03/22 1241    Order Status: Completed Specimen: Wound from Toe, Right Foot Updated: 08/03/22 1813     Gram Stain Result Rare WBC's      Rare Gram positive cocci      Rare Gram negative rods    Narrative:      Right toe          No results found for: [id number]    US Soft Tissue Upper Extremity, Right  Narrative: EXAMINATION:  US SOFT TISSUE, UPPER EXTREMITY, RIGHT    CLINICAL HISTORY:  swelling, negative for DVT. Looking for underlying fluid collection;    TECHNIQUE:  Ultrasound of the right upper extremity.    COMPARISON:  None    FINDINGS:  Diffuse soft tissue edema without organized fluid collection.  No fluid collection seen.  There is edematous soft tissue in the right forearm.  There is no evidence of a painful mass.  No significant abnormality seen.  Impression: As above.    Mild soft tissue edema but no fluid collection seen.    Electronically signed by resident: Jessica Quijano  Date:    08/04/2022  Time:    19:30    Electronically signed by: Raphael Valdez MD  Date:    08/05/2022  Time:    08:04      Labs and Imaging within the last 24 hours listed above were reviewed.       Diet: Diet diabetic [facility]; 2000 Calorie  Significant LDAs:   IV Access Type: Peripheral  Urinary Catheter Indication if present: Patient Does Not Have Urinary Catheter  Other Lines/Tubes/Drains:    HIGH RISK CONDITION(S):   Patient is currently on drug therapy requiring intensive  monitoring for toxicity: Vancomycin     Goals of Care:    Previous admission:  4/19/19  Likely prognosis:  Fair  Code Status: Full Code  Comfort Only: No  Hospice: No  Goals at discharge: remain at home, with physician follow-up    Discharge Planning   JOSÉ ANTONIO: 8/10/2022     Code Status: Full Code   Is the patient medically ready for discharge?: No    Reason for patient still in hospital (select all that apply): Patient trending condition and Consult recommendations  Discharge Plan A: Home with family

## 2022-08-08 NOTE — PROGRESS NOTES
[facility] - Med Surg  Infectious Disease  Progress Note    Patient Name: Renee Caceres  MRN: [id number]  Admission Date: 8/2/2022  Length of Stay: 5 days  Attending Physician: Maria E Flores MD  Primary Care Provider: Primary Doctor No    Isolation Status: No active isolations  Assessment/Plan:      * Osteomyelitis of great toe of right foot  Mr. Caceres is a 55 y/o male with poorly controlled DMII, HTN admitted for osteomyelitis of right hallux and 5th toe.     Recommendations  1. Continue vancomycin.  2. Podiatry following. Right hallux bone biopsy. Bone cultures +Staph cohnii. Had purulent drainage on dressing changes today. Discussed with podiatry. No tracking or deep pockets. F/u closely. If with persistent purulent drainage may need I&D  4. Glucose mgmt per primary   5. SANDEE studies   6. Judicial leg elevation ankle>knee    7. Anticipate 6 weeks of IV abx therapy. Pending clinical improvement may transition to oral doxycycline sooner.   8. Discussed with ID staff and primary team.     Outpatient Antibiotic Therapy Plan:    Please send referral to [facility].    1) Infection:  osteomyelitis    2) Discharge Antibiotics:    Intravenous antibiotics:  Vancomycin    3) Therapy Duration:  6 weeks     Estimated end date of IV antibiotics: 9/15/22    4) Outpatient Weekly Labs:    Order the following labs to be drawn on Mondays:    CBC   CMP    CRP     CPK (when on Daptomycin)   Vancomycin trough. Target 15-20    If discharged on vancomycin IV, order the following additional labs to be drawn on Thursdays:   CMP    Vancomycin trough. Target 15-20    If vancomycin trough is not at target (15-20) prior to discharge, schedule vancomycin trough to be drawn before their fourth outpatient dose.    5) Fax Lab Results to Infectious Diseases Provider: [facility] Fax Number: [phone]    6) Outpatient Infectious Diseases Follow-up     Follow-up appointment will be  arranged by the ID clinic and will be found in the patient's appointments tab.     Prior to discharge, please ensure the patient's follow-up has been scheduled.     If there is still no follow-up scheduled prior to discharge, please send an EPIC message to Melinda Mckinney in Infectious Diseases.          Thank you for your consult. I will sign off. Please contact us if you have any additional questions.    Zi Serna PA-C  Infectious Disease  [facility] - Med Surg    Subjective:     Principal Problem:Osteomyelitis of great toe of right foot    HPI: Mr. Caceres is a 57 y/o male with poorly controlled DMII, HTN admitted for right 5th toe osteomyelitis. He works as a  at a restaurant and developed this wound in the last few weeks. He denies having any fever chills orn ight sweats. He was seen by general surgery and necrotizing  fasciitis was ruled out. No fevers. WBC 13K on admit. Podiatry consulted. He is on empiric abx at this time.     Interval History:   NAEON  Bone biopsy done 8/4  Bone cultures +Staph species  Continues to have RUE swelling and RLE swelling    Review of Systems   Constitutional:  Negative for chills, diaphoresis, fatigue and fever.   HENT:  Negative for congestion, ear pain, nosebleeds, rhinorrhea and sore throat.    Eyes:  Negative for pain.   Respiratory:  Negative for cough, shortness of breath and wheezing.    Cardiovascular:  Positive for leg swelling. Negative for chest pain.   Gastrointestinal:  Negative for abdominal pain, constipation, diarrhea, nausea and vomiting.   Genitourinary:  Negative for dysuria, frequency and urgency.   Musculoskeletal:  Negative for arthralgias, back pain and neck pain.   Skin:  Positive for wound.   Neurological:  Negative for weakness, numbness and headaches.   Objective:     Vital Signs (Most Recent):  Temp: 98.1 °F (36.7 °C) (08/08/22 1136)  Pulse: 77 (08/08/22 1136)  Resp: 16 (08/08/22 1136)  BP: (!) 159/75 (08/08/22 1136)  SpO2: 98 %  (08/08/22 1136)   Vital Signs (24h Range):  Temp:  [96.2 °F (35.7 °C)-98.4 °F (36.9 °C)] 98.1 °F (36.7 °C)  Pulse:  [77-97] 77  Resp:  [16-20] 16  SpO2:  [86 %-99 %] 98 %  BP: (148-160)/(75-85) 159/75     Weight: (!) 158.4 kg (349 lb 3.3 oz)  Body mass index is 43.65 kg/m².    Estimated Creatinine Clearance: 113.6 mL/min (based on SCr of 1.2 mg/dL).    Physical Exam  Vitals and nursing note reviewed.   Constitutional:       General: He is not in acute distress.     Appearance: He is well-developed. He is obese. He is not diaphoretic.   HENT:      Head: Normocephalic and atraumatic.   Eyes:      Pupils: Pupils are equal, round, and reactive to light.   Cardiovascular:      Rate and Rhythm: Normal rate and regular rhythm.      Heart sounds: Normal heart sounds. No murmur heard.    No friction rub. No gallop.   Pulmonary:      Effort: Pulmonary effort is normal. No respiratory distress.      Breath sounds: Normal breath sounds. No wheezing or rales.   Chest:      Chest wall: No tenderness.   Abdominal:      General: Bowel sounds are normal. There is no distension.      Palpations: There is no mass.      Tenderness: There is no abdominal tenderness. There is no guarding.   Musculoskeletal:         General: Swelling (right forearm) present. No deformity. Normal range of motion.      Cervical back: Normal range of motion and neck supple.      Right lower leg: Edema present.   Skin:     General: Skin is warm and dry.      Findings: Erythema present. No rash.      Comments: Wound examined today. Some purulent drainage from right hallux. No deep pockets or tracking   Neurological:      Mental Status: He is alert and oriented to person, place, and time.   Psychiatric:         Behavior: Behavior normal.         Thought Content: Thought content normal.         Judgment: Judgment normal.             Significant Labs: All pertinent labs within the past 24 hours have been reviewed.    Significant Imaging: I have reviewed all  pertinent imaging results/findings within the past 24 hours.

## 2022-08-08 NOTE — SUBJECTIVE & OBJECTIVE
Interval History:   NAEON  Bone biopsy done 8/4  Bone cultures +Staph species  Continues to have RUE swelling and RLE swelling    Review of Systems   Constitutional:  Negative for chills, diaphoresis, fatigue and fever.   HENT:  Negative for congestion, ear pain, nosebleeds, rhinorrhea and sore throat.    Eyes:  Negative for pain.   Respiratory:  Negative for cough, shortness of breath and wheezing.    Cardiovascular:  Positive for leg swelling. Negative for chest pain.   Gastrointestinal:  Negative for abdominal pain, constipation, diarrhea, nausea and vomiting.   Genitourinary:  Negative for dysuria, frequency and urgency.   Musculoskeletal:  Negative for arthralgias, back pain and neck pain.   Skin:  Positive for wound.   Neurological:  Negative for weakness, numbness and headaches.   Objective:     Vital Signs (Most Recent):  Temp: 98.1 °F (36.7 °C) (08/08/22 1136)  Pulse: 77 (08/08/22 1136)  Resp: 16 (08/08/22 1136)  BP: (!) 159/75 (08/08/22 1136)  SpO2: 98 % (08/08/22 1136)   Vital Signs (24h Range):  Temp:  [96.2 °F (35.7 °C)-98.4 °F (36.9 °C)] 98.1 °F (36.7 °C)  Pulse:  [77-97] 77  Resp:  [16-20] 16  SpO2:  [86 %-99 %] 98 %  BP: (148-160)/(75-85) 159/75     Weight: (!) 158.4 kg (349 lb 3.3 oz)  Body mass index is 43.65 kg/m².    Estimated Creatinine Clearance: 113.6 mL/min (based on SCr of 1.2 mg/dL).    Physical Exam  Vitals and nursing note reviewed.   Constitutional:       General: He is not in acute distress.     Appearance: He is well-developed. He is obese. He is not diaphoretic.   HENT:      Head: Normocephalic and atraumatic.   Eyes:      Pupils: Pupils are equal, round, and reactive to light.   Cardiovascular:      Rate and Rhythm: Normal rate and regular rhythm.      Heart sounds: Normal heart sounds. No murmur heard.    No friction rub. No gallop.   Pulmonary:      Effort: Pulmonary effort is normal. No respiratory distress.      Breath sounds: Normal breath sounds. No wheezing or rales.   Chest:       Chest wall: No tenderness.   Abdominal:      General: Bowel sounds are normal. There is no distension.      Palpations: There is no mass.      Tenderness: There is no abdominal tenderness. There is no guarding.   Musculoskeletal:         General: Swelling (right forearm) present. No deformity. Normal range of motion.      Cervical back: Normal range of motion and neck supple.      Right lower leg: Edema present.   Skin:     General: Skin is warm and dry.      Findings: Erythema present. No rash.      Comments: Wound examined today. Some purulent drainage from right hallux. No deep pockets or tracking   Neurological:      Mental Status: He is alert and oriented to person, place, and time.   Psychiatric:         Behavior: Behavior normal.         Thought Content: Thought content normal.         Judgment: Judgment normal.             Significant Labs: All pertinent labs within the past 24 hours have been reviewed.    Significant Imaging: I have reviewed all pertinent imaging results/findings within the past 24 hours.

## 2022-08-08 NOTE — PROGRESS NOTES
[facility] - Med Surg  Endocrinology  Progress Note    Admit Date: 8/2/2022     Reason for Consult: Management of T2DM, Hyperglycemia     Diabetes diagnosis year: 2010    Home Diabetes Medications:  No current medication use. Patient states > 3 years since he has been on Metformin.     How often checking glucose at home?  Patient not checking his BG at home.     BG readings on regimen: ANURAG  Hypoglycemia on the regimen?  No  Missed doses on regimen?  Yes    Diabetes Complications include:     Hyperglycemia, Diabetic peripheral neuropathy , Diabetic dermatitis, and Foot ulcer      Complicating diabetes co morbidities:   Infection; HTN      HPI: 57 yo male with diabetes, HTN presenting 2/2 R foot swelling that started acutely 3 days ago and progressively has gotten worse. He states he had a wound on his right great toe and 5th toe that has been there for a while. Today it had not gotten any better so he decided to come in for evaluation. He has not been on any antibiotics for it. He states that he was on medications at one point but no longer takes them as he thought he had gotten them under control. In the ED, concern with elevated WBC, ESR, CRP, xr of foot showed osteo of great toe. General surgery consulted to rule nec fasc 2/2 gas in the 5th toe. Medicine called for admission. Rocephin started. Endocrine consulted to manage hyperglycemia and type 2 diabetes.       Hemoglobin A1C   Date Value Ref Range Status   08/03/2022 12.4 (H) 4.0 - 5.6 % Final     Comment:     ADA Screening Guidelines:  5.7-6.4%  Consistent with prediabetes  >or=6.5%  Consistent with diabetes    High levels of fetal hemoglobin interfere with the HbA1C  assay. Heterozygous hemoglobin variants (HbS, HgC, etc)do  not significantly interfere with this assay.   However, presence of multiple variants may affect accuracy.     03/19/2015 15.0 (H) 4.5 - 6.2 % Final   01/26/2010 15.4 (H) 4.0 - 6.2 % Final          Interval HPI:   Overnight events: No acute  "events overnight. Patient on the [facility] in room 651/651 A. Blood glucose improving. BG at and above goal on current insulin regimen (SSI, prandial, and basal insulin ). Steroid use- None.    Renal function- Normal   Vasopressors-  None       Diet diabetic [facility];  Calorie     Eatin%  Nausea: No  Hypoglycemia and intervention: No  Fever: No  TPN and/or TF: No    BP (!) 151/80 (BP Location: Left arm, Patient Position: Lying)   Pulse 92   Temp 97.9 °F (36.6 °C) (Oral)   Resp 16   Ht 6' 3" (1.905 m)   Wt (!) 158.4 kg (349 lb 3.3 oz)   SpO2 98%   BMI 43.65 kg/m²     Labs Reviewed and Include    Recent Labs   Lab 22  0528   *   CALCIUM 9.4   ALBUMIN 2.2*   PROT 7.9   *   K 3.9   CO2 27   CL 96   BUN 14   CREATININE 1.2   ALKPHOS 89   ALT 54*   AST 43*   BILITOT 0.4     Lab Results   Component Value Date    WBC 10.04 2022    HGB 10.6 (L) 2022    HCT 33.5 (L) 2022    MCV 84 2022     (H) 2022     No results for input(s): TSH, FREET4 in the last 168 hours.  Lab Results   Component Value Date    HGBA1C 12.4 (H) 2022       Nutritional status:   Body mass index is 43.65 kg/m².  Lab Results   Component Value Date    ALBUMIN 2.2 (L) 2022    ALBUMIN 2.3 (L) 2022    ALBUMIN 2.4 (L) 2022     Lab Results   Component Value Date    PREALBUMIN 6 (L) 2010       Estimated Creatinine Clearance: 113.6 mL/min (based on SCr of 1.2 mg/dL).    Accu-Checks  Recent Labs     22  2019 22  0713 22  0850 22  1122 22  1525 22  2047 22  0731 22  1103 22  1544 22   POCTGLUCOSE 313* 236* 278* 227* 169* 157* 178* 180* 159* 135*       Current Medications and/or Treatments Impacting Glycemic Control  Immunotherapy:    Immunosuppressants       None          Steroids:   Hormones (From admission, onward)                Start     Stop Route Frequency Ordered    22 0239  melatonin " tablet 6 mg         -- Oral Nightly PRN 08/03/22 0141          Pressors:    Autonomic Drugs (From admission, onward)                None          Hyperglycemia/Diabetes Medications:   Antihyperglycemics (From admission, onward)                Start     Stop Route Frequency Ordered    08/07/22 1035  insulin aspart U-100 pen 0-5 Units         -- SubQ Before meals & nightly PRN 08/07/22 0935    08/06/22 1130  insulin aspart U-100 pen 12 Units         -- SubQ 3 times daily with meals 08/06/22 0835    08/06/22 0900  insulin detemir U-100 pen 32 Units         -- SubQ Daily 08/06/22 0835            ASSESSMENT and PLAN    * Osteomyelitis of great toe of right foot  Infection may elevate BG readings  On IV antibiotics  Managed per primary team  Avoid hypoglycemia        Uncontrolled type 2 diabetes mellitus with hyperglycemia, without long-term current use of insulin  Endocrinology consulted for BG management.   BG goal 140-180    Weight-based at 0.5 units/kg/day   - Levemir Flex Pen 32 units daily  - Novolog (aspart) insulin 12 Units SQ TIDWM and prn for BG excursions LDC (150/50) SSI  - BG checks AC/HS  - Hypoglycemia protocol in place    ** Please notify Endocrine for any change and/or advance in diet**  ** Please call Endocrine for any BG related issues **    Discharge Planning:   TBD. Please notify endocrinology prior to discharge.        Primary hypertension  Willy contribute to insulin resistance.       Severe obesity (BMI >= 40)  Body mass index is 43.65 kg/m².  Increased abdominal adiposity can increase insulin resistance.            Brandon Gage, DNP, FNP  Endocrinology  [facility] - Med Surg

## 2022-08-09 LAB
BASOPHILS # BLD AUTO: 0.07 K/UL (ref 0–0.2)
BASOPHILS NFR BLD: 0.7 % (ref 0–1.9)
CRP SERPL-MCNC: 94.9 MG/L (ref 0–8.2)
DIFFERENTIAL METHOD: ABNORMAL
EOSINOPHIL # BLD AUTO: 0.2 K/UL (ref 0–0.5)
EOSINOPHIL NFR BLD: 2.2 % (ref 0–8)
ERYTHROCYTE [DISTWIDTH] IN BLOOD BY AUTOMATED COUNT: 14.1 % (ref 11.5–14.5)
ERYTHROCYTE [SEDIMENTATION RATE] IN BLOOD BY PHOTOMETRIC METHOD: >120 MM/HR (ref 0–23)
HCT VFR BLD AUTO: 31.6 % (ref 40–54)
HGB BLD-MCNC: 10.1 G/DL (ref 14–18)
IMM GRANULOCYTES # BLD AUTO: 0.12 K/UL (ref 0–0.04)
IMM GRANULOCYTES NFR BLD AUTO: 1.2 % (ref 0–0.5)
LYMPHOCYTES # BLD AUTO: 1.8 K/UL (ref 1–4.8)
LYMPHOCYTES NFR BLD: 18.4 % (ref 18–48)
MCH RBC QN AUTO: 26 PG (ref 27–31)
MCHC RBC AUTO-ENTMCNC: 32 G/DL (ref 32–36)
MCV RBC AUTO: 81 FL (ref 82–98)
MONOCYTES # BLD AUTO: 1 K/UL (ref 0.3–1)
MONOCYTES NFR BLD: 10.4 % (ref 4–15)
NEUTROPHILS # BLD AUTO: 6.5 K/UL (ref 1.8–7.7)
NEUTROPHILS NFR BLD: 67.1 % (ref 38–73)
NRBC BLD-RTO: 0 /100 WBC
PLATELET # BLD AUTO: 608 K/UL (ref 150–450)
PMV BLD AUTO: 9.3 FL (ref 9.2–12.9)
POCT GLUCOSE: 150 MG/DL (ref 70–110)
POCT GLUCOSE: 182 MG/DL (ref 70–110)
POCT GLUCOSE: 183 MG/DL (ref 70–110)
POCT GLUCOSE: 89 MG/DL (ref 70–110)
RBC # BLD AUTO: 3.88 M/UL (ref 4.6–6.2)
WBC # BLD AUTO: 9.74 K/UL (ref 3.9–12.7)

## 2022-08-09 PROCEDURE — C9399 UNCLASSIFIED DRUGS OR BIOLOG: HCPCS | Performed by: NURSE PRACTITIONER

## 2022-08-09 PROCEDURE — 63600175 PHARM REV CODE 636 W HCPCS: Performed by: INTERNAL MEDICINE

## 2022-08-09 PROCEDURE — 85025 COMPLETE CBC W/AUTO DIFF WBC: CPT | Performed by: STUDENT IN AN ORGANIZED HEALTH CARE EDUCATION/TRAINING PROGRAM

## 2022-08-09 PROCEDURE — A4216 STERILE WATER/SALINE, 10 ML: HCPCS | Performed by: INTERNAL MEDICINE

## 2022-08-09 PROCEDURE — 25500020 PHARM REV CODE 255: Performed by: INTERNAL MEDICINE

## 2022-08-09 PROCEDURE — 25000003 PHARM REV CODE 250: Performed by: INTERNAL MEDICINE

## 2022-08-09 PROCEDURE — A9585 GADOBUTROL INJECTION: HCPCS | Performed by: INTERNAL MEDICINE

## 2022-08-09 PROCEDURE — 99233 SBSQ HOSP IP/OBS HIGH 50: CPT | Mod: ,,, | Performed by: PODIATRIST

## 2022-08-09 PROCEDURE — 99233 SBSQ HOSP IP/OBS HIGH 50: CPT | Mod: 95,,, | Performed by: INTERNAL MEDICINE

## 2022-08-09 PROCEDURE — 94761 N-INVAS EAR/PLS OXIMETRY MLT: CPT

## 2022-08-09 PROCEDURE — 63600175 PHARM REV CODE 636 W HCPCS: Performed by: HOSPITALIST

## 2022-08-09 PROCEDURE — 76937 US GUIDE VASCULAR ACCESS: CPT

## 2022-08-09 PROCEDURE — 11000001 HC ACUTE MED/SURG PRIVATE ROOM

## 2022-08-09 PROCEDURE — 86140 C-REACTIVE PROTEIN: CPT | Performed by: STUDENT IN AN ORGANIZED HEALTH CARE EDUCATION/TRAINING PROGRAM

## 2022-08-09 PROCEDURE — 85652 RBC SED RATE AUTOMATED: CPT | Performed by: STUDENT IN AN ORGANIZED HEALTH CARE EDUCATION/TRAINING PROGRAM

## 2022-08-09 PROCEDURE — 99233 PR SUBSEQUENT HOSPITAL CARE,LEVL III: ICD-10-PCS | Mod: 95,,, | Performed by: INTERNAL MEDICINE

## 2022-08-09 PROCEDURE — C1751 CATH, INF, PER/CENT/MIDLINE: HCPCS

## 2022-08-09 PROCEDURE — 36415 COLL VENOUS BLD VENIPUNCTURE: CPT | Performed by: STUDENT IN AN ORGANIZED HEALTH CARE EDUCATION/TRAINING PROGRAM

## 2022-08-09 PROCEDURE — 99233 PR SUBSEQUENT HOSPITAL CARE,LEVL III: ICD-10-PCS | Mod: ,,, | Performed by: PODIATRIST

## 2022-08-09 PROCEDURE — 36573 INSJ PICC RS&I 5 YR+: CPT

## 2022-08-09 PROCEDURE — 25000003 PHARM REV CODE 250: Performed by: NURSE PRACTITIONER

## 2022-08-09 RX ORDER — SODIUM CHLORIDE 0.9 % (FLUSH) 0.9 %
10 SYRINGE (ML) INJECTION
Status: DISCONTINUED | OUTPATIENT
Start: 2022-08-09 | End: 2022-08-16 | Stop reason: HOSPADM

## 2022-08-09 RX ORDER — SODIUM CHLORIDE 0.9 % (FLUSH) 0.9 %
10 SYRINGE (ML) INJECTION EVERY 6 HOURS
Status: DISCONTINUED | OUTPATIENT
Start: 2022-08-09 | End: 2022-08-16 | Stop reason: HOSPADM

## 2022-08-09 RX ORDER — GADOBUTROL 604.72 MG/ML
10 INJECTION INTRAVENOUS
Status: COMPLETED | OUTPATIENT
Start: 2022-08-09 | End: 2022-08-09

## 2022-08-09 RX ORDER — POLYETHYLENE GLYCOL 3350 17 G/17G
17 POWDER, FOR SOLUTION ORAL 2 TIMES DAILY
Status: DISCONTINUED | OUTPATIENT
Start: 2022-08-09 | End: 2022-08-16 | Stop reason: HOSPADM

## 2022-08-09 RX ORDER — VALSARTAN 80 MG/1
80 TABLET ORAL 2 TIMES DAILY
Status: DISCONTINUED | OUTPATIENT
Start: 2022-08-09 | End: 2022-08-10

## 2022-08-09 RX ADMIN — AMLODIPINE BESYLATE 10 MG: 10 TABLET ORAL at 08:08

## 2022-08-09 RX ADMIN — GADOBUTROL 10 ML: 604.72 INJECTION INTRAVENOUS at 11:08

## 2022-08-09 RX ADMIN — INSULIN ASPART 12 UNITS: 100 INJECTION, SOLUTION INTRAVENOUS; SUBCUTANEOUS at 08:08

## 2022-08-09 RX ADMIN — SENNOSIDES AND DOCUSATE SODIUM 1 TABLET: 50; 8.6 TABLET ORAL at 08:08

## 2022-08-09 RX ADMIN — INSULIN DETEMIR 32 UNITS: 100 INJECTION, SOLUTION SUBCUTANEOUS at 08:08

## 2022-08-09 RX ADMIN — VANCOMYCIN HYDROCHLORIDE 1250 MG: 1.25 INJECTION, POWDER, LYOPHILIZED, FOR SOLUTION INTRAVENOUS at 08:08

## 2022-08-09 RX ADMIN — POLYETHYLENE GLYCOL 3350 17 G: 17 POWDER, FOR SOLUTION ORAL at 09:08

## 2022-08-09 RX ADMIN — ACETAMINOPHEN 650 MG: 325 TABLET ORAL at 08:08

## 2022-08-09 RX ADMIN — VANCOMYCIN HYDROCHLORIDE 1250 MG: 1.25 INJECTION, POWDER, LYOPHILIZED, FOR SOLUTION INTRAVENOUS at 09:08

## 2022-08-09 RX ADMIN — Medication 10 ML: at 05:08

## 2022-08-09 RX ADMIN — POLYETHYLENE GLYCOL 3350 17 G: 17 POWDER, FOR SOLUTION ORAL at 08:08

## 2022-08-09 RX ADMIN — INSULIN ASPART 12 UNITS: 100 INJECTION, SOLUTION INTRAVENOUS; SUBCUTANEOUS at 12:08

## 2022-08-09 RX ADMIN — ENOXAPARIN SODIUM 40 MG: 40 INJECTION SUBCUTANEOUS at 08:08

## 2022-08-09 RX ADMIN — VALSARTAN 80 MG: 40 TABLET, FILM COATED ORAL at 08:08

## 2022-08-09 RX ADMIN — Medication 10 ML: at 12:08

## 2022-08-09 RX ADMIN — ENOXAPARIN SODIUM 40 MG: 40 INJECTION SUBCUTANEOUS at 09:08

## 2022-08-09 RX ADMIN — INSULIN ASPART 12 UNITS: 100 INJECTION, SOLUTION INTRAVENOUS; SUBCUTANEOUS at 04:08

## 2022-08-09 NOTE — ASSESSMENT & PLAN NOTE
Per surgery, no evidence of a necrotizing infection at physical exam. Patient with a chronic wound opened to air, this may be the reason there is some soft tissue gas seen in the foot Xray. Wound looks chronic in nature.   - Continue ceftriaxone, vancomycin  - Podiatry consulted, patient s/p bone bx   - MRI consistent with osteomyelitis  - ID consulted: Right hallux bone biopsy. Bone cultures pending.  - Unable to get vascular SANDEE due to pain.   - ID de-escalating antibiotic to vancomycin. Place PICC.

## 2022-08-09 NOTE — SUBJECTIVE & OBJECTIVE
Past Medical History:   Diagnosis Date    Primary hypertension 8/3/2022       Past Surgical History:   Procedure Laterality Date    LEG SURGERY Left        Review of patient's allergies indicates:  No Known Allergies    Current Facility-Administered Medications   Medication    acetaminophen tablet 650 mg    albuterol-ipratropium 2.5 mg-0.5 mg/3 mL nebulizer solution 3 mL    amLODIPine tablet 10 mg    cadexomer iodine 0.9 % gel    dextrose 10% bolus 125 mL    dextrose 10% bolus 250 mL    enoxaparin injection 40 mg    glucagon (human recombinant) injection 1 mg    glucose chewable tablet 16 g    glucose chewable tablet 24 g    hydrALAZINE tablet 25 mg    insulin aspart U-100 pen 0-5 Units    insulin aspart U-100 pen 12 Units    insulin detemir U-100 pen 32 Units    melatonin tablet 6 mg    naloxone 0.4 mg/mL injection 0.02 mg    ondansetron disintegrating tablet 4 mg    polyethylene glycol packet 17 g    polyethylene glycol packet 17 g    prochlorperazine injection Soln 5 mg    senna-docusate 8.6-50 mg per tablet 1 tablet    simethicone chewable tablet 80 mg    sodium chloride 0.9% flush 10 mL    sodium chloride 0.9% flush 10 mL    And    sodium chloride 0.9% flush 10 mL    valsartan tablet 80 mg    vancomycin - pharmacy to dose    vancomycin 1.25 g in dextrose 5% 250 mL IVPB (ready to mix)     Family History       Problem Relation (Age of Onset)    Hypertension Brother          Tobacco Use    Smoking status: Never Smoker    Smokeless tobacco: Never Used   Substance and Sexual Activity    Alcohol use: Yes    Drug use: No    Sexual activity: Not on file     ROS    Constitutional: Denies fever/chills  Neurological: Denies numbness/tingling (any exceptions noted in orthopaedic exam)   Psychiatric/Behavioral: Denies change in normal mood  Eyes: Denies change in vision  Cardiovascular: Denies chest pain  Respiratory: Denies shortness of breath  Hematologic/Lymphatic: Denies easy bleeding/bruising   Skin: Endorses 3 day hx  "of right forearm swelling. Denies new rash or skin lesions   Gastrointestinal: Denies nausea/vomitting/diarrhea, change in bowel habits, abdominal pain   Allergic/Immunologic: Denies adverse reactions to current medications  Musculoskeletal: see HPI      Objective:     Vital Signs (Most Recent):  Temp: 97.1 °F (36.2 °C) (08/09/22 2018)  Pulse: 83 (08/09/22 2018)  Resp: 18 (08/09/22 2018)  BP: (!) 142/77 (08/09/22 2018)  SpO2: 95 % (08/09/22 2018)   Vital Signs (24h Range):  Temp:  [97.1 °F (36.2 °C)-98.6 °F (37 °C)] 97.1 °F (36.2 °C)  Pulse:  [82-90] 83  Resp:  [16-20] 18  SpO2:  [93 %-95 %] 95 %  BP: (142-176)/(77-82) 142/77     Weight: (!) 136.5 kg (300 lb 14.9 oz)  Height: 6' 3" (190.5 cm)  Body mass index is 37.61 kg/m².      Intake/Output Summary (Last 24 hours) at 8/10/2022 0345  Last data filed at 8/9/2022 2025  Gross per 24 hour   Intake 200 ml   Output 650 ml   Net -450 ml       Ortho/SPM Exam    MSK:  R Upper extremity  Inspection  - Skin intact throughout, no open wounds  - Impressive swelling around proximal forearm  - No ecchymosis, erythema, or signs of cellulitis  Palpation  - NonTTP throughout, no palpable abnormality  Range of motion  - AROM and PROM of the shoulder, elbow, wrist, and hand intact without pain  - Denies pain with supination pronation of forearm  Stability  - No evidence of joint dislocation or abnormal laxity  Neurovascular  - AIN/PIN/Radial/Median/Ulnar Nerves assessed in isolation without deficit  - Able to give thumbs up, make "OK" sign, cross IF/LF, abduct/adduct fingers, make fist  - SILT throughout  - Compartments soft  - Radial artery palpated  - Capillary Refill <3s  - Muscle tone normal      Significant Labs: A1C:   Recent Labs   Lab 08/03/22  0423   HGBA1C 12.4*     CBC:   Recent Labs   Lab 08/08/22  0528 08/09/22  1730   WBC 10.04 9.74   HGB 10.6* 10.1*   HCT 33.5* 31.6*   * 608*     CMP:   Recent Labs   Lab 08/08/22  0528   *   K 3.9   CL 96   CO2 27   GLU " 166*   BUN 14   CREATININE 1.2   CALCIUM 9.4   PROT 7.9   ALBUMIN 2.2*   BILITOT 0.4   ALKPHOS 89   AST 43*   ALT 54*   ANIONGAP 8       All pertinent labs within the past 24 hours have been reviewed.    Significant Imaging: I have reviewed all pertinent imaging results/findings.

## 2022-08-09 NOTE — PLAN OF CARE
[facility]    Referral received for vancomycin. OPAT noted for vancomycin until 9/15/22. Pending picc line placement, education, benefits. Will cont to follow.     [facility]  Crispin Schwartz Rn, Clinical Educator  Cell [phone]  Office [phone]  Fax [phone]

## 2022-08-09 NOTE — PLAN OF CARE
Pt had double Lumen PICC placed in [facility]EVIN for long term IV therapy, edema to BLE improving  Problem: Adult Inpatient Plan of Care  Goal: Plan of Care Review  Outcome: Ongoing, Progressing  Goal: Patient-Specific Goal (Individualized)  Outcome: Ongoing, Progressing  Goal: Absence of Hospital-Acquired Illness or Injury  Outcome: Ongoing, Progressing  Goal: Optimal Comfort and Wellbeing  Outcome: Ongoing, Progressing  Goal: Readiness for Transition of Care  Outcome: Ongoing, Progressing     Problem: Diabetes Comorbidity  Goal: Blood Glucose Level Within Targeted Range  Outcome: Ongoing, Progressing     Problem: Bariatric Environmental Safety  Goal: Safety Maintained with Care  Outcome: Ongoing, Progressing     Problem: Infection  Goal: Absence of Infection Signs and Symptoms  Outcome: Ongoing, Progressing

## 2022-08-09 NOTE — PROCEDURES
"Renee Caceres is a 56 y.o. male patient.    Temp: 98.6 °F (37 °C) (08/09/22 1054)  Pulse: 82 (08/09/22 1054)  Resp: 16 (08/09/22 1054)  BP: (!) 145/79 (08/09/22 1054)  SpO2: (!) 93 % (08/09/22 1054)  Weight: (!) 136.5 kg (300 lb 14.9 oz) (08/09/22 0450)  Height: 6' 3" (190.5 cm) (08/03/22 0514)    PICC  Date/Time: 8/9/2022 11:51 AM  Performed by: Wendi Caceres RN  Assisting provider: Kassandra eJong RN  Consent Done: Yes  Time out: Immediately prior to procedure a time out was called to verify the correct patient, procedure, equipment, support staff and site/side marked as required  Indications: med administration and vascular access  Anesthesia: local infiltration  Local anesthetic: lidocaine 1% without epinephrine  Anesthetic Total (mL): 3  Preparation: skin prepped with ChloraPrep  Skin prep agent dried: skin prep agent completely dried prior to procedure  Sterile barriers: all five maximum sterile barriers used - cap, mask, sterile gown, sterile gloves, and large sterile sheet  Hand hygiene: hand hygiene performed prior to central venous catheter insertion  Location details: left brachial  Catheter type: double lumen  Catheter size: 5 Fr  Catheter Length: 49cm    Ultrasound guidance: yes  Vessel Caliber: large and patent, compressibility normal  Vascular Doppler: not done  Needle advanced into vessel with real time Ultrasound guidance.  Guidewire confirmed in vessel.  Image recorded and saved.  Sterile sheath used.  Number of attempts: 1  Post-procedure: blood return through all ports, chlorhexidine patch and sterile dressing applied  Technical procedures used: 3CG  Specimens: No  Implants: No  Assessment: placement verified by x-ray  Complications: none          Name FARRUKH Jeong RN  8/9/2022  "

## 2022-08-09 NOTE — ASSESSMENT & PLAN NOTE
Acute  Continue Rocephin and vancomycin  LE U/S performed: No evidence of deep venous thrombosis in the right lower extremity.  ID de-escalating antibiotic to vancomycin.

## 2022-08-09 NOTE — HPI
Renee Caceres is a 56 y.o. male with PMH significant for diabetes (HA1c 12.4) admitted since 8/3 with right great toe osteomyelitis and right upper extremity pain. Ortho consulted for evaluation of swelling of the right forearm. Patient reports atraumatic insidious onset pain in right forearm roughly 10-14 days ago that has acutely worsened over the past 2 days. Patient has undergone RUE U/S on 8/6/ and 8/8 which has shown interval development of a heterogenous fluid collection. He has been treated with IV abx for his R great toe osteo since admission (Vancomycin). Podiatry following for toe osteo. Endocrine following for BG control. He denies any previous episodes of similar pain in his forearm. Denies any paresthesias in the RUE. No previous history of surgery to the RUE. No baseline anticoagulation.

## 2022-08-09 NOTE — ASSESSMENT & PLAN NOTE
Uncontrolled, not on medication at home  - Continued amlodipine 5mg daily  - Continued lisinopril 5mg daily; increased to 20 mg for 8/6  - BP remains elevated; increased lisinopril to 40 mg. Increased amlodipine. Monitor.

## 2022-08-09 NOTE — ASSESSMENT & PLAN NOTE
Improved with elevation but forearm remains significantly swollen  - Soft tissue US: Diffuse soft tissue edema without organized fluid collection.   - Repeat soft tissue US shows fluid collection; consult General Surgery.

## 2022-08-09 NOTE — NURSING
0833 Pt in bed watching TV, finished with breakfast, resp even and unlabored, no s/s distress/ discomfort, right leg wrinkled showing decrease in edema, continues with edema 2 + to BLE, dressing intact to right great toe, scheduled meds given, IV Vancomycin infusing, bed in low locked position, call light in reach    1140 PICC team in room

## 2022-08-09 NOTE — PLAN OF CARE
08/09/22 0936   Post-Acute Status   Post-Acute Authorization Home Health;IV Infusion   Home Health Status Referrals Sent   Coverage [employer]   IV Infusion Status Referral(s) sent   Discharge Plan   Discharge Plan A Home Health   Discharge Plan B Home Health     BARBARA met at bedside with the Pt to discuss discharge planning. The Pt's choices where [facility] and [facility]. Referrals sent. SW will follow.    [facility] can accept. If the Pt discharges 8/10/2022, they can see him Friday 8/12/2022. BARBARA notified Pt's MD. PEGGY Casiano, LMSW [facility]  [id number]

## 2022-08-09 NOTE — PROGRESS NOTES
[facility] - [facility] Surg  Podiatry  Progress Note    Patient Name: Renee Caceres  MRN: [id number]  Admission Date: 8/2/2022  Hospital Length of Stay: 6 days  Attending Physician: Maria E Flores MD  Primary Care Provider: Primary Doctor No     Subjective:     Interval History: NAEON. Afebrile. Dressing clean, dry, and intact. Leukocytosis resolved. No new pedal complaints. ID on board with speciated cultures and plan for 6 weeks ABx.         Scheduled Meds:   amLODIPine  10 mg Oral Daily    enoxaparin  40 mg Subcutaneous Q12H    insulin aspart U-100  12 Units Subcutaneous TIDWM    insulin detemir U-100  32 Units Subcutaneous Daily    polyethylene glycol  17 g Oral BID    senna-docusate 8.6-50 mg  1 tablet Oral BID    sodium chloride 0.9%  10 mL Intravenous Q6H    valsartan  80 mg Oral BID    vancomycin (VANCOCIN) IVPB  1,250 mg Intravenous Q12H     Continuous Infusions:  PRN Meds:acetaminophen, albuterol-ipratropium, cadexomer iodine, dextrose 10%, dextrose 10%, glucagon (human recombinant), glucose, glucose, hydrALAZINE, insulin aspart U-100, melatonin, naloxone, ondansetron, polyethylene glycol, prochlorperazine, simethicone, sodium chloride 0.9%, Flushing PICC Protocol **AND** sodium chloride 0.9% **AND** sodium chloride 0.9%, Pharmacy to dose Vancomycin consult **AND** vancomycin - pharmacy to dose    Review of Systems   Constitutional:  Negative for chills, diaphoresis, fatigue and fever.   HENT:  Negative for congestion, ear pain, nosebleeds, rhinorrhea and sore throat.    Eyes:  Negative for pain.   Respiratory:  Negative for cough, shortness of breath and wheezing.    Cardiovascular:  Positive for leg swelling. Negative for chest pain.   Gastrointestinal:  Negative for abdominal pain, constipation, diarrhea, nausea and vomiting.   Genitourinary:  Negative for dysuria, frequency and urgency.   Musculoskeletal:  Negative for arthralgias, back pain and neck pain.   Skin:  Positive for wound.    Neurological:  Negative for weakness, numbness and headaches.   Objective:     Vital Signs (Most Recent):  Temp: 98.4 °F (36.9 °C) (08/09/22 1527)  Pulse: 84 (08/09/22 1527)  Resp: 16 (08/09/22 1527)  BP: (!) 176/77 (08/09/22 1527)  SpO2: 95 % (08/09/22 1527)   Vital Signs (24h Range):  Temp:  [97.6 °F (36.4 °C)-98.6 °F (37 °C)] 98.4 °F (36.9 °C)  Pulse:  [81-90] 84  Resp:  [16-20] 16  SpO2:  [93 %-95 %] 95 %  BP: (142-176)/(71-82) 176/77     Weight: (!) 136.5 kg (300 lb 14.9 oz)  Body mass index is 37.61 kg/m².    Foot Exam    General  Orientation: alert and oriented to person, place, and time       Right Foot/Ankle     Inspection and Palpation  Ecchymosis: none  Skin Exam: callus, drainage, skin changes, abnormal color and ulcer;     Neurovascular  Saphenous nerve sensation: diminished  Tibial nerve sensation: diminished  Superficial peroneal nerve sensation: diminished  Deep peroneal nerve sensation: diminished  Sural nerve sensation: diminished    Comments  Right foot: Hyperkeratotic wound noted at plantar aspect of hallux. No purulent drainage was expressed. Wound does not probe to bone. Mild and Edema Erythema noted to right foot significantly resolved.     Superficial Wound also noted to 5th digit on the dorsal aspect. No drainage or fluctuance noted.      DP and PT pulses can not be palpated due to diffuse swelling. Dystrophic, elongated fungal nails noted on 1-5 digits.    Left Foot/Ankle      Inspection and Palpation  Ecchymosis: none  Tenderness: none   Swelling: none   Skin Exam: skin intact;     Neurovascular  Dorsalis pedis: 2+  Posterior tibial: 1+  Saphenous nerve sensation: diminished  Tibial nerve sensation: diminished  Superficial peroneal nerve sensation: diminished  Deep peroneal nerve sensation: diminished  Sural nerve sensation: diminished    Comments  Left foot: Xerosis noted through out left foot. Dystrophic, elongated fungal nails noted on 1-5 digits.    Laboratory:  CBC:   Recent Labs    Lab 08/08/22  0528   WBC 10.04   RBC 3.98*   HGB 10.6*   HCT 33.5*   *   MCV 84   MCH 26.6*   MCHC 31.6*     CMP:   Recent Labs   Lab 08/08/22  0528   *   CALCIUM 9.4   ALBUMIN 2.2*   PROT 7.9   *   K 3.9   CO2 27   CL 96   BUN 14   CREATININE 1.2   ALKPHOS 89   ALT 54*   AST 43*   BILITOT 0.4     CRP:   Recent Labs   Lab 08/02/22 2215   .8*     ESR:   Recent Labs   Lab 08/02/22  1927   SEDRATE >120*     Wound Cultures:   Recent Labs   Lab 08/03/22  1241 08/04/22  1145   LABAERO Skin sabine,  no predominant organism STAPHYLOCOCCUS COHNII SUBSPECIES COHNII  Moderate  Skin sabine also present  Skin sabine also present  *     Microbiology Results (last 7 days)       Procedure Component Value Units Date/Time    Fungus culture [[id number]] Collected: 08/04/22 1145    Order Status: Completed Specimen: Bone from Toe, Right Foot Updated: 08/09/22 1307     Fungus (Mycology) Culture Culture in progress    Narrative:      R Great toe bone    Aerobic culture [[id number]]  (Abnormal)  (Susceptibility) Collected: 08/04/22 1145    Order Status: Completed Specimen: Bone from Toe, Right Foot Updated: 08/08/22 1228     Aerobic Bacterial Culture STAPHYLOCOCCUS COHNII SUBSPECIES COHNII  Moderate  Skin sabine also present  Skin sabine also present      Narrative:      R great toe bone    Culture, Anaerobe [[id number]] Collected: 08/03/22 1241    Order Status: Completed Specimen: Wound from Toe, Right Foot Updated: 08/08/22 1155     Anaerobic Culture No anaerobes isolated    Culture, Anaerobe [[id number]] Collected: 08/04/22 1145    Order Status: Completed Specimen: Bone from Toe, Right Foot Updated: 08/08/22 0949     Anaerobic Culture No anaerobes isolated    Narrative:      R Great toe bone    Blood culture #1 [[id number]] Collected: 08/02/22 1935    Order Status: Completed Specimen: Blood from Peripheral, Wrist, Left Updated: 08/07/22 2022     Blood Culture, Routine No growth after 5 days.    Narrative:      Blood  Culture #1    Blood culture #2 [[id number]] Collected: 08/02/22 1927    Order Status: Completed Specimen: Blood from Peripheral, Hand, Left Updated: 08/07/22 2022     Blood Culture, Routine No growth after 5 days.    Narrative:      Blood Culture #2    AFB Culture & Smear [[id number]] Collected: 08/04/22 1145    Order Status: Completed Specimen: Bone from Toe, Right Foot Updated: 08/05/22 2127     AFB Culture & Smear Culture in progress     AFB CULTURE STAIN No acid fast bacilli seen.    Narrative:      R Great toe bone    Aerobic culture [[id number]] Collected: 08/03/22 1241    Order Status: Completed Specimen: Wound from Toe, Right Foot Updated: 08/05/22 0911     Aerobic Bacterial Culture Skin sabine,  no predominant organism    Urine culture [[id number]]  (Abnormal)  (Susceptibility) Collected: 08/02/22 2000    Order Status: Completed Specimen: Urine Updated: 08/05/22 0142     Urine Culture, Routine PROTEUS PENNERI  10,000 - 49,999 cfu/ml      Narrative:      Specimen Source->Urine    AFB Culture & Smear [[id number]] Collected: 08/03/22 1241    Order Status: Completed Specimen: Wound from Toe, Right Foot Updated: 08/04/22 2127     AFB Culture & Smear Culture in progress     AFB CULTURE STAIN No acid fast bacilli seen.    Narrative:      Right toe    Gram stain [[id number]] Collected: 08/04/22 1145    Order Status: Completed Specimen: Bone from Toe, Right Foot Updated: 08/04/22 1446     Gram Stain Result No WBC's      No organisms seen    Narrative:      R Great toe bone    Gram stain [[id number]] Collected: 08/03/22 1241    Order Status: Completed Specimen: Wound from Toe, Right Foot Updated: 08/03/22 1813     Gram Stain Result Rare WBC's      Rare Gram positive cocci      Rare Gram negative rods    Narrative:      Right toe            Diagnostic Results:  I have reviewed all pertinent imaging results/findings within the past 24 hours.        Assessment/Plan:     Cellulitis of right lower extremity  IDSA moderate  diabetic foot infection, R great toe with osteomyelitis on radiograph and MRI, R 1st MTP joint space narrowing with gout like appearance. UA elevated 8.5, with no symptoms of gout reported. Bone culture speciated STAPHYLOCOCCUS COHNII.     -Plan for 6 weeks ABx per ID, recs appreciated.   -Wounds dressed   -Nursing wound care routine, ordered   -Post op shoe, bedside  -Podiatry will follow    Final recommendations   -WB: RLE heel touch for short distance and transfer in football dressing and post op shoe, refer to PT to determine if assistive device is necessary    -Wound care: Recommend Home health wound care MWF, iodosorb to distal R great to and lateral aspect of great toe at boarder of interspace. Follow with 4x4, Kerlix x 2, and secure with ace.   -ABx: 6 weeks extended course for osteomyelitis per ID   -Recommend outpatient ambulatory referral to follow up with Dr. Sanchez at [facility] podiatry clinic 2 weeks after discharge, please see ID follow as well           Dalton Webster DPM, PGY-2  Podiatry / Foot and Ankle Surgery   Page # [phone]  Secure chat preferred

## 2022-08-09 NOTE — CARE UPDATE
BG goal 140-180    -A1C    Lab Results   Component Value Date    HGBA1C 12.4 (H) 08/03/2022           -HOME REGIMEN: none    -INPATIENT REGIMEN: Levemir 32 units daily and novolog 12 units with meals.     -GLUCOSE TREND FOR THE PAST 24HRS: 169-198    -NO HYPOGYCEMIAS NOTED     -TOLERATING 50% OF PO DIET     -Diet: Diet diabetic [facility]; 2000 Calorie        Remains in MSU, NAEON. BG reasonably well controlled with scheduled insulin and prn correction scale.       Plan:  Continue levemir 32 units daily.   Continue novolog 12 units with meals.   BG monitoring ac/hs and low dose correction scale.     Discharge planning:tbd     Endocrine to continue to follow    ** Please call Endocrine for any BG related issues **

## 2022-08-09 NOTE — SUBJECTIVE & OBJECTIVE
Subjective:     Interval History: NAEON. Afebrile. Dressing clean, dry, and intact. Leukocytosis resolved. No new pedal complaints. ID on board with speciated cultures and plan for 6 weeks ABx.         Scheduled Meds:   amLODIPine  10 mg Oral Daily    enoxaparin  40 mg Subcutaneous Q12H    insulin aspart U-100  12 Units Subcutaneous TIDWM    insulin detemir U-100  32 Units Subcutaneous Daily    polyethylene glycol  17 g Oral BID    senna-docusate 8.6-50 mg  1 tablet Oral BID    sodium chloride 0.9%  10 mL Intravenous Q6H    valsartan  80 mg Oral BID    vancomycin (VANCOCIN) IVPB  1,250 mg Intravenous Q12H     Continuous Infusions:  PRN Meds:acetaminophen, albuterol-ipratropium, cadexomer iodine, dextrose 10%, dextrose 10%, glucagon (human recombinant), glucose, glucose, hydrALAZINE, insulin aspart U-100, melatonin, naloxone, ondansetron, polyethylene glycol, prochlorperazine, simethicone, sodium chloride 0.9%, Flushing PICC Protocol **AND** sodium chloride 0.9% **AND** sodium chloride 0.9%, Pharmacy to dose Vancomycin consult **AND** vancomycin - pharmacy to dose    Review of Systems   Constitutional:  Negative for chills, diaphoresis, fatigue and fever.   HENT:  Negative for congestion, ear pain, nosebleeds, rhinorrhea and sore throat.    Eyes:  Negative for pain.   Respiratory:  Negative for cough, shortness of breath and wheezing.    Cardiovascular:  Positive for leg swelling. Negative for chest pain.   Gastrointestinal:  Negative for abdominal pain, constipation, diarrhea, nausea and vomiting.   Genitourinary:  Negative for dysuria, frequency and urgency.   Musculoskeletal:  Negative for arthralgias, back pain and neck pain.   Skin:  Positive for wound.   Neurological:  Negative for weakness, numbness and headaches.   Objective:     Vital Signs (Most Recent):  Temp: 98.4 °F (36.9 °C) (08/09/22 1527)  Pulse: 84 (08/09/22 1527)  Resp: 16 (08/09/22 1527)  BP: (!) 176/77 (08/09/22 1527)  SpO2: 95 % (08/09/22  1527)   Vital Signs (24h Range):  Temp:  [97.6 °F (36.4 °C)-98.6 °F (37 °C)] 98.4 °F (36.9 °C)  Pulse:  [81-90] 84  Resp:  [16-20] 16  SpO2:  [93 %-95 %] 95 %  BP: (142-176)/(71-82) 176/77     Weight: (!) 136.5 kg (300 lb 14.9 oz)  Body mass index is 37.61 kg/m².    Foot Exam    General  Orientation: alert and oriented to person, place, and time       Right Foot/Ankle     Inspection and Palpation  Ecchymosis: none  Skin Exam: callus, drainage, skin changes, abnormal color and ulcer;     Neurovascular  Saphenous nerve sensation: diminished  Tibial nerve sensation: diminished  Superficial peroneal nerve sensation: diminished  Deep peroneal nerve sensation: diminished  Sural nerve sensation: diminished    Comments  Right foot: Hyperkeratotic wound noted at plantar aspect of hallux. No purulent drainage was expressed. Wound does not probe to bone. Mild and Edema Erythema noted to right foot significantly resolved.     Superficial Wound also noted to 5th digit on the dorsal aspect. No drainage or fluctuance noted.      DP and PT pulses can not be palpated due to diffuse swelling. Dystrophic, elongated fungal nails noted on 1-5 digits.    Left Foot/Ankle      Inspection and Palpation  Ecchymosis: none  Tenderness: none   Swelling: none   Skin Exam: skin intact;     Neurovascular  Dorsalis pedis: 2+  Posterior tibial: 1+  Saphenous nerve sensation: diminished  Tibial nerve sensation: diminished  Superficial peroneal nerve sensation: diminished  Deep peroneal nerve sensation: diminished  Sural nerve sensation: diminished    Comments  Left foot: Xerosis noted through out left foot. Dystrophic, elongated fungal nails noted on 1-5 digits.    Laboratory:  CBC:   Recent Labs   Lab 08/08/22  0528   WBC 10.04   RBC 3.98*   HGB 10.6*   HCT 33.5*   *   MCV 84   MCH 26.6*   MCHC 31.6*     CMP:   Recent Labs   Lab 08/08/22  0528   *   CALCIUM 9.4   ALBUMIN 2.2*   PROT 7.9   *   K 3.9   CO2 27   CL 96   BUN 14    CREATININE 1.2   ALKPHOS 89   ALT 54*   AST 43*   BILITOT 0.4     CRP:   Recent Labs   Lab 08/02/22 2215   .8*     ESR:   Recent Labs   Lab 08/02/22 1927   SEDRATE >120*     Wound Cultures:   Recent Labs   Lab 08/03/22  1241 08/04/22  1145   LABAERO Skin sabine,  no predominant organism STAPHYLOCOCCUS COHNII SUBSPECIES COHNII  Moderate  Skin sabine also present  Skin sabine also present  *     Microbiology Results (last 7 days)       Procedure Component Value Units Date/Time    Fungus culture [[id number]] Collected: 08/04/22 1145    Order Status: Completed Specimen: Bone from Toe, Right Foot Updated: 08/09/22 1307     Fungus (Mycology) Culture Culture in progress    Narrative:      R Great toe bone    Aerobic culture [[id number]]  (Abnormal)  (Susceptibility) Collected: 08/04/22 1145    Order Status: Completed Specimen: Bone from Toe, Right Foot Updated: 08/08/22 1228     Aerobic Bacterial Culture STAPHYLOCOCCUS COHNII SUBSPECIES COHNII  Moderate  Skin sabine also present  Skin sabine also present      Narrative:      R great toe bone    Culture, Anaerobe [[id number]] Collected: 08/03/22 1241    Order Status: Completed Specimen: Wound from Toe, Right Foot Updated: 08/08/22 1155     Anaerobic Culture No anaerobes isolated    Culture, Anaerobe [[id number]] Collected: 08/04/22 1145    Order Status: Completed Specimen: Bone from Toe, Right Foot Updated: 08/08/22 0949     Anaerobic Culture No anaerobes isolated    Narrative:      R Great toe bone    Blood culture #1 [[id number]] Collected: 08/02/22 1935    Order Status: Completed Specimen: Blood from Peripheral, Wrist, Left Updated: 08/07/22 2022     Blood Culture, Routine No growth after 5 days.    Narrative:      Blood Culture #1    Blood culture #2 [[id number]] Collected: 08/02/22 1927    Order Status: Completed Specimen: Blood from Peripheral, Hand, Left Updated: 08/07/22 2022     Blood Culture, Routine No growth after 5 days.    Narrative:      Blood Culture #2     AFB Culture & Smear [[id number]] Collected: 08/04/22 1145    Order Status: Completed Specimen: Bone from Toe, Right Foot Updated: 08/05/22 2127     AFB Culture & Smear Culture in progress     AFB CULTURE STAIN No acid fast bacilli seen.    Narrative:      R Great toe bone    Aerobic culture [[id number]] Collected: 08/03/22 1241    Order Status: Completed Specimen: Wound from Toe, Right Foot Updated: 08/05/22 0911     Aerobic Bacterial Culture Skin sabine,  no predominant organism    Urine culture [[id number]]  (Abnormal)  (Susceptibility) Collected: 08/02/22 2000    Order Status: Completed Specimen: Urine Updated: 08/05/22 0142     Urine Culture, Routine PROTEUS PENNERI  10,000 - 49,999 cfu/ml      Narrative:      Specimen Source->Urine    AFB Culture & Smear [[id number]] Collected: 08/03/22 1241    Order Status: Completed Specimen: Wound from Toe, Right Foot Updated: 08/04/22 2127     AFB Culture & Smear Culture in progress     AFB CULTURE STAIN No acid fast bacilli seen.    Narrative:      Right toe    Gram stain [[id number]] Collected: 08/04/22 1145    Order Status: Completed Specimen: Bone from Toe, Right Foot Updated: 08/04/22 1446     Gram Stain Result No WBC's      No organisms seen    Narrative:      R Great toe bone    Gram stain [[id number]] Collected: 08/03/22 1241    Order Status: Completed Specimen: Wound from Toe, Right Foot Updated: 08/03/22 1813     Gram Stain Result Rare WBC's      Rare Gram positive cocci      Rare Gram negative rods    Narrative:      Right toe            Diagnostic Results:  I have reviewed all pertinent imaging results/findings within the past 24 hours.

## 2022-08-09 NOTE — CONSULTS
KRUPA placed double lumen PICC to left brachial vein using u/s guidance.  42 cm in length, 49 cm arm circumference and  cm exposed.   Lot # [id number].    PICC inserted for IV ab: 6 weeks of Vancomycin estimated end date 9/15/222

## 2022-08-09 NOTE — PROGRESS NOTES
[facility]  Telemedicine Progress Note    Patient Name: Renee Caceres  MRN: [id number]  Patient Class: IP- Inpatient   Admission Date: 8/2/2022  Length of Stay: 5 days  Attending Physician: Maria E Flores MD  Primary Care Provider: Primary Doctor No    Subjective:     Principal Problem:Osteomyelitis of great toe of right foot    HPI:  55 yo male with diabetes, HTN presenting 2/2 R foot swelling that started acutely 3 days ago and progressively has gotten worse. He states he had a wound on his right great toe and 5th toe that has been there for a while. Today it had not gotten any better so he decided to come in for evaluation. He has not been on any antibiotics for it. He states that he was on medications at one point but no longer takes them as he thought he had gotten them under control. In the ED, concern with elevated WBC, ESR, CRP, xr of foot showed osteo of great toe. General surgery consulted to rule nec fasc 2/2 gas in the 5th toe. Medicine called for admission. Rocephin started.         Overview/Hospital Course:  No evidence of a necrotizing infection on physical exam. Podiatry and ID consulted. Hyperglycemia managed by Endocrinology.      Telemedicine  This service was provided by Virtual Visit.    Patient was transferred to [facility] on:  8/5/2022    Chief Complaint   Patient presents with    Swelling     Right arm and leg swelling that started Saturday. Denies pain. Denies medical hx.     The patient location is: [address] [place] [address] A   Admitted 8/2/2022  6:24 PM  Present with the patient at the time of the telemed/virtual assessment: Telepresenter    Interval History / Events Overnight:   The patient is able to provide adequate history. Additional history was obtained from past medical records. No significant events reported by Nursing.  BP elevated.  Patient complains of R UE swelling. Symptoms have worsened since yesterday. Associated symptoms include: fatigue.  Symptoms are increasing in severity.     Lab test(s) reviewed: sCr stable    Review of Systems   Constitutional:  Negative for fever.   Respiratory:  Negative for shortness of breath.      Objective:     Vital Signs (Most Recent):  Temp: 98.1 °F (36.7 °C) (08/08/22 1136)  Pulse: 77 (08/08/22 1136)  Resp: 16 (08/08/22 1136)  BP: (!) 159/75 (08/08/22 1136)  SpO2: 98 % (08/08/22 1136)   Vital Signs (24h Range):  Temp:  [96.2 °F (35.7 °C)-98.4 °F (36.9 °C)] 98.1 °F (36.7 °C)  Pulse:  [77-97] 77  Resp:  [16-20] 16  SpO2:  [86 %-99 %] 98 %  BP: (148-160)/(75-85) 159/75     Weight: (!) 158.4 kg (349 lb 3.3 oz)  Body mass index is 43.65 kg/m².  No intake or output data in the 24 hours ending 08/08/22 1239     Physical Exam  Constitutional:       General: He is not in acute distress.     Appearance: Normal appearance. He is morbidly obese.   Eyes:      General: Lids are normal. No scleral icterus.        Right eye: No discharge.         Left eye: No discharge.      Conjunctiva/sclera: Conjunctivae normal.   Neck:      Trachea: Phonation normal.   Cardiovascular:      Rate and Rhythm: Normal rate.      Comments: Monitor / Vital signs reviewed at time of visit  Pulmonary:      Effort: Pulmonary effort is normal. No tachypnea, accessory muscle usage or respiratory distress.   Abdominal:      General: There is no distension.   Musculoskeletal:      Right forearm: Swelling and edema present.   Neurological:      Mental Status: He is alert. He is not disoriented.   Psychiatric:         Attention and Perception: Attention normal.         Mood and Affect: Affect normal.         Behavior: Behavior is cooperative.       Significant Labs:   Recent Labs   Lab 08/03/22  0423   HGBA1C 12.4*       Recent Labs   Lab 08/07/22  1103 08/07/22  1544 08/07/22  2055   POCTGLUCOSE 180* 159* 135*       Recent Labs   Lab 08/04/22  0552 08/05/22  0436 08/08/22  0528   WBC 15.04* 14.87* 10.04   HGB 11.4* 11.4* 10.6*   HCT 34.7* 35.2* 33.5*     449 533*       Recent Labs   Lab 08/04/22  0552 08/05/22  0436 08/08/22  0528   GRAN 74.1*  11.2* 73.5*  11.0* 70.8  7.1   LYMPH 14.1*  2.1 14.5*  2.2 16.4*  1.7   MONO 8.4  1.3* 8.3  1.2* 9.5  1.0   EOS 0.3 0.3 0.2       Recent Labs   Lab 08/04/22  0552 08/05/22  0436 08/07/22  1803 08/08/22  0528   * 133* 137 131*   K 3.9 3.8 4.2 3.9   CL 96 96 99 96   CO2 28 28 28 27   BUN 11 11 13 14   CREATININE 1.0 1.2 1.2 1.2   * 227* 154* 166*   CALCIUM 9.3 9.3 9.6 9.4   ALBUMIN 2.3* 2.4* 2.3* 2.2*   MG 1.6 1.6  --  1.7   PHOS 3.6 3.8 3.8 4.0       Recent Labs   Lab 08/02/22  2215 08/03/22  0423 08/04/22  0552 08/05/22  0436 08/08/22  0528   ALKPHOS 138*   < > 142* 144* 89   ALT 59*   < > 52* 49* 54*   AST 54*   < > 42* 34 43*   PROT 9.0*   < > 7.9 8.3 7.9   BILITOT 0.5   < > 0.4 0.4 0.4   .8*  --   --   --   --     < > = values in this interval not displayed.       Recent Labs   Lab 08/02/22  1927   LACTATE 2.5*   SEDRATE >120*       Microbiology Results (last 7 days)       Procedure Component Value Units Date/Time    Aerobic culture [[id number]]  (Abnormal)  (Susceptibility) Collected: 08/04/22 1145    Order Status: Completed Specimen: Bone from Toe, Right Foot Updated: 08/08/22 1228     Aerobic Bacterial Culture STAPHYLOCOCCUS COHNII SUBSPECIES COHNII  Moderate  Skin sabine also present  Skin sabine also present      Narrative:      R great toe bone    Culture, Anaerobe [[id number]] Collected: 08/03/22 1241    Order Status: Completed Specimen: Wound from Toe, Right Foot Updated: 08/08/22 1155     Anaerobic Culture No anaerobes isolated    Culture, Anaerobe [[id number]] Collected: 08/04/22 1145    Order Status: Completed Specimen: Bone from Toe, Right Foot Updated: 08/08/22 0949     Anaerobic Culture No anaerobes isolated    Narrative:      R Great toe bone    Blood culture #1 [[id number]] Collected: 08/02/22 1935    Order Status: Completed Specimen: Blood from Peripheral, Wrist, Left Updated:  08/07/22 2022     Blood Culture, Routine No growth after 5 days.    Narrative:      Blood Culture #1    Blood culture #2 [[id number]] Collected: 08/02/22 1927    Order Status: Completed Specimen: Blood from Peripheral, Hand, Left Updated: 08/07/22 2022     Blood Culture, Routine No growth after 5 days.    Narrative:      Blood Culture #2    AFB Culture & Smear [[id number]] Collected: 08/04/22 1145    Order Status: Completed Specimen: Bone from Toe, Right Foot Updated: 08/05/22 2127     AFB Culture & Smear Culture in progress     AFB CULTURE STAIN No acid fast bacilli seen.    Narrative:      R Great toe bone    Aerobic culture [[id number]] Collected: 08/03/22 1241    Order Status: Completed Specimen: Wound from Toe, Right Foot Updated: 08/05/22 0911     Aerobic Bacterial Culture Skin sabine,  no predominant organism    Urine culture [[id number]]  (Abnormal)  (Susceptibility) Collected: 08/02/22 2000    Order Status: Completed Specimen: Urine Updated: 08/05/22 0142     Urine Culture, Routine PROTEUS PENNERI  10,000 - 49,999 cfu/ml      Narrative:      Specimen Source->Urine    AFB Culture & Smear [[id number]] Collected: 08/03/22 1241    Order Status: Completed Specimen: Wound from Toe, Right Foot Updated: 08/04/22 2127     AFB Culture & Smear Culture in progress     AFB CULTURE STAIN No acid fast bacilli seen.    Narrative:      Right toe    Gram stain [[id number]] Collected: 08/04/22 1145    Order Status: Completed Specimen: Bone from Toe, Right Foot Updated: 08/04/22 1446     Gram Stain Result No WBC's      No organisms seen    Narrative:      R Great toe bone    Fungus culture [[id number]] Collected: 08/04/22 1145    Order Status: Sent Specimen: Bone from Toe, Right Foot Updated: 08/04/22 1216    Gram stain [[id number]] Collected: 08/03/22 1241    Order Status: Completed Specimen: Wound from Toe, Right Foot Updated: 08/03/22 1813     Gram Stain Result Rare WBC's      Rare Gram positive cocci      Rare Gram negative rods     Narrative:      Right toe          No results found for: [id number]    US Soft Tissue Upper Extremity, Right  Narrative: EXAMINATION:  US SOFT TISSUE, UPPER EXTREMITY, RIGHT    CLINICAL HISTORY:  swelling, negative for DVT. Looking for underlying fluid collection;    TECHNIQUE:  Ultrasound of the right upper extremity.    COMPARISON:  None    FINDINGS:  Diffuse soft tissue edema without organized fluid collection.  No fluid collection seen.  There is edematous soft tissue in the right forearm.  There is no evidence of a painful mass.  No significant abnormality seen.  Impression: As above.    Mild soft tissue edema but no fluid collection seen.    Electronically signed by resident: Jessica Quijano  Date:    08/04/2022  Time:    19:30    Electronically signed by: Raphael Valdez MD  Date:    08/05/2022  Time:    08:04      Labs and Imaging within the last 24 hours listed above were reviewed.       Diet: Diet diabetic [facility]; 2000 Calorie  Significant LDAs:   IV Access Type: Peripheral  Urinary Catheter Indication if present: Patient Does Not Have Urinary Catheter  Other Lines/Tubes/Drains:    HIGH RISK CONDITION(S):   Patient is currently on drug therapy requiring intensive monitoring for toxicity: Vancomycin     Goals of Care:    Previous admission:  4/19/19  Likely prognosis:  Fair  Code Status: Full Code  Comfort Only: No  Hospice: No  Goals at discharge: remain at home, with physician follow-up    Discharge Planning   JOSÉ ANTONIO: 8/10/2022     Code Status: Full Code   Is the patient medically ready for discharge?: No    Reason for patient still in hospital (select all that apply): Patient trending condition and Consult recommendations  Discharge Plan A: Home with family          Assessment/Plan:      * Osteomyelitis of great toe of right foot  Per surgery, no evidence of a necrotizing infection at physical exam. Patient with a chronic wound opened to air, this may be the reason there is some soft tissue gas seen  in the foot Xray. Wound looks chronic in nature.   - Continue ceftriaxone, vancomycin  - Podiatry consulted, patient s/p bone bx   - MRI consistent with osteomyelitis  - ID consulted: Right hallux bone biopsy. Bone cultures pending.  - Unable to get vascular SANDEE due to pain.   - ID de-escalating antibiotic to vancomycin. Place PICC.    Ulcer of right fifth toe due to diabetes mellitus  Possible osteomyelitis, continuing wound care and antibiotics    Swelling of right upper extremity  Improved with elevation but forearm remains significantly swollen  - Soft tissue US: Diffuse soft tissue edema without organized fluid collection.   - Repeat soft tissue US shows fluid collection; consult General Surgery.    Uncontrolled type 2 diabetes mellitus with hyperglycemia, without long-term current use of insulin  Patient's FSGs are uncontrolled due to hyperglycemia on current medication regimen.  Last A1c reviewed- uncontrolled since 2010 per available records.  Current correctional scale  Low  Endocrinology consulted and managing with anti-hyperglycemics as follows-   Antihyperglycemics (From admission, onward)            Start     Stop Route Frequency Ordered    08/07/22 1035  insulin aspart U-100 pen 0-5 Units         -- SubQ Before meals & nightly PRN 08/07/22 0935    08/06/22 1130  insulin aspart U-100 pen 12 Units         -- SubQ 3 times daily with meals 08/06/22 0835    08/06/22 0900  insulin detemir U-100 pen 32 Units         -- SubQ Daily 08/06/22 0835      Hold Oral hypoglycemics while patient is in the hospital.  Consulted Endocrinology for management and discharge recommendations, follow up.  Management per Endocrinology.    Primary hypertension  Uncontrolled, not on medication at home  - Continued amlodipine 5mg daily  - Continued lisinopril 5mg daily; increased to 20 mg for 8/6  - BP remains elevated; increased lisinopril to 40 mg. Increased amlodipine. Monitor.    Cellulitis of right lower  extremity  Acute  Continue Rocephin and vancomycin  LE U/S performed: No evidence of deep venous thrombosis in the right lower extremity.  ID de-escalating antibiotic to vancomycin.        Active Hospital Problems    Diagnosis  POA    *Osteomyelitis of great toe of right foot [M86.9]  Yes    Severe obesity (BMI >= 40) [E66.01]  Yes    Ulcer of right fifth toe due to diabetes mellitus [E11.621, L97.519]  Yes    Cellulitis of right lower extremity [L03.115]  Yes    Primary hypertension [I10]  Yes    Uncontrolled type 2 diabetes mellitus with hyperglycemia, without long-term current use of insulin [E11.65]  Yes    Swelling of right upper extremity [M79.89]  Yes      Resolved Hospital Problems   No resolved problems to display.       Inpatient Medications Prescribed for Management of Current Problems:     Scheduled Meds:    amLODIPine  10 mg Oral Daily    enoxaparin  40 mg Subcutaneous Q12H    insulin aspart U-100  12 Units Subcutaneous TIDWM    insulin detemir U-100  32 Units Subcutaneous Daily    [START ON 8/9/2022] lisinopriL  40 mg Oral Daily    polyethylene glycol  17 g Oral Daily    senna-docusate 8.6-50 mg  1 tablet Oral BID    vancomycin (VANCOCIN) IVPB  1,250 mg Intravenous Q12H     Continuous Infusions:   As Needed: acetaminophen, albuterol-ipratropium, cadexomer iodine, dextrose 10%, dextrose 10%, glucagon (human recombinant), glucose, glucose, hydrALAZINE, insulin aspart U-100, melatonin, naloxone, ondansetron, polyethylene glycol, prochlorperazine, simethicone, sodium chloride 0.9%, Pharmacy to dose Vancomycin consult **AND** vancomycin - pharmacy to dose    VTE Risk Mitigation (From admission, onward)         Ordered     enoxaparin injection 40 mg  Every 12 hours         08/03/22 0833     IP VTE HIGH RISK PATIENT  Once         08/03/22 0141     Place sequential compression device  Until discontinued         08/03/22 0141              I have assessed these finding virtually using telemed  platform and with assistance of bedside nurse     The attending portion of this evaluation, treatment, and documentation was performed per Maria E Flores MD via Telemedicine AudioVisual using the secure [employer] software platform with 2 way audio/video. The provider was located off-site and the patient is located in the hospital. The aforementioned video software was utilized to document the relevant history and physical exam    Maria E Flores MD  Department of Hospital Medicine   [facility]

## 2022-08-09 NOTE — ASSESSMENT & PLAN NOTE
IDSA moderate diabetic foot infection, R great toe with osteomyelitis on radiograph and MRI, R 1st MTP joint space narrowing with gout like appearance. UA elevated 8.5, with no symptoms of gout reported. Bone culture speciated STAPHYLOCOCCUS COHNII.     -Plan for 6 weeks ABx per ID, recs appreciated.   -Wounds dressed   -Nursing wound care routine, ordered   -Post op shoe, bedside  -Podiatry will follow    Final recommendations   -WB: RLE heel touch for short distance and transfer in football dressing and post op shoe, refer to PT to determine if assistive device is necessary    -Wound care: Recommend Home health wound care MWF, iodosorb to distal R great to and lateral aspect of great toe at boarder of interspace. Follow with 4x4, Kerlix x 2, and secure with ace.   -ABx: 6 weeks extended course for osteomyelitis per ID   -Recommend outpatient ambulatory referral to follow up with Dr. Sanchez at [facility] podiatry clinic 2 weeks after discharge, please see ID follow as well

## 2022-08-09 NOTE — SUBJECTIVE & OBJECTIVE
Telemedicine  This service was provided by Virtual Visit.    Patient was transferred to [facility] on:  8/5/2022    Chief Complaint   Patient presents with    Swelling     Right arm and leg swelling that started Saturday. Denies pain. Denies medical hx.     The patient location is: 1/651 A   Admitted 8/2/2022  6:24 PM  Present with the patient at the time of the telemed/virtual assessment: Telepresenter    Interval History / Events Overnight:   The patient is able to provide adequate history. Additional history was obtained from past medical records. No significant events reported by Nursing.  BP elevated.  Patient complains of R UE swelling. Symptoms have worsened since yesterday. Associated symptoms include: fatigue. Symptoms are increasing in severity.     Lab test(s) reviewed: sCr stable    Review of Systems   Constitutional:  Negative for fever.   Respiratory:  Negative for shortness of breath.      Objective:     Vital Signs (Most Recent):  Temp: 98.6 °F (37 °C) (08/09/22 1054)  Pulse: 82 (08/09/22 1054)  Resp: 16 (08/09/22 1054)  BP: (!) 145/79 (08/09/22 1054)  SpO2: (!) 93 % (08/09/22 1054)   Vital Signs (24h Range):  Temp:  [97.6 °F (36.4 °C)-98.6 °F (37 °C)] 98.6 °F (37 °C)  Pulse:  [76-90] 82  Resp:  [16-20] 16  SpO2:  [93 %-96 %] 93 %  BP: (139-172)/(71-84) 145/79     Weight: (!) 136.5 kg (300 lb 14.9 oz)  Body mass index is 37.61 kg/m².    Intake/Output Summary (Last 24 hours) at 8/9/2022 1307  Last data filed at 8/9/2022 1100  Gross per 24 hour   Intake 200 ml   Output 2650 ml   Net -2450 ml        Physical Exam  Constitutional:       General: He is not in acute distress.     Appearance: Normal appearance. He is morbidly obese.   Eyes:      General: Lids are normal. No scleral icterus.        Right eye: No discharge.         Left eye: No discharge.      Conjunctiva/sclera: Conjunctivae normal.   Neck:      Trachea: Phonation normal.   Cardiovascular:      Rate and Rhythm: Normal rate.       Comments: Monitor / Vital signs reviewed at time of visit  Pulmonary:      Effort: Pulmonary effort is normal. No tachypnea, accessory muscle usage or respiratory distress.   Abdominal:      General: There is no distension.   Musculoskeletal:      Right forearm: Swelling and edema present.   Neurological:      Mental Status: He is alert. He is not disoriented.   Psychiatric:         Attention and Perception: Attention normal.         Mood and Affect: Affect normal.         Behavior: Behavior is cooperative.       Significant Labs:   Recent Labs   Lab 08/03/22  0423   HGBA1C 12.4*       Recent Labs   Lab 08/08/22 2026 08/09/22  0718 08/09/22  1053   POCTGLUCOSE 169* 182* 183*       Recent Labs   Lab 08/04/22  0552 08/05/22  0436 08/08/22  0528   WBC 15.04* 14.87* 10.04   HGB 11.4* 11.4* 10.6*   HCT 34.7* 35.2* 33.5*    449 533*       Recent Labs   Lab 08/04/22  0552 08/05/22  0436 08/08/22  0528   GRAN 74.1*  11.2* 73.5*  11.0* 70.8  7.1   LYMPH 14.1*  2.1 14.5*  2.2 16.4*  1.7   MONO 8.4  1.3* 8.3  1.2* 9.5  1.0   EOS 0.3 0.3 0.2       Recent Labs   Lab 08/04/22  0552 08/05/22 0436 08/07/22  1803 08/08/22  0528   * 133* 137 131*   K 3.9 3.8 4.2 3.9   CL 96 96 99 96   CO2 28 28 28 27   BUN 11 11 13 14   CREATININE 1.0 1.2 1.2 1.2   * 227* 154* 166*   CALCIUM 9.3 9.3 9.6 9.4   ALBUMIN 2.3* 2.4* 2.3* 2.2*   MG 1.6 1.6  --  1.7   PHOS 3.6 3.8 3.8 4.0       Recent Labs   Lab 08/02/22  2215 08/03/22 0423 08/04/22  0552 08/05/22  0436 08/08/22  0528   ALKPHOS 138*   < > 142* 144* 89   ALT 59*   < > 52* 49* 54*   AST 54*   < > 42* 34 43*   PROT 9.0*   < > 7.9 8.3 7.9   BILITOT 0.5   < > 0.4 0.4 0.4   .8*  --   --   --   --     < > = values in this interval not displayed.       Recent Labs   Lab 08/02/22 1927   LACTATE 2.5*   SEDRATE >120*       Microbiology Results (last 7 days)       Procedure Component Value Units Date/Time    Aerobic culture [[id number]]  (Abnormal)   (Susceptibility) Collected: 08/04/22 1145    Order Status: Completed Specimen: Bone from Toe, Right Foot Updated: 08/08/22 1228     Aerobic Bacterial Culture STAPHYLOCOCCUS COHNII SUBSPECIES COHNII  Moderate  Skin sabine also present  Skin sabine also present      Narrative:      R great toe bone    Culture, Anaerobe [[id number]] Collected: 08/03/22 1241    Order Status: Completed Specimen: Wound from Toe, Right Foot Updated: 08/08/22 1155     Anaerobic Culture No anaerobes isolated    Culture, Anaerobe [[id number]] Collected: 08/04/22 1145    Order Status: Completed Specimen: Bone from Toe, Right Foot Updated: 08/08/22 0949     Anaerobic Culture No anaerobes isolated    Narrative:      R Great toe bone    Blood culture #1 [[id number]] Collected: 08/02/22 1935    Order Status: Completed Specimen: Blood from Peripheral, Wrist, Left Updated: 08/07/22 2022     Blood Culture, Routine No growth after 5 days.    Narrative:      Blood Culture #1    Blood culture #2 [[id number]] Collected: 08/02/22 1927    Order Status: Completed Specimen: Blood from Peripheral, Hand, Left Updated: 08/07/22 2022     Blood Culture, Routine No growth after 5 days.    Narrative:      Blood Culture #2    AFB Culture & Smear [[id number]] Collected: 08/04/22 1145    Order Status: Completed Specimen: Bone from Toe, Right Foot Updated: 08/05/22 2127     AFB Culture & Smear Culture in progress     AFB CULTURE STAIN No acid fast bacilli seen.    Narrative:      R Great toe bone    Aerobic culture [[id number]] Collected: 08/03/22 1241    Order Status: Completed Specimen: Wound from Toe, Right Foot Updated: 08/05/22 0911     Aerobic Bacterial Culture Skin sabine,  no predominant organism    Urine culture [[id number]]  (Abnormal)  (Susceptibility) Collected: 08/02/22 2000    Order Status: Completed Specimen: Urine Updated: 08/05/22 0142     Urine Culture, Routine PROTEUS PENNERI  10,000 - 49,999 cfu/ml      Narrative:      Specimen Source->Urine    AFB Culture &  Smear [[id number]] Collected: 08/03/22 1241    Order Status: Completed Specimen: Wound from Toe, Right Foot Updated: 08/04/22 2127     AFB Culture & Smear Culture in progress     AFB CULTURE STAIN No acid fast bacilli seen.    Narrative:      Right toe    Gram stain [[id number]] Collected: 08/04/22 1145    Order Status: Completed Specimen: Bone from Toe, Right Foot Updated: 08/04/22 1446     Gram Stain Result No WBC's      No organisms seen    Narrative:      R Great toe bone    Fungus culture [[id number]] Collected: 08/04/22 1145    Order Status: Sent Specimen: Bone from Toe, Right Foot Updated: 08/04/22 1216    Gram stain [[id number]] Collected: 08/03/22 1241    Order Status: Completed Specimen: Wound from Toe, Right Foot Updated: 08/03/22 1813     Gram Stain Result Rare WBC's      Rare Gram positive cocci      Rare Gram negative rods    Narrative:      Right toe          No results found for: [id number]    US Soft Tissue Upper Extremity, Right  Narrative: EXAMINATION:  US SOFT TISSUE, UPPER EXTREMITY, RIGHT    CLINICAL HISTORY:  persistent localized swelling of R forearm - rule out fluid collection;    TECHNIQUE:  Grayscale and color Doppler ultrasound images were acquired of the middle portion of the anterior right forearm.    COMPARISON:  U/S soft tissue right upper extremity 08/04/2022    FINDINGS:  There is interval development of a ill-defined heterogeneous hypoechoic area in the musculature of the middle portion of the anterior right forearm measuring approximately 6.0 x 2.8 cm.  This is approximately 1.7 cm below the skin.  There is no significant internal Doppler signal.  It is not compressible.  Impression: Interval development of ill-defined heterogeneous area within the right anterior forearm muscles as detailed above.  This is nonspecific.  Differential considerations include hematoma, abscess, complicated seroma.    Electronically signed by resident: Altaf  Billie  Date:    08/08/2022  Time:    19:20    Electronically signed by: Elvin Knox  Date:    08/08/2022  Time:    20:24      Labs and Imaging within the last 24 hours listed above were reviewed.       Diet: Diet diabetic [facility]; 2000 Calorie  Significant LDAs:   IV Access Type: Peripheral  Urinary Catheter Indication if present: Patient Does Not Have Urinary Catheter  Other Lines/Tubes/Drains:    HIGH RISK CONDITION(S):   Patient is currently on drug therapy requiring intensive monitoring for toxicity: Vancomycin     Goals of Care:    Previous admission:  4/19/19  Likely prognosis:  Fair  Code Status: Full Code  Comfort Only: No  Hospice: No  Goals at discharge: remain at home, with physician follow-up    Discharge Planning   JOSÉ ANTONIO: 8/10/2022     Code Status: Full Code   Is the patient medically ready for discharge?: No    Reason for patient still in hospital (select all that apply): Patient trending condition and Consult recommendations  Discharge Plan A: Home Health

## 2022-08-10 ENCOUNTER — ANESTHESIA EVENT (OUTPATIENT)
Dept: SURGERY | Facility: HOSPITAL | Age: 57
DRG: 629 | End: 2022-08-10
Payer: COMMERCIAL

## 2022-08-10 ENCOUNTER — ANESTHESIA (OUTPATIENT)
Dept: SURGERY | Facility: HOSPITAL | Age: 57
DRG: 629 | End: 2022-08-10
Payer: COMMERCIAL

## 2022-08-10 PROBLEM — L02.413 ABSCESS OF RIGHT FOREARM: Status: ACTIVE | Noted: 2022-08-10

## 2022-08-10 LAB
ALBUMIN SERPL BCP-MCNC: 2.4 G/DL (ref 3.5–5.2)
ALP SERPL-CCNC: 99 U/L (ref 55–135)
ALT SERPL W/O P-5'-P-CCNC: 56 U/L (ref 10–44)
ANION GAP SERPL CALC-SCNC: 10 MMOL/L (ref 8–16)
AST SERPL-CCNC: 40 U/L (ref 10–40)
BASOPHILS # BLD AUTO: 0.08 K/UL (ref 0–0.2)
BASOPHILS NFR BLD: 0.8 % (ref 0–1.9)
BILIRUB SERPL-MCNC: 0.5 MG/DL (ref 0.1–1)
BUN SERPL-MCNC: 16 MG/DL (ref 6–20)
CALCIUM SERPL-MCNC: 9.6 MG/DL (ref 8.7–10.5)
CHLORIDE SERPL-SCNC: 99 MMOL/L (ref 95–110)
CO2 SERPL-SCNC: 28 MMOL/L (ref 23–29)
CREAT SERPL-MCNC: 1.3 MG/DL (ref 0.5–1.4)
DIFFERENTIAL METHOD: ABNORMAL
EOSINOPHIL # BLD AUTO: 0.2 K/UL (ref 0–0.5)
EOSINOPHIL NFR BLD: 2.2 % (ref 0–8)
ERYTHROCYTE [DISTWIDTH] IN BLOOD BY AUTOMATED COUNT: 14.2 % (ref 11.5–14.5)
EST. GFR  (NO RACE VARIABLE): >60 ML/MIN/1.73 M^2
GLUCOSE SERPL-MCNC: 150 MG/DL (ref 70–110)
GRAM STN SPEC: NORMAL
GRAM STN SPEC: NORMAL
HCT VFR BLD AUTO: 31.8 % (ref 40–54)
HGB BLD-MCNC: 10.4 G/DL (ref 14–18)
IMM GRANULOCYTES # BLD AUTO: 0.06 K/UL (ref 0–0.04)
IMM GRANULOCYTES NFR BLD AUTO: 0.6 % (ref 0–0.5)
LYMPHOCYTES # BLD AUTO: 1.8 K/UL (ref 1–4.8)
LYMPHOCYTES NFR BLD: 18.4 % (ref 18–48)
MAGNESIUM SERPL-MCNC: 1.7 MG/DL (ref 1.6–2.6)
MCH RBC QN AUTO: 27.2 PG (ref 27–31)
MCHC RBC AUTO-ENTMCNC: 32.7 G/DL (ref 32–36)
MCV RBC AUTO: 83 FL (ref 82–98)
MONOCYTES # BLD AUTO: 1.1 K/UL (ref 0.3–1)
MONOCYTES NFR BLD: 11.4 % (ref 4–15)
NEUTROPHILS # BLD AUTO: 6.5 K/UL (ref 1.8–7.7)
NEUTROPHILS NFR BLD: 66.6 % (ref 38–73)
NRBC BLD-RTO: 0 /100 WBC
PHOSPHATE SERPL-MCNC: 3.9 MG/DL (ref 2.7–4.5)
PLATELET # BLD AUTO: 614 K/UL (ref 150–450)
PMV BLD AUTO: 9.4 FL (ref 9.2–12.9)
POCT GLUCOSE: 117 MG/DL (ref 70–110)
POCT GLUCOSE: 164 MG/DL (ref 70–110)
POCT GLUCOSE: 181 MG/DL (ref 70–110)
POCT GLUCOSE: 194 MG/DL (ref 70–110)
POCT GLUCOSE: 203 MG/DL (ref 70–110)
POTASSIUM SERPL-SCNC: 4.1 MMOL/L (ref 3.5–5.1)
PROT SERPL-MCNC: 8.8 G/DL (ref 6–8.4)
RBC # BLD AUTO: 3.83 M/UL (ref 4.6–6.2)
SODIUM SERPL-SCNC: 137 MMOL/L (ref 136–145)
VANCOMYCIN TROUGH SERPL-MCNC: 24.8 UG/ML (ref 10–22)
WBC # BLD AUTO: 9.76 K/UL (ref 3.9–12.7)

## 2022-08-10 PROCEDURE — 87102 FUNGUS ISOLATION CULTURE: CPT | Performed by: INTERNAL MEDICINE

## 2022-08-10 PROCEDURE — 83735 ASSAY OF MAGNESIUM: CPT | Performed by: INTERNAL MEDICINE

## 2022-08-10 PROCEDURE — 64415 NJX AA&/STRD BRCH PLXS IMG: CPT | Performed by: STUDENT IN AN ORGANIZED HEALTH CARE EDUCATION/TRAINING PROGRAM

## 2022-08-10 PROCEDURE — D9220A PRA ANESTHESIA: ICD-10-PCS | Mod: ANES,,, | Performed by: ANESTHESIOLOGY

## 2022-08-10 PROCEDURE — 76942 ECHO GUIDE FOR BIOPSY: CPT | Mod: 26,,, | Performed by: ANESTHESIOLOGY

## 2022-08-10 PROCEDURE — 87186 SC STD MICRODIL/AGAR DIL: CPT | Performed by: INTERNAL MEDICINE

## 2022-08-10 PROCEDURE — 80202 ASSAY OF VANCOMYCIN: CPT | Performed by: INTERNAL MEDICINE

## 2022-08-10 PROCEDURE — 37000009 HC ANESTHESIA EA ADD 15 MINS: Performed by: ORTHOPAEDIC SURGERY

## 2022-08-10 PROCEDURE — A4216 STERILE WATER/SALINE, 10 ML: HCPCS | Performed by: INTERNAL MEDICINE

## 2022-08-10 PROCEDURE — 71000015 HC POSTOP RECOV 1ST HR: Performed by: ORTHOPAEDIC SURGERY

## 2022-08-10 PROCEDURE — 84100 ASSAY OF PHOSPHORUS: CPT | Performed by: INTERNAL MEDICINE

## 2022-08-10 PROCEDURE — 80053 COMPREHEN METABOLIC PANEL: CPT | Performed by: INTERNAL MEDICINE

## 2022-08-10 PROCEDURE — 11000001 HC ACUTE MED/SURG PRIVATE ROOM

## 2022-08-10 PROCEDURE — 87075 CULTR BACTERIA EXCEPT BLOOD: CPT | Performed by: INTERNAL MEDICINE

## 2022-08-10 PROCEDURE — 87070 CULTURE OTHR SPECIMN AEROBIC: CPT | Performed by: INTERNAL MEDICINE

## 2022-08-10 PROCEDURE — 99223 PR INITIAL HOSPITAL CARE,LEVL III: ICD-10-PCS | Mod: 57,,, | Performed by: ORTHOPAEDIC SURGERY

## 2022-08-10 PROCEDURE — 82962 GLUCOSE BLOOD TEST: CPT | Performed by: ORTHOPAEDIC SURGERY

## 2022-08-10 PROCEDURE — 63600175 PHARM REV CODE 636 W HCPCS: Performed by: STUDENT IN AN ORGANIZED HEALTH CARE EDUCATION/TRAINING PROGRAM

## 2022-08-10 PROCEDURE — 63600175 PHARM REV CODE 636 W HCPCS: Performed by: NURSE PRACTITIONER

## 2022-08-10 PROCEDURE — 25028 PR INCIS/DRAIN FOREARM DEEP ABSCESS: ICD-10-PCS | Mod: 59,51,RT, | Performed by: ORTHOPAEDIC SURGERY

## 2022-08-10 PROCEDURE — 93005 ELECTROCARDIOGRAM TRACING: CPT

## 2022-08-10 PROCEDURE — 25000003 PHARM REV CODE 250: Performed by: INTERNAL MEDICINE

## 2022-08-10 PROCEDURE — 25020 PR DECOMP FOREARM,1 COMPART,W/O DEBRIDE: ICD-10-PCS | Mod: RT,,, | Performed by: ORTHOPAEDIC SURGERY

## 2022-08-10 PROCEDURE — 63600175 PHARM REV CODE 636 W HCPCS: Performed by: ANESTHESIOLOGY

## 2022-08-10 PROCEDURE — 99233 SBSQ HOSP IP/OBS HIGH 50: CPT | Mod: 95,,, | Performed by: INTERNAL MEDICINE

## 2022-08-10 PROCEDURE — 25000003 PHARM REV CODE 250: Performed by: STUDENT IN AN ORGANIZED HEALTH CARE EDUCATION/TRAINING PROGRAM

## 2022-08-10 PROCEDURE — 71000033 HC RECOVERY, INTIAL HOUR: Performed by: ORTHOPAEDIC SURGERY

## 2022-08-10 PROCEDURE — 63600175 PHARM REV CODE 636 W HCPCS: Performed by: INTERNAL MEDICINE

## 2022-08-10 PROCEDURE — 25000003 PHARM REV CODE 250: Performed by: NURSE ANESTHETIST, CERTIFIED REGISTERED

## 2022-08-10 PROCEDURE — 63600175 PHARM REV CODE 636 W HCPCS: Performed by: HOSPITALIST

## 2022-08-10 PROCEDURE — 37000008 HC ANESTHESIA 1ST 15 MINUTES: Performed by: ORTHOPAEDIC SURGERY

## 2022-08-10 PROCEDURE — D9220A PRA ANESTHESIA: Mod: ANES,,, | Performed by: ANESTHESIOLOGY

## 2022-08-10 PROCEDURE — 87205 SMEAR GRAM STAIN: CPT | Performed by: INTERNAL MEDICINE

## 2022-08-10 PROCEDURE — 25020 DECOMPRESS FOREARM 1 SPACE: CPT | Mod: RT,,, | Performed by: ORTHOPAEDIC SURGERY

## 2022-08-10 PROCEDURE — 87116 MYCOBACTERIA CULTURE: CPT | Performed by: INTERNAL MEDICINE

## 2022-08-10 PROCEDURE — 93010 EKG 12-LEAD: ICD-10-PCS | Mod: ,,, | Performed by: INTERNAL MEDICINE

## 2022-08-10 PROCEDURE — D9220A PRA ANESTHESIA: Mod: CRNA,,, | Performed by: NURSE ANESTHETIST, CERTIFIED REGISTERED

## 2022-08-10 PROCEDURE — 36415 COLL VENOUS BLD VENIPUNCTURE: CPT | Performed by: INTERNAL MEDICINE

## 2022-08-10 PROCEDURE — 63600175 PHARM REV CODE 636 W HCPCS: Performed by: ORTHOPAEDIC SURGERY

## 2022-08-10 PROCEDURE — 87206 SMEAR FLUORESCENT/ACID STAI: CPT | Performed by: INTERNAL MEDICINE

## 2022-08-10 PROCEDURE — 64415 PERIPHERAL BLOCK: ICD-10-PCS | Mod: 59,RT,, | Performed by: ANESTHESIOLOGY

## 2022-08-10 PROCEDURE — D9220A PRA ANESTHESIA: ICD-10-PCS | Mod: CRNA,,, | Performed by: NURSE ANESTHETIST, CERTIFIED REGISTERED

## 2022-08-10 PROCEDURE — 27201037 HC PRESSURE MONITORING SET UP

## 2022-08-10 PROCEDURE — 94761 N-INVAS EAR/PLS OXIMETRY MLT: CPT

## 2022-08-10 PROCEDURE — 85025 COMPLETE CBC W/AUTO DIFF WBC: CPT | Performed by: INTERNAL MEDICINE

## 2022-08-10 PROCEDURE — 93010 ELECTROCARDIOGRAM REPORT: CPT | Mod: ,,, | Performed by: INTERNAL MEDICINE

## 2022-08-10 PROCEDURE — 63600175 PHARM REV CODE 636 W HCPCS: Performed by: NURSE ANESTHETIST, CERTIFIED REGISTERED

## 2022-08-10 PROCEDURE — 27200665 HC NERVE BLOCK NEEDLE/ CATHETER: Performed by: STUDENT IN AN ORGANIZED HEALTH CARE EDUCATION/TRAINING PROGRAM

## 2022-08-10 PROCEDURE — 25028 I&D F/ARM&/WRST DP ABSC/HMTM: CPT | Mod: 59,51,RT, | Performed by: ORTHOPAEDIC SURGERY

## 2022-08-10 PROCEDURE — 76942 PERIPHERAL BLOCK: ICD-10-PCS | Mod: 26,,, | Performed by: ANESTHESIOLOGY

## 2022-08-10 PROCEDURE — 64415 NJX AA&/STRD BRCH PLXS IMG: CPT | Mod: 59,RT,, | Performed by: ANESTHESIOLOGY

## 2022-08-10 PROCEDURE — 36000705 HC OR TIME LEV I EA ADD 15 MIN: Performed by: ORTHOPAEDIC SURGERY

## 2022-08-10 PROCEDURE — 36000704 HC OR TIME LEV I 1ST 15 MIN: Performed by: ORTHOPAEDIC SURGERY

## 2022-08-10 PROCEDURE — 99233 PR SUBSEQUENT HOSPITAL CARE,LEVL III: ICD-10-PCS | Mod: 95,,, | Performed by: INTERNAL MEDICINE

## 2022-08-10 PROCEDURE — 99223 1ST HOSP IP/OBS HIGH 75: CPT | Mod: 57,,, | Performed by: ORTHOPAEDIC SURGERY

## 2022-08-10 RX ORDER — CEFAZOLIN SODIUM/D5W 2 G/100 ML
2 PLASTIC BAG, INJECTION (ML) INTRAVENOUS
Status: COMPLETED | OUTPATIENT
Start: 2022-08-10 | End: 2022-08-11

## 2022-08-10 RX ORDER — PROPOFOL 10 MG/ML
VIAL (ML) INTRAVENOUS
Status: DISCONTINUED | OUTPATIENT
Start: 2022-08-10 | End: 2022-08-10

## 2022-08-10 RX ORDER — PROPOFOL 10 MG/ML
INJECTION, EMULSION INTRAVENOUS CONTINUOUS PRN
Status: DISCONTINUED | OUTPATIENT
Start: 2022-08-10 | End: 2022-08-10

## 2022-08-10 RX ORDER — LIDOCAINE HCL/PF 100 MG/5ML
SYRINGE (ML) INTRAVENOUS
Status: DISCONTINUED | OUTPATIENT
Start: 2022-08-10 | End: 2022-08-10

## 2022-08-10 RX ORDER — MORPHINE SULFATE 2 MG/ML
2 INJECTION, SOLUTION INTRAMUSCULAR; INTRAVENOUS EVERY 6 HOURS PRN
Status: DISCONTINUED | OUTPATIENT
Start: 2022-08-10 | End: 2022-08-16 | Stop reason: HOSPADM

## 2022-08-10 RX ORDER — SODIUM CHLORIDE 0.9 % (FLUSH) 0.9 %
10 SYRINGE (ML) INJECTION
Status: DISCONTINUED | OUTPATIENT
Start: 2022-08-10 | End: 2022-08-10 | Stop reason: HOSPADM

## 2022-08-10 RX ORDER — MUPIROCIN 20 MG/G
OINTMENT TOPICAL
Status: DISCONTINUED | OUTPATIENT
Start: 2022-08-10 | End: 2022-08-10 | Stop reason: HOSPADM

## 2022-08-10 RX ORDER — CEFEPIME HYDROCHLORIDE 1 G/50ML
INJECTION, SOLUTION INTRAVENOUS
Status: COMPLETED | OUTPATIENT
Start: 2022-08-10 | End: 2022-08-10

## 2022-08-10 RX ORDER — MIDAZOLAM HYDROCHLORIDE 1 MG/ML
.5-4 INJECTION INTRAMUSCULAR; INTRAVENOUS
Status: DISCONTINUED | OUTPATIENT
Start: 2022-08-10 | End: 2022-08-10 | Stop reason: HOSPADM

## 2022-08-10 RX ORDER — CLINDAMYCIN PHOSPHATE 900 MG/50ML
INJECTION, SOLUTION INTRAVENOUS
Status: DISCONTINUED | OUTPATIENT
Start: 2022-08-10 | End: 2022-08-10

## 2022-08-10 RX ORDER — MIDAZOLAM HYDROCHLORIDE 1 MG/ML
INJECTION INTRAMUSCULAR; INTRAVENOUS
Status: DISCONTINUED | OUTPATIENT
Start: 2022-08-10 | End: 2022-08-10

## 2022-08-10 RX ORDER — ROPIVACAINE HYDROCHLORIDE 5 MG/ML
INJECTION, SOLUTION EPIDURAL; INFILTRATION; PERINEURAL
Status: COMPLETED | OUTPATIENT
Start: 2022-08-10 | End: 2022-08-10

## 2022-08-10 RX ORDER — CEFAZOLIN SODIUM/WATER 2 G/20 ML
2 SYRINGE (ML) INTRAVENOUS
Status: DISCONTINUED | OUTPATIENT
Start: 2022-08-10 | End: 2022-08-10 | Stop reason: HOSPADM

## 2022-08-10 RX ORDER — FENTANYL CITRATE 50 UG/ML
25-200 INJECTION, SOLUTION INTRAMUSCULAR; INTRAVENOUS
Status: DISCONTINUED | OUTPATIENT
Start: 2022-08-10 | End: 2022-08-10 | Stop reason: HOSPADM

## 2022-08-10 RX ORDER — VALSARTAN 40 MG/1
80 TABLET ORAL 2 TIMES DAILY
Status: DISCONTINUED | OUTPATIENT
Start: 2022-08-11 | End: 2022-08-16 | Stop reason: HOSPADM

## 2022-08-10 RX ORDER — HYDROCODONE BITARTRATE AND ACETAMINOPHEN 7.5; 325 MG/1; MG/1
1 TABLET ORAL EVERY 6 HOURS PRN
Status: DISCONTINUED | OUTPATIENT
Start: 2022-08-10 | End: 2022-08-16 | Stop reason: HOSPADM

## 2022-08-10 RX ORDER — CLINDAMYCIN PHOSPHATE 900 MG/50ML
900 INJECTION, SOLUTION INTRAVENOUS
Status: DISCONTINUED | OUTPATIENT
Start: 2022-08-10 | End: 2022-08-10 | Stop reason: HOSPADM

## 2022-08-10 RX ORDER — PROPOFOL 10 MG/ML
INJECTION, EMULSION INTRAVENOUS
Status: DISCONTINUED | OUTPATIENT
Start: 2022-08-10 | End: 2022-08-10

## 2022-08-10 RX ADMIN — INSULIN ASPART 12 UNITS: 100 INJECTION, SOLUTION INTRAVENOUS; SUBCUTANEOUS at 05:08

## 2022-08-10 RX ADMIN — Medication 10 ML: at 05:08

## 2022-08-10 RX ADMIN — MIDAZOLAM HYDROCHLORIDE 2 MG: 1 INJECTION, SOLUTION INTRAMUSCULAR; INTRAVENOUS at 08:08

## 2022-08-10 RX ADMIN — ENOXAPARIN SODIUM 40 MG: 40 INJECTION SUBCUTANEOUS at 08:08

## 2022-08-10 RX ADMIN — PROPOFOL 50 MG: 10 INJECTION, EMULSION INTRAVENOUS at 09:08

## 2022-08-10 RX ADMIN — PROPOFOL 50 MCG/KG/MIN: 10 INJECTION, EMULSION INTRAVENOUS at 08:08

## 2022-08-10 RX ADMIN — FENTANYL CITRATE 50 MCG: 50 INJECTION INTRAMUSCULAR; INTRAVENOUS at 07:08

## 2022-08-10 RX ADMIN — GLYCOPYRROLATE 0.2 MG: 0.2 INJECTION, SOLUTION INTRAMUSCULAR; INTRAVITREAL at 08:08

## 2022-08-10 RX ADMIN — MUPIROCIN: 20 OINTMENT TOPICAL at 07:08

## 2022-08-10 RX ADMIN — INSULIN DETEMIR 32 UNITS: 100 INJECTION, SOLUTION SUBCUTANEOUS at 10:08

## 2022-08-10 RX ADMIN — SODIUM CHLORIDE: 0.9 INJECTION, SOLUTION INTRAVENOUS at 08:08

## 2022-08-10 RX ADMIN — Medication 10 ML: at 12:08

## 2022-08-10 RX ADMIN — VANCOMYCIN HYDROCHLORIDE 1250 MG: 1.25 INJECTION, POWDER, LYOPHILIZED, FOR SOLUTION INTRAVENOUS at 11:08

## 2022-08-10 RX ADMIN — Medication 10 ML: at 11:08

## 2022-08-10 RX ADMIN — INSULIN ASPART 12 UNITS: 100 INJECTION, SOLUTION INTRAVENOUS; SUBCUTANEOUS at 12:08

## 2022-08-10 RX ADMIN — AMLODIPINE BESYLATE 10 MG: 10 TABLET ORAL at 10:08

## 2022-08-10 RX ADMIN — MIDAZOLAM 2 MG: 1 INJECTION INTRAMUSCULAR; INTRAVENOUS at 07:08

## 2022-08-10 RX ADMIN — CLINDAMYCIN PHOSPHATE 900 MG: 18 INJECTION, SOLUTION INTRAVENOUS at 09:08

## 2022-08-10 RX ADMIN — ROPIVACAINE HYDROCHLORIDE 30 ML: 5 INJECTION, SOLUTION EPIDURAL; INFILTRATION; PERINEURAL at 07:08

## 2022-08-10 RX ADMIN — DEXTROSE 2 G: 50 INJECTION, SOLUTION INTRAVENOUS at 02:08

## 2022-08-10 RX ADMIN — ACETAMINOPHEN 650 MG: 325 TABLET ORAL at 09:08

## 2022-08-10 RX ADMIN — MORPHINE SULFATE 2 MG: 2 INJECTION, SOLUTION INTRAMUSCULAR; INTRAVENOUS at 11:08

## 2022-08-10 RX ADMIN — LIDOCAINE HYDROCHLORIDE 50 MG: 20 INJECTION, SOLUTION INTRAVENOUS at 08:08

## 2022-08-10 RX ADMIN — PROPOFOL 100 MG: 10 INJECTION, EMULSION INTRAVENOUS at 08:08

## 2022-08-10 RX ADMIN — PROPOFOL 50 MG: 10 INJECTION, EMULSION INTRAVENOUS at 08:08

## 2022-08-10 RX ADMIN — DEXTROSE 2 G: 50 INJECTION, SOLUTION INTRAVENOUS at 10:08

## 2022-08-10 NOTE — OP NOTE
OP NOTE    DOS:  08/10/2022    Preop Dx: Right volar forearm superficial and deep muscle abscess    Postop Dx: Right volar forearm superficial and deep muscle abscess    Compartment syndrome right forearm volar compartment secondary to infection/purulence    Procedure: Incision drainage deep abscess right volar forearm    Fasciotomy right volar forearm anterior compartment    Surgeon: Usman Oleary M.D.    Asst:  Aguilar Benitez M.D    Anesthesia: Mac plus regional    EBL:  Minimal    IVF:  500cc crystalloid    Specimens: Swab cultures    Findings: Pus    Dispo:  To PACU extubated/stable       Indications for Procedure:      56-year-old male with poorly controlled diabetes and increased swelling of the volar aspect of the right forearm and MRI showing a deep abscess.  I am taking patient the operating room for incision and drainage.  The risks, benefits and alternatives to surgery discussed the patient prior to going to the operating room.  Informed consent was obtained.    Procedure in Detail:    Patient identified the preop holding area and the site was marked.  Regional analgesia was administered.  Patient was wheeled to the operating room and placed on the operating table in supine position.  Monitored anesthesia care was induced and the right lower extremity was prepped draped sterile fashion after placement in a nonsterile tourniquet.  A time-out was undertaken to confirm patient, side, site, surgery, surgeon.  All agreed we proceeded.  The arm was elevated but not exsanguinated and the tourniquet was raised.    I marked out a volar approach of Zoltan.  I made a 10-15 cm incision from the proximal to mid forearm through the skin subcutaneous tissues.  Immediately getting through the subcutaneous fascia we encountered purulence.  This was cultured.  This was then suctioned.  A cursory irrigation was undertaken.  The patient had significant swelling in the volar forearm compartment which as tense.  I then  opened up the volar compartment fascia overlying the pronator teres with Metzenbaum scissors and then down through the flexors between the brachioradialis and pronator teres and then between that and the flexor carpi radialis.  After the fascia was completely opened this revealed significantly more purulence in between the muscle which poured out.  This was digitally explored for any loculations and then suctioned.  This was then copiously irrigated with normal saline solution.  The volar compartment was now soft.    After through irrigation and suctioning the tourniquet was let down hemostasis obtained electrocautery.  1 g vancomycin 2 g cefepime powder were placed deep.  The subcutaneous tissue was then closed with 3-0 Vicryl suture and the skin with 3-0 nylon suture in running fashion.  A compressive dressing was then applied.    All instrument and sponge counts were reported correct at the end of the case.  There were no complications.  The patient was awakened and taken to the recovery room stable condition.    Plan the patient:    Broad-spectrum antibiotics tailored based on culture results.  Good blood sugar control to help prevent further abscesses and heal his current.    Usman Oleary MD

## 2022-08-10 NOTE — ASSESSMENT & PLAN NOTE
Per surgery, no evidence of a necrotizing infection at physical exam. Patient with a chronic wound opened to air, this may be the reason there is some soft tissue gas seen in the foot Xray. Wound looks chronic in nature.   - Continue ceftriaxone, vancomycin  - Podiatry consulted, patient s/p bone bx   - MRI consistent with osteomyelitis  - ID consulted: Right hallux bone biopsy. Bone cultures pending.  - Unable to get vascular SANDEE due to pain.   - ID de-escalated antibiotic to vancomycin. Place PICC. Wound care per Podiatry.

## 2022-08-10 NOTE — PLAN OF CARE
Pt went and had I&D of RUE for abcess, block used, continues IV ABT , PICC in place  Problem: Adult Inpatient Plan of Care  Goal: Plan of Care Review  Outcome: Ongoing, Progressing  Goal: Patient-Specific Goal (Individualized)  Outcome: Ongoing, Progressing  Goal: Absence of Hospital-Acquired Illness or Injury  Outcome: Ongoing, Progressing  Goal: Optimal Comfort and Wellbeing  Outcome: Ongoing, Progressing  Goal: Readiness for Transition of Care  Outcome: Ongoing, Progressing     Problem: Diabetes Comorbidity  Goal: Blood Glucose Level Within Targeted Range  Outcome: Ongoing, Progressing     Problem: Bariatric Environmental Safety  Goal: Safety Maintained with Care  Outcome: Ongoing, Progressing     Problem: Infection  Goal: Absence of Infection Signs and Symptoms  Outcome: Ongoing, Progressing

## 2022-08-10 NOTE — ASSESSMENT & PLAN NOTE
Renee Caceres is a 56 y.o. male with morbid obesity and poorly controlled T2DM admitted for right great toe osteo. MRI obtained today shows R forearm intra-muscular abscess. Signs of developing phlegmon on RUE U/S on 8/8. CRP 95 today. Has been on IV vancomycin for toe osteo. Wrapped and elevated. Discussed that RUE abscess will require operative I&D. Educated patient he is at high risk for infection recurrence and wound healing issues in the setting of his high A1c. He is in understanding and wishes to proceed. All risks/benefits/alternatives were discussed and consents signed.     - Remain strictly NPO for OR 8/10AM   - MRI w/wo contrast right forearm completed  - ID following for R great toe osteo  - Toe cx + staph, Urine cx 8/5 + Proteus   - IV vanc ongoing per ID, at high risk for sterile OR culture RUE in setting of recent IV abx

## 2022-08-10 NOTE — PROGRESS NOTES
[facility]  Telemedicine Progress Note    Patient Name: Renee Caceres  MRN: [id number]  Patient Class: IP- Inpatient   Admission Date: 8/2/2022  Length of Stay: 7 days  Attending Physician: Maria E Flores MD  Primary Care Provider: Primary Doctor No    Subjective:     Principal Problem:Osteomyelitis of great toe of right foot    HPI:  57 yo male with diabetes, HTN presenting 2/2 R foot swelling that started acutely 3 days ago and progressively has gotten worse. He states he had a wound on his right great toe and 5th toe that has been there for a while. Today it had not gotten any better so he decided to come in for evaluation. He has not been on any antibiotics for it. He states that he was on medications at one point but no longer takes them as he thought he had gotten them under control. In the ED, concern with elevated WBC, ESR, CRP, xr of foot showed osteo of great toe. General surgery consulted to rule nec fasc 2/2 gas in the 5th toe. Medicine called for admission. Rocephin started.         Overview/Hospital Course:  No evidence of a necrotizing infection on physical exam. Podiatry and ID consulted. Hyperglycemia managed by Endocrinology.      Telemedicine  This service was provided by Virtual Visit.    Patient was transferred to [facility] on:  8/5/2022    Chief Complaint   Patient presents with    Swelling     Right arm and leg swelling that started Saturday. Denies pain. Denies medical hx.     The patient location is: [address] [place] [address] A   Admitted 8/2/2022  6:24 PM  Present with the patient at the time of the telemed/virtual assessment: Telepresenter    Interval History / Events Overnight:   The patient is able to provide adequate history. Additional history was obtained from past medical records. No significant events reported by Nursing.  S/p OR today for I&D of R forearm abscess.  Patient complains of R UE swelling. Symptoms have improved since yesterday.  Associated symptoms include: fatigue. Symptoms are decreasing in severity.     Lab test(s) reviewed: sCr and H&H stable    Review of Systems   Constitutional:  Negative for fever.   Respiratory:  Negative for shortness of breath.      Objective:     Vital Signs (Most Recent):  Temp: 97.7 °F (36.5 °C) (08/10/22 1019)  Pulse: 90 (08/10/22 1019)  Resp: 18 (08/10/22 1019)  BP: 139/62 (08/10/22 1019)  SpO2: (!) 94 % (08/10/22 1019)   Vital Signs (24h Range):  Temp:  [97 °F (36.1 °C)-98.6 °F (37 °C)] 97.7 °F (36.5 °C)  Pulse:  [82-98] 90  Resp:  [14-22] 18  SpO2:  [94 %-100 %] 94 %  BP: (137-192)/(62-83) 139/62     Weight: (!) 136.5 kg (300 lb 14.9 oz)  Body mass index is 37.61 kg/m².    Intake/Output Summary (Last 24 hours) at 8/10/2022 1100  Last data filed at 8/10/2022 0827  Gross per 24 hour   Intake 300 ml   Output --   Net 300 ml        Physical Exam  Constitutional:       General: He is not in acute distress.     Appearance: Normal appearance. He is morbidly obese.   Eyes:      General: Lids are normal. No scleral icterus.        Right eye: No discharge.         Left eye: No discharge.      Conjunctiva/sclera: Conjunctivae normal.   Neck:      Trachea: Phonation normal.   Cardiovascular:      Rate and Rhythm: Normal rate.      Comments: Monitor / Vital signs reviewed at time of visit  Pulmonary:      Effort: Pulmonary effort is normal. No tachypnea, accessory muscle usage or respiratory distress.   Abdominal:      General: There is no distension.   Musculoskeletal:      Right forearm: Swelling (with surgical dressing) present.   Neurological:      Mental Status: He is alert. He is not disoriented.   Psychiatric:         Attention and Perception: Attention normal.         Mood and Affect: Affect normal.         Behavior: Behavior is cooperative.       Significant Labs:   Recent Labs   Lab 08/03/22  0423   HGBA1C 12.4*       Recent Labs   Lab 08/09/22  1948 08/10/22  0741 08/10/22  0937   POCTGLUCOSE 89 194* 203*        Recent Labs   Lab 08/08/22 0528 08/09/22  1730 08/10/22  0557   WBC 10.04 9.74 9.76   HGB 10.6* 10.1* 10.4*   HCT 33.5* 31.6* 31.8*   * 608* 614*       Recent Labs   Lab 08/08/22  0528 08/09/22  1730 08/10/22  0557   GRAN 70.8  7.1 67.1  6.5 66.6  6.5   LYMPH 16.4*  1.7 18.4  1.8 18.4  1.8   MONO 9.5  1.0 10.4  1.0 11.4  1.1*   EOS 0.2 0.2 0.2       Recent Labs   Lab 08/05/22 0436 08/07/22  1803 08/08/22  0528 08/10/22  0612   * 137 131* 137   K 3.8 4.2 3.9 4.1   CL 96 99 96 99   CO2 28 28 27 28   BUN 11 13 14 16   CREATININE 1.2 1.2 1.2 1.3   * 154* 166* 150*   CALCIUM 9.3 9.6 9.4 9.6   ALBUMIN 2.4* 2.3* 2.2* 2.4*   MG 1.6  --  1.7 1.7   PHOS 3.8 3.8 4.0 3.9       Recent Labs   Lab 08/05/22  0436 08/08/22  0528 08/09/22  1730 08/10/22  0612   ALKPHOS 144* 89  --  99   ALT 49* 54*  --  56*   AST 34 43*  --  40   PROT 8.3 7.9  --  8.8*   BILITOT 0.4 0.4  --  0.5   CRP  --   --  94.9*  --        Recent Labs   Lab 08/02/22 1927 08/09/22 1730   LACTATE 2.5*  --    SEDRATE >120* >120*       Microbiology Results (last 7 days)       Procedure Component Value Units Date/Time    Gram stain [[id number]] Collected: 08/10/22 0859    Order Status: Sent Specimen: Incision site from Arm, Right Updated: 08/10/22 0911    AFB Culture & Smear [[id number]] Collected: 08/10/22 0859    Order Status: Sent Specimen: Incision site from Arm, Right Updated: 08/10/22 0910    Culture, Anaerobe [[id number]] Collected: 08/10/22 0859    Order Status: Sent Specimen: Incision site from Arm, Right Updated: 08/10/22 0910    Fungus culture [[id number]] Collected: 08/10/22 0859    Order Status: Sent Specimen: Incision site from Arm, Right Updated: 08/10/22 0910    Aerobic culture [[id number]] Collected: 08/10/22 0859    Order Status: Sent Specimen: Incision site from Arm, Right Updated: 08/10/22 0910    Fungus culture [[id number]] Collected: 08/04/22 1145    Order Status: Completed Specimen: Bone from Toe, Right Foot  Updated: 08/09/22 1307     Fungus (Mycology) Culture Culture in progress    Narrative:      R Great toe bone    Aerobic culture [[id number]]  (Abnormal)  (Susceptibility) Collected: 08/04/22 1145    Order Status: Completed Specimen: Bone from Toe, Right Foot Updated: 08/08/22 1228     Aerobic Bacterial Culture STAPHYLOCOCCUS COHNII SUBSPECIES COHNII  Moderate  Skin sabine also present  Skin sabine also present      Narrative:      R great toe bone    Culture, Anaerobe [[id number]] Collected: 08/03/22 1241    Order Status: Completed Specimen: Wound from Toe, Right Foot Updated: 08/08/22 1155     Anaerobic Culture No anaerobes isolated    Culture, Anaerobe [[id number]] Collected: 08/04/22 1145    Order Status: Completed Specimen: Bone from Toe, Right Foot Updated: 08/08/22 0949     Anaerobic Culture No anaerobes isolated    Narrative:      R Great toe bone    Blood culture #1 [[id number]] Collected: 08/02/22 1935    Order Status: Completed Specimen: Blood from Peripheral, Wrist, Left Updated: 08/07/22 2022     Blood Culture, Routine No growth after 5 days.    Narrative:      Blood Culture #1    Blood culture #2 [[id number]] Collected: 08/02/22 1927    Order Status: Completed Specimen: Blood from Peripheral, Hand, Left Updated: 08/07/22 2022     Blood Culture, Routine No growth after 5 days.    Narrative:      Blood Culture #2    AFB Culture & Smear [[id number]] Collected: 08/04/22 1145    Order Status: Completed Specimen: Bone from Toe, Right Foot Updated: 08/05/22 2127     AFB Culture & Smear Culture in progress     AFB CULTURE STAIN No acid fast bacilli seen.    Narrative:      R Great toe bone    Aerobic culture [[id number]] Collected: 08/03/22 1241    Order Status: Completed Specimen: Wound from Toe, Right Foot Updated: 08/05/22 0911     Aerobic Bacterial Culture Skin sabine,  no predominant organism    Urine culture [[id number]]  (Abnormal)  (Susceptibility) Collected: 08/02/22 2000    Order Status: Completed Specimen:  Urine Updated: 08/05/22 0142     Urine Culture, Routine PROTEUS PENNERI  10,000 - 49,999 cfu/ml      Narrative:      Specimen Source->Urine    AFB Culture & Smear [[id number]] Collected: 08/03/22 1241    Order Status: Completed Specimen: Wound from Toe, Right Foot Updated: 08/04/22 2127     AFB Culture & Smear Culture in progress     AFB CULTURE STAIN No acid fast bacilli seen.    Narrative:      Right toe    Gram stain [[id number]] Collected: 08/04/22 1145    Order Status: Completed Specimen: Bone from Toe, Right Foot Updated: 08/04/22 1446     Gram Stain Result No WBC's      No organisms seen    Narrative:      R Great toe bone    Gram stain [[id number]] Collected: 08/03/22 1241    Order Status: Completed Specimen: Wound from Toe, Right Foot Updated: 08/03/22 1813     Gram Stain Result Rare WBC's      Rare Gram positive cocci      Rare Gram negative rods    Narrative:      Right toe          No results found for: [id number]    MRI Forearm W WO Contrast Right  Narrative: EXAMINATION:  MRI FOREARM W WO CONTRAST RIGHT    CLINICAL HISTORY:  Soft tissue mass, forearm, deep;    TECHNIQUE:  Multisequence, multi planar magnetic resonance imaging of the right forearm before and after the intravenous administration of 10 mL Gadavist.    COMPARISON:  Radiographs from earlier today.  Ultrasound of the right forearm from 08/08/2022 and 08/04/2022.    FINDINGS:  Soft tissues: There is a large rim enhancing fluid collection within the right anterior forearm soft tissues measuring 9.3 x 5.7 x 3.2 cm (cc by AP by TR).  The collection is centered within the musculature of the anterior compartment of the proximal forearm.  There is some associated T1 hyperintensity suspicious for blood products or proteinaceous content.  There is adjacent enhancement and edema of the anterior compartment musculature and the subcutaneous fat of the anterior forearm, with overlying skin thickening and enhancement.    Bone: There is no evidence of acute  osteomyelitis.  Bone marrow signal is normal.  There is no T1 hypointense marrow signal.  There is no abnormal marrow enhancement.  There is no acute fracture or dislocation.    Articulations: The right elbow and right wrist joints are grossly unremarkable.  No joint effusion.    Miscellaneous: Neurovascular structures are grossly intact.  Impression: Large rim enhancing fluid collection within the musculature of the anterior compartment of the proximal forearm, compatible with an intramuscular abscess or infected hematoma.  Adjacent myositis and cellulitis as above.  No evidence of acute osteomyelitis.  Correlate clinically for evidence of compartment syndrome.    This report was flagged in Epic as abnormal.    Electronically signed by: Jesse Heerdia  Date:    08/10/2022  Time:    00:40      Labs and Imaging within the last 24 hours listed above were reviewed.       Diet: Diet diabetic [facility]; 2000 Calorie  Significant LDAs:   IV Access Type: Peripheral  Urinary Catheter Indication if present: Patient Does Not Have Urinary Catheter  Other Lines/Tubes/Drains:    HIGH RISK CONDITION(S):   Patient is currently on drug therapy requiring intensive monitoring for toxicity: Vancomycin     Goals of Care:    Previous admission:  4/19/19  Likely prognosis:  Fair  Code Status: Full Code  Comfort Only: No  Hospice: No  Goals at discharge: remain at home, with physician follow-up    Discharge Planning   JOSÉ ANTONIO: 8/12/2022     Code Status: Full Code   Is the patient medically ready for discharge?: No    Reason for patient still in hospital (select all that apply): Patient trending condition and Consult recommendations  Discharge Plan A: Home Health          Assessment/Plan:      * Osteomyelitis of great toe of right foot  Per surgery, no evidence of a necrotizing infection at physical exam. Patient with a chronic wound opened to air, this may be the reason there is some soft tissue gas seen in the foot Xray. Wound looks  chronic in nature.   - Continued ceftriaxone, vancomycin initially  - Podiatry consulted, patient s/p bone bx   - MRI consistent with osteomyelitis  - ID consulted: Right hallux bone biopsy. Bone cultures pending.  - Unable to get vascular SANDEE due to pain.   - ID de-escalated antibiotic to vancomycin. Placed PICC. Wound care per Podiatry.    Abscess of right forearm  Began developing about 2 weeks ago. Now s/p I&D with Ortho.  Cultures pending (patient already treated with vancomycin)    Ulcer of right fifth toe due to diabetes mellitus  Possible osteomyelitis, continuing wound care and antibiotics    Swelling of right upper extremity  Improved with elevation but forearm remained significantly swollen  - Soft tissue US: Diffuse soft tissue edema without organized fluid collection.   - Repeat soft tissue US shows fluid collection; consulted Ortho.  See: Abscess of right forearm    Uncontrolled type 2 diabetes mellitus with hyperglycemia, without long-term current use of insulin  Patient's FSGs are uncontrolled due to hyperglycemia on current medication regimen.  Last A1c reviewed- uncontrolled since 2010 per available records.  Current correctional scale  Low  Endocrinology consulted and managing with anti-hyperglycemics as follows-   Antihyperglycemics (From admission, onward)            Start     Stop Route Frequency Ordered    08/07/22 1035  insulin aspart U-100 pen 0-5 Units         -- SubQ Before meals & nightly PRN 08/07/22 0935    08/06/22 1130  insulin aspart U-100 pen 12 Units         -- SubQ 3 times daily with meals 08/06/22 0835    08/06/22 0900  insulin detemir U-100 pen 32 Units         -- SubQ Daily 08/06/22 0835      Hold Oral hypoglycemics while patient is in the hospital.  Consulted Endocrinology for management and discharge recommendations, follow up.  Management per Endocrinology.    Primary hypertension  Uncontrolled, not on medication at home  - Continued amlodipine 5mg daily  - Continued  lisinopril 5mg daily; increased to 20 mg for 8/6  - BP remained elevated; increased lisinopril to 40 mg. Increased amlodipine. Monitor.  - Persistent hypertension; Changed lisinopril to valsartan on 8/9 - holding zoila-op on 8/10    Cellulitis of right lower extremity  Acute  Continued Rocephin and vancomycin initially  LE U/S performed: No evidence of deep venous thrombosis in the right lower extremity.  ID de-escalated antibiotic to vancomycin.      VTE Risk Mitigation (From admission, onward)         Ordered     enoxaparin injection 40 mg  Every 12 hours         08/03/22 0833     IP VTE HIGH RISK PATIENT  Once         08/03/22 0141     Place sequential compression device  Until discontinued         08/03/22 0141              I have assessed these finding virtually using telemed platform and with assistance of bedside nurse     The attending portion of this evaluation, treatment, and documentation was performed per Maria E Flores MD via Telemedicine AudioVisual using the secure [employer] software platform with 2 way audio/video. The provider was located off-site and the patient is located in the hospital. The aforementioned video software was utilized to document the relevant history and physical exam    Maria E Flores MD  Department of Hospital Medicine   [facility]

## 2022-08-10 NOTE — ASSESSMENT & PLAN NOTE
Uncontrolled, not on medication at home  - Continued amlodipine 5mg daily  - Continued lisinopril 5mg daily; increased to 20 mg for 8/6  - BP remains elevated; increased lisinopril to 40 mg. Increased amlodipine. Monitor.  - Changed lisinopril to valsartan 8/9

## 2022-08-10 NOTE — ASSESSMENT & PLAN NOTE
Acute  Continued Rocephin and vancomycin initially  LE U/S performed: No evidence of deep venous thrombosis in the right lower extremity.  ID de-escalated antibiotic to vancomycin.

## 2022-08-10 NOTE — NURSING
0735 Pt in surgery, communication board updated    1019 Pt arrived back to the floor in his bed, A&O x4 resp even and unlabored, dressing to right arm CDI, fingers warm no swelling, not able to move d/t block, moves left arm and legs with ease, call light placed on left side along with all belongings, vitals done, bed in low locked position, pt denies any pain at this time    1105 Pt Trough drawn and sent to Lab, spoke with Pharmacist okay to start vancomycin now since trough drawn, infusing in PICC

## 2022-08-10 NOTE — ASSESSMENT & PLAN NOTE
Per surgery, no evidence of a necrotizing infection at physical exam. Patient with a chronic wound opened to air, this may be the reason there is some soft tissue gas seen in the foot Xray. Wound looks chronic in nature.   - Continued ceftriaxone, vancomycin initially  - Podiatry consulted, patient s/p bone bx   - MRI consistent with osteomyelitis  - ID consulted: Right hallux bone biopsy. Bone cultures pending.  - Unable to get vascular SANDEE due to pain.   - ID de-escalated antibiotic to vancomycin. Placed PICC. Wound care per Podiatry.

## 2022-08-10 NOTE — ASSESSMENT & PLAN NOTE
Began developing about 2 weeks ago. Now s/p I&D with Ortho.  Cultures pending (patient already treated with vancomycin)

## 2022-08-10 NOTE — ANESTHESIA PROCEDURE NOTES
Peripheral Block    Patient location during procedure: pre-op   Block not for primary anesthetic.  Reason for block: at surgeon's request and post-op pain management   Post-op Pain Location: Right forearm Pain   Start time: 8/10/2022 7:51 AM  Timeout: 8/10/2022 7:50 AM   End time: 8/10/2022 8:00 AM    Staffing  Authorizing Provider: Blake Arceo MD  Performing Provider: Alberto Murry MD    Preanesthetic Checklist  Completed: patient identified, IV checked, site marked, risks and benefits discussed, surgical consent, monitors and equipment checked, pre-op evaluation and timeout performed  Peripheral Block  Patient position: sitting  Prep: ChloraPrep  Patient monitoring: heart rate, cardiac monitor, continuous pulse ox, continuous capnometry and frequent blood pressure checks  Block type: infraclavicular  Laterality: right  Injection technique: single shot  Needle  Needle type: Echogenic   Needle gauge: 22 G  Needle length: 4 in  Needle localization: ultrasound guidance and anatomical landmarks   -ultrasound image captured on disc.  Assessment  Injection assessment: negative aspiration and negative parasthesia  Paresthesia pain: none  Heart rate change: no  Slow fractionated injection: yes  Pain Tolerance: comfortable throughout block and no complaints  Medications:    Medications: ropivacaine (NAROPIN) injection 0.5% - Perineural   30 mL - 8/10/2022 7:58:00 AM    Additional Notes  VSS.  [facility] RN monitoring vitals throughout procedure.  Patient tolerated procedure well.

## 2022-08-10 NOTE — CONSULTS
[facility] Surg  Orthopedics  Consult Note    Patient Name: Renee Caceres  MRN: [id number]  Admission Date: 8/2/2022  Hospital Length of Stay: 7 days  Attending Provider: Maria E Flores MD  Primary Care Provider: Primary Doctor No      Inpatient consult to Orthopedic Surgery  Consult performed by: Kiana Rick MD  Consult ordered by: Maria E Flores MD        Subjective:     Principal Problem:Osteomyelitis of great toe of right foot    Chief Complaint:   Chief Complaint   Patient presents with    Swelling     Right arm and leg swelling that started Saturday. Denies pain. Denies medical hx.        HPI: Renee Caceres is a 56 y.o. male with PMH significant for diabetes (HA1c 12.4) admitted since 8/3 with right great toe osteomyelitis and right upper extremity pain. Ortho consulted for evaluation of swelling of the right forearm. Patient reports atraumatic insidious onset pain in right forearm roughly 10-14 days ago that has acutely worsened over the past 2 days. Patient has undergone RUE U/S on 8/6/ and 8/8 which has shown interval development of a heterogenous fluid collection. He has been treated with IV abx for his R great toe osteo since admission (Vancomycin). Podiatry following for toe osteo. Endocrine following for BG control. He denies any previous episodes of similar pain in his forearm. Denies any paresthesias in the RUE. No previous history of surgery to the RUE. No baseline anticoagulation.         Past Medical History:   Diagnosis Date    Primary hypertension 8/3/2022       Past Surgical History:   Procedure Laterality Date    LEG SURGERY Left        Review of patient's allergies indicates:  No Known Allergies    Current Facility-Administered Medications   Medication    acetaminophen tablet 650 mg    albuterol-ipratropium 2.5 mg-0.5 mg/3 mL nebulizer solution 3 mL    amLODIPine tablet 10 mg    cadexomer iodine 0.9 % gel    dextrose 10% bolus 125 mL    dextrose 10% bolus 250 mL     enoxaparin injection 40 mg    glucagon (human recombinant) injection 1 mg    glucose chewable tablet 16 g    glucose chewable tablet 24 g    hydrALAZINE tablet 25 mg    insulin aspart U-100 pen 0-5 Units    insulin aspart U-100 pen 12 Units    insulin detemir U-100 pen 32 Units    melatonin tablet 6 mg    naloxone 0.4 mg/mL injection 0.02 mg    ondansetron disintegrating tablet 4 mg    polyethylene glycol packet 17 g    polyethylene glycol packet 17 g    prochlorperazine injection Soln 5 mg    senna-docusate 8.6-50 mg per tablet 1 tablet    simethicone chewable tablet 80 mg    sodium chloride 0.9% flush 10 mL    sodium chloride 0.9% flush 10 mL    And    sodium chloride 0.9% flush 10 mL    valsartan tablet 80 mg    vancomycin - pharmacy to dose    vancomycin 1.25 g in dextrose 5% 250 mL IVPB (ready to mix)     Family History       Problem Relation (Age of Onset)    Hypertension Brother          Tobacco Use    Smoking status: Never Smoker    Smokeless tobacco: Never Used   Substance and Sexual Activity    Alcohol use: Yes    Drug use: No    Sexual activity: Not on file     ROS    Constitutional: Denies fever/chills  Neurological: Denies numbness/tingling (any exceptions noted in orthopaedic exam)   Psychiatric/Behavioral: Denies change in normal mood  Eyes: Denies change in vision  Cardiovascular: Denies chest pain  Respiratory: Denies shortness of breath  Hematologic/Lymphatic: Denies easy bleeding/bruising   Skin: Endorses 3 day hx of right forearm swelling. Denies new rash or skin lesions   Gastrointestinal: Denies nausea/vomitting/diarrhea, change in bowel habits, abdominal pain   Allergic/Immunologic: Denies adverse reactions to current medications  Musculoskeletal: see HPI      Objective:     Vital Signs (Most Recent):  Temp: 97.1 °F (36.2 °C) (08/09/22 2018)  Pulse: 83 (08/09/22 2018)  Resp: 18 (08/09/22 2018)  BP: (!) 142/77 (08/09/22 2018)  SpO2: 95 % (08/09/22 2018)   Vital Signs  "(24h Range):  Temp:  [97.1 °F (36.2 °C)-98.6 °F (37 °C)] 97.1 °F (36.2 °C)  Pulse:  [82-90] 83  Resp:  [16-20] 18  SpO2:  [93 %-95 %] 95 %  BP: (142-176)/(77-82) 142/77     Weight: (!) 136.5 kg (300 lb 14.9 oz)  Height: 6' 3" (190.5 cm)  Body mass index is 37.61 kg/m².      Intake/Output Summary (Last 24 hours) at 8/10/2022 0345  Last data filed at 8/9/2022 2025  Gross per 24 hour   Intake 200 ml   Output 650 ml   Net -450 ml       Ortho/SPM Exam    MSK:  R Upper extremity  Inspection  - Skin intact throughout, no open wounds  - Impressive swelling around proximal forearm  - No ecchymosis, erythema, or signs of cellulitis  Palpation  - NonTTP throughout, no palpable abnormality  Range of motion  - AROM and PROM of the shoulder, elbow, wrist, and hand intact without pain  - Denies pain with supination pronation of forearm  Stability  - No evidence of joint dislocation or abnormal laxity  Neurovascular  - AIN/PIN/Radial/Median/Ulnar Nerves assessed in isolation without deficit  - Able to give thumbs up, make "OK" sign, cross IF/LF, abduct/adduct fingers, make fist  - SILT throughout  - Compartments soft  - Radial artery palpated  - Capillary Refill <3s  - Muscle tone normal      Significant Labs: A1C:   Recent Labs   Lab 08/03/22  0423   HGBA1C 12.4*     CBC:   Recent Labs   Lab 08/08/22 0528 08/09/22  1730   WBC 10.04 9.74   HGB 10.6* 10.1*   HCT 33.5* 31.6*   * 608*     CMP:   Recent Labs   Lab 08/08/22 0528   *   K 3.9   CL 96   CO2 27   *   BUN 14   CREATININE 1.2   CALCIUM 9.4   PROT 7.9   ALBUMIN 2.2*   BILITOT 0.4   ALKPHOS 89   AST 43*   ALT 54*   ANIONGAP 8       All pertinent labs within the past 24 hours have been reviewed.    Significant Imaging: I have reviewed all pertinent imaging results/findings.    Assessment/Plan:     Swelling of right upper extremity  Renee Caceres is a 56 y.o. male with morbid obesity and poorly controlled T2DM admitted for right great toe osteo. MRI " obtained today shows R forearm intra-muscular abscess. Signs of developing phlegmon on RUE U/S on 8/8. CRP 95 today. Has been on IV vancomycin for toe osteo. Wrapped and elevated. Discussed that RUE abscess will require operative I&D. Educated patient he is at high risk for infection recurrence and wound healing issues in the setting of his high A1c. He is in understanding and wishes to proceed. All risks/benefits/alternatives were discussed and consents signed.     - Remain strictly NPO for OR 8/10AM   - MRI w/wo contrast right forearm completed  - ID following for R great toe osteo  - Toe cx + staph, Urine cx 8/5 + Proteus   - IV vanc ongoing per ID, at high risk for sterile OR culture RUE in setting of recent IV abx           Kiana Rick MD  Orthopedics  [facility] Surg

## 2022-08-10 NOTE — ASSESSMENT & PLAN NOTE
Improved with elevation but forearm remained significantly swollen  - Soft tissue US: Diffuse soft tissue edema without organized fluid collection.   - Repeat soft tissue US shows fluid collection; consulted Ortho.  See: Abscess of right forearm

## 2022-08-10 NOTE — ASSESSMENT & PLAN NOTE
Uncontrolled, not on medication at home  - Continued amlodipine 5mg daily  - Continued lisinopril 5mg daily; increased to 20 mg for 8/6  - BP remained elevated; increased lisinopril to 40 mg. Increased amlodipine. Monitor.  - Persistent hypertension; Changed lisinopril to valsartan on 8/9 - holding zoila-op on 8/10

## 2022-08-10 NOTE — ASSESSMENT & PLAN NOTE
Improved with elevation but forearm remains significantly swollen  - Soft tissue US: Diffuse soft tissue edema without organized fluid collection.   - Repeat soft tissue US shows fluid collection; consult Ortho.

## 2022-08-10 NOTE — CARE UPDATE
Preoperative Cardiovascular Risk Assessment:    Non-emergent surgery.  No active cardiac problems.  Low - Intermediate risk surgery.  Functional Status: (> 4 METS)  Revised cardiac risk index (RCRI) score is 1.         Preoperative evaluation:  RCRI score of 1 thus 0.9 - 1.3% rate of major cardiac complications.    Acceptable cardiac risk going for low - intermediate risk surgery.    No absolute contraindications to the proposed surgery at this time   - Ok to proceed to surgery  - continue current medications perioperatively  - hold ARB on day of surgery      Maria E Flores MD

## 2022-08-10 NOTE — TRANSFER OF CARE
"Anesthesia Transfer of Care Note    Patient: Renee Caceres    Procedure(s) Performed: Procedure(s) (LRB):  INCISION AND DRAINAGE, UPPER EXTREMITY - RIGHT, Hand table, Ancef/clinda (hold until cx collected), Cysto tubing (Right)    Patient location: PACU    Anesthesia Type: MAC    Transport from OR: Transported from OR on room air with adequate spontaneous ventilation    Post pain: adequate analgesia    Post assessment: no apparent anesthetic complications and tolerated procedure well    Post vital signs: stable    Level of consciousness: awake    Nausea/Vomiting: no nausea/vomiting    Complications: none    Transfer of care protocol was followed      Last vitals:   Visit Vitals  BP (!) 157/70 (BP Location: Left arm, Patient Position: Lying)   Pulse 85   Temp 37 °C (98.6 °F) (Temporal)   Resp (!) 22   Ht 6' 3" (1.905 m)   Wt (!) 136.5 kg (300 lb 14.9 oz)   SpO2 100%   BMI 37.61 kg/m²     "

## 2022-08-10 NOTE — ASSESSMENT & PLAN NOTE
Acute  Continue Rocephin and vancomycin  LE U/S performed: No evidence of deep venous thrombosis in the right lower extremity.  ID de-escalated antibiotic to vancomycin.   Please talk to Samy about appt with cardiology.

## 2022-08-10 NOTE — ANESTHESIA POSTPROCEDURE EVALUATION
Anesthesia Post Evaluation    Patient: Renee Caceres    Procedure(s) Performed: Procedure(s) (LRB):  INCISION AND DRAINAGE, UPPER EXTREMITY - RIGHT, Hand table, Ancef/clinda (hold until cx collected), Cysto tubing (Right)    Final Anesthesia Type: general      Patient location during evaluation: PACU  Patient participation: Yes- Able to Participate  Level of consciousness: awake and alert  Post-procedure vital signs: reviewed and stable  Pain management: adequate  Airway patency: patent    PONV status at discharge: No PONV  Anesthetic complications: no      Cardiovascular status: blood pressure returned to baseline  Respiratory status: unassisted  Hydration status: euvolemic  Follow-up not needed.          Vitals Value Taken Time   /63 08/10/22 1122   Temp 36.7 °C (98 °F) 08/10/22 1122   Pulse 85 08/10/22 1122   Resp 18 08/10/22 1122   SpO2 96 % 08/10/22 1122         Event Time   Out of Recovery 08/10/2022 10:10:00         Pain/Sara Score: Pain Rating Prior to Med Admin: 0 (8/10/2022  8:05 AM)  Pain Rating Post Med Admin: 0 (8/10/2022  9:35 AM)  Sara Score: 10 (8/10/2022  9:35 AM)

## 2022-08-10 NOTE — PROGRESS NOTES
Pharmacokinetic Assessment Follow Up: IV Vancomycin    Vancomycin serum concentration assessment(s):    The trough level was drawn correctly and can be used to guide therapy at this time. The measurement is above the desired definitive target range of 15 to 20 mcg/mL.    Vancomycin Regimen Plan:    Discontinue the scheduled vancomycin regimen and re-dose when the random level is less than 20 mcg/mL, next level to be drawn at 0400 on 8/11.    Drug levels (last 3 results):  Recent Labs   Lab Result Units 08/07/22  1803 08/08/22  0528 08/10/22  1100   Vancomycin, Random ug/mL  --  15.8  --    Vancomycin-Trough ug/mL 23.1*  --  24.8*       Pharmacy will continue to follow and monitor vancomycin.    Please contact pharmacy at extension [phone] for questions regarding this assessment.    Thank you for the consult,   Kathy Orta, PharmD       Patient brief summary:  Renee Caceres is a 56 y.o. male initiated on antimicrobial therapy with IV Vancomycin for treatment of bone/joint infection    Drug Allergies:   Review of patient's allergies indicates:  No Known Allergies    Actual Body Weight:   136.5 kg    Renal Function:   Estimated Creatinine Clearance: 96.8 mL/min (based on SCr of 1.3 mg/dL).    Dialysis Method (if applicable):  N/A    CBC (last 72 hours):  Recent Labs   Lab Result Units 08/08/22 0528 08/09/22  1730 08/10/22  0557   WBC K/uL 10.04 9.74 9.76   Hemoglobin g/dL 10.6* 10.1* 10.4*   Hematocrit % 33.5* 31.6* 31.8*   Platelets K/uL 533* 608* 614*   Gran % % 70.8 67.1 66.6   Lymph % % 16.4* 18.4 18.4   Mono % % 9.5 10.4 11.4   Eosinophil % % 1.8 2.2 2.2   Basophil % % 0.7 0.7 0.8   Differential Method  Automated Automated Automated       Metabolic Panel (last 72 hours):  Recent Labs   Lab Result Units 08/07/22 1803 08/08/22 0528 08/10/22  0612   Sodium mmol/L 137 131* 137   Potassium mmol/L 4.2 3.9 4.1   Chloride mmol/L 99 96 99   CO2 mmol/L 28 27 28   Glucose mg/dL 154* 166* 150*   BUN mg/dL 13 14 16    Creatinine mg/dL 1.2 1.2 1.3   Albumin g/dL 2.3* 2.2* 2.4*   Total Bilirubin mg/dL  --  0.4 0.5   Alkaline Phosphatase U/L  --  89 99   AST U/L  --  43* 40   ALT U/L  --  54* 56*   Magnesium mg/dL  --  1.7 1.7   Phosphorus mg/dL 3.8 4.0 3.9       Vancomycin Administrations:  vancomycin given in the last 96 hours                   vancomycin 1.25 g in dextrose 5% 250 mL IVPB (ready to mix) (mg) 1,250 mg New Bag 08/10/22 1110     1,250 mg New Bag 08/09/22 2100     1,250 mg New Bag  0834     1,250 mg New Bag 08/08/22 2000     1,250 mg New Bag  0915    vancomycin in dextrose 5 % 1 gram/250 mL IVPB 1,000 mg (mg) 1,000 mg New Bag 08/07/22 0634     1,000 mg New Bag 08/06/22 1829                Microbiologic Results:  Microbiology Results (last 7 days)     Procedure Component Value Units Date/Time    Gram stain [[id number]] Collected: 08/10/22 0859    Order Status: Sent Specimen: Incision site from Arm, Right Updated: 08/10/22 0911    AFB Culture & Smear [[id number]] Collected: 08/10/22 0859    Order Status: Sent Specimen: Incision site from Arm, Right Updated: 08/10/22 0910    Culture, Anaerobe [[id number]] Collected: 08/10/22 0859    Order Status: Sent Specimen: Incision site from Arm, Right Updated: 08/10/22 0910    Fungus culture [[id number]] Collected: 08/10/22 0859    Order Status: Sent Specimen: Incision site from Arm, Right Updated: 08/10/22 0910    Aerobic culture [[id number]] Collected: 08/10/22 0859    Order Status: Sent Specimen: Incision site from Arm, Right Updated: 08/10/22 0910    Fungus culture [[id number]] Collected: 08/04/22 1145    Order Status: Completed Specimen: Bone from Toe, Right Foot Updated: 08/09/22 1307     Fungus (Mycology) Culture Culture in progress    Narrative:      R Great toe bone    Aerobic culture [[id number]]  (Abnormal)  (Susceptibility) Collected: 08/04/22 1145    Order Status: Completed Specimen: Bone from Toe, Right Foot Updated: 08/08/22 1228     Aerobic Bacterial Culture  STAPHYLOCOCCUS COHNII SUBSPECIES COHNII  Moderate  Skin sabine also present  Skin sabine also present      Narrative:      R great toe bone    Culture, Anaerobe [[id number]] Collected: 08/03/22 1241    Order Status: Completed Specimen: Wound from Toe, Right Foot Updated: 08/08/22 1155     Anaerobic Culture No anaerobes isolated    Culture, Anaerobe [[id number]] Collected: 08/04/22 1145    Order Status: Completed Specimen: Bone from Toe, Right Foot Updated: 08/08/22 0949     Anaerobic Culture No anaerobes isolated    Narrative:      R Great toe bone    Blood culture #1 [[id number]] Collected: 08/02/22 1935    Order Status: Completed Specimen: Blood from Peripheral, Wrist, Left Updated: 08/07/22 2022     Blood Culture, Routine No growth after 5 days.    Narrative:      Blood Culture #1    Blood culture #2 [[id number]] Collected: 08/02/22 1927    Order Status: Completed Specimen: Blood from Peripheral, Hand, Left Updated: 08/07/22 2022     Blood Culture, Routine No growth after 5 days.    Narrative:      Blood Culture #2    AFB Culture & Smear [[id number]] Collected: 08/04/22 1145    Order Status: Completed Specimen: Bone from Toe, Right Foot Updated: 08/05/22 2127     AFB Culture & Smear Culture in progress     AFB CULTURE STAIN No acid fast bacilli seen.    Narrative:      R Great toe bone    Aerobic culture [[id number]] Collected: 08/03/22 1241    Order Status: Completed Specimen: Wound from Toe, Right Foot Updated: 08/05/22 0911     Aerobic Bacterial Culture Skin sabine,  no predominant organism    Urine culture [[id number]]  (Abnormal)  (Susceptibility) Collected: 08/02/22 2000    Order Status: Completed Specimen: Urine Updated: 08/05/22 0142     Urine Culture, Routine PROTEUS PENNERI  10,000 - 49,999 cfu/ml      Narrative:      Specimen Source->Urine    AFB Culture & Smear [[id number]] Collected: 08/03/22 1241    Order Status: Completed Specimen: Wound from Toe, Right Foot Updated: 08/04/22 2127     AFB Culture & Smear  Culture in progress     AFB CULTURE STAIN No acid fast bacilli seen.    Narrative:      Right toe    Gram stain [[id number]] Collected: 08/04/22 1145    Order Status: Completed Specimen: Bone from Toe, Right Foot Updated: 08/04/22 1446     Gram Stain Result No WBC's      No organisms seen    Narrative:      R Great toe bone    Gram stain [[id number]] Collected: 08/03/22 1241    Order Status: Completed Specimen: Wound from Toe, Right Foot Updated: 08/03/22 1813     Gram Stain Result Rare WBC's      Rare Gram positive cocci      Rare Gram negative rods    Narrative:      Right toe

## 2022-08-10 NOTE — PROGRESS NOTES
[facility]  Telemedicine Progress Note    Patient Name: Renee Caceres  MRN: [id number]  Patient Class: IP- Inpatient   Admission Date: 8/2/2022  Length of Stay: 6 days  Attending Physician: Maria E Flores MD  Primary Care Provider: Primary Doctor No      Subjective:     Principal Problem:Osteomyelitis of great toe of right foot    HPI:  55 yo male with diabetes, HTN presenting 2/2 R foot swelling that started acutely 3 days ago and progressively has gotten worse. He states he had a wound on his right great toe and 5th toe that has been there for a while. Today it had not gotten any better so he decided to come in for evaluation. He has not been on any antibiotics for it. He states that he was on medications at one point but no longer takes them as he thought he had gotten them under control. In the ED, concern with elevated WBC, ESR, CRP, xr of foot showed osteo of great toe. General surgery consulted to rule nec fasc 2/2 gas in the 5th toe. Medicine called for admission. Rocephin started.         Overview/Hospital Course:  No evidence of a necrotizing infection on physical exam. Podiatry and ID consulted. Hyperglycemia managed by Endocrinology.      Telemedicine  This service was provided by Virtual Visit.    Patient was transferred to [facility] on:  8/5/2022    Chief Complaint   Patient presents with    Swelling     Right arm and leg swelling that started Saturday. Denies pain. Denies medical hx.     The patient location is: [address] [place] [address] A   Admitted 8/2/2022  6:24 PM  Present with the patient at the time of the telemed/virtual assessment: Telepresenter    Interval History / Events Overnight:   The patient is able to provide adequate history. Additional history was obtained from past medical records. No significant events reported by Nursing.  BP elevated.  Patient complains of R UE swelling. Symptoms have worsened since yesterday. Associated symptoms include: fatigue.  Symptoms are increasing in severity.     Lab test(s) reviewed: sCr stable    Review of Systems   Constitutional:  Negative for fever.   Respiratory:  Negative for shortness of breath.      Objective:     Vital Signs (Most Recent):  Temp: 98.6 °F (37 °C) (08/09/22 1054)  Pulse: 82 (08/09/22 1054)  Resp: 16 (08/09/22 1054)  BP: (!) 145/79 (08/09/22 1054)  SpO2: (!) 93 % (08/09/22 1054)   Vital Signs (24h Range):  Temp:  [97.6 °F (36.4 °C)-98.6 °F (37 °C)] 98.6 °F (37 °C)  Pulse:  [76-90] 82  Resp:  [16-20] 16  SpO2:  [93 %-96 %] 93 %  BP: (139-172)/(71-84) 145/79     Weight: (!) 136.5 kg (300 lb 14.9 oz)  Body mass index is 37.61 kg/m².    Intake/Output Summary (Last 24 hours) at 8/9/2022 1307  Last data filed at 8/9/2022 1100  Gross per 24 hour   Intake 200 ml   Output 2650 ml   Net -2450 ml        Physical Exam  Constitutional:       General: He is not in acute distress.     Appearance: Normal appearance. He is morbidly obese.   Eyes:      General: Lids are normal. No scleral icterus.        Right eye: No discharge.         Left eye: No discharge.      Conjunctiva/sclera: Conjunctivae normal.   Neck:      Trachea: Phonation normal.   Cardiovascular:      Rate and Rhythm: Normal rate.      Comments: Monitor / Vital signs reviewed at time of visit  Pulmonary:      Effort: Pulmonary effort is normal. No tachypnea, accessory muscle usage or respiratory distress.   Abdominal:      General: There is no distension.   Musculoskeletal:      Right forearm: Swelling and edema present.   Neurological:      Mental Status: He is alert. He is not disoriented.   Psychiatric:         Attention and Perception: Attention normal.         Mood and Affect: Affect normal.         Behavior: Behavior is cooperative.       Significant Labs:   Recent Labs   Lab 08/03/22  0423   HGBA1C 12.4*       Recent Labs   Lab 08/08/22 2026 08/09/22  0718 08/09/22  1053   POCTGLUCOSE 169* 182* 183*       Recent Labs   Lab 08/04/22  0552 08/05/22  0436  08/08/22  0528   WBC 15.04* 14.87* 10.04   HGB 11.4* 11.4* 10.6*   HCT 34.7* 35.2* 33.5*    449 533*       Recent Labs   Lab 08/04/22  0552 08/05/22 0436 08/08/22  0528   GRAN 74.1*  11.2* 73.5*  11.0* 70.8  7.1   LYMPH 14.1*  2.1 14.5*  2.2 16.4*  1.7   MONO 8.4  1.3* 8.3  1.2* 9.5  1.0   EOS 0.3 0.3 0.2       Recent Labs   Lab 08/04/22  0552 08/05/22 0436 08/07/22  1803 08/08/22 0528   * 133* 137 131*   K 3.9 3.8 4.2 3.9   CL 96 96 99 96   CO2 28 28 28 27   BUN 11 11 13 14   CREATININE 1.0 1.2 1.2 1.2   * 227* 154* 166*   CALCIUM 9.3 9.3 9.6 9.4   ALBUMIN 2.3* 2.4* 2.3* 2.2*   MG 1.6 1.6  --  1.7   PHOS 3.6 3.8 3.8 4.0       Recent Labs   Lab 08/02/22 2215 08/03/22 0423 08/04/22 0552 08/05/22 0436 08/08/22  0528   ALKPHOS 138*   < > 142* 144* 89   ALT 59*   < > 52* 49* 54*   AST 54*   < > 42* 34 43*   PROT 9.0*   < > 7.9 8.3 7.9   BILITOT 0.5   < > 0.4 0.4 0.4   .8*  --   --   --   --     < > = values in this interval not displayed.       Recent Labs   Lab 08/02/22  1927   LACTATE 2.5*   SEDRATE >120*       Microbiology Results (last 7 days)       Procedure Component Value Units Date/Time    Aerobic culture [[id number]]  (Abnormal)  (Susceptibility) Collected: 08/04/22 1145    Order Status: Completed Specimen: Bone from Toe, Right Foot Updated: 08/08/22 1228     Aerobic Bacterial Culture STAPHYLOCOCCUS COHNII SUBSPECIES COHNII  Moderate  Skin sabine also present  Skin sabine also present      Narrative:      R great toe bone    Culture, Anaerobe [[id number]] Collected: 08/03/22 1241    Order Status: Completed Specimen: Wound from Toe, Right Foot Updated: 08/08/22 1155     Anaerobic Culture No anaerobes isolated    Culture, Anaerobe [[id number]] Collected: 08/04/22 1145    Order Status: Completed Specimen: Bone from Toe, Right Foot Updated: 08/08/22 0949     Anaerobic Culture No anaerobes isolated    Narrative:      R Great toe bone    Blood culture #1 [[id number]] Collected:  08/02/22 1935    Order Status: Completed Specimen: Blood from Peripheral, Wrist, Left Updated: 08/07/22 2022     Blood Culture, Routine No growth after 5 days.    Narrative:      Blood Culture #1    Blood culture #2 [[id number]] Collected: 08/02/22 1927    Order Status: Completed Specimen: Blood from Peripheral, Hand, Left Updated: 08/07/22 2022     Blood Culture, Routine No growth after 5 days.    Narrative:      Blood Culture #2    AFB Culture & Smear [[id number]] Collected: 08/04/22 1145    Order Status: Completed Specimen: Bone from Toe, Right Foot Updated: 08/05/22 2127     AFB Culture & Smear Culture in progress     AFB CULTURE STAIN No acid fast bacilli seen.    Narrative:      R Great toe bone    Aerobic culture [[id number]] Collected: 08/03/22 1241    Order Status: Completed Specimen: Wound from Toe, Right Foot Updated: 08/05/22 0911     Aerobic Bacterial Culture Skin sabine,  no predominant organism    Urine culture [[id number]]  (Abnormal)  (Susceptibility) Collected: 08/02/22 2000    Order Status: Completed Specimen: Urine Updated: 08/05/22 0142     Urine Culture, Routine PROTEUS PENNERI  10,000 - 49,999 cfu/ml      Narrative:      Specimen Source->Urine    AFB Culture & Smear [[id number]] Collected: 08/03/22 1241    Order Status: Completed Specimen: Wound from Toe, Right Foot Updated: 08/04/22 2127     AFB Culture & Smear Culture in progress     AFB CULTURE STAIN No acid fast bacilli seen.    Narrative:      Right toe    Gram stain [[id number]] Collected: 08/04/22 1145    Order Status: Completed Specimen: Bone from Toe, Right Foot Updated: 08/04/22 1446     Gram Stain Result No WBC's      No organisms seen    Narrative:      R Great toe bone    Fungus culture [[id number]] Collected: 08/04/22 1145    Order Status: Sent Specimen: Bone from Toe, Right Foot Updated: 08/04/22 1216    Gram stain [[id number]] Collected: 08/03/22 1241    Order Status: Completed Specimen: Wound from Toe, Right Foot Updated: 08/03/22 1813      Gram Stain Result Rare WBC's      Rare Gram positive cocci      Rare Gram negative rods    Narrative:      Right toe          No results found for: [id number]    US Soft Tissue Upper Extremity, Right  Narrative: EXAMINATION:  US SOFT TISSUE, UPPER EXTREMITY, RIGHT    CLINICAL HISTORY:  persistent localized swelling of R forearm - rule out fluid collection;    TECHNIQUE:  Grayscale and color Doppler ultrasound images were acquired of the middle portion of the anterior right forearm.    COMPARISON:  U/S soft tissue right upper extremity 08/04/2022    FINDINGS:  There is interval development of a ill-defined heterogeneous hypoechoic area in the musculature of the middle portion of the anterior right forearm measuring approximately 6.0 x 2.8 cm.  This is approximately 1.7 cm below the skin.  There is no significant internal Doppler signal.  It is not compressible.  Impression: Interval development of ill-defined heterogeneous area within the right anterior forearm muscles as detailed above.  This is nonspecific.  Differential considerations include hematoma, abscess, complicated seroma.    Electronically signed by resident: Altaf Wise  Date:    08/08/2022  Time:    19:20    Electronically signed by: Elvin Knox  Date:    08/08/2022  Time:    20:24      Labs and Imaging within the last 24 hours listed above were reviewed.       Diet: Diet diabetic [facility]; 2000 Calorie  Significant LDAs:   IV Access Type: Peripheral  Urinary Catheter Indication if present: Patient Does Not Have Urinary Catheter  Other Lines/Tubes/Drains:    HIGH RISK CONDITION(S):   Patient is currently on drug therapy requiring intensive monitoring for toxicity: Vancomycin     Goals of Care:    Previous admission:  4/19/19  Likely prognosis:  Fair  Code Status: Full Code  Comfort Only: No  Hospice: No  Goals at discharge: remain at home, with physician follow-up    Discharge Planning   JOSÉ ANTONIO: 8/10/2022     Code Status: Full Code   Is  the patient medically ready for discharge?: No    Reason for patient still in hospital (select all that apply): Patient trending condition and Consult recommendations  Discharge Plan A: Home Health        Assessment/Plan:      * Osteomyelitis of great toe of right foot  Per surgery, no evidence of a necrotizing infection at physical exam. Patient with a chronic wound opened to air, this may be the reason there is some soft tissue gas seen in the foot Xray. Wound looks chronic in nature.   - Continue ceftriaxone, vancomycin  - Podiatry consulted, patient s/p bone bx   - MRI consistent with osteomyelitis  - ID consulted: Right hallux bone biopsy. Bone cultures pending.  - Unable to get vascular SANDEE due to pain.   - ID de-escalated antibiotic to vancomycin. Place PICC. Wound care per Podiatry.    Ulcer of right fifth toe due to diabetes mellitus  Possible osteomyelitis, continuing wound care and antibiotics    Swelling of right upper extremity  Improved with elevation but forearm remains significantly swollen  - Soft tissue US: Diffuse soft tissue edema without organized fluid collection.   - Repeat soft tissue US shows fluid collection; consult Ortho.    Uncontrolled type 2 diabetes mellitus with hyperglycemia, without long-term current use of insulin  Patient's FSGs are uncontrolled due to hyperglycemia on current medication regimen.  Last A1c reviewed- uncontrolled since 2010 per available records.  Current correctional scale  Low  Endocrinology consulted and managing with anti-hyperglycemics as follows-   Antihyperglycemics (From admission, onward)            Start     Stop Route Frequency Ordered    08/07/22 1035  insulin aspart U-100 pen 0-5 Units         -- SubQ Before meals & nightly PRN 08/07/22 0935    08/06/22 1130  insulin aspart U-100 pen 12 Units         -- SubQ 3 times daily with meals 08/06/22 0835    08/06/22 0900  insulin detemir U-100 pen 32 Units         -- SubQ Daily 08/06/22 0835      Hold Oral  hypoglycemics while patient is in the hospital.  Consulted Endocrinology for management and discharge recommendations, follow up.  Management per Endocrinology.    Primary hypertension  Uncontrolled, not on medication at home  - Continued amlodipine 5mg daily  - Continued lisinopril 5mg daily; increased to 20 mg for 8/6  - BP remains elevated; increased lisinopril to 40 mg. Increased amlodipine. Monitor.  - Changed lisinopril to valsartan 8/9    Cellulitis of right lower extremity  Acute  Continue Rocephin and vancomycin  LE U/S performed: No evidence of deep venous thrombosis in the right lower extremity.  ID de-escalated antibiotic to vancomycin.        Active Hospital Problems    Diagnosis  POA    *Osteomyelitis of great toe of right foot [M86.9]  Yes    Severe obesity (BMI >= 40) [E66.01]  Yes    Ulcer of right fifth toe due to diabetes mellitus [E11.621, L97.519]  Yes    Cellulitis of right lower extremity [L03.115]  Yes    Primary hypertension [I10]  Yes    Uncontrolled type 2 diabetes mellitus with hyperglycemia, without long-term current use of insulin [E11.65]  Yes    Swelling of right upper extremity [M79.89]  Yes      Resolved Hospital Problems   No resolved problems to display.       Inpatient Medications Prescribed for Management of Current Problems:     Scheduled Meds:    amLODIPine  10 mg Oral Daily    enoxaparin  40 mg Subcutaneous Q12H    insulin aspart U-100  12 Units Subcutaneous TIDWM    insulin detemir U-100  32 Units Subcutaneous Daily    polyethylene glycol  17 g Oral BID    senna-docusate 8.6-50 mg  1 tablet Oral BID    sodium chloride 0.9%  10 mL Intravenous Q6H    valsartan  80 mg Oral BID    vancomycin (VANCOCIN) IVPB  1,250 mg Intravenous Q12H     Continuous Infusions:   As Needed: acetaminophen, albuterol-ipratropium, cadexomer iodine, dextrose 10%, dextrose 10%, glucagon (human recombinant), glucose, glucose, hydrALAZINE, insulin aspart U-100, melatonin, naloxone,  ondansetron, polyethylene glycol, prochlorperazine, simethicone, sodium chloride 0.9%, Flushing PICC Protocol **AND** sodium chloride 0.9% **AND** sodium chloride 0.9%, Pharmacy to dose Vancomycin consult **AND** vancomycin - pharmacy to dose    VTE Risk Mitigation (From admission, onward)         Ordered     enoxaparin injection 40 mg  Every 12 hours         08/03/22 0833     IP VTE HIGH RISK PATIENT  Once         08/03/22 0141     Place sequential compression device  Until discontinued         08/03/22 0141              I have assessed these finding virtually using telemed platform and with assistance of bedside nurse     The attending portion of this evaluation, treatment, and documentation was performed per Maria E Flores MD via Telemedicine AudioVisual using the secure [employer] software platform with 2 way audio/video. The provider was located off-site and the patient is located in the hospital. The aforementioned video software was utilized to document the relevant history and physical exam    Maria E Flores MD  Department of Hospital Medicine   [facility]

## 2022-08-10 NOTE — CARE UPDATE
BG goal 140-180    -A1C    Lab Results   Component Value Date    HGBA1C 12.4 (H) 08/03/2022           -HOME REGIMEN: none    -INPATIENT REGIMEN: Levemir 32 units daily and novolog 12 units with meals.     -GLUCOSE TREND FOR THE PAST 24HRS:     -NO HYPOGYCEMIAS NOTED     -TOLERATING 50% OF PO DIET     -Diet: Diet diabetic [facility]; 2000 Calorie        Remains in MSU, NAEON. BG reasonably well controlled with scheduled insulin and prn correction scale. No changes to plan of care; endocrine to continue to follow.       Plan:  Continue levemir 32 units daily.   Continue novolog 12 units with meals.   BG monitoring ac/hs and low dose correction scale.     Discharge planning:tbd     Endocrine to continue to follow    ** Please call Endocrine for any BG related issues **

## 2022-08-10 NOTE — SUBJECTIVE & OBJECTIVE
Telemedicine  This service was provided by Virtual Visit.    Patient was transferred to [facility] on:  8/5/2022    Chief Complaint   Patient presents with    Swelling     Right arm and leg swelling that started Saturday. Denies pain. Denies medical hx.     The patient location is: 1/651 A   Admitted 8/2/2022  6:24 PM  Present with the patient at the time of the telemed/virtual assessment: Telepresenter    Interval History / Events Overnight:   The patient is able to provide adequate history. Additional history was obtained from past medical records. No significant events reported by Nursing.  S/p OR today for I&D of R forearm abscess.  Patient complains of R UE swelling. Symptoms have improved since yesterday. Associated symptoms include: fatigue. Symptoms are decreasing in severity.     Lab test(s) reviewed: sCr and H&H stable    Review of Systems   Constitutional:  Negative for fever.   Respiratory:  Negative for shortness of breath.      Objective:     Vital Signs (Most Recent):  Temp: 97.7 °F (36.5 °C) (08/10/22 1019)  Pulse: 90 (08/10/22 1019)  Resp: 18 (08/10/22 1019)  BP: 139/62 (08/10/22 1019)  SpO2: (!) 94 % (08/10/22 1019)   Vital Signs (24h Range):  Temp:  [97 °F (36.1 °C)-98.6 °F (37 °C)] 97.7 °F (36.5 °C)  Pulse:  [82-98] 90  Resp:  [14-22] 18  SpO2:  [94 %-100 %] 94 %  BP: (137-192)/(62-83) 139/62     Weight: (!) 136.5 kg (300 lb 14.9 oz)  Body mass index is 37.61 kg/m².    Intake/Output Summary (Last 24 hours) at 8/10/2022 1100  Last data filed at 8/10/2022 0827  Gross per 24 hour   Intake 300 ml   Output --   Net 300 ml        Physical Exam  Constitutional:       General: He is not in acute distress.     Appearance: Normal appearance. He is morbidly obese.   Eyes:      General: Lids are normal. No scleral icterus.        Right eye: No discharge.         Left eye: No discharge.      Conjunctiva/sclera: Conjunctivae normal.   Neck:      Trachea: Phonation normal.   Cardiovascular:       Rate and Rhythm: Normal rate.      Comments: Monitor / Vital signs reviewed at time of visit  Pulmonary:      Effort: Pulmonary effort is normal. No tachypnea, accessory muscle usage or respiratory distress.   Abdominal:      General: There is no distension.   Musculoskeletal:      Right forearm: Swelling (with surgical dressing) present.   Neurological:      Mental Status: He is alert. He is not disoriented.   Psychiatric:         Attention and Perception: Attention normal.         Mood and Affect: Affect normal.         Behavior: Behavior is cooperative.       Significant Labs:   Recent Labs   Lab 08/03/22  0423   HGBA1C 12.4*       Recent Labs   Lab 08/09/22  1948 08/10/22  0741 08/10/22  0937   POCTGLUCOSE 89 194* 203*       Recent Labs   Lab 08/08/22  0528 08/09/22  1730 08/10/22  0557   WBC 10.04 9.74 9.76   HGB 10.6* 10.1* 10.4*   HCT 33.5* 31.6* 31.8*   * 608* 614*       Recent Labs   Lab 08/08/22  0528 08/09/22  1730 08/10/22  0557   GRAN 70.8  7.1 67.1  6.5 66.6  6.5   LYMPH 16.4*  1.7 18.4  1.8 18.4  1.8   MONO 9.5  1.0 10.4  1.0 11.4  1.1*   EOS 0.2 0.2 0.2       Recent Labs   Lab 08/05/22  0436 08/07/22  1803 08/08/22  0528 08/10/22  0612   * 137 131* 137   K 3.8 4.2 3.9 4.1   CL 96 99 96 99   CO2 28 28 27 28   BUN 11 13 14 16   CREATININE 1.2 1.2 1.2 1.3   * 154* 166* 150*   CALCIUM 9.3 9.6 9.4 9.6   ALBUMIN 2.4* 2.3* 2.2* 2.4*   MG 1.6  --  1.7 1.7   PHOS 3.8 3.8 4.0 3.9       Recent Labs   Lab 08/05/22  0436 08/08/22  0528 08/09/22 1730 08/10/22  0612   ALKPHOS 144* 89  --  99   ALT 49* 54*  --  56*   AST 34 43*  --  40   PROT 8.3 7.9  --  8.8*   BILITOT 0.4 0.4  --  0.5   CRP  --   --  94.9*  --        Recent Labs   Lab 08/02/22  1927 08/09/22  1730   LACTATE 2.5*  --    SEDRATE >120* >120*       Microbiology Results (last 7 days)       Procedure Component Value Units Date/Time    Gram stain [[id number]] Collected: 08/10/22 0859    Order Status: Sent Specimen: Incision  site from Arm, Right Updated: 08/10/22 0911    AFB Culture & Smear [[id number]] Collected: 08/10/22 0859    Order Status: Sent Specimen: Incision site from Arm, Right Updated: 08/10/22 0910    Culture, Anaerobe [[id number]] Collected: 08/10/22 0859    Order Status: Sent Specimen: Incision site from Arm, Right Updated: 08/10/22 0910    Fungus culture [[id number]] Collected: 08/10/22 0859    Order Status: Sent Specimen: Incision site from Arm, Right Updated: 08/10/22 0910    Aerobic culture [[id number]] Collected: 08/10/22 0859    Order Status: Sent Specimen: Incision site from Arm, Right Updated: 08/10/22 0910    Fungus culture [[id number]] Collected: 08/04/22 1145    Order Status: Completed Specimen: Bone from Toe, Right Foot Updated: 08/09/22 1307     Fungus (Mycology) Culture Culture in progress    Narrative:      R Great toe bone    Aerobic culture [[id number]]  (Abnormal)  (Susceptibility) Collected: 08/04/22 1145    Order Status: Completed Specimen: Bone from Toe, Right Foot Updated: 08/08/22 1228     Aerobic Bacterial Culture STAPHYLOCOCCUS COHNII SUBSPECIES COHNII  Moderate  Skin sabine also present  Skin sabine also present      Narrative:      R great toe bone    Culture, Anaerobe [[id number]] Collected: 08/03/22 1241    Order Status: Completed Specimen: Wound from Toe, Right Foot Updated: 08/08/22 1155     Anaerobic Culture No anaerobes isolated    Culture, Anaerobe [[id number]] Collected: 08/04/22 1145    Order Status: Completed Specimen: Bone from Toe, Right Foot Updated: 08/08/22 0949     Anaerobic Culture No anaerobes isolated    Narrative:      R Great toe bone    Blood culture #1 [[id number]] Collected: 08/02/22 1935    Order Status: Completed Specimen: Blood from Peripheral, Wrist, Left Updated: 08/07/22 2022     Blood Culture, Routine No growth after 5 days.    Narrative:      Blood Culture #1    Blood culture #2 [[id number]] Collected: 08/02/22 1927    Order Status: Completed Specimen: Blood from Peripheral,  Hand, Left Updated: 08/07/22 2022     Blood Culture, Routine No growth after 5 days.    Narrative:      Blood Culture #2    AFB Culture & Smear [[id number]] Collected: 08/04/22 1145    Order Status: Completed Specimen: Bone from Toe, Right Foot Updated: 08/05/22 2127     AFB Culture & Smear Culture in progress     AFB CULTURE STAIN No acid fast bacilli seen.    Narrative:      R Great toe bone    Aerobic culture [[id number]] Collected: 08/03/22 1241    Order Status: Completed Specimen: Wound from Toe, Right Foot Updated: 08/05/22 0911     Aerobic Bacterial Culture Skin sabine,  no predominant organism    Urine culture [[id number]]  (Abnormal)  (Susceptibility) Collected: 08/02/22 2000    Order Status: Completed Specimen: Urine Updated: 08/05/22 0142     Urine Culture, Routine PROTEUS PENNERI  10,000 - 49,999 cfu/ml      Narrative:      Specimen Source->Urine    AFB Culture & Smear [[id number]] Collected: 08/03/22 1241    Order Status: Completed Specimen: Wound from Toe, Right Foot Updated: 08/04/22 2127     AFB Culture & Smear Culture in progress     AFB CULTURE STAIN No acid fast bacilli seen.    Narrative:      Right toe    Gram stain [[id number]] Collected: 08/04/22 1145    Order Status: Completed Specimen: Bone from Toe, Right Foot Updated: 08/04/22 1446     Gram Stain Result No WBC's      No organisms seen    Narrative:      R Great toe bone    Gram stain [[id number]] Collected: 08/03/22 1241    Order Status: Completed Specimen: Wound from Toe, Right Foot Updated: 08/03/22 1813     Gram Stain Result Rare WBC's      Rare Gram positive cocci      Rare Gram negative rods    Narrative:      Right toe          No results found for: [id number]    MRI Forearm W WO Contrast Right  Narrative: EXAMINATION:  MRI FOREARM W WO CONTRAST RIGHT    CLINICAL HISTORY:  Soft tissue mass, forearm, deep;    TECHNIQUE:  Multisequence, multi planar magnetic resonance imaging of the right forearm before and after the intravenous  administration of 10 mL Gadavist.    COMPARISON:  Radiographs from earlier today.  Ultrasound of the right forearm from 08/08/2022 and 08/04/2022.    FINDINGS:  Soft tissues: There is a large rim enhancing fluid collection within the right anterior forearm soft tissues measuring 9.3 x 5.7 x 3.2 cm (cc by AP by TR).  The collection is centered within the musculature of the anterior compartment of the proximal forearm.  There is some associated T1 hyperintensity suspicious for blood products or proteinaceous content.  There is adjacent enhancement and edema of the anterior compartment musculature and the subcutaneous fat of the anterior forearm, with overlying skin thickening and enhancement.    Bone: There is no evidence of acute osteomyelitis.  Bone marrow signal is normal.  There is no T1 hypointense marrow signal.  There is no abnormal marrow enhancement.  There is no acute fracture or dislocation.    Articulations: The right elbow and right wrist joints are grossly unremarkable.  No joint effusion.    Miscellaneous: Neurovascular structures are grossly intact.  Impression: Large rim enhancing fluid collection within the musculature of the anterior compartment of the proximal forearm, compatible with an intramuscular abscess or infected hematoma.  Adjacent myositis and cellulitis as above.  No evidence of acute osteomyelitis.  Correlate clinically for evidence of compartment syndrome.    This report was flagged in Epic as abnormal.    Electronically signed by: Jesse Heredia  Date:    08/10/2022  Time:    00:40      Labs and Imaging within the last 24 hours listed above were reviewed.       Diet: Diet diabetic [facility]; 2000 Calorie  Significant LDAs:   IV Access Type: Peripheral  Urinary Catheter Indication if present: Patient Does Not Have Urinary Catheter  Other Lines/Tubes/Drains:    HIGH RISK CONDITION(S):   Patient is currently on drug therapy requiring intensive monitoring for toxicity: Vancomycin      Goals of Care:    Previous admission:  4/19/19  Likely prognosis:  Fair  Code Status: Full Code  Comfort Only: No  Hospice: No  Goals at discharge: remain at home, with physician follow-up    Discharge Planning   JOSÉ ANTONIO: 8/12/2022     Code Status: Full Code   Is the patient medically ready for discharge?: No    Reason for patient still in hospital (select all that apply): Patient trending condition and Consult recommendations  Discharge Plan A: Home Health
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
## 2022-08-11 PROBLEM — M79.89 SWELLING OF RIGHT UPPER EXTREMITY: Status: RESOLVED | Noted: 2022-08-03 | Resolved: 2022-08-11

## 2022-08-11 LAB
POCT GLUCOSE: 170 MG/DL (ref 70–110)
POCT GLUCOSE: 177 MG/DL (ref 70–110)
POCT GLUCOSE: 223 MG/DL (ref 70–110)
VANCOMYCIN SERPL-MCNC: 27.2 UG/ML

## 2022-08-11 PROCEDURE — 63600175 PHARM REV CODE 636 W HCPCS: Performed by: STUDENT IN AN ORGANIZED HEALTH CARE EDUCATION/TRAINING PROGRAM

## 2022-08-11 PROCEDURE — 97535 SELF CARE MNGMENT TRAINING: CPT

## 2022-08-11 PROCEDURE — 97116 GAIT TRAINING THERAPY: CPT

## 2022-08-11 PROCEDURE — A4216 STERILE WATER/SALINE, 10 ML: HCPCS | Performed by: INTERNAL MEDICINE

## 2022-08-11 PROCEDURE — 94761 N-INVAS EAR/PLS OXIMETRY MLT: CPT

## 2022-08-11 PROCEDURE — 99233 PR SUBSEQUENT HOSPITAL CARE,LEVL III: ICD-10-PCS | Mod: 95,,, | Performed by: INTERNAL MEDICINE

## 2022-08-11 PROCEDURE — 63600175 PHARM REV CODE 636 W HCPCS: Performed by: NURSE PRACTITIONER

## 2022-08-11 PROCEDURE — 25000003 PHARM REV CODE 250: Performed by: INTERNAL MEDICINE

## 2022-08-11 PROCEDURE — 25000003 PHARM REV CODE 250: Performed by: STUDENT IN AN ORGANIZED HEALTH CARE EDUCATION/TRAINING PROGRAM

## 2022-08-11 PROCEDURE — 97165 OT EVAL LOW COMPLEX 30 MIN: CPT

## 2022-08-11 PROCEDURE — 63600175 PHARM REV CODE 636 W HCPCS: Performed by: HOSPITALIST

## 2022-08-11 PROCEDURE — 97161 PT EVAL LOW COMPLEX 20 MIN: CPT

## 2022-08-11 PROCEDURE — 25000003 PHARM REV CODE 250: Performed by: NURSE PRACTITIONER

## 2022-08-11 PROCEDURE — 80202 ASSAY OF VANCOMYCIN: CPT | Performed by: INTERNAL MEDICINE

## 2022-08-11 PROCEDURE — 99233 SBSQ HOSP IP/OBS HIGH 50: CPT | Mod: 95,,, | Performed by: INTERNAL MEDICINE

## 2022-08-11 PROCEDURE — 11000001 HC ACUTE MED/SURG PRIVATE ROOM

## 2022-08-11 RX ADMIN — INSULIN ASPART 2 UNITS: 100 INJECTION, SOLUTION INTRAVENOUS; SUBCUTANEOUS at 11:08

## 2022-08-11 RX ADMIN — INSULIN ASPART 12 UNITS: 100 INJECTION, SOLUTION INTRAVENOUS; SUBCUTANEOUS at 08:08

## 2022-08-11 RX ADMIN — DEXTROSE 2 G: 50 INJECTION, SOLUTION INTRAVENOUS at 06:08

## 2022-08-11 RX ADMIN — INSULIN ASPART 12 UNITS: 100 INJECTION, SOLUTION INTRAVENOUS; SUBCUTANEOUS at 05:08

## 2022-08-11 RX ADMIN — Medication 10 ML: at 11:08

## 2022-08-11 RX ADMIN — ENOXAPARIN SODIUM 40 MG: 40 INJECTION SUBCUTANEOUS at 08:08

## 2022-08-11 RX ADMIN — VALSARTAN 80 MG: 40 TABLET, FILM COATED ORAL at 08:08

## 2022-08-11 RX ADMIN — INSULIN DETEMIR 32 UNITS: 100 INJECTION, SOLUTION SUBCUTANEOUS at 08:08

## 2022-08-11 RX ADMIN — HYDROCODONE BITARTRATE AND ACETAMINOPHEN 1 TABLET: 7.5; 325 TABLET ORAL at 04:08

## 2022-08-11 RX ADMIN — SENNOSIDES AND DOCUSATE SODIUM 1 TABLET: 50; 8.6 TABLET ORAL at 08:08

## 2022-08-11 RX ADMIN — Medication 10 ML: at 06:08

## 2022-08-11 RX ADMIN — Medication 10 ML: at 05:08

## 2022-08-11 RX ADMIN — POLYETHYLENE GLYCOL 3350 17 G: 17 POWDER, FOR SOLUTION ORAL at 08:08

## 2022-08-11 RX ADMIN — AMLODIPINE BESYLATE 10 MG: 10 TABLET ORAL at 08:08

## 2022-08-11 RX ADMIN — HYDROCODONE BITARTRATE AND ACETAMINOPHEN 1 TABLET: 7.5; 325 TABLET ORAL at 11:08

## 2022-08-11 RX ADMIN — HYDROCODONE BITARTRATE AND ACETAMINOPHEN 1 TABLET: 7.5; 325 TABLET ORAL at 05:08

## 2022-08-11 RX ADMIN — INSULIN ASPART 12 UNITS: 100 INJECTION, SOLUTION INTRAVENOUS; SUBCUTANEOUS at 11:08

## 2022-08-11 NOTE — SUBJECTIVE & OBJECTIVE
"Principal Problem:Osteomyelitis of great toe of right foot    Principal Orthopedic Problem: R arm intramuscular abscess    Interval History: s/p I&D on 8/10/22 with Dr. Oleary. Patient seen and examined at bedside. SAMMY. Patient doing well. R arm pain improved.     Review of patient's allergies indicates:  No Known Allergies    Current Facility-Administered Medications   Medication    acetaminophen tablet 650 mg    albuterol-ipratropium 2.5 mg-0.5 mg/3 mL nebulizer solution 3 mL    amLODIPine tablet 10 mg    cadexomer iodine 0.9 % gel    dextrose 10% bolus 125 mL    dextrose 10% bolus 250 mL    enoxaparin injection 40 mg    glucagon (human recombinant) injection 1 mg    glucose chewable tablet 16 g    glucose chewable tablet 24 g    hydrALAZINE tablet 25 mg    HYDROcodone-acetaminophen 7.5-325 mg per tablet 1 tablet    insulin aspart U-100 pen 0-5 Units    insulin aspart U-100 pen 12 Units    insulin detemir U-100 pen 32 Units    melatonin tablet 6 mg    morphine injection 2 mg    naloxone 0.4 mg/mL injection 0.02 mg    ondansetron disintegrating tablet 4 mg    polyethylene glycol packet 17 g    polyethylene glycol packet 17 g    prochlorperazine injection Soln 5 mg    senna-docusate 8.6-50 mg per tablet 1 tablet    simethicone chewable tablet 80 mg    sodium chloride 0.9% flush 10 mL    sodium chloride 0.9% flush 10 mL    And    sodium chloride 0.9% flush 10 mL    valsartan tablet 80 mg    vancomycin - pharmacy to dose     Objective:     Vital Signs (Most Recent):  Temp: 97.5 °F (36.4 °C) (08/11/22 1137)  Pulse: 79 (08/11/22 1137)  Resp: 18 (08/11/22 1153)  BP: (!) 155/59 (08/11/22 1137)  SpO2: (!) 94 % (08/11/22 1137)   Vital Signs (24h Range):  Temp:  [97.5 °F (36.4 °C)-98.1 °F (36.7 °C)] 97.5 °F (36.4 °C)  Pulse:  [77-88] 79  Resp:  [16-20] 18  SpO2:  [92 %-97 %] 94 %  BP: (117-162)/(58-70) 155/59     Weight: (!) 162.2 kg (357 lb 9.4 oz)  Height: 6' 3" (190.5 cm)  Body mass index is 44.7 " kg/m².      Intake/Output Summary (Last 24 hours) at 8/11/2022 1330  Last data filed at 8/11/2022 0642  Gross per 24 hour   Intake 760 ml   Output 725 ml   Net 35 ml       Ortho/SPM Exam  AAOx4  NAD  Reg rate  No increased WOB    RUE:  Dressing c/d/i  FROM shoulder, elbow, and wrist  SILT M/U/R  Motor intact AIN/PIN/M/U/R  WWP extremities     Significant Labs: CBC:   Recent Labs   Lab 08/09/22  1730 08/10/22  0557   WBC 9.74 9.76   HGB 10.1* 10.4*   HCT 31.6* 31.8*   * 614*     CMP:   Recent Labs   Lab 08/10/22  0612      K 4.1   CL 99   CO2 28   *   BUN 16   CREATININE 1.3   CALCIUM 9.6   PROT 8.8*   ALBUMIN 2.4*   BILITOT 0.5   ALKPHOS 99   AST 40   ALT 56*   ANIONGAP 10     Wound Culture:   Recent Labs   Lab 08/10/22  0859   LABAERO No growth     All pertinent labs within the past 24 hours have been reviewed.    Significant Imaging: I have reviewed all pertinent imaging results/findings.

## 2022-08-11 NOTE — ASSESSMENT & PLAN NOTE
Renee Caceres is a 56 y.o. male with morbid obesity and poorly controlled T2DM admitted for right great toe osteo. MRI obtained today shows R forearm intra-muscular abscess. Signs of developing phlegmon on RUE U/S on 8/8. CRP 95 today. Has been on IV vancomycin for toe osteo. Educated patient he is at high risk for infection recurrence and wound healing issues in the setting of his high A1c. S/p I&D with Dr. Oleary on 8/10/22.    - ID following for R great toe osteo  - Toe cx + staph, Urine cx 8/5 + Proteus   - IV vanc ongoing per ID, at high risk for sterile OR culture RUE in setting of recent IV abx   - Pain control MM  - DVT ppx per primary  - Elevate, ROMAT, WBAT RUE

## 2022-08-11 NOTE — SUBJECTIVE & OBJECTIVE
This encounter was provided through telemedicine.  Patient was transferred to the telemedicine service on:  08/5/2022   The patient location is: 651/651 A admitted 8/2/2022  6:24 PM.  Present with the patient at the time of the telemed/virtual assessment: Telepresenter    Interval History/Overnight Events:   Clinical record since admit reviewed.  Patient able to provide history.     Right UE pain controlled with current regimen.  BP improving; Vanc currently held due to high level    Review of Systems   Constitutional:  Positive for activity change. Negative for fever.   Respiratory:  Negative for shortness of breath.    Cardiovascular:  Negative for chest pain.   Gastrointestinal:  Negative for diarrhea and vomiting.   Musculoskeletal:  Positive for arthralgias and myalgias.      Inpatient Medications:  Scheduled Meds:   amLODIPine  10 mg Oral Daily    enoxaparin  40 mg Subcutaneous Q12H    insulin aspart U-100  12 Units Subcutaneous TIDWM    insulin detemir U-100  32 Units Subcutaneous Daily    polyethylene glycol  17 g Oral BID    senna-docusate 8.6-50 mg  1 tablet Oral BID    sodium chloride 0.9%  10 mL Intravenous Q6H    valsartan  80 mg Oral BID     Continuous Infusions:  PRN Meds:.acetaminophen, albuterol-ipratropium, cadexomer iodine, dextrose 10%, dextrose 10%, glucagon (human recombinant), glucose, glucose, hydrALAZINE, HYDROcodone-acetaminophen, insulin aspart U-100, melatonin, morphine, naloxone, ondansetron, polyethylene glycol, prochlorperazine, simethicone, sodium chloride 0.9%, Flushing PICC Protocol **AND** sodium chloride 0.9% **AND** sodium chloride 0.9%, Pharmacy to dose Vancomycin consult **AND** vancomycin - pharmacy to dose      Objective:     Temp:  [97.5 °F (36.4 °C)-98.1 °F (36.7 °C)] 97.5 °F (36.4 °C)  Pulse:  [77-88] 77  Resp:  [16-18] 18  SpO2:  [92 %-97 %] 94 %  BP: (117-162)/(58-70) 155/70      Intake/Output Summary (Last 24 hours) at 8/11/2022 1108  Last data filed at 8/11/2022  0642  Gross per 24 hour   Intake 760 ml   Output 725 ml   Net 35 ml        Body mass index is 44.7 kg/m².    Physical Exam  Vitals and nursing note reviewed.   Constitutional:       General: He is not in acute distress.     Appearance: Normal appearance. He is normal weight. He is not ill-appearing.   HENT:      Head: Normocephalic and atraumatic.      Right Ear: Hearing normal.      Left Ear: Hearing normal.      Nose: Nose normal.   Eyes:      General: No scleral icterus.        Right eye: No discharge.         Left eye: No discharge.      Extraocular Movements: Extraocular movements intact.   Cardiovascular:      Rate and Rhythm: Normal rate.   Pulmonary:      Effort: Pulmonary effort is normal. No accessory muscle usage or respiratory distress.   Musculoskeletal:      Comments: Right UE bandaged   Neurological:      General: No focal deficit present.      Mental Status: He is alert and oriented to person, place, and time.      Cranial Nerves: No cranial nerve deficit.      Motor: No weakness.   Psychiatric:         Attention and Perception: Attention normal.         Mood and Affect: Mood normal.         Speech: Speech normal.         Behavior: Behavior is cooperative.        Labs:  Recent Results (from the past 24 hour(s))   POCT glucose    Collection Time: 08/10/22 11:20 AM   Result Value Ref Range    POCT Glucose 181 (H) 70 - 110 mg/dL   POCT glucose    Collection Time: 08/10/22  4:04 PM   Result Value Ref Range    POCT Glucose 164 (H) 70 - 110 mg/dL   POCT glucose    Collection Time: 08/10/22  8:53 PM   Result Value Ref Range    POCT Glucose 117 (H) 70 - 110 mg/dL   Vancomycin, random    Collection Time: 08/11/22  4:11 AM   Result Value Ref Range    Vancomycin, Random 27.2 Not established ug/mL   POCT glucose    Collection Time: 08/11/22  7:27 AM   Result Value Ref Range    POCT Glucose 170 (H) 70 - 110 mg/dL        No results found for: [id number]    Recent Labs   Lab 08/08/22  0528 08/09/22  3265  08/10/22  0557   WBC 10.04 9.74 9.76   LYMPH 16.4*  1.7 18.4  1.8 18.4  1.8   HGB 10.6* 10.1* 10.4*   HCT 33.5* 31.6* 31.8*   * 608* 614*     Recent Labs   Lab 08/05/22  0436 08/07/22  1803 08/08/22  0528 08/10/22  0612   * 137 131* 137   K 3.8 4.2 3.9 4.1   CL 96 99 96 99   CO2 28 28 27 28   BUN 11 13 14 16   CREATININE 1.2 1.2 1.2 1.3   * 154* 166* 150*   CALCIUM 9.3 9.6 9.4 9.6   MG 1.6  --  1.7 1.7   PHOS 3.8 3.8 4.0 3.9     Recent Labs   Lab 08/05/22  0436 08/07/22  1803 08/08/22  0528 08/10/22  0612   ALKPHOS 144*  --  89 99   ALT 49*  --  54* 56*   AST 34  --  43* 40   ALBUMIN 2.4* 2.3* 2.2* 2.4*   PROT 8.3  --  7.9 8.8*   BILITOT 0.4  --  0.4 0.5        Recent Labs     08/09/22  1730   CRP 94.9*       All labs within the last 24 hours were reviewed.     Microbiology:  Microbiology Results (last 7 days)       Procedure Component Value Units Date/Time    Culture, Anaerobe [[id number]] Collected: 08/10/22 0859    Order Status: Completed Specimen: Incision site from Arm, Right Updated: 08/11/22 0750     Anaerobic Culture Culture in progress    Fungus culture [[id number]] Collected: 08/10/22 0859    Order Status: Completed Specimen: Incision site from Arm, Right Updated: 08/11/22 0747     Fungus (Mycology) Culture Culture in progress    Aerobic culture [[id number]] Collected: 08/10/22 0859    Order Status: Completed Specimen: Incision site from Arm, Right Updated: 08/11/22 0618     Aerobic Bacterial Culture No growth    Gram stain [[id number]] Collected: 08/10/22 0859    Order Status: Completed Specimen: Incision site from Arm, Right Updated: 08/10/22 1313     Gram Stain Result Moderate WBC's      No organisms seen    AFB Culture & Smear [[id number]] Collected: 08/10/22 0859    Order Status: Sent Specimen: Incision site from Arm, Right Updated: 08/10/22 0910    Fungus culture [[id number]] Collected: 08/04/22 1145    Order Status: Completed Specimen: Bone from Toe, Right Foot Updated: 08/09/22  1307     Fungus (Mycology) Culture Culture in progress    Narrative:      R Great toe bone    Aerobic culture [[id number]]  (Abnormal)  (Susceptibility) Collected: 08/04/22 1145    Order Status: Completed Specimen: Bone from Toe, Right Foot Updated: 08/08/22 1228     Aerobic Bacterial Culture STAPHYLOCOCCUS COHNII SUBSPECIES COHNII  Moderate  Skin sabine also present  Skin sabine also present      Narrative:      R great toe bone    Culture, Anaerobe [[id number]] Collected: 08/03/22 1241    Order Status: Completed Specimen: Wound from Toe, Right Foot Updated: 08/08/22 1155     Anaerobic Culture No anaerobes isolated    Culture, Anaerobe [[id number]] Collected: 08/04/22 1145    Order Status: Completed Specimen: Bone from Toe, Right Foot Updated: 08/08/22 0949     Anaerobic Culture No anaerobes isolated    Narrative:      R Great toe bone    Blood culture #1 [[id number]] Collected: 08/02/22 1935    Order Status: Completed Specimen: Blood from Peripheral, Wrist, Left Updated: 08/07/22 2022     Blood Culture, Routine No growth after 5 days.    Narrative:      Blood Culture #1    Blood culture #2 [[id number]] Collected: 08/02/22 1927    Order Status: Completed Specimen: Blood from Peripheral, Hand, Left Updated: 08/07/22 2022     Blood Culture, Routine No growth after 5 days.    Narrative:      Blood Culture #2    AFB Culture & Smear [[id number]] Collected: 08/04/22 1145    Order Status: Completed Specimen: Bone from Toe, Right Foot Updated: 08/05/22 2127     AFB Culture & Smear Culture in progress     AFB CULTURE STAIN No acid fast bacilli seen.    Narrative:      R Great toe bone    Aerobic culture [[id number]] Collected: 08/03/22 1241    Order Status: Completed Specimen: Wound from Toe, Right Foot Updated: 08/05/22 0911     Aerobic Bacterial Culture Skin sabine,  no predominant organism    Urine culture [[id number]]  (Abnormal)  (Susceptibility) Collected: 08/02/22 2000    Order Status: Completed Specimen: Urine Updated:  08/05/22 0142     Urine Culture, Routine PROTEUS PENNERI  10,000 - 49,999 cfu/ml      Narrative:      Specimen Source->Urine    AFB Culture & Smear [[id number]] Collected: 08/03/22 1241    Order Status: Completed Specimen: Wound from Toe, Right Foot Updated: 08/04/22 2127     AFB Culture & Smear Culture in progress     AFB CULTURE STAIN No acid fast bacilli seen.    Narrative:      Right toe    Gram stain [[id number]] Collected: 08/04/22 1145    Order Status: Completed Specimen: Bone from Toe, Right Foot Updated: 08/04/22 1446     Gram Stain Result No WBC's      No organisms seen    Narrative:      R Great toe bone              Imaging  ECG Results    None         No results found for this or any previous visit.      MRI Forearm W WO Contrast Right  Narrative: EXAMINATION:  MRI FOREARM W WO CONTRAST RIGHT    CLINICAL HISTORY:  Soft tissue mass, forearm, deep;    TECHNIQUE:  Multisequence, multi planar magnetic resonance imaging of the right forearm before and after the intravenous administration of 10 mL Gadavist.    COMPARISON:  Radiographs from earlier today.  Ultrasound of the right forearm from 08/08/2022 and 08/04/2022.    FINDINGS:  Soft tissues: There is a large rim enhancing fluid collection within the right anterior forearm soft tissues measuring 9.3 x 5.7 x 3.2 cm (cc by AP by TR).  The collection is centered within the musculature of the anterior compartment of the proximal forearm.  There is some associated T1 hyperintensity suspicious for blood products or proteinaceous content.  There is adjacent enhancement and edema of the anterior compartment musculature and the subcutaneous fat of the anterior forearm, with overlying skin thickening and enhancement.    Bone: There is no evidence of acute osteomyelitis.  Bone marrow signal is normal.  There is no T1 hypointense marrow signal.  There is no abnormal marrow enhancement.  There is no acute fracture or dislocation.    Articulations: The right elbow and  right wrist joints are grossly unremarkable.  No joint effusion.    Miscellaneous: Neurovascular structures are grossly intact.  Impression: Large rim enhancing fluid collection within the musculature of the anterior compartment of the proximal forearm, compatible with an intramuscular abscess or infected hematoma.  Adjacent myositis and cellulitis as above.  No evidence of acute osteomyelitis.  Correlate clinically for evidence of compartment syndrome.    This report was flagged in Epic as abnormal.    Electronically signed by: Jesse Heredia  Date:    08/10/2022  Time:    00:40      All imaging within the last 24 hours was reviewed.       Discharge Planning   JOSÉ ANTONIO: 8/12/2022     Code Status: Full Code   Is the patient medically ready for discharge?: No    Reason for patient still in hospital (select all that apply): Patient trending condition, Laboratory test, Treatment, Consult recommendations, and Pending disposition  Discharge Plan A: Home Health

## 2022-08-11 NOTE — PROGRESS NOTES
"[facility] - [facility] S[facility]  Adult Nutrition  Progress Note    SUMMARY       Recommendations    1. Continue current diabetic diet.   2. RD following.    Nutrition Goal Status: goal met  Communication of RD Recs: (POC)    Assessment and Plan    Nutrition Problem  Inconsistent CHO intake    Related to (etiology):   Physiological causes associated with T2DM    Signs and Symptoms (as evidenced by):   A1C 12.4% and Glucose of 150 mg/dL    Interventions/Recommendations (treatment strategy):  Collaboration of nutrition care with other providers   DM diet education handout    Nutrition Diagnosis Status:   New     Reason for Assessment    Reason For Assessment: RD follow-up  General Information Comments: Spoke with pt at bedside. States intake %. No issues with n/v/d/c/chewing/swallowing. No wt changes since last RD visit. LBM 8/4.    8/4: Spoke with pt at bedside. States intake % with 3 meals/day on general diet. States he currently does not drink any soft drinks. States intake now % d/t personal preferences. No issues with n/v/d/c/chewing/swallowing. Unsure UBW. No wt hx. NFPE not warranted at this time d/t pt appearing well-nourished.     Nutrition Discharge Planning: Diabetic diet    Nutrition Risk Screen    Nutrition Risk Screen: no indicators present    Nutrition/Diet History    Spiritual, Cultural Beliefs, Hinduism Practices, Values that Affect Care: no  Food Allergies: NKFA  Factors Affecting Nutritional Intake: None identified at this time    Anthropometrics    Temp: 97.5 °F (36.4 °C)  Height Method: Stated  Height: 6' 3" (190.5 cm)  Height (inches): 75 in  Weight Method: Bed Scale  Weight: (!) 162.2 kg (357 lb 9.4 oz)  Weight (lb): (!) 357.59 lb  Ideal Body Weight (IBW), Male: 196 lb  % Ideal Body Weight, Male (lb): 182.44 %  BMI (Calculated): 44.7  BMI Grade: greater than 40 - morbid obesity     Lab/Procedures/Meds    Pertinent Labs Reviewed: reviewed  Pertinent Labs Comments: Glucose 150, A1C 12.4, " Albumin 2.4, ALT 56, Hgb 10.4, Hct 31.8, CRP 94.9  Pertinent Medications Reviewed: reviewed  Pertinent Medications Comments: amlodipine, enoxaparin, insulin, senna-docusate    Estimated/Assessed Needs    Weight Used For Calorie Calculations: 88.9 kg (196 lb)  Energy Calorie Requirements (kcal): 2223 kcal (25 g/kg IBW)  Energy Need Method: Kcal/kg  Protein Requirements: 71-89 g (0.8-1.0 g/kg IBW)  Weight Used For Protein Calculations: 88.9 kg (196 lb)  Fluid Requirements (mL): 1 ml or fluid per MD  Estimated Fluid Requirement Method: RDA Method  RDA Method (mL): 2223  CHO Requirement: 278 g    Nutrition Prescription Ordered    Current Diet Order: Diabetic    Evaluation of Received Nutrient/Fluid Intake    I/O: -6.8 L since admit  Energy Calories Required: meeting needs  Protein Required: meeting needs  Fluid Required: meeting needs  Tolerance: tolerating  % Intake of Estimated Energy Needs: 75 - 100 %  % Meal Intake: 75 - 100 %    Nutrition Risk    Level of Risk/Frequency of Follow-up:  (1 time/week)     Monitor and Evaluation    Food and Nutrient Intake: energy intake, food and beverage intake  Food and Nutrient Adminstration: diet order     Nutrition Follow-Up    RD Follow-up?: Yes    Kimberlee MILLER-TIFFANIEN

## 2022-08-11 NOTE — PROGRESS NOTES
[facility]  Orthopedics  Progress Note    Patient Name: Renee Caceres  MRN: [id number]  Admission Date: 8/2/2022  Hospital Length of Stay: 8 days  Attending Provider: Mamta Cadena MD  Primary Care Provider: Primary Doctor No  Follow-up For: Procedure(s) (LRB):  INCISION AND DRAINAGE, UPPER EXTREMITY - RIGHT, Hand table, Ancef/clinda (hold until cx collected), Cysto tubing (Right)    Post-Operative Day: 1 Day Post-Op  Subjective:     Principal Problem:Osteomyelitis of great toe of right foot    Principal Orthopedic Problem: R arm intramuscular abscess    Interval History: s/p I&D on 8/10/22 with Dr. Oleary. Patient seen and examined at bedside. NAEON. Patient doing well. R arm pain improved.     Review of patient's allergies indicates:  No Known Allergies    Current Facility-Administered Medications   Medication    acetaminophen tablet 650 mg    albuterol-ipratropium 2.5 mg-0.5 mg/3 mL nebulizer solution 3 mL    amLODIPine tablet 10 mg    cadexomer iodine 0.9 % gel    dextrose 10% bolus 125 mL    dextrose 10% bolus 250 mL    enoxaparin injection 40 mg    glucagon (human recombinant) injection 1 mg    glucose chewable tablet 16 g    glucose chewable tablet 24 g    hydrALAZINE tablet 25 mg    HYDROcodone-acetaminophen 7.5-325 mg per tablet 1 tablet    insulin aspart U-100 pen 0-5 Units    insulin aspart U-100 pen 12 Units    insulin detemir U-100 pen 32 Units    melatonin tablet 6 mg    morphine injection 2 mg    naloxone 0.4 mg/mL injection 0.02 mg    ondansetron disintegrating tablet 4 mg    polyethylene glycol packet 17 g    polyethylene glycol packet 17 g    prochlorperazine injection Soln 5 mg    senna-docusate 8.6-50 mg per tablet 1 tablet    simethicone chewable tablet 80 mg    sodium chloride 0.9% flush 10 mL    sodium chloride 0.9% flush 10 mL    And    sodium chloride 0.9% flush 10 mL    valsartan tablet 80 mg    vancomycin - pharmacy to dose     Objective:     Vital  "Signs (Most Recent):  Temp: 97.5 °F (36.4 °C) (08/11/22 1137)  Pulse: 79 (08/11/22 1137)  Resp: 18 (08/11/22 1153)  BP: (!) 155/59 (08/11/22 1137)  SpO2: (!) 94 % (08/11/22 1137)   Vital Signs (24h Range):  Temp:  [97.5 °F (36.4 °C)-98.1 °F (36.7 °C)] 97.5 °F (36.4 °C)  Pulse:  [77-88] 79  Resp:  [16-20] 18  SpO2:  [92 %-97 %] 94 %  BP: (117-162)/(58-70) 155/59     Weight: (!) 162.2 kg (357 lb 9.4 oz)  Height: 6' 3" (190.5 cm)  Body mass index is 44.7 kg/m².      Intake/Output Summary (Last 24 hours) at 8/11/2022 1330  Last data filed at 8/11/2022 0642  Gross per 24 hour   Intake 760 ml   Output 725 ml   Net 35 ml       Ortho/SPM Exam  AAOx4  NAD  Reg rate  No increased WOB    RUE:  Dressing c/d/i  FROM shoulder, elbow, and wrist  SILT M/U/R  Motor intact AIN/PIN/M/U/R  WWP extremities     Significant Labs: CBC:   Recent Labs   Lab 08/09/22  1730 08/10/22  0557   WBC 9.74 9.76   HGB 10.1* 10.4*   HCT 31.6* 31.8*   * 614*     CMP:   Recent Labs   Lab 08/10/22  0612      K 4.1   CL 99   CO2 28   *   BUN 16   CREATININE 1.3   CALCIUM 9.6   PROT 8.8*   ALBUMIN 2.4*   BILITOT 0.5   ALKPHOS 99   AST 40   ALT 56*   ANIONGAP 10     Wound Culture:   Recent Labs   Lab 08/10/22  0859   LABAERO No growth     All pertinent labs within the past 24 hours have been reviewed.    Significant Imaging: I have reviewed all pertinent imaging results/findings.    Assessment/Plan:     Abscess of right forearm  Renee Caceres is a 56 y.o. male with morbid obesity and poorly controlled T2DM admitted for right great toe osteo. MRI obtained today shows R forearm intra-muscular abscess. Signs of developing phlegmon on RUE U/S on 8/8. CRP 95 today. Has been on IV vancomycin for toe osteo. Educated patient he is at high risk for infection recurrence and wound healing issues in the setting of his high A1c. S/p I&D with Dr. Oleary on 8/10/22.    - ID following for R great toe osteo  - Toe cx + staph, Urine cx 8/5 + Proteus "   - IV vanc ongoing per ID, at high risk for sterile OR culture RUE in setting of recent IV abx   - Pain control MM  - DVT ppx per primary  - Elevate, ROMAT, WBAT RUE          Brandon Valera MD  Orthopedics  [facility] - [facility] Surg

## 2022-08-11 NOTE — PLAN OF CARE
Recommendations    1. Continue current diabetic diet.   2. RD following.    Nutrition Goal Status: goal met  Communication of RD Recs: (POC)    Kimberlee MILLER-TIFFANIEN

## 2022-08-11 NOTE — PLAN OF CARE
PT evaluation complete - see note for details. POC and goals established.    Problem: Physical Therapy  Goal: Physical Therapy Goal  Description: Goals to be met by: 22     Patient will increase functional independence with mobility by performin. Supine to sit with Esmeralda  2. Sit to stand transfer with Supervision  3. Bed to chair transfer with Supervision using LRAD  4. Gait  x 150 feet with Supervision using LRAD  5. Stand for 10 minutes with Supervision using LRAD    Outcome: Ongoing, Progressing     2022

## 2022-08-11 NOTE — PLAN OF CARE
Problem: Adult Inpatient Plan of Care  Goal: Plan of Care Review  Outcome: Ongoing, Progressing  Goal: Patient-Specific Goal (Individualized)  Outcome: Ongoing, Progressing  Goal: Absence of Hospital-Acquired Illness or Injury  Outcome: Ongoing, Progressing  Goal: Optimal Comfort and Wellbeing  Outcome: Ongoing, Progressing  Goal: Readiness for Transition of Care  Outcome: Ongoing, Progressing     Problem: Diabetes Comorbidity  Goal: Blood Glucose Level Within Targeted Range  Outcome: Ongoing, Progressing     Problem: Bariatric Environmental Safety  Goal: Safety Maintained with Care  Outcome: Ongoing, Progressing   No acute health changes to report during shift.  Pain manage with prescribed intervention when available.  Wound care to right foot (great toe), completed. Pt tolerated well.  Instructed pt to use darco boot when ambulating.

## 2022-08-11 NOTE — PROGRESS NOTES
Pharmacokinetic Assessment Follow Up: IV Vancomycin    Vancomycin serum concentration assessment(s):    The random level was drawn correctly and can be used to guide therapy at this time. The measurement is above the desired definitive target range of 15 to 20 mcg/mL.    Vancomycin Regimen Plan:    Continue to hold Vancomycin today  Re-dose when the random level is less than 20 mcg/mL, next level to be drawn at 0400 on 8/12/22    Drug levels (last 3 results):  Recent Labs   Lab Result Units 08/10/22  1100 08/11/22  0411   Vancomycin, Random ug/mL  --  27.2   Vancomycin-Trough ug/mL 24.8*  --        Pharmacy will continue to follow and monitor vancomycin.    Please contact pharmacy at extension [phone] for questions regarding this assessment.    Thank you for the consult,   Kathy Orta, PharmD       Patient brief summary:  Renee Caceres is a 56 y.o. male initiated on antimicrobial therapy with IV Vancomycin for treatment of bone/joint infection    Drug Allergies:   Review of patient's allergies indicates:  No Known Allergies    Actual Body Weight:   162.2 kg    Renal Function:   Estimated Creatinine Clearance: 106.2 mL/min (based on SCr of 1.3 mg/dL).,     Dialysis Method (if applicable):  N/A    CBC (last 72 hours):  Recent Labs   Lab Result Units 08/09/22  1730 08/10/22  0557   WBC K/uL 9.74 9.76   Hemoglobin g/dL 10.1* 10.4*   Hematocrit % 31.6* 31.8*   Platelets K/uL 608* 614*   Gran % % 67.1 66.6   Lymph % % 18.4 18.4   Mono % % 10.4 11.4   Eosinophil % % 2.2 2.2   Basophil % % 0.7 0.8   Differential Method  Automated Automated       Metabolic Panel (last 72 hours):  Recent Labs   Lab Result Units 08/10/22  0612   Sodium mmol/L 137   Potassium mmol/L 4.1   Chloride mmol/L 99   CO2 mmol/L 28   Glucose mg/dL 150*   BUN mg/dL 16   Creatinine mg/dL 1.3   Albumin g/dL 2.4*   Total Bilirubin mg/dL 0.5   Alkaline Phosphatase U/L 99   AST U/L 40   ALT U/L 56*   Magnesium mg/dL 1.7   Phosphorus mg/dL 3.9       Vancomycin  Administrations:  vancomycin given in the last 96 hours                   vancomycin 1.25 g in dextrose 5% 250 mL IVPB (ready to mix) (mg) 1,250 mg New Bag 08/10/22 1110     1,250 mg New Bag 08/09/22 2100     1,250 mg New Bag  0834     1,250 mg New Bag 08/08/22 2000     1,250 mg New Bag  0915                Microbiologic Results:  Microbiology Results (last 7 days)     Procedure Component Value Units Date/Time    Culture, Anaerobe [[id number]] Collected: 08/10/22 0859    Order Status: Completed Specimen: Incision site from Arm, Right Updated: 08/11/22 0750     Anaerobic Culture Culture in progress    Fungus culture [[id number]] Collected: 08/10/22 0859    Order Status: Completed Specimen: Incision site from Arm, Right Updated: 08/11/22 0747     Fungus (Mycology) Culture Culture in progress    Aerobic culture [[id number]] Collected: 08/10/22 0859    Order Status: Completed Specimen: Incision site from Arm, Right Updated: 08/11/22 0618     Aerobic Bacterial Culture No growth    Gram stain [[id number]] Collected: 08/10/22 0859    Order Status: Completed Specimen: Incision site from Arm, Right Updated: 08/10/22 1313     Gram Stain Result Moderate WBC's      No organisms seen    AFB Culture & Smear [[id number]] Collected: 08/10/22 0859    Order Status: Sent Specimen: Incision site from Arm, Right Updated: 08/10/22 0910    Fungus culture [[id number]] Collected: 08/04/22 1145    Order Status: Completed Specimen: Bone from Toe, Right Foot Updated: 08/09/22 1307     Fungus (Mycology) Culture Culture in progress    Narrative:      R Great toe bone    Aerobic culture [[id number]]  (Abnormal)  (Susceptibility) Collected: 08/04/22 1145    Order Status: Completed Specimen: Bone from Toe, Right Foot Updated: 08/08/22 1228     Aerobic Bacterial Culture STAPHYLOCOCCUS COHNII SUBSPECIES COHNII  Moderate  Skin sabine also present  Skin sabine also present      Narrative:      R great toe bone    Culture, Anaerobe [[id number]] Collected:  08/03/22 1241    Order Status: Completed Specimen: Wound from Toe, Right Foot Updated: 08/08/22 1155     Anaerobic Culture No anaerobes isolated    Culture, Anaerobe [[id number]] Collected: 08/04/22 1145    Order Status: Completed Specimen: Bone from Toe, Right Foot Updated: 08/08/22 0949     Anaerobic Culture No anaerobes isolated    Narrative:      R Great toe bone    Blood culture #1 [[id number]] Collected: 08/02/22 1935    Order Status: Completed Specimen: Blood from Peripheral, Wrist, Left Updated: 08/07/22 2022     Blood Culture, Routine No growth after 5 days.    Narrative:      Blood Culture #1    Blood culture #2 [[id number]] Collected: 08/02/22 1927    Order Status: Completed Specimen: Blood from Peripheral, Hand, Left Updated: 08/07/22 2022     Blood Culture, Routine No growth after 5 days.    Narrative:      Blood Culture #2    AFB Culture & Smear [[id number]] Collected: 08/04/22 1145    Order Status: Completed Specimen: Bone from Toe, Right Foot Updated: 08/05/22 2127     AFB Culture & Smear Culture in progress     AFB CULTURE STAIN No acid fast bacilli seen.    Narrative:      R Great toe bone    Aerobic culture [[id number]] Collected: 08/03/22 1241    Order Status: Completed Specimen: Wound from Toe, Right Foot Updated: 08/05/22 0911     Aerobic Bacterial Culture Skin sabine,  no predominant organism    Urine culture [[id number]]  (Abnormal)  (Susceptibility) Collected: 08/02/22 2000    Order Status: Completed Specimen: Urine Updated: 08/05/22 0142     Urine Culture, Routine PROTEUS PENNERI  10,000 - 49,999 cfu/ml      Narrative:      Specimen Source->Urine    AFB Culture & Smear [[id number]] Collected: 08/03/22 1241    Order Status: Completed Specimen: Wound from Toe, Right Foot Updated: 08/04/22 2127     AFB Culture & Smear Culture in progress     AFB CULTURE STAIN No acid fast bacilli seen.    Narrative:      Right toe    Gram stain [[id number]] Collected: 08/04/22 1145    Order Status: Completed Specimen:  Bone from Toe, Right Foot Updated: 08/04/22 1446     Gram Stain Result No WBC's      No organisms seen    Narrative:      R Great toe bone

## 2022-08-11 NOTE — CARE UPDATE
BG goal 140-180    -A1C    Lab Results   Component Value Date    HGBA1C 12.4 (H) 08/03/2022           -HOME REGIMEN: none    -INPATIENT REGIMEN: Levemir 32 units daily and novolog 12 units with meals.     -GLUCOSE TREND FOR THE PAST 24HRS: 117-203    -NO HYPOGYCEMIAS NOTED     -TOLERATING 50% OF PO DIET     -Diet: Diet diabetic [facility]; 2000 Calorie        Remains in MSU, NAEON. BG reasonably well controlled with scheduled insulin and prn correction scale. No changes to plan of care; endocrine to continue to follow.       Plan:  Continue levemir 32 units daily.   Continue novolog 12 units with meals.   BG monitoring ac/hs and low dose correction scale.     Bedside nurse to instruct on insulin pen use and blood sugar monitor. Have patient administer own injections after education completed supervised by nurse.    Have patient watch insulin educatoin video's via i-pad if available on unit       Discharge planning:tbd     Endocrine to continue to follow    ** Please call Endocrine for any BG related issues **

## 2022-08-11 NOTE — PT/OT/SLP EVAL
Physical Therapy Co-Evaluation with OT and Treatment     Patient Name:  Renee Caceres  MRN: [id number]    Admit Date: 8/2/2022  Admitting Diagnosis:  Osteomyelitis of great toe of right foot  Length of Stay: 8 days  Recent Surgery: Procedure(s) (LRB):  INCISION AND DRAINAGE, UPPER EXTREMITY - RIGHT, Hand table, Ancef/clinda (hold until cx collected), Cysto tubing (Right) 1 Day Post-Op    Recommendations:     Discharge Recommendations: Home Health PT  Equipment recommendations: rolling walker and bedside commode  Barriers to discharge: None Identified     Assessment:     Renee Caceres is a 56 y.o. male admitted to [facility] on 8/2/2022 with medical diagnosis of Osteomyelitis of great toe of right foot. Pt presents with weakness, impaired endurance, impaired functional mobility, gait instability, impaired balance, pain, orthopedic precautions, edema. These deficits effect their roles and responsibilities in which they were able to complete prior to admit. PTA, pt states he was (I) with ambulation and ADLs. Pt educated on R heel WB precautions. No post-op shoe present in room at time of evaluation, but pt able to maintain WB to R heel during gait bout. Demonstrates decreased processing speed during session. Renee Caceres would benefit from acute PT intervention to improve quality of life, focus on recovery of impairments, provide patient/caregiver education, reduce fall risk, and maximize (I) and safety with functional mobility. Once medically stable, recommending pt discharge to Home Health PT.      Rehab Prognosis: Good    Plan:     During this hospitalization, patient to be seen 3 x/week to address the identified rehab impairments via gait training, therapeutic activities, therapeutic exercises and progress towards stated goals.     Plan of Care Expires:  09/10/22  Plan of Care reviewed with: patient    This plan of care has been discussed with the patient/caregiver, who was included in its development and is in  "agreement with the identified goals and treatment plan.     Subjective     Communicated with RN prior to session.  Patient found supine upon PT entry to room, agreeable to evaluation. Pt alone during session.    Chief Complaint: RUE pain and swelling    Patient/Family Comments/goals: "I can do all of this. I was doing all of it before"    Pain/Comfort:  · Pain Rating 1: 7/10  · Location - Side 1: Right  · Location - Orientation 1: generalized  · Location 1: arm  · Pain Addressed 1: Reposition, Distraction, Cessation of Activity  · Pain Rating Post-Intervention 1: 7/10  · Pain Rating 2: 1/10  · Location - Side 2: Right  · Location - Orientation 2: generalized  · Location 2: first toe  · Pain Addressed 2: Reposition, Distraction, Cessation of Activity  · Pain Rating Post-Intervention 2: 1/10    Patients cultural, spiritual, Cheondoism conflicts given the current situation: None identified     Patient History: information obtained from pt     Living Environment: Pt lives alone (plans to d/c to daughter's apartment with similar layout) in apartment  with threshold [address]. Bathroom set-up: tub/shower combo  Prior Level of Function: independent with mobility and ADLs; driving; works as   DME owned: none  Support Available/Caregiver Assistance: daughter    Objective:   OT present for coeval due to pt's multiple medical comorbidities and functional/cognition deficits requiring two skilled therapists to appropriately progress pt's musculoskeletal strength, neuromuscular control, and endurance while taking into consideration medical acuity and pt safety.    Patient found with: telemetry    Recent Surgery: Procedure(s) (LRB):  INCISION AND DRAINAGE, UPPER EXTREMITY - RIGHT, Hand table, Ancef/clinda (hold until cx collected), Cysto tubing (Right) 1 Day Post-Op  General Precautions: Standard, fall   Orthopedic Precautions:RLE partial weight bearing (WB to R heel in post op shoe)   Braces: N/A   Oxygen Device: " room air      Exams:     Cognition:  · Alert  · Command following: Follows multistep verbal commands  · Communication: clear/fluent     Sensation:   o Light touch sensation: Intact BLEs     Gross Motor Coordination: No deficits noted during functional mobility tasks      Edema/Skin Integrity: Moderate RUE and RLE; Visible skin intact     Postural examination/scapula alignment: Rounded shoulder and Head forward     Lower Extremity Range of Motion:  o Right Lower Extremity: WFL  o Left Lower Extremity: WFL     Lower Extremity Strength:  o Right Lower Extremity: Grossly 4/5  o Left Lower Extremity: Grossly 4/5    Functional Mobility:    Bed Mobility:  · Supine > Sit with stand by assistance  · Sit > Supine with stand by assistance    Transfers:   · Sit <> Stand Transfer: from eob with RW  · Trial 1: MaxAx2; max vc for safety and to maintain R heel WB precautions; pt initially demonstrates difficulty coordinating standing with RW  · Trial 2: ModAx2             Gait:  · Distance: 15 ft  · Assistance level: Minimal Assistance  · Assistive Device: rolling walker  · Gait Assessment: wide base of support with RW pushed too far in front of him (vc for correction but pt did not correct)  · R foot significantly externally rotated while walking (may be his baseline)    Balance:  · Dynamic Sitting: GOOD: Maintains balance through MODERATE excursions of active trunk movement  · Standing:  · Static: FAIR+: Takes MINIMAL challenges from all directions   · Able to stand and wipe himself down CGA  · Dynamic: POOR+: Needs MIN (minimal ) assist during gait    Outcome Measure: AM-PAC 6 CLICK MOBILITY  Total Score:17     Patient/Caregiver Education:     Therapist educated pt/caregiver regarding:    PT POC and goals for therapy    Safety with mobility and fall risk    Instruction on use of call button and importance of calling nursing staff for assistance with mobility    Time provided for therapeutic counseling and discussion  of current health disposition. All questions answered to satisfaction, within scope of PT practice     Patient/caregiver able to verbalize understanding and expressed no further questions this visit; will follow-up with pt/caregiver during current admit for additional questions/concerns within scope of practice.     White board updated.     Patient left supine with call button in reach.    GOALS:   Multidisciplinary Problems     Physical Therapy Goals        Problem: Physical Therapy    Goal Priority Disciplines Outcome Goal Variances Interventions   Physical Therapy Goal     PT, PT/OT Ongoing, Progressing     Description: Goals to be met by: 22     Patient will increase functional independence with mobility by performin. Supine to sit with [place]  2. Sit to stand transfer with Supervision  3. Bed to chair transfer with Supervision using LRAD  4. Gait  x 150 feet with Supervision using LRAD  5. Stand for 10 minutes with Supervision using LRAD                       History:     Past Medical History:   Diagnosis Date    Primary hypertension 8/3/2022       Past Surgical History:   Procedure Laterality Date    LEG SURGERY Left        Time Tracking:     PT Received On: 22  PT Start Time: 0950     PT Stop Time: 1016  PT Total Time (min): 26 min     Billable Minutes: Evaluation 8 and Gait Training 18    2022

## 2022-08-11 NOTE — PLAN OF CARE
OT evaluation completed, POC established as appropriate. OT recommending Home Health once medically stable for discharge. Patient would require RW and BSC.    Problem: Occupational Therapy  Goal: Occupational Therapy Goal  Description: Goals set on 8/11, with expiration date 8/25:  Patient will increase functional independence with ADLs by performing:    Bed mobility with Mod-[place]  Grooming while standing at sink with SBA  UB Dressing with SBA.  LB Dressing with SBA.  Toileting from toilet with SBA for hygiene and clothing management.   Functional mobility of household and community distance with Min A and AD as needed  Pt will demonstrate understanding of education provided regarding energy conservation and task modification through teach-back method.  Pt will demonstrate [place] in HEP for BUE strengthening GM/FM exercises to improve functional performance.     Outcome: Ongoing, Progressing

## 2022-08-11 NOTE — PROGRESS NOTES
[facility]  Telemedicine Progress Note    Patient Name: Renee Caceres  MRN: [id number]  Patient Class: IP- Inpatient   Admission Date: 8/2/2022  Length of Stay: 8 days  Attending Physician: Mamta Cadena MD  Primary Care Provider: Primary Doctor No          Subjective:     Principal Problem:Osteomyelitis of great toe of right foot        HPI:  57 yo male with diabetes, HTN presenting 2/2 R foot swelling that started acutely 3 days ago and progressively has gotten worse. He states he had a wound on his right great toe and 5th toe that has been there for a while. Today it had not gotten any better so he decided to come in for evaluation. He has not been on any antibiotics for it. He states that he was on medications at one point but no longer takes them as he thought he had gotten them under control. In the ED, concern with elevated WBC, ESR, CRP, xr of foot showed osteo of great toe. General surgery consulted to rule nec fasc 2/2 gas in the 5th toe. Medicine called for admission. Rocephin started.         Overview/Hospital Course:  No evidence of a necrotizing infection on physical exam. Podiatry and ID consulted. Hyperglycemia managed by Endocrinology.        This encounter was provided through telemedicine.  Patient was transferred to the telemedicine service on:  08/5/2022   The patient location is: [address] [place] [address] A admitted 8/2/2022  6:24 PM.  Present with the patient at the time of the telemed/virtual assessment: Telepresenter    Interval History/Overnight Events:   Clinical record since admit reviewed.  Patient able to provide history.     Right UE pain controlled with current regimen.  BP improving; Vanc currently held due to high level    Review of Systems   Constitutional:  Positive for activity change. Negative for fever.   Respiratory:  Negative for shortness of breath.    Cardiovascular:  Negative for chest pain.   Gastrointestinal:  Negative for diarrhea and vomiting.    Musculoskeletal:  Positive for arthralgias and myalgias.      Inpatient Medications:  Scheduled Meds:   amLODIPine  10 mg Oral Daily    enoxaparin  40 mg Subcutaneous Q12H    insulin aspart U-100  12 Units Subcutaneous TIDWM    insulin detemir U-100  32 Units Subcutaneous Daily    polyethylene glycol  17 g Oral BID    senna-docusate 8.6-50 mg  1 tablet Oral BID    sodium chloride 0.9%  10 mL Intravenous Q6H    valsartan  80 mg Oral BID     Continuous Infusions:  PRN Meds:.acetaminophen, albuterol-ipratropium, cadexomer iodine, dextrose 10%, dextrose 10%, glucagon (human recombinant), glucose, glucose, hydrALAZINE, HYDROcodone-acetaminophen, insulin aspart U-100, melatonin, morphine, naloxone, ondansetron, polyethylene glycol, prochlorperazine, simethicone, sodium chloride 0.9%, Flushing PICC Protocol **AND** sodium chloride 0.9% **AND** sodium chloride 0.9%, Pharmacy to dose Vancomycin consult **AND** vancomycin - pharmacy to dose      Objective:     Temp:  [97.5 °F (36.4 °C)-98.1 °F (36.7 °C)] 97.5 °F (36.4 °C)  Pulse:  [77-88] 77  Resp:  [16-18] 18  SpO2:  [92 %-97 %] 94 %  BP: (117-162)/(58-70) 155/70      Intake/Output Summary (Last 24 hours) at 8/11/2022 1108  Last data filed at 8/11/2022 0642  Gross per 24 hour   Intake 760 ml   Output 725 ml   Net 35 ml        Body mass index is 44.7 kg/m².    Physical Exam  Vitals and nursing note reviewed.   Constitutional:       General: He is not in acute distress.     Appearance: Normal appearance. He is normal weight. He is not ill-appearing.   HENT:      Head: Normocephalic and atraumatic.      Right Ear: Hearing normal.      Left Ear: Hearing normal.      Nose: Nose normal.   Eyes:      General: No scleral icterus.        Right eye: No discharge.         Left eye: No discharge.      Extraocular Movements: Extraocular movements intact.   Cardiovascular:      Rate and Rhythm: Normal rate.   Pulmonary:      Effort: Pulmonary effort is normal. No accessory  muscle usage or respiratory distress.   Musculoskeletal:      Comments: Right UE bandaged   Neurological:      General: No focal deficit present.      Mental Status: He is alert and oriented to person, place, and time.      Cranial Nerves: No cranial nerve deficit.      Motor: No weakness.   Psychiatric:         Attention and Perception: Attention normal.         Mood and Affect: Mood normal.         Speech: Speech normal.         Behavior: Behavior is cooperative.        Labs:  Recent Results (from the past 24 hour(s))   POCT glucose    Collection Time: 08/10/22 11:20 AM   Result Value Ref Range    POCT Glucose 181 (H) 70 - 110 mg/dL   POCT glucose    Collection Time: 08/10/22  4:04 PM   Result Value Ref Range    POCT Glucose 164 (H) 70 - 110 mg/dL   POCT glucose    Collection Time: 08/10/22  8:53 PM   Result Value Ref Range    POCT Glucose 117 (H) 70 - 110 mg/dL   Vancomycin, random    Collection Time: 08/11/22  4:11 AM   Result Value Ref Range    Vancomycin, Random 27.2 Not established ug/mL   POCT glucose    Collection Time: 08/11/22  7:27 AM   Result Value Ref Range    POCT Glucose 170 (H) 70 - 110 mg/dL        No results found for: [id number]    Recent Labs   Lab 08/08/22 0528 08/09/22  1730 08/10/22  0557   WBC 10.04 9.74 9.76   LYMPH 16.4*  1.7 18.4  1.8 18.4  1.8   HGB 10.6* 10.1* 10.4*   HCT 33.5* 31.6* 31.8*   * 608* 614*     Recent Labs   Lab 08/05/22 0436 08/07/22  1803 08/08/22  0528 08/10/22  0612   * 137 131* 137   K 3.8 4.2 3.9 4.1   CL 96 99 96 99   CO2 28 28 27 28   BUN 11 13 14 16   CREATININE 1.2 1.2 1.2 1.3   * 154* 166* 150*   CALCIUM 9.3 9.6 9.4 9.6   MG 1.6  --  1.7 1.7   PHOS 3.8 3.8 4.0 3.9     Recent Labs   Lab 08/05/22  0436 08/07/22  1803 08/08/22  0528 08/10/22  0612   ALKPHOS 144*  --  89 99   ALT 49*  --  54* 56*   AST 34  --  43* 40   ALBUMIN 2.4* 2.3* 2.2* 2.4*   PROT 8.3  --  7.9 8.8*   BILITOT 0.4  --  0.4 0.5        Recent Labs     08/09/22  1830   CRP  94.9*       All labs within the last 24 hours were reviewed.     Microbiology:  Microbiology Results (last 7 days)       Procedure Component Value Units Date/Time    Culture, Anaerobe [[id number]] Collected: 08/10/22 0859    Order Status: Completed Specimen: Incision site from Arm, Right Updated: 08/11/22 0750     Anaerobic Culture Culture in progress    Fungus culture [[id number]] Collected: 08/10/22 0859    Order Status: Completed Specimen: Incision site from Arm, Right Updated: 08/11/22 0747     Fungus (Mycology) Culture Culture in progress    Aerobic culture [[id number]] Collected: 08/10/22 0859    Order Status: Completed Specimen: Incision site from Arm, Right Updated: 08/11/22 0618     Aerobic Bacterial Culture No growth    Gram stain [[id number]] Collected: 08/10/22 0859    Order Status: Completed Specimen: Incision site from Arm, Right Updated: 08/10/22 1313     Gram Stain Result Moderate WBC's      No organisms seen    AFB Culture & Smear [[id number]] Collected: 08/10/22 0859    Order Status: Sent Specimen: Incision site from Arm, Right Updated: 08/10/22 0910    Fungus culture [[id number]] Collected: 08/04/22 1145    Order Status: Completed Specimen: Bone from Toe, Right Foot Updated: 08/09/22 1307     Fungus (Mycology) Culture Culture in progress    Narrative:      R Great toe bone    Aerobic culture [[id number]]  (Abnormal)  (Susceptibility) Collected: 08/04/22 1145    Order Status: Completed Specimen: Bone from Toe, Right Foot Updated: 08/08/22 1228     Aerobic Bacterial Culture STAPHYLOCOCCUS COHNII SUBSPECIES COHNII  Moderate  Skin sabine also present  Skin sabine also present      Narrative:      R great toe bone    Culture, Anaerobe [[id number]] Collected: 08/03/22 1241    Order Status: Completed Specimen: Wound from Toe, Right Foot Updated: 08/08/22 1155     Anaerobic Culture No anaerobes isolated    Culture, Anaerobe [[id number]] Collected: 08/04/22 1145    Order Status: Completed Specimen: Bone from Toe,  Right Foot Updated: 08/08/22 0949     Anaerobic Culture No anaerobes isolated    Narrative:      R Great toe bone    Blood culture #1 [[id number]] Collected: 08/02/22 1935    Order Status: Completed Specimen: Blood from Peripheral, Wrist, Left Updated: 08/07/22 2022     Blood Culture, Routine No growth after 5 days.    Narrative:      Blood Culture #1    Blood culture #2 [[id number]] Collected: 08/02/22 1927    Order Status: Completed Specimen: Blood from Peripheral, Hand, Left Updated: 08/07/22 2022     Blood Culture, Routine No growth after 5 days.    Narrative:      Blood Culture #2    AFB Culture & Smear [[id number]] Collected: 08/04/22 1145    Order Status: Completed Specimen: Bone from Toe, Right Foot Updated: 08/05/22 2127     AFB Culture & Smear Culture in progress     AFB CULTURE STAIN No acid fast bacilli seen.    Narrative:      R Great toe bone    Aerobic culture [[id number]] Collected: 08/03/22 1241    Order Status: Completed Specimen: Wound from Toe, Right Foot Updated: 08/05/22 0911     Aerobic Bacterial Culture Skin sabine,  no predominant organism    Urine culture [[id number]]  (Abnormal)  (Susceptibility) Collected: 08/02/22 2000    Order Status: Completed Specimen: Urine Updated: 08/05/22 0142     Urine Culture, Routine PROTEUS PENNERI  10,000 - 49,999 cfu/ml      Narrative:      Specimen Source->Urine    AFB Culture & Smear [[id number]] Collected: 08/03/22 1241    Order Status: Completed Specimen: Wound from Toe, Right Foot Updated: 08/04/22 2127     AFB Culture & Smear Culture in progress     AFB CULTURE STAIN No acid fast bacilli seen.    Narrative:      Right toe    Gram stain [[id number]] Collected: 08/04/22 1145    Order Status: Completed Specimen: Bone from Toe, Right Foot Updated: 08/04/22 1446     Gram Stain Result No WBC's      No organisms seen    Narrative:      R Great toe bone              Imaging  ECG Results    None         No results found for this or any previous visit.      MRI Forearm  W WO Contrast Right  Narrative: EXAMINATION:  MRI FOREARM W WO CONTRAST RIGHT    CLINICAL HISTORY:  Soft tissue mass, forearm, deep;    TECHNIQUE:  Multisequence, multi planar magnetic resonance imaging of the right forearm before and after the intravenous administration of 10 mL Gadavist.    COMPARISON:  Radiographs from earlier today.  Ultrasound of the right forearm from 08/08/2022 and 08/04/2022.    FINDINGS:  Soft tissues: There is a large rim enhancing fluid collection within the right anterior forearm soft tissues measuring 9.3 x 5.7 x 3.2 cm (cc by AP by TR).  The collection is centered within the musculature of the anterior compartment of the proximal forearm.  There is some associated T1 hyperintensity suspicious for blood products or proteinaceous content.  There is adjacent enhancement and edema of the anterior compartment musculature and the subcutaneous fat of the anterior forearm, with overlying skin thickening and enhancement.    Bone: There is no evidence of acute osteomyelitis.  Bone marrow signal is normal.  There is no T1 hypointense marrow signal.  There is no abnormal marrow enhancement.  There is no acute fracture or dislocation.    Articulations: The right elbow and right wrist joints are grossly unremarkable.  No joint effusion.    Miscellaneous: Neurovascular structures are grossly intact.  Impression: Large rim enhancing fluid collection within the musculature of the anterior compartment of the proximal forearm, compatible with an intramuscular abscess or infected hematoma.  Adjacent myositis and cellulitis as above.  No evidence of acute osteomyelitis.  Correlate clinically for evidence of compartment syndrome.    This report was flagged in Epic as abnormal.    Electronically signed by: Jesse Heredia  Date:    08/10/2022  Time:    00:40      All imaging within the last 24 hours was reviewed.       Discharge Planning   JOSÉ ANTONIO: 8/12/2022     Code Status: Full Code   Is the patient medically  ready for discharge?: No    Reason for patient still in hospital (select all that apply): Patient trending condition, Laboratory test, Treatment, Consult recommendations, and Pending disposition  Discharge Plan A: Home Health            Assessment/Plan:      * Osteomyelitis of great toe of right foot  Per surgery, no evidence of a necrotizing infection at physical exam. Patient with a chronic wound opened to air, this may be the reason there is some soft tissue gas seen in the foot Xray. Wound looks chronic in nature.   - Continued ceftriaxone, vancomycin initially  - Podiatry consulted, patient s/p bone bx   - MRI consistent with osteomyelitis  - ID consulted: Right hallux bone biopsy. Bone cultures pending.  - Unable to get vascular SANDEE due to pain.   - ID de-escalated antibiotic to vancomycin. Placed PICC. Wound care per Podiatry.    Abscess of right forearm  Began developing about 2 weeks ago. Now s/p I&D with Ortho.  Cultures pending (patient already treated with vancomycin) - NG so far    Ulcer of right fifth toe due to diabetes mellitus  Possible osteomyelitis, continuing wound care and antibiotics    Uncontrolled type 2 diabetes mellitus with hyperglycemia, without long-term current use of insulin  Patient's FSGs are uncontrolled due to hyperglycemia on current medication regimen.  Last A1c reviewed- uncontrolled since 2010 per available records.  Current correctional scale  Low  Endocrinology consulted and managing with anti-hyperglycemics as follows-   Antihyperglycemics (From admission, onward)            Start     Stop Route Frequency Ordered    08/07/22 1035  insulin aspart U-100 pen 0-5 Units         -- SubQ Before meals & nightly PRN 08/07/22 0935    08/06/22 1130  insulin aspart U-100 pen 12 Units         -- SubQ 3 times daily with meals 08/06/22 0835    08/06/22 0900  insulin detemir U-100 pen 32 Units         -- SubQ Daily 08/06/22 0835      Hold Oral hypoglycemics while patient is in the hospital  - anticipate insulin at discharge  Consulted Endocrinology for management and discharge recommendations, follow up.  Management per Endocrinology.    Primary hypertension  Uncontrolled, not on medication at home  - Continued amlodipine 5mg daily  - Continued lisinopril 5mg daily; increased to 20 mg for 8/6  - BP remained elevated; increased lisinopril to 40 mg. Increased amlodipine. Monitor.  - Persistent hypertension; Changed lisinopril to valsartan on 8/9 - holding zoila-op on 8/10    Cellulitis of right lower extremity  Acute  Continued Rocephin and vancomycin initially  LE U/S performed: No evidence of deep venous thrombosis in the right lower extremity.  ID de-escalated antibiotic to vancomycin.      VTE Risk Mitigation (From admission, onward)         Ordered     enoxaparin injection 40 mg  Every 12 hours         08/03/22 0833     IP VTE HIGH RISK PATIENT  Once         08/03/22 0141     Place sequential compression device  Until discontinued         08/03/22 0141                High Risk Conditions:  Patient is currently on drug therapy requiring intensive monitoring for toxicity: Vancomycin      I have assessed these findings virtually using a telemed platform and with assistance of the bedside nurse.        The attending portion of this evaluation, treatment, and documentation was performed per Mamta Cadena MD via Telemedicine AudioVisual using the secure [employer] software platform with 2 way audio/video. The provider was located off-site and the patient is located in the hospital. The aforementioned video software was utilized to document the relevant history and physical exam    Mamta Cadena MD  Department of Hospital Medicine   [facility]

## 2022-08-11 NOTE — PLAN OF CARE
Pt is AAOx4. Pt remain free from fall and injuries. VS remain stable. Questions and concerns voiced and answered. Medication compliance. Pain is medicated, right upper arm dressing remain dry and intact. Call light in reach. Bed in low locked position. Side rails x2. Belongings at bedside.

## 2022-08-11 NOTE — ASSESSMENT & PLAN NOTE
Began developing about 2 weeks ago. Now s/p I&D with Ortho.  Cultures pending (patient already treated with vancomycin) - NG so far

## 2022-08-11 NOTE — PT/OT/SLP EVAL
Occupational Therapy  Co- Evaluation and Treatment  Co-treat with PT due to medical complexity of pt and to optimize functional performance.      Name: Renee Caceres  MRN: [id number]  Admitting Diagnosis:  Osteomyelitis of great toe of right foot 1 Day Post-Op  Length of Stay: 8 days    Recommendations:     Discharge Recommendations: home health OT  Discharge Equipment Recommendations:  other (see comments) (TBD progress pending)  Barriers to discharge:  Other (Comment) (Decreased safety awareness)    Plan:     Patient to be seen 3 x/week to address the above listed problems via self-care/home management, therapeutic activities, therapeutic exercises, neuromuscular re-education  · Plan of Care Expires: 09/10/22  · Plan of Care Reviewed with: patient    Assessment:     Renee Caceres is a 56 y.o. male with a medical diagnosis of Osteomyelitis of great toe of right foot.  He presents with the following performance deficits affecting function: weakness, impaired endurance, impaired self care skills, impaired functional mobility, gait instability, impaired balance, pain, decreased safety awareness, decreased lower extremity function, decreased upper extremity function, decreased coordination, impaired coordination, decreased ROM, impaired skin, impaired cardiopulmonary response to activity, orthopedic precautions, edema.      Pt presenting with increased pain, impaired RUE and RLE function, impaired endurance, increased weakness, impaired balance, and decreased safety awareness upon evaluation this date, requiring increased time and assistance to complete functional tasks. Patient observed with delayed cognition, requiring increased time and repeating of commands for completion of tasks. Patient educated on technique for stand transfers and mobility while adhering to Heel WB restrictions. Post op shoe not present at bedside. Pt would benefit from continued acute skilled OT services at this time to increase  "functional performance, and improve quality of life. OT to recommend HHOT at discharge once medically appropriate for increased functional independence and to improve patient safety before returning home.    Rehab Prognosis: Fair; patient would benefit from acute skilled OT services to address these deficits and reach maximum level of function.       Subjective   Patient states: " I drove myself here. Usually I do okay "    Communicated with: Nurse prior to session.  Patient found HOB elevated with telemetry, peripheral IV, PICC line upon OT entry to room.    Chief Complaint: Swelling (Right arm and leg swelling that started Saturday. Denies pain. Denies medical hx.)    Patient/Family Comments/goals: to return home at [facility]    Pain/Comfort:  Pain Rating 1: 7/10  Location - Side 1: Right  Location - Orientation 1: generalized  Location 1: arm  Pain Addressed 1: Reposition, Distraction, Cessation of Activity  Pain Rating Post-Intervention 1: 7/10  Pain Rating 2: 1/10  Location - Side 2: Right  Location - Orientation 2: generalized  Location 2: other (see comments) (toe)  Pain Addressed 2: Reposition, Distraction, Cessation of Activity  Pain Rating Post-Intervention 2: 1/10    Patients cultural, spiritual, Evangelical conflicts given the current situation: no    Occupational Profile:    Patient was not very clear in his responses, responded "not really, no" or "yes sometimes" for most questions  Living Environment: lives alone in H / 1st floor apt threshold [address]; tub/shower combo  Prior Level of Function: Patient reports being Independent with mobility & with ADLs.   Patient uses DME as follows: none.   DME owned (not currently used): none.  Roles/Repsonsibilities:   Hand Dominance: reports using both   Work: .   Drive: yes.   Managing Medicines/Managing Home: yes.   Equipment Used at Home:  none    Patient reports they will have assistance from daughter upon discharge. Patient reports he will " "either discharge to daughter's home or she will come stay with him, unsure.      Objective:     Patient found with: telemetry, peripheral IV, PICC line   General Precautions: Standard, Cardiac fall   Orthopedic Precautions:RLE partial weight bearing (WB R heel in post op shoe)   Braces: N/A   Oxygen Device: Room Air  Vitals: BP (!) 155/59 (BP Location: Left leg, Patient Position: Lying)   Pulse 79   Temp 97.5 °F (36.4 °C) (Oral)   Resp 18   Ht 6' 3" (1.905 m)   Wt (!) 162.2 kg (357 lb 9.4 oz)   SpO2 (!) 94%   BMI 44.70 kg/m²     Cognitive and Psychosocial Function:   · AxOx4 -- Person, Place, Time and Situation   · Follows Commands/attention:follows one-step commands  · Communication:  clear/fluent  · Memory: Poor immediate recall  · Safety awareness/insight to disability: impaired   · Mood/Affect/Coping skills/emotional control: Appropriate to situation    Hearing: Intact    Vision:  Intact visual fields    Physical Exam:  Postural examination/scapula alignment:    -       Rounded shoulders  Skin integrity: Visible skin intact     Left UE Right UE   UE Edema absent present   UE ROM AROM WFL AROM WFL; limited by ace wrapping   UE Strength 3+/5 3/5    Strength 3+/5 3/5   Sensation LUE INTACT:WFL RUE INTACT: WFL   Fine Motor Coordination:  LUE INTACT: WFL RUE IMPAIRED:    Gross Motor Coordination: LUE INTACT: WFL RUE INTACT:WFL     Occupational Performance:  Bed Mobility:    · Patient completed Rolling/Turning to Right with stand by assistance  · Patient completed Supine to Sit with stand by assistance on R side of bed  · Scooting anteriorly to EOB to have both feet planted on floor: stand by assistance  · Patient completed Sit to Supine with stand by assistance on r side of bed    Functional Mobility/Transfers:   Static Sitting EOB: SBA   Patient completed Sit <> Stand Transfer from EOB with maximal assistance and of 2 persons  with  rolling walker ; patient with unsafe technique attempt, requiring " increased cueing and time to achieve full stance   Patient completed 2nd stand EOB with Mod A x  2 using RW; required increased time to achieve full stance   Static Standing Balance: CGA   Pt completed functional mobility of household distance ~20ft with Min A using RW. Required increased time and cueing for RW technique and safety; observed with increased ER of RLE      Activities of Daily Living:  · Batheing: contact guard assistance to perform pericare/LB wipe off in stance  · Upper Body Dressing: moderate assistance to don gown  · Lower Body Dressing: contact guard assistance to doff/don clean underwear in stance      AMPA 6 Click ADL:  AMPA Total Score: 16    Treatment & Education:  -Pt education on OT role and POC.  -Importance of E/OOB activity with staff assistance, emphasis on daily participation  -Safety during functional transfer and mobility ensured  -Patient provided with education on importance of Bilateral UB/LB integration during functional tasks for improvement in functional performance.   -Education provided/reviewed, questions answered within OT scope of practice.   -Patient will continue to require reinforcement to demonstrate understanding and learning this date.         Patient left HOB elevated with all lines intact, call button in reach and nurse notified    GOALS:   Multidisciplinary Problems     Occupational Therapy Goals        Problem: Occupational Therapy    Goal Priority Disciplines Outcome Interventions   Occupational Therapy Goal     OT, PT/OT Ongoing, Progressing    Description: Goals set on 8/11, with expiration date 8/25:  Patient will increase functional independence with ADLs by performing:    Bed mobility with Mod-Ravalli  Grooming while standing at sink with SBA  UB Dressing with SBA.  LB Dressing with SBA.  Toileting from toilet with SBA for hygiene and clothing management.   Functional mobility of household and community distance with Min A and AD as needed  Pt will  demonstrate understanding of education provided regarding energy conservation and task modification through teach-back method.  Pt will demonstrate Delta in HEP for BUE strengthening GM/FM exercises to improve functional performance.                      History:     Past Medical History:   Diagnosis Date    Primary hypertension 8/3/2022         Past Surgical History:   Procedure Laterality Date    LEG SURGERY Left        Time Tracking:       OT Date of Treatment: 08/11/22  OT Start Time: 0950  OT Stop Time: 1014  OT Total Time (min): 24 min    Billable Minutes:Evaluation 10  Self Care/Home Management 14      8/11/2022

## 2022-08-11 NOTE — ASSESSMENT & PLAN NOTE
Patient's FSGs are uncontrolled due to hyperglycemia on current medication regimen.  Last A1c reviewed- uncontrolled since 2010 per available records.  Current correctional scale  Low  Endocrinology consulted and managing with anti-hyperglycemics as follows-   Antihyperglycemics (From admission, onward)            Start     Stop Route Frequency Ordered    08/07/22 1035  insulin aspart U-100 pen 0-5 Units         -- SubQ Before meals & nightly PRN 08/07/22 0935    08/06/22 1130  insulin aspart U-100 pen 12 Units         -- SubQ 3 times daily with meals 08/06/22 0835    08/06/22 0900  insulin detemir U-100 pen 32 Units         -- SubQ Daily 08/06/22 0835      Hold Oral hypoglycemics while patient is in the hospital - anticipate insulin at discharge  Consulted Endocrinology for management and discharge recommendations, follow up.  Management per Endocrinology.

## 2022-08-12 LAB
ALBUMIN SERPL BCP-MCNC: 2.2 G/DL (ref 3.5–5.2)
ALP SERPL-CCNC: 86 U/L (ref 55–135)
ALT SERPL W/O P-5'-P-CCNC: 32 U/L (ref 10–44)
ANION GAP SERPL CALC-SCNC: 9 MMOL/L (ref 8–16)
AST SERPL-CCNC: 29 U/L (ref 10–40)
BASOPHILS # BLD AUTO: 0.06 K/UL (ref 0–0.2)
BASOPHILS NFR BLD: 0.9 % (ref 0–1.9)
BILIRUB SERPL-MCNC: 0.3 MG/DL (ref 0.1–1)
BUN SERPL-MCNC: 15 MG/DL (ref 6–20)
CALCIUM SERPL-MCNC: 9.1 MG/DL (ref 8.7–10.5)
CHLORIDE SERPL-SCNC: 101 MMOL/L (ref 95–110)
CO2 SERPL-SCNC: 26 MMOL/L (ref 23–29)
CREAT SERPL-MCNC: 1.3 MG/DL (ref 0.5–1.4)
DIFFERENTIAL METHOD: ABNORMAL
EOSINOPHIL # BLD AUTO: 0.3 K/UL (ref 0–0.5)
EOSINOPHIL NFR BLD: 4.2 % (ref 0–8)
ERYTHROCYTE [DISTWIDTH] IN BLOOD BY AUTOMATED COUNT: 14 % (ref 11.5–14.5)
EST. GFR  (NO RACE VARIABLE): >60 ML/MIN/1.73 M^2
GLUCOSE SERPL-MCNC: 113 MG/DL (ref 70–110)
HCT VFR BLD AUTO: 31.4 % (ref 40–54)
HGB BLD-MCNC: 9.9 G/DL (ref 14–18)
IMM GRANULOCYTES # BLD AUTO: 0.03 K/UL (ref 0–0.04)
IMM GRANULOCYTES NFR BLD AUTO: 0.4 % (ref 0–0.5)
LYMPHOCYTES # BLD AUTO: 1.6 K/UL (ref 1–4.8)
LYMPHOCYTES NFR BLD: 23.1 % (ref 18–48)
MAGNESIUM SERPL-MCNC: 1.7 MG/DL (ref 1.6–2.6)
MCH RBC QN AUTO: 26.5 PG (ref 27–31)
MCHC RBC AUTO-ENTMCNC: 31.5 G/DL (ref 32–36)
MCV RBC AUTO: 84 FL (ref 82–98)
MONOCYTES # BLD AUTO: 0.9 K/UL (ref 0.3–1)
MONOCYTES NFR BLD: 12.9 % (ref 4–15)
NEUTROPHILS # BLD AUTO: 4.1 K/UL (ref 1.8–7.7)
NEUTROPHILS NFR BLD: 58.5 % (ref 38–73)
NRBC BLD-RTO: 0 /100 WBC
PHOSPHATE SERPL-MCNC: 4 MG/DL (ref 2.7–4.5)
PLATELET # BLD AUTO: 640 K/UL (ref 150–450)
PMV BLD AUTO: 9.1 FL (ref 9.2–12.9)
POCT GLUCOSE: 128 MG/DL (ref 70–110)
POCT GLUCOSE: 133 MG/DL (ref 70–110)
POCT GLUCOSE: 196 MG/DL (ref 70–110)
POCT GLUCOSE: 205 MG/DL (ref 70–110)
POTASSIUM SERPL-SCNC: 4.2 MMOL/L (ref 3.5–5.1)
PROT SERPL-MCNC: 8.3 G/DL (ref 6–8.4)
RBC # BLD AUTO: 3.73 M/UL (ref 4.6–6.2)
SODIUM SERPL-SCNC: 136 MMOL/L (ref 136–145)
VANCOMYCIN SERPL-MCNC: 14.9 UG/ML
WBC # BLD AUTO: 6.92 K/UL (ref 3.9–12.7)

## 2022-08-12 PROCEDURE — 99233 SBSQ HOSP IP/OBS HIGH 50: CPT | Mod: 95,,, | Performed by: INTERNAL MEDICINE

## 2022-08-12 PROCEDURE — 99233 PR SUBSEQUENT HOSPITAL CARE,LEVL III: ICD-10-PCS | Mod: 95,,, | Performed by: INTERNAL MEDICINE

## 2022-08-12 PROCEDURE — 83735 ASSAY OF MAGNESIUM: CPT | Performed by: INTERNAL MEDICINE

## 2022-08-12 PROCEDURE — 80053 COMPREHEN METABOLIC PANEL: CPT | Performed by: INTERNAL MEDICINE

## 2022-08-12 PROCEDURE — 25000003 PHARM REV CODE 250: Performed by: INTERNAL MEDICINE

## 2022-08-12 PROCEDURE — 85025 COMPLETE CBC W/AUTO DIFF WBC: CPT | Performed by: INTERNAL MEDICINE

## 2022-08-12 PROCEDURE — 99232 PR SUBSEQUENT HOSPITAL CARE,LEVL II: ICD-10-PCS | Mod: ,,, | Performed by: NURSE PRACTITIONER

## 2022-08-12 PROCEDURE — 63600175 PHARM REV CODE 636 W HCPCS: Performed by: INTERNAL MEDICINE

## 2022-08-12 PROCEDURE — 25000003 PHARM REV CODE 250: Performed by: NURSE PRACTITIONER

## 2022-08-12 PROCEDURE — A4216 STERILE WATER/SALINE, 10 ML: HCPCS | Performed by: INTERNAL MEDICINE

## 2022-08-12 PROCEDURE — 63600175 PHARM REV CODE 636 W HCPCS: Performed by: HOSPITALIST

## 2022-08-12 PROCEDURE — 11000001 HC ACUTE MED/SURG PRIVATE ROOM

## 2022-08-12 PROCEDURE — 80202 ASSAY OF VANCOMYCIN: CPT | Performed by: INTERNAL MEDICINE

## 2022-08-12 PROCEDURE — 84100 ASSAY OF PHOSPHORUS: CPT | Performed by: INTERNAL MEDICINE

## 2022-08-12 PROCEDURE — 99232 SBSQ HOSP IP/OBS MODERATE 35: CPT | Mod: ,,, | Performed by: NURSE PRACTITIONER

## 2022-08-12 RX ORDER — AMLODIPINE BESYLATE 5 MG/1
5 TABLET ORAL DAILY
Status: DISCONTINUED | OUTPATIENT
Start: 2022-08-13 | End: 2022-08-16 | Stop reason: HOSPADM

## 2022-08-12 RX ORDER — VALSARTAN 80 MG/1
80 TABLET ORAL 2 TIMES DAILY
Qty: 180 TABLET | Refills: 3 | Status: SHIPPED | OUTPATIENT
Start: 2022-08-12 | End: 2024-03-06 | Stop reason: SDUPTHER

## 2022-08-12 RX ORDER — INSULIN ASPART 100 [IU]/ML
12 INJECTION, SOLUTION INTRAVENOUS; SUBCUTANEOUS 3 TIMES DAILY
Qty: 12 ML | Refills: 11 | Status: SHIPPED | OUTPATIENT
Start: 2022-08-12 | End: 2023-08-12

## 2022-08-12 RX ORDER — VANCOMYCIN HCL IN 5 % DEXTROSE 1G/250ML
1000 PLASTIC BAG, INJECTION (ML) INTRAVENOUS ONCE
Status: COMPLETED | OUTPATIENT
Start: 2022-08-12 | End: 2022-08-12

## 2022-08-12 RX ORDER — POLYETHYLENE GLYCOL 3350 17 G/17G
17 POWDER, FOR SOLUTION ORAL 3 TIMES DAILY PRN
Refills: 0 | COMMUNITY
Start: 2022-08-12

## 2022-08-12 RX ORDER — AMOXICILLIN 250 MG
1 CAPSULE ORAL 2 TIMES DAILY
COMMUNITY
Start: 2022-08-12

## 2022-08-12 RX ORDER — HYDROCODONE BITARTRATE AND ACETAMINOPHEN 7.5; 325 MG/1; MG/1
1 TABLET ORAL EVERY 6 HOURS PRN
Qty: 21 TABLET | Refills: 0 | Status: SHIPPED | OUTPATIENT
Start: 2022-08-12

## 2022-08-12 RX ORDER — AMLODIPINE BESYLATE 5 MG/1
5 TABLET ORAL DAILY
Qty: 30 TABLET | Refills: 11 | Status: SHIPPED | OUTPATIENT
Start: 2022-08-13 | End: 2024-03-06 | Stop reason: SDUPTHER

## 2022-08-12 RX ADMIN — INSULIN DETEMIR 32 UNITS: 100 INJECTION, SOLUTION SUBCUTANEOUS at 08:08

## 2022-08-12 RX ADMIN — Medication 10 ML: at 12:08

## 2022-08-12 RX ADMIN — INSULIN ASPART 12 UNITS: 100 INJECTION, SOLUTION INTRAVENOUS; SUBCUTANEOUS at 05:08

## 2022-08-12 RX ADMIN — Medication 10 ML: at 05:08

## 2022-08-12 RX ADMIN — INSULIN ASPART 12 UNITS: 100 INJECTION, SOLUTION INTRAVENOUS; SUBCUTANEOUS at 11:08

## 2022-08-12 RX ADMIN — ENOXAPARIN SODIUM 40 MG: 40 INJECTION SUBCUTANEOUS at 08:08

## 2022-08-12 RX ADMIN — VALSARTAN 80 MG: 40 TABLET, FILM COATED ORAL at 08:08

## 2022-08-12 RX ADMIN — Medication 10 ML: at 11:08

## 2022-08-12 RX ADMIN — HYDROCODONE BITARTRATE AND ACETAMINOPHEN 1 TABLET: 7.5; 325 TABLET ORAL at 08:08

## 2022-08-12 RX ADMIN — VANCOMYCIN HYDROCHLORIDE 1000 MG: 1 INJECTION, POWDER, LYOPHILIZED, FOR SOLUTION INTRAVENOUS at 07:08

## 2022-08-12 RX ADMIN — SENNOSIDES AND DOCUSATE SODIUM 1 TABLET: 50; 8.6 TABLET ORAL at 08:08

## 2022-08-12 RX ADMIN — HYDROCODONE BITARTRATE AND ACETAMINOPHEN 1 TABLET: 7.5; 325 TABLET ORAL at 05:08

## 2022-08-12 RX ADMIN — AMLODIPINE BESYLATE 10 MG: 10 TABLET ORAL at 08:08

## 2022-08-12 RX ADMIN — HYDROCODONE BITARTRATE AND ACETAMINOPHEN 1 TABLET: 7.5; 325 TABLET ORAL at 12:08

## 2022-08-12 RX ADMIN — POLYETHYLENE GLYCOL 3350 17 G: 17 POWDER, FOR SOLUTION ORAL at 08:08

## 2022-08-12 NOTE — SUBJECTIVE & OBJECTIVE
"Interval HPI:   Overnight events:Remains in [facility]. Tentative discharge tomorrow. BG reasonably well controlled with current regimen. Antibiotics. 2 Days Post-Op  Eating: Diet diabetic [facility]; 2000 Calorie    50%  Nausea: No  Hypoglycemia and intervention: No  Fever: No  TPN and/or TF: No    BP (!) 144/63 (BP Location: Left leg, Patient Position: Lying)   Pulse 78   Temp 97.4 °F (36.3 °C) (Oral)   Resp 16   Ht 6' 3" (1.905 m)   Wt (!) 162.2 kg (357 lb 9.4 oz)   SpO2 (!) 94%   BMI 44.70 kg/m²     Labs Reviewed and Include    Recent Labs   Lab 08/12/22  0345   *   CALCIUM 9.1   ALBUMIN 2.2*   PROT 8.3      K 4.2   CO2 26      BUN 15   CREATININE 1.3   ALKPHOS 86   ALT 32   AST 29   BILITOT 0.3     Lab Results   Component Value Date    WBC 6.92 08/12/2022    HGB 9.9 (L) 08/12/2022    HCT 31.4 (L) 08/12/2022    MCV 84 08/12/2022     (H) 08/12/2022     No results for input(s): TSH, FREET4 in the last 168 hours.  Lab Results   Component Value Date    HGBA1C 12.4 (H) 08/03/2022       Nutritional status:   Body mass index is 44.7 kg/m².  Lab Results   Component Value Date    ALBUMIN 2.2 (L) 08/12/2022    ALBUMIN 2.4 (L) 08/10/2022    ALBUMIN 2.2 (L) 08/08/2022     Lab Results   Component Value Date    PREALBUMIN 6 (L) 02/01/2010       Estimated Creatinine Clearance: 106.2 mL/min (based on SCr of 1.3 mg/dL).    Accu-Checks  Recent Labs     08/10/22  0741 08/10/22  0937 08/10/22  1120 08/10/22  1604 08/10/22  2053 08/11/22  0727 08/11/22  1139 08/11/22  1554 08/12/22  0655 08/12/22  1116   POCTGLUCOSE 194* 203* 181* 164* 117* 170* 223* 177* 128* 196*       Current Medications and/or Treatments Impacting Glycemic Control  Immunotherapy:    Immunosuppressants       None          Steroids:   Hormones (From admission, onward)                Start     Stop Route Frequency Ordered    08/03/22 0239  melatonin tablet 6 mg         -- Oral Nightly PRN 08/03/22 0141          Pressors:    Autonomic " Drugs (From admission, onward)                None          Hyperglycemia/Diabetes Medications:   Antihyperglycemics (From admission, onward)                Start     Stop Route Frequency Ordered    08/07/22 1035  insulin aspart U-100 pen 0-5 Units         -- SubQ Before meals & nightly PRN 08/07/22 0935    08/06/22 1130  insulin aspart U-100 pen 12 Units         -- SubQ 3 times daily with meals 08/06/22 0835    08/06/22 0900  insulin detemir U-100 pen 32 Units         -- SubQ Daily 08/06/22 0835

## 2022-08-12 NOTE — ASSESSMENT & PLAN NOTE
Endocrinology consulted for BG management.   BG goal 140-180    Weight-based at 0.5 units/kg/day   - Levemir Flex Pen 32 units daily  - Novolog (aspart) insulin 12 Units SQ TIDWM and prn for BG excursions LDC (150/50) SSI  - BG checks AC/HS  - Hypoglycemia protocol in place    ** Please notify Endocrine for any change and/or advance in diet**  ** Please call Endocrine for any BG related issues **    Discharge Planning: TBD      Reviewed topics related to DM including: the need for insulin, how insulin works, what makes it a high risk medication, the importance of follow up with either PCP or endocrine provider, importance of and how to check BG, how to record BG on logs, how to administer insulin, appropriate insulin administration sites, importance of rotating injection sites, hyper/hypoglycemia, how and when to treat hypoglycemia, when to hold insulin,  importance of storing unused insulin in the refrigerator, and when to seek medical attention.  Patient verbalized understanding, answered all questions to patient's satisfaction.

## 2022-08-12 NOTE — PROGRESS NOTES
[facility] Medicine  Telemedicine Progress Note    Patient Name: Renee Caceres  MRN: [id number]  Patient Class: IP- Inpatient   Admission Date: 8/2/2022  Length of Stay: 9 days  Attending Physician: Mamta Cadena MD  Primary Care Provider: Primary Doctor No          Subjective:     Principal Problem:Osteomyelitis of great toe of right foot        HPI:  57 yo male with diabetes, HTN presenting 2/2 R foot swelling that started acutely 3 days ago and progressively has gotten worse. He states he had a wound on his right great toe and 5th toe that has been there for a while. Today it had not gotten any better so he decided to come in for evaluation. He has not been on any antibiotics for it. He states that he was on medications at one point but no longer takes them as he thought he had gotten them under control. In the ED, concern with elevated WBC, ESR, CRP, xr of foot showed osteo of great toe. General surgery consulted to rule nec fasc 2/2 gas in the 5th toe. Medicine called for admission. Rocephin started.         Overview/Hospital Course:  No evidence of a necrotizing infection on physical exam. Podiatry and ID consulted. Hyperglycemia managed by Endocrinology.        This encounter was provided through telemedicine.  Patient was transferred to the telemedicine service on:  08/5/2022   The patient location is: [address] [place] [address] A admitted 8/2/2022  6:24 PM.  Present with the patient at the time of the telemed/virtual assessment: Telepresenter    Interval History/Overnight Events:   Clinical record since admit reviewed.  Patient able to provide history.     ROM and strength in right UE improved today; vanc dosed this am and repeat level pending in am; Ortho has cleared for discharge; RUE culture now with Step dysagalctiae so ID contacted.     Review of Systems   Constitutional:  Positive for activity change. Negative for fever.   Respiratory:  Negative for shortness of breath.    Cardiovascular:   Negative for chest pain.   Gastrointestinal:  Negative for diarrhea and vomiting.   Musculoskeletal:  Positive for arthralgias and myalgias.      Inpatient Medications:  Scheduled Meds:   [START ON 8/13/2022] amLODIPine  5 mg Oral Daily    enoxaparin  40 mg Subcutaneous Q12H    insulin aspart U-100  12 Units Subcutaneous TIDWM    insulin detemir U-100  32 Units Subcutaneous Daily    polyethylene glycol  17 g Oral BID    senna-docusate 8.6-50 mg  1 tablet Oral BID    sodium chloride 0.9%  10 mL Intravenous Q6H    valsartan  80 mg Oral BID     Continuous Infusions:  PRN Meds:.acetaminophen, albuterol-ipratropium, cadexomer iodine, dextrose 10%, dextrose 10%, glucagon (human recombinant), glucose, glucose, hydrALAZINE, HYDROcodone-acetaminophen, insulin aspart U-100, melatonin, morphine, naloxone, ondansetron, polyethylene glycol, prochlorperazine, simethicone, sodium chloride 0.9%, Flushing PICC Protocol **AND** sodium chloride 0.9% **AND** sodium chloride 0.9%, Pharmacy to dose Vancomycin consult **AND** vancomycin - pharmacy to dose      Objective:     Temp:  [97.4 °F (36.3 °C)-98.5 °F (36.9 °C)] 97.4 °F (36.3 °C)  Pulse:  [75-89] 78  Resp:  [16-18] 16  SpO2:  [93 %-98 %] 94 %  BP: (112-155)/(57-85) 144/63      Intake/Output Summary (Last 24 hours) at 8/12/2022 1520  Last data filed at 8/12/2022 0014  Gross per 24 hour   Intake 237 ml   Output 1020 ml   Net -783 ml          Body mass index is 44.7 kg/m².    Physical Exam  Vitals and nursing note reviewed.   Constitutional:       General: He is not in acute distress.     Appearance: Normal appearance. He is normal weight. He is not ill-appearing.   HENT:      Head: Normocephalic and atraumatic.      Right Ear: Hearing normal.      Left Ear: Hearing normal.      Nose: Nose normal.   Eyes:      General: No scleral icterus.        Right eye: No discharge.         Left eye: No discharge.      Extraocular Movements: Extraocular movements intact.   Cardiovascular:       Rate and Rhythm: Normal rate.   Pulmonary:      Effort: Pulmonary effort is normal. No accessory muscle usage or respiratory distress.   Musculoskeletal:      Comments: Right UE bandaged   Neurological:      General: No focal deficit present.      Mental Status: He is alert and oriented to person, place, and time.      Cranial Nerves: No cranial nerve deficit.      Motor: No weakness.   Psychiatric:         Attention and Perception: Attention normal.         Mood and Affect: Mood normal.         Speech: Speech normal.         Behavior: Behavior is cooperative.        Labs:  Recent Results (from the past 24 hour(s))   POCT glucose    Collection Time: 08/11/22  3:54 PM   Result Value Ref Range    POCT Glucose 177 (H) 70 - 110 mg/dL   CBC Auto Differential    Collection Time: 08/12/22  3:45 AM   Result Value Ref Range    WBC 6.92 3.90 - 12.70 K/uL    RBC 3.73 (L) 4.60 - 6.20 M/uL    Hemoglobin 9.9 (L) 14.0 - 18.0 g/dL    Hematocrit 31.4 (L) 40.0 - 54.0 %    MCV 84 82 - 98 fL    MCH 26.5 (L) 27.0 - 31.0 pg    MCHC 31.5 (L) 32.0 - 36.0 g/dL    RDW 14.0 11.5 - 14.5 %    Platelets 640 (H) 150 - 450 K/uL    MPV 9.1 (L) 9.2 - 12.9 fL    Immature Granulocytes 0.4 0.0 - 0.5 %    Gran # (ANC) 4.1 1.8 - 7.7 K/uL    Immature Grans (Abs) 0.03 0.00 - 0.04 K/uL    Lymph # 1.6 1.0 - 4.8 K/uL    Mono # 0.9 0.3 - 1.0 K/uL    Eos # 0.3 0.0 - 0.5 K/uL    Baso # 0.06 0.00 - 0.20 K/uL    nRBC 0 0 /100 WBC    Gran % 58.5 38.0 - 73.0 %    Lymph % 23.1 18.0 - 48.0 %    Mono % 12.9 4.0 - 15.0 %    Eosinophil % 4.2 0.0 - 8.0 %    Basophil % 0.9 0.0 - 1.9 %    Differential Method Automated    Comprehensive Metabolic Panel    Collection Time: 08/12/22  3:45 AM   Result Value Ref Range    Sodium 136 136 - 145 mmol/L    Potassium 4.2 3.5 - 5.1 mmol/L    Chloride 101 95 - 110 mmol/L    CO2 26 23 - 29 mmol/L    Glucose 113 (H) 70 - 110 mg/dL    BUN 15 6 - 20 mg/dL    Creatinine 1.3 0.5 - 1.4 mg/dL    Calcium 9.1 8.7 - 10.5 mg/dL    Total Protein  8.3 6.0 - 8.4 g/dL    Albumin 2.2 (L) 3.5 - 5.2 g/dL    Total Bilirubin 0.3 0.1 - 1.0 mg/dL    Alkaline Phosphatase 86 55 - 135 U/L    AST 29 10 - 40 U/L    ALT 32 10 - 44 U/L    Anion Gap 9 8 - 16 mmol/L    eGFR >60.0 >60 mL/min/1.73 m^2   Magnesium    Collection Time: 08/12/22  3:45 AM   Result Value Ref Range    Magnesium 1.7 1.6 - 2.6 mg/dL   Phosphorus    Collection Time: 08/12/22  3:45 AM   Result Value Ref Range    Phosphorus 4.0 2.7 - 4.5 mg/dL   Vancomycin, random    Collection Time: 08/12/22  3:45 AM   Result Value Ref Range    Vancomycin, Random 14.9 Not established ug/mL   POCT glucose    Collection Time: 08/12/22  6:55 AM   Result Value Ref Range    POCT Glucose 128 (H) 70 - 110 mg/dL   POCT glucose    Collection Time: 08/12/22 11:16 AM   Result Value Ref Range    POCT Glucose 196 (H) 70 - 110 mg/dL        No results found for: [id number]    Recent Labs   Lab 08/09/22  1730 08/10/22  0557 08/12/22  0345   WBC 9.74 9.76 6.92   LYMPH 18.4  1.8 18.4  1.8 23.1  1.6   HGB 10.1* 10.4* 9.9*   HCT 31.6* 31.8* 31.4*   * 614* 640*       Recent Labs   Lab 08/08/22 0528 08/10/22  0612 08/12/22  0345   * 137 136   K 3.9 4.1 4.2   CL 96 99 101   CO2 27 28 26   BUN 14 16 15   CREATININE 1.2 1.3 1.3   * 150* 113*   CALCIUM 9.4 9.6 9.1   MG 1.7 1.7 1.7   PHOS 4.0 3.9 4.0       Recent Labs   Lab 08/08/22  0528 08/10/22  0612 08/12/22  0345   ALKPHOS 89 99 86   ALT 54* 56* 32   AST 43* 40 29   ALBUMIN 2.2* 2.4* 2.2*   PROT 7.9 8.8* 8.3   BILITOT 0.4 0.5 0.3          Recent Labs     08/09/22  1730   CRP 94.9*         All labs within the last 24 hours were reviewed.     Microbiology:  Microbiology Results (last 7 days)       Procedure Component Value Units Date/Time    Aerobic culture [[id number]]  (Abnormal) Collected: 08/10/22 0859    Order Status: Completed Specimen: Incision site from Arm, Right Updated: 08/12/22 1449     Aerobic Bacterial Culture STREPTOCOCCUS DYSGALACTIAE  Rare  Susceptibility  pending      Culture, Anaerobe [[id number]] Collected: 08/10/22 0859    Order Status: Completed Specimen: Incision site from Arm, Right Updated: 08/12/22 1037     Anaerobic Culture Culture in progress    AFB Culture & Smear [[id number]] Collected: 08/10/22 0859    Order Status: Completed Specimen: Incision site from Arm, Right Updated: 08/11/22 2127     AFB Culture & Smear Culture in progress     AFB CULTURE STAIN No acid fast bacilli seen.    Fungus culture [[id number]] Collected: 08/10/22 0859    Order Status: Completed Specimen: Incision site from Arm, Right Updated: 08/11/22 0747     Fungus (Mycology) Culture Culture in progress    Gram stain [[id number]] Collected: 08/10/22 0859    Order Status: Completed Specimen: Incision site from Arm, Right Updated: 08/10/22 1313     Gram Stain Result Moderate WBC's      No organisms seen    Fungus culture [[id number]] Collected: 08/04/22 1145    Order Status: Completed Specimen: Bone from Toe, Right Foot Updated: 08/09/22 1307     Fungus (Mycology) Culture Culture in progress    Narrative:      R Great toe bone    Aerobic culture [[id number]]  (Abnormal)  (Susceptibility) Collected: 08/04/22 1145    Order Status: Completed Specimen: Bone from Toe, Right Foot Updated: 08/08/22 1228     Aerobic Bacterial Culture STAPHYLOCOCCUS COHNII SUBSPECIES COHNII  Moderate  Skin sabine also present  Skin sabine also present      Narrative:      R great toe bone    Culture, Anaerobe [[id number]] Collected: 08/03/22 1241    Order Status: Completed Specimen: Wound from Toe, Right Foot Updated: 08/08/22 1155     Anaerobic Culture No anaerobes isolated    Culture, Anaerobe [[id number]] Collected: 08/04/22 1145    Order Status: Completed Specimen: Bone from Toe, Right Foot Updated: 08/08/22 0949     Anaerobic Culture No anaerobes isolated    Narrative:      R Great toe bone    Blood culture #1 [[id number]] Collected: 08/02/22 1935    Order Status: Completed Specimen: Blood from Peripheral, Wrist, Left  Updated: 08/07/22 2022     Blood Culture, Routine No growth after 5 days.    Narrative:      Blood Culture #1    Blood culture #2 [[id number]] Collected: 08/02/22 1927    Order Status: Completed Specimen: Blood from Peripheral, Hand, Left Updated: 08/07/22 2022     Blood Culture, Routine No growth after 5 days.    Narrative:      Blood Culture #2    AFB Culture & Smear [[id number]] Collected: 08/04/22 1145    Order Status: Completed Specimen: Bone from Toe, Right Foot Updated: 08/05/22 2127     AFB Culture & Smear Culture in progress     AFB CULTURE STAIN No acid fast bacilli seen.    Narrative:      R Great toe bone              Imaging  ECG Results    None         No results found for this or any previous visit.      MRI Forearm W WO Contrast Right  Narrative: EXAMINATION:  MRI FOREARM W WO CONTRAST RIGHT    CLINICAL HISTORY:  Soft tissue mass, forearm, deep;    TECHNIQUE:  Multisequence, multi planar magnetic resonance imaging of the right forearm before and after the intravenous administration of 10 mL Gadavist.    COMPARISON:  Radiographs from earlier today.  Ultrasound of the right forearm from 08/08/2022 and 08/04/2022.    FINDINGS:  Soft tissues: There is a large rim enhancing fluid collection within the right anterior forearm soft tissues measuring 9.3 x 5.7 x 3.2 cm (cc by AP by TR).  The collection is centered within the musculature of the anterior compartment of the proximal forearm.  There is some associated T1 hyperintensity suspicious for blood products or proteinaceous content.  There is adjacent enhancement and edema of the anterior compartment musculature and the subcutaneous fat of the anterior forearm, with overlying skin thickening and enhancement.    Bone: There is no evidence of acute osteomyelitis.  Bone marrow signal is normal.  There is no T1 hypointense marrow signal.  There is no abnormal marrow enhancement.  There is no acute fracture or dislocation.    Articulations: The right elbow and  right wrist joints are grossly unremarkable.  No joint effusion.    Miscellaneous: Neurovascular structures are grossly intact.  Impression: Large rim enhancing fluid collection within the musculature of the anterior compartment of the proximal forearm, compatible with an intramuscular abscess or infected hematoma.  Adjacent myositis and cellulitis as above.  No evidence of acute osteomyelitis.  Correlate clinically for evidence of compartment syndrome.    This report was flagged in Epic as abnormal.    Electronically signed by: Jesse Heredia  Date:    08/10/2022  Time:    00:40      All imaging within the last 24 hours was reviewed.       Discharge Planning   JOSÉ ANTONIO: 8/13/2022     Code Status: Full Code   Is the patient medically ready for discharge?: No    Reason for patient still in hospital (select all that apply): Patient trending condition, Laboratory test, Treatment, Consult recommendations, and Pending disposition  Discharge Plan A: Home Health            Assessment/Plan:      * Osteomyelitis of great toe of right foot  Per surgery, no evidence of a necrotizing infection at physical exam. Patient with a chronic wound opened to air, this may be the reason there is some soft tissue gas seen in the foot Xray. Wound looks chronic in nature.   - Continued ceftriaxone, vancomycin initially  - Podiatry consulted, patient s/p bone bx   - MRI consistent with osteomyelitis  - ID consulted: Right hallux bone biopsy. Bone cultures pending.  - Unable to get vascular SANDEE due to pain.   - ID de-escalated antibiotic to vancomycin. Placed PICC. Wound care per Podiatry.    Abscess of right forearm  Began developing about 2 weeks ago. Now s/p I&D with Ortho.  Cultures pending (patient receiving vancomycin) - now with strep dysgalactiae; sensitivities pending; ID notified for recs    Ulcer of right fifth toe due to diabetes mellitus  Possible osteomyelitis, continuing wound care and antibiotics    Uncontrolled type 2 diabetes  mellitus with hyperglycemia, without long-term current use of insulin  Patient's FSGs are uncontrolled due to hyperglycemia on current medication regimen.  Last A1c reviewed- uncontrolled since 2010 per available records.  Current correctional scale  Low  Endocrinology consulted and managing with anti-hyperglycemics as follows-   Antihyperglycemics (From admission, onward)            Start     Stop Route Frequency Ordered    08/07/22 1035  insulin aspart U-100 pen 0-5 Units         -- SubQ Before meals & nightly PRN 08/07/22 0935    08/06/22 1130  insulin aspart U-100 pen 12 Units         -- SubQ 3 times daily with meals 08/06/22 0835    08/06/22 0900  insulin detemir U-100 pen 32 Units         -- SubQ Daily 08/06/22 0835      Hold Oral hypoglycemics while patient is in the hospital - anticipate insulin at discharge  Consulted Endocrinology for management and discharge recommendations, follow up.  Management per Endocrinology.    Primary hypertension  Uncontrolled, not on medication at home  - Continued amlodipine 5mg daily  - Continued lisinopril 5mg daily; increased to 20 mg for 8/6  - BP remained elevated; increased lisinopril to 40 mg. Increased amlodipine. Monitor.  - Persistent hypertension; Changed lisinopril to valsartan on 8/9 - holding zoila-op on 8/10    Cellulitis of right lower extremity  Acute  Continued Rocephin and vancomycin initially  LE U/S performed: No evidence of deep venous thrombosis in the right lower extremity.  ID de-escalated antibiotic to vancomycin.      VTE Risk Mitigation (From admission, onward)         Ordered     enoxaparin injection 40 mg  Every 12 hours         08/03/22 0833     IP VTE HIGH RISK PATIENT  Once         08/03/22 0141     Place sequential compression device  Until discontinued         08/03/22 0141                High Risk Conditions:  Patient is currently on drug therapy requiring intensive monitoring for toxicity: Vancomycin  Patient is currently receiving  parenteral controlled substances: Morphine    I have assessed these findings virtually using a telemed platform and with assistance of the bedside nurse.      The attending portion of this evaluation, treatment, and documentation was performed per Mamta Cadena MD via Telemedicine AudioVisual using the secure [employer] software platform with 2 way audio/video. The provider was located off-site and the patient is located in the hospital. The aforementioned video software was utilized to document the relevant history and physical exam    Mamta Cadena MD  Department of Hospital Medicine   [facility]

## 2022-08-12 NOTE — PROGRESS NOTES
[facility]  Orthopedics  Progress Note    Patient Name: Renee Caceres  MRN: [id number]  Admission Date: 8/2/2022  Hospital Length of Stay: 9 days  Attending Provider: Mamta Cadena MD  Primary Care Provider: Primary Doctor No  Follow-up For: Procedure(s) (LRB):  INCISION AND DRAINAGE, UPPER EXTREMITY - RIGHT, Hand table, Ancef/clinda (hold until cx collected), Cysto tubing (Right)    Post-Operative Day: 2 Days Post-Op  Subjective:     Principal Problem:Osteomyelitis of great toe of right foot    Principal Orthopedic Problem: r arm intramuscular abscess     Interval History: s/p I&D on 8/10/22 with Dr. Oleary. Patient seen and examined at bedside. JAKOBEON. Patient doing well. R arm pain improved. Stated that he has been elevating and working on range of motion.     Review of patient's allergies indicates:  No Known Allergies    Current Facility-Administered Medications   Medication    acetaminophen tablet 650 mg    albuterol-ipratropium 2.5 mg-0.5 mg/3 mL nebulizer solution 3 mL    amLODIPine tablet 10 mg    cadexomer iodine 0.9 % gel    dextrose 10% bolus 125 mL    dextrose 10% bolus 250 mL    enoxaparin injection 40 mg    glucagon (human recombinant) injection 1 mg    glucose chewable tablet 16 g    glucose chewable tablet 24 g    hydrALAZINE tablet 25 mg    HYDROcodone-acetaminophen 7.5-325 mg per tablet 1 tablet    insulin aspart U-100 pen 0-5 Units    insulin aspart U-100 pen 12 Units    insulin detemir U-100 pen 32 Units    melatonin tablet 6 mg    morphine injection 2 mg    naloxone 0.4 mg/mL injection 0.02 mg    ondansetron disintegrating tablet 4 mg    polyethylene glycol packet 17 g    polyethylene glycol packet 17 g    prochlorperazine injection Soln 5 mg    senna-docusate 8.6-50 mg per tablet 1 tablet    simethicone chewable tablet 80 mg    sodium chloride 0.9% flush 10 mL    sodium chloride 0.9% flush 10 mL    And    sodium chloride 0.9% flush 10 mL    valsartan  "tablet 80 mg    vancomycin - pharmacy to dose     Objective:     Vital Signs (Most Recent):  Temp: 98.5 °F (36.9 °C) (08/12/22 0546)  Pulse: 88 (08/12/22 0546)  Resp: 18 (08/12/22 0546)  BP: 114/85 (08/12/22 0546)  SpO2: 98 % (08/12/22 0546) Vital Signs (24h Range):  Temp:  [97.5 °F (36.4 °C)-98.5 °F (36.9 °C)] 98.5 °F (36.9 °C)  Pulse:  [75-88] 88  Resp:  [16-20] 18  SpO2:  [93 %-98 %] 98 %  BP: (114-155)/(57-85) 114/85     Weight: (!) 162.2 kg (357 lb 9.4 oz)  Height: 6' 3" (190.5 cm)  Body mass index is 44.7 kg/m².      Intake/Output Summary (Last 24 hours) at 8/12/2022 0625  Last data filed at 8/12/2022 0014  Gross per 24 hour   Intake 1574 ml   Output 1790 ml   Net -216 ml       Ortho/SPM Exam    Ortho/SPM Exam  AAOx4  NAD  Reg rate  No increased WOB     RUE:  Dressing c/d/i  FROM shoulder, elbow, and wrist, fingers   SILT M/U/R  Motor intact AIN/PIN/M/U/R  WWP extremities     Significant Labs:   CBC:   Recent Labs   Lab 08/12/22  0345   WBC 6.92   HGB 9.9*   HCT 31.4*   *     CMP:   Recent Labs   Lab 08/12/22  0345      K 4.2      CO2 26   *   BUN 15   CREATININE 1.3   CALCIUM 9.1   PROT 8.3   ALBUMIN 2.2*   BILITOT 0.3   ALKPHOS 86   AST 29   ALT 32   ANIONGAP 9     All pertinent labs within the past 24 hours have been reviewed.    Significant Imaging: I have reviewed all pertinent imaging results/findings.    Assessment/Plan:     Abscess of right forearm  Renee Caceres is a 56 y.o. male with morbid obesity and poorly controlled T2DM admitted for right great toe osteo. MRI obtained today shows R forearm intra-muscular abscess. Signs of developing phlegmon on RUE U/S on 8/8. CRP 95 today. Has been on IV vancomycin for toe osteo. Educated patient he is at high risk for infection recurrence and wound healing issues in the setting of his high A1c. S/p I&D with Dr. Oleary on 8/10/22.     - ID following for R great toe osteo  - Toe cx + staph, Urine cx 8/5 + Proteus   - IV vanc " ongoing per ID, at high risk for sterile OR culture RUE in setting of recent IV abx   - Pain control MM  - DVT ppx per primary  - Elevate, ROMAT, WBAT RUE    Maria E Khan PA-C  Orthopedics  [facility]

## 2022-08-12 NOTE — PLAN OF CARE
[facility]    At the pt's bedside for education. Dr. Cadena on telemedicine and pharmacy is still dosing vanc. Not sure what the d/c dose will be and may hold up his d/c for tomorrow. Pt's friend will try to get to the hospital between 2-3:00 today for education.

## 2022-08-12 NOTE — ADDENDUM NOTE
Addendum  created 08/12/22 1211 by Blake Arceo MD    Attestation recorded in Intraprocedure, Intraprocedure Attestations filed

## 2022-08-12 NOTE — PLAN OF CARE
Problem: Adult Inpatient Plan of Care  Goal: Plan of Care Review  Outcome: Ongoing, Progressing  Goal: Patient-Specific Goal (Individualized)  Outcome: Ongoing, Progressing  Goal: Absence of Hospital-Acquired Illness or Injury  Outcome: Ongoing, Progressing  Goal: Optimal Comfort and Wellbeing  Outcome: Ongoing, Progressing  Goal: Readiness for Transition of Care  Outcome: Ongoing, Progressing     Problem: Diabetes Comorbidity  Goal: Blood Glucose Level Within Targeted Range  Outcome: Ongoing, Progressing     Problem: Bariatric Environmental Safety  Goal: Safety Maintained with Care  Outcome: Ongoing, Progressing     Problem: Infection  Goal: Absence of Infection Signs and Symptoms  Outcome: Ongoing, Progressing

## 2022-08-12 NOTE — PLAN OF CARE
"Pt is set up with [facility]. SW notified both that the Pt will likely discharge tomorrow, 8/13/22.     2:59 PM  Pt stated his electricity had been off at his apartment, but he was calling today to see if it's back on. Pt said he will discharge to a "motel" if he has no electricity upon discharge. Pt stated he'll let case management know.     3:10 PM  Pt is discharging to [facility] [address] [place] [address]. DME will be delivered to Pt's bedside today.    PEGGY Casiano, LMSW [facility]  [id number]      "

## 2022-08-12 NOTE — ASSESSMENT & PLAN NOTE
Began developing about 2 weeks ago. Now s/p I&D with Ortho.  Cultures pending (patient receiving vancomycin) - now with strep dysgalactiae; sensitivities pending; ID notified for recs

## 2022-08-12 NOTE — PLAN OF CARE
[facility]      HOME HEALTH ORDERS  FACE TO FACE ENCOUNTER    Patient Name: Renee Caceres  YOB: 1965    PCP: Primary Doctor No   PCP Address: None  PCP Phone Number: None  PCP Fax: None    Encounter Date: 8/2/22    Admit to Home Health    Diagnoses:  Active Hospital Problems    Diagnosis  POA    *Osteomyelitis of great toe of right foot [M86.9]  Yes    Abscess of right forearm [L02.413]  Yes    Severe obesity (BMI >= 40) [E66.01]  Yes    Ulcer of right fifth toe due to diabetes mellitus [E11.621, L97.519]  Yes    Cellulitis of right lower extremity [L03.115]  Yes    Primary hypertension [I10]  Yes    Uncontrolled type 2 diabetes mellitus with hyperglycemia, without long-term current use of insulin [E11.65]  Yes      Resolved Hospital Problems    Diagnosis Date Resolved POA    Swelling of right upper extremity [M79.89] 08/11/2022 Yes       Follow Up Appointments:  Future Appointments   Date Time Provider Department Center   8/24/2022  8:45 AM JOLEEN Dahl   8/25/2022  9:00 AM JOLEEN Alonso [facility]   9/19/2022 10:00 AM JOLEEN Alonso [facility]   9/21/2022  8:45 AM JOLEEN Dahl [facility]       Allergies:Review of patient's allergies indicates:  No Known Allergies    Medications: Review discharge medications with patient and family and provide education.    Current Facility-Administered Medications   Medication Dose Route Frequency Provider Last Rate Last Admin    acetaminophen tablet 650 mg  650 mg Oral Q8H PRN Artie Martin MD   650 mg at 08/10/22 2108    albuterol-ipratropium 2.5 mg-0.5 mg/3 mL nebulizer solution 3 mL  3 mL Nebulization Q6H PRN Artie Martin MD        amLODIPine tablet 5 mg  5 mg Oral Daily Mamta Cadena MD   5 mg at 08/15/22 0825    cadexomer iodine 0.9 % gel   Topical (Top) Daily PRN Dalton Webster DPM        dextrose 10% bolus 125 mL  12.5 g Intravenous PRN Artie Martin MD         dextrose 10% bolus 250 mL  25 g Intravenous PRN Artie Martin MD        enoxaparin injection 40 mg  40 mg Subcutaneous Q12H Manuela Amezcua MD   40 mg at 08/15/22 0825    glucagon (human recombinant) injection 1 mg  1 mg Intramuscular PRN Artie Martin MD        glucose chewable tablet 16 g  16 g Oral PRN Artie Martin MD        glucose chewable tablet 24 g  24 g Oral PRN Artie Martin MD        hydrALAZINE tablet 25 mg  25 mg Oral Q8H PRN Artie Martin MD   25 mg at 08/03/22 0200    HYDROcodone-acetaminophen 7.5-325 mg per tablet 1 tablet  1 tablet Oral Q6H PRN Dasia Amezcua, NP   1 tablet at 08/15/22 0045    indocyanine green (IC-GREEN) 25 mg injection             insulin aspart U-100 pen 0-5 Units  0-5 Units Subcutaneous QID (AC + HS) PRN Brandon Gage DNP, FNP   2 Units at 08/11/22 1154    insulin aspart U-100 pen 12 Units  12 Units Subcutaneous TIDWM Brandon Gage DNP, FNP   12 Units at 08/15/22 1135    insulin detemir U-100 pen 32 Units  32 Units Subcutaneous Daily Brandon Gage DNP, FNP   32 Units at 08/15/22 0826    melatonin tablet 6 mg  6 mg Oral Nightly PRN Artie Martin MD        morphine injection 2 mg  2 mg Intravenous Q6H PRN Dasia Amezcua, ANGELA   2 mg at 08/10/22 2300    naloxone 0.4 mg/mL injection 0.02 mg  0.02 mg Intravenous PRN Artie Martin MD        ondansetron disintegrating tablet 4 mg  4 mg Oral Q8H PRN Artie Martin MD        polyethylene glycol packet 17 g  17 g Oral BID PRN Artie Martin MD   17 g at 08/05/22 0922    polyethylene glycol packet 17 g  17 g Oral BID Maria E Flores MD   17 g at 08/15/22 0825    prochlorperazine injection Soln 5 mg  5 mg Intravenous Q6H PRN Artie Martin MD        senna-docusate 8.6-50 mg per tablet 1 tablet  1 tablet Oral BID Maria E Flores MD   1 tablet at 08/15/22 0825    simethicone chewable tablet 80 mg  1 tablet Oral QID PRN Artie Martin MD        sodium chloride 0.9% flush 10 mL  10 mL Intravenous PRN Artie Martin,  MD        sodium chloride 0.9% flush 10 mL  10 mL Intravenous Q6H Maria E Flores MD   10 mL at 08/15/22 1138    And    sodium chloride 0.9% flush 10 mL  10 mL Intravenous PRN Maria E Flores MD        valsartan tablet 80 mg  80 mg Oral BID Maria E Flores MD   80 mg at 08/15/22 0825    vancomycin - pharmacy to dose   Intravenous pharmacy to manage frequency Artie Martin MD        vancomycin 1.5 g in dextrose 5 % 250 mL IVPB (ready to mix)  1,500 mg Intravenous Q24H Mamta Cadena MD   Stopped at 08/15/22 1326     Current Discharge Medication List      START taking these medications    Details   amLODIPine (NORVASC) 5 MG tablet Take 1 tablet (5 mg total) by mouth once daily.  Qty: 30 tablet, Refills: 11    Comments: .      blood sugar diagnostic Strp To check BG 4 times daily, to use with insurance preferred meter  Qty: 100 each, Refills: 11      blood-glucose meter kit To check BG 4 times daily, to use with insurance preferred meter  Qty: 1 each, Refills: 0      cadexomer iodine (IODOSORB) 0.9 % gel Apply topically daily as needed for Wound Care. Apply to distal R great to and lateral aspect of great toe at boarder of interspace, and R 5th toe. Follow with 4x4, kerlix x 2, and secure with ace.  Qty: 40 g, Refills: 0      HYDROcodone-acetaminophen (NORCO) 7.5-325 mg per tablet Take 1 tablet by mouth every 6 (six) hours as needed for Pain.  Qty: 21 tablet, Refills: 0    Comments: Quantity prescribed more than 7 day supply? No      insulin aspart U-100 (NOVOLOG) 100 unit/mL (3 mL) InPn pen Inject 12 Units into the skin 3 (three) times daily.  Qty: 12 mL, Refills: 11      insulin degludec (TRESIBA FLEXTOUCH U-200) 200 unit/mL (3 mL) insulin pen Inject 32 Units into the skin once daily.  Qty: 2 pen, Refills: 9      lancets Misc To check BG 4 times daily, to use with insurance preferred meter  Qty: 100 each, Refills: 11      metFORMIN (GLUCOPHAGE-XR) 500 MG ER 24hr tablet Take 1 tablet (500 mg total) by mouth  "As instructed (see directions). 1 tab once daily by mouth with breakfast for a week THEN  1 tab with breakfast, 1 tab with dinner for a week THEN  1 tab with breakfast, 2 tabs with dinner for a week THEN  2 tabs with breakfast, 2 tabs with dinner  Qty: 60 tablet, Refills: 10      pen needle, diabetic 32 gauge x 5/32" Ndle 1 each by Misc.(Non-Drug; Combo Route) route 5 (five) times daily.  Qty: 100 each, Refills: 11      polyethylene glycol (GLYCOLAX) 17 gram PwPk Take 17 g by mouth 3 (three) times daily as needed (constipation).  Refills: 0      senna-docusate 8.6-50 mg (PERICOLACE) 8.6-50 mg per tablet Take 1 tablet by mouth 2 (two) times daily.      valsartan (DIOVAN) 80 MG tablet Take 1 tablet (80 mg total) by mouth 2 (two) times daily.  Qty: 180 tablet, Refills: 3    Comments: .      vancomycin/0.9 % sod chloride (VANCOMYCIN IN 0.9 % SODIUM CHL) 1.5 gram/250 mL Soln Inject 250 mLs (1,500 mg total) into the vein once daily. Ending on 9/15/2022  Qty: 7500 mL, Refills: 0         STOP taking these medications       acetaminophen (TYLENOL) 325 MG tablet Comments:   Reason for Stopping:         ibuprofen (ADVIL,MOTRIN) 200 MG tablet Comments:   Reason for Stopping:                 I have seen and examined this patient within the last 30 days. My clinical findings that support the need for the home health skilled services and home bound status are the following:   Weakness/numbness causing balance and gait disturbance due to Infection and Weakness/Debility making it taxing to leave home.  Requiring assistive device to leave home due to unsteady gait caused by  Infection and Weakness/Debility.     Diet:   diabetic diet 2000 calorie    Labs:  SN to perform labs:      following labs to be drawn on Mondays:   · CBC  · CMP   · CRP  · Vancomycin trough. Target 15-20     the following additional labs to be drawn on Thursdays:  · CMP   · Vancomycin trough. Target 15-20    Fax Lab Results to Infectious Diseases Provider: "   Attention: [facility] Fax Number: [phone]    Referrals/ Consults  Physical Therapy to evaluate and treat. Evaluate for home safety and equipment needs; Establish/upgrade home exercise program. Perform / instruct on therapeutic exercises, gait training, transfer training, and Range of Motion.  Occupational Therapy to evaluate and treat. Evaluate home environment for safety and equipment needs. Perform/Instruct on transfers, ADL training, ROM, and therapeutic exercises.    Activities:   other    RLE:  heel touch for short distance and transfers in football dressing and post op shoe  RUE:  ROM as tolerated and WBAT    Nursing:   Agency to admit patient within 24 hours of hospital discharge unless specified on physician order or at patient request    SN to complete comprehensive assessment including routine vital signs. Instruct on disease process and s/s of complications to report to MD. Review/verify medication list sent home with the patient at time of discharge  and instruct patient/caregiver as needed. Frequency may be adjusted depending on start of care date.     Skilled nurse to perform up to 3 visits PRN for symptoms related to diagnosis    Notify MD if SBP > 160 or < 90; DBP > 90 or < 50; HR > 120 or < 50; Temp > 101; O2 < 88%    Ok to schedule additional visits based on staff availability and patient request on consecutive days within the home health episode.    When multiple disciplines ordered:    Start of Care occurs on Sunday - Wednesday schedule remaining discipline evaluations as ordered on separate consecutive days following the start of care.    Thursday SOC -schedule subsequent evaluations Friday and Monday the following week.     Friday - Saturday SOC - schedule subsequent discipline evaluations on consecutive days starting Monday of the following week.    For all post-discharge communication and subsequent orders please contact patient's primary care physician. If unable to reach  primary care physician or do not receive response within 30 minutes, please contact [phone] for clinical staff order clarification    Miscellaneous   Home Infusion Therapy:   SN to perform Infusion Therapy/Central Line Care.  Review Central Line Care & Central Line Flush with patient.    Administer (drug and dose): Vancomycin 1.5 g IV every 24 hours    End date: 9/15/2022    Scrub the Hub: Prior to accessing the line, always perform a 30 second alcohol scrub  Each lumen of the central line is to be flushed at least daily with 10 mL Normal Saline and 3 mL Heparin flush (10 units/mL)  Skilled Nurse (SN) may draw blood from IV access  Blood Draw Procedure:   - Aspirate at least 5 mL of blood   - Discard   - Obtain specimen   - Change injection cap   - Flush with 20 mL Normal Saline followed by a                 3-5 mL Heparin flush (10 units/mL)  Central :   - Sterile dressing changes are done weekly and as needed.   - Use chlor-hexadine scrub to cleanse site, apply Biopatch to insertion site,       apply securement device dressing   - Injection caps are changed weekly and after EVERY lab draw.   - If sterile gauze is under dressing to control oozing,                 dressing change must be performed every 24 hours until gauze is not needed.    Diabetic Care:   SN to perform and educate Diabetic management with blood glucose monitoring:, Fingerstick blood sugar AC and HS and Report CBG < 60 or > 350 to physician.    Home Health Frequency:  Nursing Three times weekly, Physical Therapy Twice weekly and Occupational Therapy Twice weekly    Wound Care Orders  yes:   Right foot every M, W, F  Iodosorb to distal Right great toe,  lateral aspect of great toe at boarder of interspace, and R 5th toe. Follow with 4x4, kerlix x 2, and secure with ACE wrap.    Right Arm:  Change dressing daily as needed.     I certify that this patient is confined to his home and needs intermittent skilled nursing care,  physical therapy and occupational therapy.    Mamta Cadena MD

## 2022-08-12 NOTE — PROGRESS NOTES
[facility]  Endocrinology  Progress Note    Admit Date: 8/2/2022     Reason for Consult: Management of T2DM, Hyperglycemia     Diabetes diagnosis year: 2010    Home Diabetes Medications:  No current medication use. Patient states > 3 years since he has been on Metformin.     How often checking glucose at home?  Patient not checking his BG at home.     BG readings on regimen: ANURAG  Hypoglycemia on the regimen?  No  Missed doses on regimen?  Yes    Diabetes Complications include:     Hyperglycemia, Diabetic peripheral neuropathy , Diabetic dermatitis, and Foot ulcer      Complicating diabetes co morbidities:   Infection; HTN      HPI: 55 yo male with diabetes, HTN presenting 2/2 R foot swelling that started acutely 3 days ago and progressively has gotten worse. He states he had a wound on his right great toe and 5th toe that has been there for a while. Today it had not gotten any better so he decided to come in for evaluation. He has not been on any antibiotics for it. He states that he was on medications at one point but no longer takes them as he thought he had gotten them under control. In the ED, concern with elevated WBC, ESR, CRP, xr of foot showed osteo of great toe. General surgery consulted to rule nec fasc 2/2 gas in the 5th toe. Medicine called for admission. Rocephin started. Endocrine consulted to manage hyperglycemia and type 2 diabetes.       Hemoglobin A1C   Date Value Ref Range Status   08/03/2022 12.4 (H) 4.0 - 5.6 % Final     Comment:     ADA Screening Guidelines:  5.7-6.4%  Consistent with prediabetes  >or=6.5%  Consistent with diabetes    High levels of fetal hemoglobin interfere with the HbA1C  assay. Heterozygous hemoglobin variants (HbS, HgC, etc)do  not significantly interfere with this assay.   However, presence of multiple variants may affect accuracy.     03/19/2015 15.0 (H) 4.5 - 6.2 % Final   01/26/2010 15.4 (H) 4.0 - 6.2 % Final          Interval HPI:   Overnight events:Remains  "in [facility]. Tentative discharge tomorrow. BG reasonably well controlled with current regimen. Antibiotics. 2 Days Post-Op  Eating: Diet diabetic [facility]; 2000 Calorie    50%  Nausea: No  Hypoglycemia and intervention: No  Fever: No  TPN and/or TF: No    BP (!) 144/63 (BP Location: Left leg, Patient Position: Lying)   Pulse 78   Temp 97.4 °F (36.3 °C) (Oral)   Resp 16   Ht 6' 3" (1.905 m)   Wt (!) 162.2 kg (357 lb 9.4 oz)   SpO2 (!) 94%   BMI 44.70 kg/m²     Labs Reviewed and Include    Recent Labs   Lab 08/12/22  0345   *   CALCIUM 9.1   ALBUMIN 2.2*   PROT 8.3      K 4.2   CO2 26      BUN 15   CREATININE 1.3   ALKPHOS 86   ALT 32   AST 29   BILITOT 0.3     Lab Results   Component Value Date    WBC 6.92 08/12/2022    HGB 9.9 (L) 08/12/2022    HCT 31.4 (L) 08/12/2022    MCV 84 08/12/2022     (H) 08/12/2022     No results for input(s): TSH, FREET4 in the last 168 hours.  Lab Results   Component Value Date    HGBA1C 12.4 (H) 08/03/2022       Nutritional status:   Body mass index is 44.7 kg/m².  Lab Results   Component Value Date    ALBUMIN 2.2 (L) 08/12/2022    ALBUMIN 2.4 (L) 08/10/2022    ALBUMIN 2.2 (L) 08/08/2022     Lab Results   Component Value Date    PREALBUMIN 6 (L) 02/01/2010       Estimated Creatinine Clearance: 106.2 mL/min (based on SCr of 1.3 mg/dL).    Accu-Checks  Recent Labs     08/10/22  0741 08/10/22  0937 08/10/22  1120 08/10/22  1604 08/10/22  2053 08/11/22  0727 08/11/22  1139 08/11/22  1554 08/12/22  0655 08/12/22  1116   POCTGLUCOSE 194* 203* 181* 164* 117* 170* 223* 177* 128* 196*       Current Medications and/or Treatments Impacting Glycemic Control  Immunotherapy:    Immunosuppressants       None          Steroids:   Hormones (From admission, onward)                Start     Stop Route Frequency Ordered    08/03/22 0239  melatonin tablet 6 mg         -- Oral Nightly PRN 08/03/22 0141          Pressors:    Autonomic Drugs (From admission, onward)           "      None          Hyperglycemia/Diabetes Medications:   Antihyperglycemics (From admission, onward)                Start     Stop Route Frequency Ordered    08/07/22 1035  insulin aspart U-100 pen 0-5 Units         -- SubQ Before meals & nightly PRN 08/07/22 0935    08/06/22 1130  insulin aspart U-100 pen 12 Units         -- SubQ 3 times daily with meals 08/06/22 0835    08/06/22 0900  insulin detemir U-100 pen 32 Units         -- SubQ Daily 08/06/22 0835            ASSESSMENT and PLAN    * Osteomyelitis of great toe of right foot  Infection may elevate BG readings  On IV antibiotics  Managed per primary team  Avoid hypoglycemia        Uncontrolled type 2 diabetes mellitus with hyperglycemia, without long-term current use of insulin  Endocrinology consulted for BG management.   BG goal 140-180    Weight-based at 0.5 units/kg/day   - Levemir Flex Pen 32 units daily  - Novolog (aspart) insulin 12 Units SQ TIDWM and prn for BG excursions LDC (150/50) SSI  - BG checks AC/HS  - Hypoglycemia protocol in place    ** Please notify Endocrine for any change and/or advance in diet**  ** Please call Endocrine for any BG related issues **    Discharge Planning: TBD      Reviewed topics related to DM including: the need for insulin, how insulin works, what makes it a high risk medication, the importance of follow up with either PCP or endocrine provider, importance of and how to check BG, how to record BG on logs, how to administer insulin, appropriate insulin administration sites, importance of rotating injection sites, hyper/hypoglycemia, how and when to treat hypoglycemia, when to hold insulin,  importance of storing unused insulin in the refrigerator, and when to seek medical attention.  Patient verbalized understanding, answered all questions to patient's satisfaction.            Severe obesity (BMI >= 40)  Body mass index is 44.7 kg/m².  Increased abdominal adiposity can increase insulin resistance.           Jessica AHMADI  ANGELA Wharton  Endocrinology  [facility]

## 2022-08-12 NOTE — SUBJECTIVE & OBJECTIVE
This encounter was provided through telemedicine.  Patient was transferred to the telemedicine service on:  08/5/2022   The patient location is: 651/651 A admitted 8/2/2022  6:24 PM.  Present with the patient at the time of the telemed/virtual assessment: Telepresenter    Interval History/Overnight Events:   Clinical record since admit reviewed.  Patient able to provide history.     ROM and strength in right UE improved today; vanc dosed this am and repeat level pending in am; Ortho has cleared for discharge; RUE culture now with Step dysagalctiae so ID contacted.     Review of Systems   Constitutional:  Positive for activity change. Negative for fever.   Respiratory:  Negative for shortness of breath.    Cardiovascular:  Negative for chest pain.   Gastrointestinal:  Negative for diarrhea and vomiting.   Musculoskeletal:  Positive for arthralgias and myalgias.      Inpatient Medications:  Scheduled Meds:   [START ON 8/13/2022] amLODIPine  5 mg Oral Daily    enoxaparin  40 mg Subcutaneous Q12H    insulin aspart U-100  12 Units Subcutaneous TIDWM    insulin detemir U-100  32 Units Subcutaneous Daily    polyethylene glycol  17 g Oral BID    senna-docusate 8.6-50 mg  1 tablet Oral BID    sodium chloride 0.9%  10 mL Intravenous Q6H    valsartan  80 mg Oral BID     Continuous Infusions:  PRN Meds:.acetaminophen, albuterol-ipratropium, cadexomer iodine, dextrose 10%, dextrose 10%, glucagon (human recombinant), glucose, glucose, hydrALAZINE, HYDROcodone-acetaminophen, insulin aspart U-100, melatonin, morphine, naloxone, ondansetron, polyethylene glycol, prochlorperazine, simethicone, sodium chloride 0.9%, Flushing PICC Protocol **AND** sodium chloride 0.9% **AND** sodium chloride 0.9%, Pharmacy to dose Vancomycin consult **AND** vancomycin - pharmacy to dose      Objective:     Temp:  [97.4 °F (36.3 °C)-98.5 °F (36.9 °C)] 97.4 °F (36.3 °C)  Pulse:  [75-89] 78  Resp:  [16-18] 16  SpO2:  [93 %-98 %] 94 %  BP:  (112-155)/(57-85) 144/63      Intake/Output Summary (Last 24 hours) at 8/12/2022 1520  Last data filed at 8/12/2022 0014  Gross per 24 hour   Intake 237 ml   Output 1020 ml   Net -783 ml          Body mass index is 44.7 kg/m².    Physical Exam  Vitals and nursing note reviewed.   Constitutional:       General: He is not in acute distress.     Appearance: Normal appearance. He is normal weight. He is not ill-appearing.   HENT:      Head: Normocephalic and atraumatic.      Right Ear: Hearing normal.      Left Ear: Hearing normal.      Nose: Nose normal.   Eyes:      General: No scleral icterus.        Right eye: No discharge.         Left eye: No discharge.      Extraocular Movements: Extraocular movements intact.   Cardiovascular:      Rate and Rhythm: Normal rate.   Pulmonary:      Effort: Pulmonary effort is normal. No accessory muscle usage or respiratory distress.   Musculoskeletal:      Comments: Right UE bandaged   Neurological:      General: No focal deficit present.      Mental Status: He is alert and oriented to person, place, and time.      Cranial Nerves: No cranial nerve deficit.      Motor: No weakness.   Psychiatric:         Attention and Perception: Attention normal.         Mood and Affect: Mood normal.         Speech: Speech normal.         Behavior: Behavior is cooperative.        Labs:  Recent Results (from the past 24 hour(s))   POCT glucose    Collection Time: 08/11/22  3:54 PM   Result Value Ref Range    POCT Glucose 177 (H) 70 - 110 mg/dL   CBC Auto Differential    Collection Time: 08/12/22  3:45 AM   Result Value Ref Range    WBC 6.92 3.90 - 12.70 K/uL    RBC 3.73 (L) 4.60 - 6.20 M/uL    Hemoglobin 9.9 (L) 14.0 - 18.0 g/dL    Hematocrit 31.4 (L) 40.0 - 54.0 %    MCV 84 82 - 98 fL    MCH 26.5 (L) 27.0 - 31.0 pg    MCHC 31.5 (L) 32.0 - 36.0 g/dL    RDW 14.0 11.5 - 14.5 %    Platelets 640 (H) 150 - 450 K/uL    MPV 9.1 (L) 9.2 - 12.9 fL    Immature Granulocytes 0.4 0.0 - 0.5 %    Gran # (ANC) 4.1  1.8 - 7.7 K/uL    Immature Grans (Abs) 0.03 0.00 - 0.04 K/uL    Lymph # 1.6 1.0 - 4.8 K/uL    Mono # 0.9 0.3 - 1.0 K/uL    Eos # 0.3 0.0 - 0.5 K/uL    Baso # 0.06 0.00 - 0.20 K/uL    nRBC 0 0 /100 WBC    Gran % 58.5 38.0 - 73.0 %    Lymph % 23.1 18.0 - 48.0 %    Mono % 12.9 4.0 - 15.0 %    Eosinophil % 4.2 0.0 - 8.0 %    Basophil % 0.9 0.0 - 1.9 %    Differential Method Automated    Comprehensive Metabolic Panel    Collection Time: 08/12/22  3:45 AM   Result Value Ref Range    Sodium 136 136 - 145 mmol/L    Potassium 4.2 3.5 - 5.1 mmol/L    Chloride 101 95 - 110 mmol/L    CO2 26 23 - 29 mmol/L    Glucose 113 (H) 70 - 110 mg/dL    BUN 15 6 - 20 mg/dL    Creatinine 1.3 0.5 - 1.4 mg/dL    Calcium 9.1 8.7 - 10.5 mg/dL    Total Protein 8.3 6.0 - 8.4 g/dL    Albumin 2.2 (L) 3.5 - 5.2 g/dL    Total Bilirubin 0.3 0.1 - 1.0 mg/dL    Alkaline Phosphatase 86 55 - 135 U/L    AST 29 10 - 40 U/L    ALT 32 10 - 44 U/L    Anion Gap 9 8 - 16 mmol/L    eGFR >60.0 >60 mL/min/1.73 m^2   Magnesium    Collection Time: 08/12/22  3:45 AM   Result Value Ref Range    Magnesium 1.7 1.6 - 2.6 mg/dL   Phosphorus    Collection Time: 08/12/22  3:45 AM   Result Value Ref Range    Phosphorus 4.0 2.7 - 4.5 mg/dL   Vancomycin, random    Collection Time: 08/12/22  3:45 AM   Result Value Ref Range    Vancomycin, Random 14.9 Not established ug/mL   POCT glucose    Collection Time: 08/12/22  6:55 AM   Result Value Ref Range    POCT Glucose 128 (H) 70 - 110 mg/dL   POCT glucose    Collection Time: 08/12/22 11:16 AM   Result Value Ref Range    POCT Glucose 196 (H) 70 - 110 mg/dL        No results found for: [id number]    Recent Labs   Lab 08/09/22  1730 08/10/22  0557 08/12/22  0345   WBC 9.74 9.76 6.92   LYMPH 18.4  1.8 18.4  1.8 23.1  1.6   HGB 10.1* 10.4* 9.9*   HCT 31.6* 31.8* 31.4*   * 614* 640*       Recent Labs   Lab 08/08/22  0528 08/10/22  0612 08/12/22  0345   * 137 136   K 3.9 4.1 4.2   CL 96 99 101   CO2 27 28 26   BUN 14 16  15   CREATININE 1.2 1.3 1.3   * 150* 113*   CALCIUM 9.4 9.6 9.1   MG 1.7 1.7 1.7   PHOS 4.0 3.9 4.0       Recent Labs   Lab 08/08/22  0528 08/10/22  0612 08/12/22  0345   ALKPHOS 89 99 86   ALT 54* 56* 32   AST 43* 40 29   ALBUMIN 2.2* 2.4* 2.2*   PROT 7.9 8.8* 8.3   BILITOT 0.4 0.5 0.3          Recent Labs     08/09/22  1730   CRP 94.9*         All labs within the last 24 hours were reviewed.     Microbiology:  Microbiology Results (last 7 days)       Procedure Component Value Units Date/Time    Aerobic culture [[id number]]  (Abnormal) Collected: 08/10/22 0859    Order Status: Completed Specimen: Incision site from Arm, Right Updated: 08/12/22 1449     Aerobic Bacterial Culture STREPTOCOCCUS DYSGALACTIAE  Rare  Susceptibility pending      Culture, Anaerobe [[id number]] Collected: 08/10/22 0859    Order Status: Completed Specimen: Incision site from Arm, Right Updated: 08/12/22 1037     Anaerobic Culture Culture in progress    AFB Culture & Smear [[id number]] Collected: 08/10/22 0859    Order Status: Completed Specimen: Incision site from Arm, Right Updated: 08/11/22 2127     AFB Culture & Smear Culture in progress     AFB CULTURE STAIN No acid fast bacilli seen.    Fungus culture [[id number]] Collected: 08/10/22 0859    Order Status: Completed Specimen: Incision site from Arm, Right Updated: 08/11/22 0747     Fungus (Mycology) Culture Culture in progress    Gram stain [[id number]] Collected: 08/10/22 0859    Order Status: Completed Specimen: Incision site from Arm, Right Updated: 08/10/22 1313     Gram Stain Result Moderate WBC's      No organisms seen    Fungus culture [[id number]] Collected: 08/04/22 1145    Order Status: Completed Specimen: Bone from Toe, Right Foot Updated: 08/09/22 1307     Fungus (Mycology) Culture Culture in progress    Narrative:      R Great toe bone    Aerobic culture [[id number]]  (Abnormal)  (Susceptibility) Collected: 08/04/22 1145    Order Status: Completed Specimen: Bone from Toe,  Right Foot Updated: 08/08/22 1228     Aerobic Bacterial Culture STAPHYLOCOCCUS COHNII SUBSPECIES COHNII  Moderate  Skin sabine also present  Skin sabine also present      Narrative:      R great toe bone    Culture, Anaerobe [[id number]] Collected: 08/03/22 1241    Order Status: Completed Specimen: Wound from Toe, Right Foot Updated: 08/08/22 1155     Anaerobic Culture No anaerobes isolated    Culture, Anaerobe [[id number]] Collected: 08/04/22 1145    Order Status: Completed Specimen: Bone from Toe, Right Foot Updated: 08/08/22 0949     Anaerobic Culture No anaerobes isolated    Narrative:      R Great toe bone    Blood culture #1 [[id number]] Collected: 08/02/22 1935    Order Status: Completed Specimen: Blood from Peripheral, Wrist, Left Updated: 08/07/22 2022     Blood Culture, Routine No growth after 5 days.    Narrative:      Blood Culture #1    Blood culture #2 [[id number]] Collected: 08/02/22 1927    Order Status: Completed Specimen: Blood from Peripheral, Hand, Left Updated: 08/07/22 2022     Blood Culture, Routine No growth after 5 days.    Narrative:      Blood Culture #2    AFB Culture & Smear [[id number]] Collected: 08/04/22 1145    Order Status: Completed Specimen: Bone from Toe, Right Foot Updated: 08/05/22 2127     AFB Culture & Smear Culture in progress     AFB CULTURE STAIN No acid fast bacilli seen.    Narrative:      R Great toe bone              Imaging  ECG Results    None         No results found for this or any previous visit.      MRI Forearm W WO Contrast Right  Narrative: EXAMINATION:  MRI FOREARM W WO CONTRAST RIGHT    CLINICAL HISTORY:  Soft tissue mass, forearm, deep;    TECHNIQUE:  Multisequence, multi planar magnetic resonance imaging of the right forearm before and after the intravenous administration of 10 mL Gadavist.    COMPARISON:  Radiographs from earlier today.  Ultrasound of the right forearm from 08/08/2022 and 08/04/2022.    FINDINGS:  Soft tissues: There is a large rim enhancing  fluid collection within the right anterior forearm soft tissues measuring 9.3 x 5.7 x 3.2 cm (cc by AP by TR).  The collection is centered within the musculature of the anterior compartment of the proximal forearm.  There is some associated T1 hyperintensity suspicious for blood products or proteinaceous content.  There is adjacent enhancement and edema of the anterior compartment musculature and the subcutaneous fat of the anterior forearm, with overlying skin thickening and enhancement.    Bone: There is no evidence of acute osteomyelitis.  Bone marrow signal is normal.  There is no T1 hypointense marrow signal.  There is no abnormal marrow enhancement.  There is no acute fracture or dislocation.    Articulations: The right elbow and right wrist joints are grossly unremarkable.  No joint effusion.    Miscellaneous: Neurovascular structures are grossly intact.  Impression: Large rim enhancing fluid collection within the musculature of the anterior compartment of the proximal forearm, compatible with an intramuscular abscess or infected hematoma.  Adjacent myositis and cellulitis as above.  No evidence of acute osteomyelitis.  Correlate clinically for evidence of compartment syndrome.    This report was flagged in Epic as abnormal.    Electronically signed by: Jesse Heredia  Date:    08/10/2022  Time:    00:40      All imaging within the last 24 hours was reviewed.       Discharge Planning   JOSÉ ANTONIO: 8/13/2022     Code Status: Full Code   Is the patient medically ready for discharge?: No    Reason for patient still in hospital (select all that apply): Patient trending condition, Laboratory test, Treatment, Consult recommendations, and Pending disposition  Discharge Plan A: Home Health

## 2022-08-13 ENCOUNTER — TELEPHONE (OUTPATIENT)
Dept: ENDOCRINOLOGY | Facility: HOSPITAL | Age: 57
End: 2022-08-13

## 2022-08-13 LAB
BACTERIA SPEC AEROBE CULT: ABNORMAL
CREAT SERPL-MCNC: 1.2 MG/DL (ref 0.5–1.4)
CRP SERPL-MCNC: 71.4 MG/L (ref 0–8.2)
EST. GFR  (NO RACE VARIABLE): >60 ML/MIN/1.73 M^2
POCT GLUCOSE: 118 MG/DL (ref 70–110)
POCT GLUCOSE: 138 MG/DL (ref 70–110)
POCT GLUCOSE: 166 MG/DL (ref 70–110)
POCT GLUCOSE: 201 MG/DL (ref 70–110)
VANCOMYCIN SERPL-MCNC: 13.8 UG/ML

## 2022-08-13 PROCEDURE — 25000003 PHARM REV CODE 250: Performed by: INTERNAL MEDICINE

## 2022-08-13 PROCEDURE — 82565 ASSAY OF CREATININE: CPT | Performed by: INTERNAL MEDICINE

## 2022-08-13 PROCEDURE — 80202 ASSAY OF VANCOMYCIN: CPT | Performed by: INTERNAL MEDICINE

## 2022-08-13 PROCEDURE — 99233 PR SUBSEQUENT HOSPITAL CARE,LEVL III: ICD-10-PCS | Mod: 95,,, | Performed by: INTERNAL MEDICINE

## 2022-08-13 PROCEDURE — 63600175 PHARM REV CODE 636 W HCPCS: Performed by: HOSPITALIST

## 2022-08-13 PROCEDURE — 99233 SBSQ HOSP IP/OBS HIGH 50: CPT | Mod: 95,,, | Performed by: INTERNAL MEDICINE

## 2022-08-13 PROCEDURE — 99232 PR SUBSEQUENT HOSPITAL CARE,LEVL II: ICD-10-PCS | Mod: ,,, | Performed by: NURSE PRACTITIONER

## 2022-08-13 PROCEDURE — 63600175 PHARM REV CODE 636 W HCPCS: Performed by: INTERNAL MEDICINE

## 2022-08-13 PROCEDURE — A4216 STERILE WATER/SALINE, 10 ML: HCPCS | Performed by: INTERNAL MEDICINE

## 2022-08-13 PROCEDURE — 99232 SBSQ HOSP IP/OBS MODERATE 35: CPT | Mod: ,,, | Performed by: NURSE PRACTITIONER

## 2022-08-13 PROCEDURE — 86140 C-REACTIVE PROTEIN: CPT

## 2022-08-13 PROCEDURE — 25000003 PHARM REV CODE 250: Performed by: NURSE PRACTITIONER

## 2022-08-13 PROCEDURE — 11000001 HC ACUTE MED/SURG PRIVATE ROOM

## 2022-08-13 RX ORDER — VANCOMYCIN HCL IN 5 % DEXTROSE 1G/250ML
1000 PLASTIC BAG, INJECTION (ML) INTRAVENOUS ONCE
Status: COMPLETED | OUTPATIENT
Start: 2022-08-13 | End: 2022-08-13

## 2022-08-13 RX ADMIN — VALSARTAN 80 MG: 40 TABLET, FILM COATED ORAL at 08:08

## 2022-08-13 RX ADMIN — SENNOSIDES AND DOCUSATE SODIUM 1 TABLET: 50; 8.6 TABLET ORAL at 09:08

## 2022-08-13 RX ADMIN — HYDROCODONE BITARTRATE AND ACETAMINOPHEN 1 TABLET: 7.5; 325 TABLET ORAL at 02:08

## 2022-08-13 RX ADMIN — Medication 10 ML: at 01:08

## 2022-08-13 RX ADMIN — Medication 10 ML: at 06:08

## 2022-08-13 RX ADMIN — POLYETHYLENE GLYCOL 3350 17 G: 17 POWDER, FOR SOLUTION ORAL at 09:08

## 2022-08-13 RX ADMIN — HYDROCODONE BITARTRATE AND ACETAMINOPHEN 1 TABLET: 7.5; 325 TABLET ORAL at 06:08

## 2022-08-13 RX ADMIN — VANCOMYCIN HYDROCHLORIDE 1000 MG: 1 INJECTION, POWDER, LYOPHILIZED, FOR SOLUTION INTRAVENOUS at 09:08

## 2022-08-13 RX ADMIN — SENNOSIDES AND DOCUSATE SODIUM 1 TABLET: 50; 8.6 TABLET ORAL at 08:08

## 2022-08-13 RX ADMIN — AMLODIPINE BESYLATE 5 MG: 5 TABLET ORAL at 08:08

## 2022-08-13 RX ADMIN — VALSARTAN 80 MG: 40 TABLET, FILM COATED ORAL at 09:08

## 2022-08-13 RX ADMIN — Medication 10 ML: at 12:08

## 2022-08-13 RX ADMIN — Medication 10 ML: at 11:08

## 2022-08-13 RX ADMIN — Medication 10 ML: at 05:08

## 2022-08-13 RX ADMIN — ENOXAPARIN SODIUM 40 MG: 40 INJECTION SUBCUTANEOUS at 08:08

## 2022-08-13 RX ADMIN — INSULIN ASPART 12 UNITS: 100 INJECTION, SOLUTION INTRAVENOUS; SUBCUTANEOUS at 11:08

## 2022-08-13 RX ADMIN — INSULIN DETEMIR 32 UNITS: 100 INJECTION, SOLUTION SUBCUTANEOUS at 09:08

## 2022-08-13 RX ADMIN — HYDROCODONE BITARTRATE AND ACETAMINOPHEN 1 TABLET: 7.5; 325 TABLET ORAL at 11:08

## 2022-08-13 RX ADMIN — ENOXAPARIN SODIUM 40 MG: 40 INJECTION SUBCUTANEOUS at 09:08

## 2022-08-13 NOTE — PROGRESS NOTES
Pharmacokinetic Assessment Follow Up: IV Vancomycin    Vancomycin serum concentration assessment(s):    The random level was drawn correctly and can be used to guide therapy at this time. The measurement is below the desired definitive target range of 15 to 20 mcg/mL.    Vancomycin Regimen Plan:    As patient had abnormal accumulation of vancomycin in the past on an appropriate regimen for renal function and weight. Will continue pulse dosing. Give vancomycin 1000 mg X 1 dose today.   Re-dose when the random level is less than 20 mcg/mL, next level to be drawn at 0500 with AM labs on 08/14/2022.    Drug levels (last 3 results):  Recent Labs   Lab Result Units 08/10/22  1100 08/11/22  0411 08/12/22  0345 08/13/22  0422   Vancomycin, Random ug/mL  --  27.2 14.9 13.8   Vancomycin-Trough ug/mL 24.8*  --   --   --        Pharmacy will continue to follow and monitor vancomycin.    Please contact pharmacy at extension [phone] for questions regarding this assessment.    Thank you for the consult,   Jade Carrillo       Patient brief summary:  Renee Caceres is a 56 y.o. male initiated on antimicrobial therapy with IV Vancomycin for treatment of bone/joint infection    The patient's current regimen is vancomycin pulse dosing.    Drug Allergies:   Review of patient's allergies indicates:  No Known Allergies    Actual Body Weight:   157.5 kg    Renal Function:   Estimated Creatinine Clearance: 113.2 mL/min (based on SCr of 1.2 mg/dL).,     Dialysis Method (if applicable):  N/A    CBC (last 72 hours):  Recent Labs   Lab Result Units 08/12/22  0345   WBC K/uL 6.92   Hemoglobin g/dL 9.9*   Hematocrit % 31.4*   Platelets K/uL 640*   Gran % % 58.5   Lymph % % 23.1   Mono % % 12.9   Eosinophil % % 4.2   Basophil % % 0.9   Differential Method  Automated       Metabolic Panel (last 72 hours):  Recent Labs   Lab Result Units 08/12/22  0345 08/13/22  0422   Sodium mmol/L 136  --    Potassium mmol/L 4.2  --    Chloride mmol/L 101  --     CO2 mmol/L 26  --    Glucose mg/dL 113*  --    BUN mg/dL 15  --    Creatinine mg/dL 1.3 1.2   Albumin g/dL 2.2*  --    Total Bilirubin mg/dL 0.3  --    Alkaline Phosphatase U/L 86  --    AST U/L 29  --    ALT U/L 32  --    Magnesium mg/dL 1.7  --    Phosphorus mg/dL 4.0  --        Vancomycin Administrations:  vancomycin given in the last 96 hours                     vancomycin in dextrose 5 % 1 gram/250 mL IVPB 1,000 mg (mg) 1,000 mg New Bag 08/12/22 0756    vancomycin 1.25 g in dextrose 5% 250 mL IVPB (ready to mix) (mg) 1,250 mg New Bag 08/10/22 1110     1,250 mg New Bag 08/09/22 2100     1,250 mg New Bag  0834                    Microbiologic Results:  Microbiology Results (last 7 days)       Procedure Component Value Units Date/Time    Aerobic culture [[id number]]  (Abnormal) Collected: 08/10/22 0859    Order Status: Completed Specimen: Incision site from Arm, Right Updated: 08/12/22 1449     Aerobic Bacterial Culture STREPTOCOCCUS DYSGALACTIAE  Rare  Susceptibility pending      Culture, Anaerobe [[id number]] Collected: 08/10/22 0859    Order Status: Completed Specimen: Incision site from Arm, Right Updated: 08/12/22 1037     Anaerobic Culture Culture in progress    AFB Culture & Smear [[id number]] Collected: 08/10/22 0859    Order Status: Completed Specimen: Incision site from Arm, Right Updated: 08/11/22 2127     AFB Culture & Smear Culture in progress     AFB CULTURE STAIN No acid fast bacilli seen.    Fungus culture [[id number]] Collected: 08/10/22 0859    Order Status: Completed Specimen: Incision site from Arm, Right Updated: 08/11/22 0747     Fungus (Mycology) Culture Culture in progress    Gram stain [[id number]] Collected: 08/10/22 0859    Order Status: Completed Specimen: Incision site from Arm, Right Updated: 08/10/22 1313     Gram Stain Result Moderate WBC's      No organisms seen    Fungus culture [[id number]] Collected: 08/04/22 1145    Order Status: Completed Specimen: Bone from Toe, Right Foot  Updated: 08/09/22 1307     Fungus (Mycology) Culture Culture in progress    Narrative:      R Great toe bone    Aerobic culture [[id number]]  (Abnormal)  (Susceptibility) Collected: 08/04/22 1145    Order Status: Completed Specimen: Bone from Toe, Right Foot Updated: 08/08/22 1228     Aerobic Bacterial Culture STAPHYLOCOCCUS COHNII SUBSPECIES COHNII  Moderate  Skin sabine also present  Skin sabine also present      Narrative:      R great toe bone    Culture, Anaerobe [[id number]] Collected: 08/03/22 1241    Order Status: Completed Specimen: Wound from Toe, Right Foot Updated: 08/08/22 1155     Anaerobic Culture No anaerobes isolated    Culture, Anaerobe [[id number]] Collected: 08/04/22 1145    Order Status: Completed Specimen: Bone from Toe, Right Foot Updated: 08/08/22 0949     Anaerobic Culture No anaerobes isolated    Narrative:      R Great toe bone    Blood culture #1 [[id number]] Collected: 08/02/22 1935    Order Status: Completed Specimen: Blood from Peripheral, Wrist, Left Updated: 08/07/22 2022     Blood Culture, Routine No growth after 5 days.    Narrative:      Blood Culture #1    Blood culture #2 [[id number]] Collected: 08/02/22 1927    Order Status: Completed Specimen: Blood from Peripheral, Hand, Left Updated: 08/07/22 2022     Blood Culture, Routine No growth after 5 days.    Narrative:      Blood Culture #2

## 2022-08-13 NOTE — PLAN OF CARE
Pt is AAOx4. Pt remain free from fall and injuries. VS remain stable. Questions and concerns voiced and answered. Medication compliance. Pain is medicated, right upper arm dressing remain dry and intact. Call light in reach. Bed in low locked position. Possible D/C today. Side rails x2. Belongings at bedside.

## 2022-08-13 NOTE — PROGRESS NOTES
[facility]  Orthopedics  Progress Note    Patient Name: Renee Caceres  MRN: [id number]  Admission Date: 8/2/2022  Hospital Length of Stay: 10 days  Attending Provider: Mamta Cadena MD  Primary Care Provider: Primary Doctor No  Follow-up For: Procedure(s) (LRB):  INCISION AND DRAINAGE, UPPER EXTREMITY - RIGHT, Hand table, Ancef/clinda (hold until cx collected), Cysto tubing (Right)    Post-Operative Day: 3 Days Post-Op  Subjective:     Principal Problem:Osteomyelitis of great toe of right foot    Principal Orthopedic Problem: s/p I&D right proximal forearm abscess (0810)    Interval History: Overnight events: NAEON    Vitals: VSS    PT/OT update:  PT/OT, walks 15ft with rolling walker    Need to know: Cx result Strep dysgalactiae 08/10, susceptibility pending, currently receiving vanc        Review of patient's allergies indicates:  No Known Allergies    Current Facility-Administered Medications   Medication    acetaminophen tablet 650 mg    albuterol-ipratropium 2.5 mg-0.5 mg/3 mL nebulizer solution 3 mL    amLODIPine tablet 5 mg    cadexomer iodine 0.9 % gel    dextrose 10% bolus 125 mL    dextrose 10% bolus 250 mL    enoxaparin injection 40 mg    glucagon (human recombinant) injection 1 mg    glucose chewable tablet 16 g    glucose chewable tablet 24 g    hydrALAZINE tablet 25 mg    HYDROcodone-acetaminophen 7.5-325 mg per tablet 1 tablet    insulin aspart U-100 pen 0-5 Units    insulin aspart U-100 pen 12 Units    insulin detemir U-100 pen 32 Units    melatonin tablet 6 mg    morphine injection 2 mg    naloxone 0.4 mg/mL injection 0.02 mg    ondansetron disintegrating tablet 4 mg    polyethylene glycol packet 17 g    polyethylene glycol packet 17 g    prochlorperazine injection Soln 5 mg    senna-docusate 8.6-50 mg per tablet 1 tablet    simethicone chewable tablet 80 mg    sodium chloride 0.9% flush 10 mL    sodium chloride 0.9% flush 10 mL    And    sodium chloride 0.9%  "flush 10 mL    valsartan tablet 80 mg    vancomycin - pharmacy to dose     Objective:     Vital Signs (Most Recent):  Temp: 98.4 °F (36.9 °C) (08/13/22 0420)  Pulse: 93 (08/13/22 0420)  Resp: 18 (08/13/22 0420)  BP: 136/61 (08/13/22 0420)  SpO2: 96 % (08/13/22 0420) Vital Signs (24h Range):  Temp:  [97.4 °F (36.3 °C)-98.5 °F (36.9 °C)] 98.4 °F (36.9 °C)  Pulse:  [78-95] 93  Resp:  [16-18] 18  SpO2:  [94 %-99 %] 96 %  BP: (112-164)/(60-85) 136/61     Weight: (!) 162.2 kg (357 lb 9.4 oz)  Height: 6' 3" (190.5 cm)  Body mass index is 44.7 kg/m².      Intake/Output Summary (Last 24 hours) at 8/13/2022 0702  Last data filed at 8/13/2022 0000  Gross per 24 hour   Intake --   Output 600 ml   Net -600 ml     Constitutional: Denies fever/chills  Neurological: Denies numbness/tingling  Cardiovascular: Denies chest pain  Respiratory: Denies shortness of breath  Musculoskeletal: see HPI      Ortho/SPM Exam  Gen:  No acute distress, well-developed, well nourished.  CV:  Peripherally well-perfused. 2+ radial pulses, symmetric.  Respiratory:  Normal respiratory effort. No accessory muscle use.       MSK:  R Upper extremity  Inspection  - dressing CDI  - mild swelling in right palm, most likely do to dressing wrap  - No ecchymosis, erythema, or signs of cellulitis  Palpation  - mild TTP over surgical incision site  Range of motion  - AROM and PROM of the shoulder, elbow, wrist, and hand intact without pain  Stability  - No evidence of joint dislocation or abnormal laxity  Neurovascular  - AIN/PIN/Radial/Median/Ulnar Nerves assessed in isolation without deficit  - Able to give thumbs up, make "OK" sign, cross IF/LF, abduct/adduct fingers, make fist  - SILT throughout  - Compartments soft  - Radial artery palpated  - Capillary Refill <3s  - Muscle tone normal      Significant Labs: CBC:   Recent Labs   Lab 08/12/22 0345   WBC 6.92   HGB 9.9*   HCT 31.4*   *     CMP:   Recent Labs   Lab 08/12/22 0345 08/13/22  0422    "  --    K 4.2  --      --    CO2 26  --    *  --    BUN 15  --    CREATININE 1.3 1.2   CALCIUM 9.1  --    PROT 8.3  --    ALBUMIN 2.2*  --    BILITOT 0.3  --    ALKPHOS 86  --    AST 29  --    ALT 32  --    ANIONGAP 9  --          Significant Imaging: I have reviewed and interpreted all pertinent imaging results/findings.    Assessment/Plan:     Abscess of right forearm  Renee Caceres is a 56 y.o. male with morbid obesity and poorly controlled T2DM admitted for right great toe osteo. MRI obtained today shows R forearm intra-muscular abscess. Signs of developing phlegmon on RUE U/S on 8/8. CRP 95 today. Has been on IV vancomycin for toe osteo. Educated patient he is at high risk for infection recurrence and wound healing issues in the setting of his high A1c. S/p I&D with Dr. Oleary on 8/10/22.    - ID following for R great toe osteo  - Toe cx + staph, Urine cx 8/5 + Proteus   - IV vanc ongoing per ID, at high risk for sterile OR culture RUE in setting of recent IV abx   - Right forearm culture: strep dysgalactiae  - Pain control MM  - DVT ppx per primary, enoxaparin 40mg BID  - Elevate, ROMAT, WBAT RUE    Dispo: follow primary team, PT/OT rec            KATHRIN NORMAN MD  Orthopedics  [facility]

## 2022-08-13 NOTE — ASSESSMENT & PLAN NOTE
Renee Caceres is a 56 y.o. male with morbid obesity and poorly controlled T2DM admitted for right great toe osteo. MRI obtained today shows R forearm intra-muscular abscess. Signs of developing phlegmon on RUE U/S on 8/8. CRP 95 today. Has been on IV vancomycin for toe osteo. Educated patient he is at high risk for infection recurrence and wound healing issues in the setting of his high A1c. S/p I&D with Dr. Oleary on 8/10/22.    - ID following for R great toe osteo  - Toe cx + staph, Urine cx 8/5 + Proteus   - IV vanc ongoing per ID, at high risk for sterile OR culture RUE in setting of recent IV abx   - Right forearm culture: strep dysgalactiae  - Pain control MM  - DVT ppx per primary, enoxaparin 40mg BID  - Elevate, ROMAT, WBAT RUE    Dispo: follow primary team, PT/OT rec HH

## 2022-08-13 NOTE — ASSESSMENT & PLAN NOTE
Per surgery, no evidence of a necrotizing infection at physical exam. Patient with a chronic wound opened to air, this may be the reason there is some soft tissue gas seen in the foot Xray. Wound looks chronic in nature.   - Continued ceftriaxone, vancomycin initially  - Podiatry consulted, patient s/p bone bx   - MRI consistent with osteomyelitis  - ID consulted: Right hallux bone biopsy. Bone cultures pending.  - Unable to get vascular SANDEE due to pain.   - ID de-escalated antibiotic to vancomycin. Placed PICC. Wound care per Podiatry.  - XL post op shoe to small for patient so ordered with E

## 2022-08-13 NOTE — SUBJECTIVE & OBJECTIVE
"Interval HPI:   Overnight events: Remains in [facility]. [facility]. Tentative discharge today. Antibiotics. BG reasonably well controlled with current regimen. 3 Days Post-Op  Eating:  Diet diabetic [facility]; 2000 Calorie   100%  Nausea: No  Hypoglycemia and intervention: No  Fever: No  TPN and/or TF: No      BP (!) 141/65 (Patient Position: Lying)   Pulse 83   Temp 98.1 °F (36.7 °C) (Oral)   Resp 16   Ht 6' 3" (1.905 m)   Wt (!) 157.5 kg (347 lb 3.6 oz)   SpO2 (!) 92%   BMI 43.40 kg/m²     Labs Reviewed and Include    Recent Labs   Lab 08/13/22 0422   CREATININE 1.2     Lab Results   Component Value Date    WBC 6.92 08/12/2022    HGB 9.9 (L) 08/12/2022    HCT 31.4 (L) 08/12/2022    MCV 84 08/12/2022     (H) 08/12/2022     No results for input(s): TSH, FREET4 in the last 168 hours.  Lab Results   Component Value Date    HGBA1C 12.4 (H) 08/03/2022       Nutritional status:   Body mass index is 43.4 kg/m².  Lab Results   Component Value Date    ALBUMIN 2.2 (L) 08/12/2022    ALBUMIN 2.4 (L) 08/10/2022    ALBUMIN 2.2 (L) 08/08/2022     Lab Results   Component Value Date    PREALBUMIN 6 (L) 02/01/2010       Estimated Creatinine Clearance: 113.2 mL/min (based on SCr of 1.2 mg/dL).    Accu-Checks  Recent Labs     08/10/22  1604 08/10/22  2053 08/11/22  0727 08/11/22  1139 08/11/22  1554 08/12/22  0655 08/12/22  1116 08/12/22  1617 08/12/22  2007 08/13/22  0714   POCTGLUCOSE 164* 117* 170* 223* 177* 128* 196* 205* 133* 138*       Current Medications and/or Treatments Impacting Glycemic Control  Immunotherapy:    Immunosuppressants       None          Steroids:   Hormones (From admission, onward)                Start     Stop Route Frequency Ordered    08/03/22 0239  melatonin tablet 6 mg         -- Oral Nightly PRN 08/03/22 0141          Pressors:    Autonomic Drugs (From admission, onward)                None          Hyperglycemia/Diabetes Medications:   Antihyperglycemics (From admission, onward)           "      Start     Stop Route Frequency Ordered    08/07/22 1035  insulin aspart U-100 pen 0-5 Units         -- SubQ Before meals & nightly PRN 08/07/22 0935    08/06/22 1130  insulin aspart U-100 pen 12 Units         -- SubQ 3 times daily with meals 08/06/22 0835    08/06/22 0900  insulin detemir U-100 pen 32 Units         -- SubQ Daily 08/06/22 0835

## 2022-08-13 NOTE — PROGRESS NOTES
[facility] Medicine  Telemedicine Progress Note    Patient Name: Renee Caceres  MRN: [id number]  Patient Class: IP- Inpatient   Admission Date: 8/2/2022  Length of Stay: 10 days  Attending Physician: Mamta Cadena MD  Primary Care Provider: Primary Doctor No          Subjective:     Principal Problem:Osteomyelitis of great toe of right foot        HPI:  57 yo male with diabetes, HTN presenting 2/2 R foot swelling that started acutely 3 days ago and progressively has gotten worse. He states he had a wound on his right great toe and 5th toe that has been there for a while. Today it had not gotten any better so he decided to come in for evaluation. He has not been on any antibiotics for it. He states that he was on medications at one point but no longer takes them as he thought he had gotten them under control. In the ED, concern with elevated WBC, ESR, CRP, xr of foot showed osteo of great toe. General surgery consulted to rule nec fasc 2/2 gas in the 5th toe. Medicine called for admission. Rocephin started.         Overview/Hospital Course:  No evidence of a necrotizing infection on physical exam. Podiatry and ID consulted. Hyperglycemia managed by Endocrinology.        This encounter was provided through telemedicine.  Patient was transferred to the telemedicine service on:  08/5/2022   The patient location is: [address] [place] [address] A admitted 8/2/2022  6:24 PM.  Present with the patient at the time of the telemed/virtual assessment: Telepresenter    Interval History/Overnight Events:   Clinical record since admit reviewed.  Patient able to provide history.     Uneventful night - pain and ROM to RUE continue to improve; nursing educating on insulin pen use; patient plans to go to hot from hospital as unclear when his apartment electricity will be turned on; post op boot too small and HME contacted for larger size    Review of Systems   Constitutional:  Positive for activity change. Negative for  fever.   Respiratory:  Negative for shortness of breath.    Cardiovascular:  Negative for chest pain.   Gastrointestinal:  Negative for diarrhea and vomiting.   Musculoskeletal:  Positive for arthralgias and myalgias.      Inpatient Medications:  Scheduled Meds:   amLODIPine  5 mg Oral Daily    enoxaparin  40 mg Subcutaneous Q12H    insulin aspart U-100  12 Units Subcutaneous TIDWM    insulin detemir U-100  32 Units Subcutaneous Daily    polyethylene glycol  17 g Oral BID    senna-docusate 8.6-50 mg  1 tablet Oral BID    sodium chloride 0.9%  10 mL Intravenous Q6H    valsartan  80 mg Oral BID     Continuous Infusions:  PRN Meds:.acetaminophen, albuterol-ipratropium, cadexomer iodine, dextrose 10%, dextrose 10%, glucagon (human recombinant), glucose, glucose, hydrALAZINE, HYDROcodone-acetaminophen, insulin aspart U-100, melatonin, morphine, naloxone, ondansetron, polyethylene glycol, prochlorperazine, simethicone, sodium chloride 0.9%, Flushing PICC Protocol **AND** sodium chloride 0.9% **AND** sodium chloride 0.9%, Pharmacy to dose Vancomycin consult **AND** vancomycin - pharmacy to dose      Objective:     Temp:  [97.8 °F (36.6 °C)-98.4 °F (36.9 °C)] 97.8 °F (36.6 °C)  Pulse:  [80-95] 82  Resp:  [16-18] 16  SpO2:  [92 %-99 %] 92 %  BP: (135-164)/(60-85) 135/62      Intake/Output Summary (Last 24 hours) at 8/13/2022 1558  Last data filed at 8/13/2022 1209  Gross per 24 hour   Intake 350 ml   Output 1200 ml   Net -850 ml          Body mass index is 43.4 kg/m².    Physical Exam  Vitals and nursing note reviewed.   Constitutional:       General: He is not in acute distress.     Appearance: Normal appearance. He is normal weight. He is not ill-appearing.   HENT:      Head: Normocephalic and atraumatic.      Right Ear: Hearing normal.      Left Ear: Hearing normal.      Nose: Nose normal.   Eyes:      General: No scleral icterus.        Right eye: No discharge.         Left eye: No discharge.      Extraocular  Movements: Extraocular movements intact.   Cardiovascular:      Rate and Rhythm: Normal rate.   Pulmonary:      Effort: Pulmonary effort is normal. No accessory muscle usage or respiratory distress.   Musculoskeletal:      Comments: Right UE bandaged   Neurological:      General: No focal deficit present.      Mental Status: He is alert and oriented to person, place, and time.      Cranial Nerves: No cranial nerve deficit.      Motor: No weakness.   Psychiatric:         Attention and Perception: Attention normal.         Mood and Affect: Mood normal.         Speech: Speech normal.         Behavior: Behavior is cooperative.        Labs:  Recent Results (from the past 24 hour(s))   POCT glucose    Collection Time: 08/12/22  4:17 PM   Result Value Ref Range    POCT Glucose 205 (H) 70 - 110 mg/dL   POCT glucose    Collection Time: 08/12/22  8:07 PM   Result Value Ref Range    POCT Glucose 133 (H) 70 - 110 mg/dL   Vancomycin, random    Collection Time: 08/13/22  4:22 AM   Result Value Ref Range    Vancomycin, Random 13.8 Not established ug/mL   Creatinine, serum    Collection Time: 08/13/22  4:22 AM   Result Value Ref Range    Creatinine 1.2 0.5 - 1.4 mg/dL    eGFR >60.0 >60 mL/min/1.73 m^2   POCT glucose    Collection Time: 08/13/22  7:14 AM   Result Value Ref Range    POCT Glucose 138 (H) 70 - 110 mg/dL   POCT glucose    Collection Time: 08/13/22 11:20 AM   Result Value Ref Range    POCT Glucose 201 (H) 70 - 110 mg/dL   C-reactive protein    Collection Time: 08/13/22 12:09 PM   Result Value Ref Range    CRP 71.4 (H) 0.0 - 8.2 mg/L        No results found for: [id number]    Recent Labs   Lab 08/09/22  1730 08/10/22  0557 08/12/22  0345   WBC 9.74 9.76 6.92   LYMPH 18.4  1.8 18.4  1.8 23.1  1.6   HGB 10.1* 10.4* 9.9*   HCT 31.6* 31.8* 31.4*   * 614* 640*       Recent Labs   Lab 08/08/22  0528 08/10/22  0612 08/12/22  0345 08/13/22  0422   * 137 136  --    K 3.9 4.1 4.2  --    CL 96 99 101  --    CO2 27  28 26  --    BUN 14 16 15  --    CREATININE 1.2 1.3 1.3 1.2   * 150* 113*  --    CALCIUM 9.4 9.6 9.1  --    MG 1.7 1.7 1.7  --    PHOS 4.0 3.9 4.0  --        Recent Labs   Lab 08/08/22  0528 08/10/22  0612 08/12/22  0345   ALKPHOS 89 99 86   ALT 54* 56* 32   AST 43* 40 29   ALBUMIN 2.2* 2.4* 2.2*   PROT 7.9 8.8* 8.3   BILITOT 0.4 0.5 0.3          Recent Labs     08/13/22  1209   CRP 71.4*         All labs within the last 24 hours were reviewed.     Microbiology:  Microbiology Results (last 7 days)       Procedure Component Value Units Date/Time    Aerobic culture [[id number]]  (Abnormal)  (Susceptibility) Collected: 08/10/22 0859    Order Status: Completed Specimen: Incision site from Arm, Right Updated: 08/13/22 1326     Aerobic Bacterial Culture STREPTOCOCCUS DYSGALACTIAE  Rare      Culture, Anaerobe [[id number]] Collected: 08/10/22 0859    Order Status: Completed Specimen: Incision site from Arm, Right Updated: 08/12/22 1037     Anaerobic Culture Culture in progress    AFB Culture & Smear [[id number]] Collected: 08/10/22 0859    Order Status: Completed Specimen: Incision site from Arm, Right Updated: 08/11/22 2127     AFB Culture & Smear Culture in progress     AFB CULTURE STAIN No acid fast bacilli seen.    Fungus culture [[id number]] Collected: 08/10/22 0859    Order Status: Completed Specimen: Incision site from Arm, Right Updated: 08/11/22 0747     Fungus (Mycology) Culture Culture in progress    Gram stain [[id number]] Collected: 08/10/22 0859    Order Status: Completed Specimen: Incision site from Arm, Right Updated: 08/10/22 1313     Gram Stain Result Moderate WBC's      No organisms seen    Fungus culture [[id number]] Collected: 08/04/22 1145    Order Status: Completed Specimen: Bone from Toe, Right Foot Updated: 08/09/22 1307     Fungus (Mycology) Culture Culture in progress    Narrative:      R Great toe bone    Aerobic culture [[id number]]  (Abnormal)  (Susceptibility) Collected: 08/04/22 1145    Order  Status: Completed Specimen: Bone from Toe, Right Foot Updated: 08/08/22 1228     Aerobic Bacterial Culture STAPHYLOCOCCUS COHNII SUBSPECIES COHNII  Moderate  Skin sabine also present  Skin sabine also present      Narrative:      R great toe bone    Culture, Anaerobe [[id number]] Collected: 08/03/22 1241    Order Status: Completed Specimen: Wound from Toe, Right Foot Updated: 08/08/22 1155     Anaerobic Culture No anaerobes isolated    Culture, Anaerobe [[id number]] Collected: 08/04/22 1145    Order Status: Completed Specimen: Bone from Toe, Right Foot Updated: 08/08/22 0949     Anaerobic Culture No anaerobes isolated    Narrative:      R Great toe bone    Blood culture #1 [[id number]] Collected: 08/02/22 1935    Order Status: Completed Specimen: Blood from Peripheral, Wrist, Left Updated: 08/07/22 2022     Blood Culture, Routine No growth after 5 days.    Narrative:      Blood Culture #1    Blood culture #2 [[id number]] Collected: 08/02/22 1927    Order Status: Completed Specimen: Blood from Peripheral, Hand, Left Updated: 08/07/22 2022     Blood Culture, Routine No growth after 5 days.    Narrative:      Blood Culture #2              Imaging  ECG Results    None         No results found for this or any previous visit.      MRI Forearm W WO Contrast Right  Narrative: EXAMINATION:  MRI FOREARM W WO CONTRAST RIGHT    CLINICAL HISTORY:  Soft tissue mass, forearm, deep;    TECHNIQUE:  Multisequence, multi planar magnetic resonance imaging of the right forearm before and after the intravenous administration of 10 mL Gadavist.    COMPARISON:  Radiographs from earlier today.  Ultrasound of the right forearm from 08/08/2022 and 08/04/2022.    FINDINGS:  Soft tissues: There is a large rim enhancing fluid collection within the right anterior forearm soft tissues measuring 9.3 x 5.7 x 3.2 cm (cc by AP by TR).  The collection is centered within the musculature of the anterior compartment of the proximal forearm.  There is some  associated T1 hyperintensity suspicious for blood products or proteinaceous content.  There is adjacent enhancement and edema of the anterior compartment musculature and the subcutaneous fat of the anterior forearm, with overlying skin thickening and enhancement.    Bone: There is no evidence of acute osteomyelitis.  Bone marrow signal is normal.  There is no T1 hypointense marrow signal.  There is no abnormal marrow enhancement.  There is no acute fracture or dislocation.    Articulations: The right elbow and right wrist joints are grossly unremarkable.  No joint effusion.    Miscellaneous: Neurovascular structures are grossly intact.  Impression: Large rim enhancing fluid collection within the musculature of the anterior compartment of the proximal forearm, compatible with an intramuscular abscess or infected hematoma.  Adjacent myositis and cellulitis as above.  No evidence of acute osteomyelitis.  Correlate clinically for evidence of compartment syndrome.    This report was flagged in Epic as abnormal.    Electronically signed by: Jesse Heredia  Date:    08/10/2022  Time:    00:40      All imaging within the last 24 hours was reviewed.       Discharge Planning   JOSÉ ANTONIO: 8/13/2022     Code Status: Full Code   Is the patient medically ready for discharge?: No    Reason for patient still in hospital (select all that apply): Patient trending condition, Laboratory test, Treatment, Consult recommendations, and Pending disposition  Discharge Plan A: Home Health            Assessment/Plan:      * Osteomyelitis of great toe of right foot  Per surgery, no evidence of a necrotizing infection at physical exam. Patient with a chronic wound opened to air, this may be the reason there is some soft tissue gas seen in the foot Xray. Wound looks chronic in nature.   - Continued ceftriaxone, vancomycin initially  - Podiatry consulted, patient s/p bone bx   - MRI consistent with osteomyelitis  - ID consulted: Right hallux bone biopsy.  Bone cultures pending.  - Unable to get vascular SANDEE due to pain.   - ID de-escalated antibiotic to vancomycin. Placed PICC. Wound care per Podiatry.  - XL post op shoe to small for patient so ordered with HME    Abscess of right forearm  Began developing about 2 weeks ago. Now s/p I&D with Ortho.  Cultures pending (patient receiving vancomycin) - now with strep dysgalactiae; sensitive to Vanc    Ulcer of right fifth toe due to diabetes mellitus  Possible osteomyelitis, continuing wound care and antibiotics    Uncontrolled type 2 diabetes mellitus with hyperglycemia, without long-term current use of insulin  Patient's FSGs are uncontrolled due to hyperglycemia on current medication regimen.  Last A1c reviewed- uncontrolled since 2010 per available records.  Current correctional scale  Low  Endocrinology consulted and managing with anti-hyperglycemics as follows-   Antihyperglycemics (From admission, onward)            Start     Stop Route Frequency Ordered    08/07/22 1035  insulin aspart U-100 pen 0-5 Units         -- SubQ Before meals & nightly PRN 08/07/22 0935    08/06/22 1130  insulin aspart U-100 pen 12 Units         -- SubQ 3 times daily with meals 08/06/22 0835    08/06/22 0900  insulin detemir U-100 pen 32 Units         -- SubQ Daily 08/06/22 0835      Hold Oral hypoglycemics while patient is in the hospital - anticipate insulin at discharge  Consulted Endocrinology for management and discharge recommendations, follow up.  Management per Endocrinology.    Primary hypertension  Uncontrolled, not on medication at home  - Continued amlodipine 5mg daily  - Continued lisinopril 5mg daily; increased to 20 mg for 8/6  - BP remained elevated; increased lisinopril to 40 mg. Increased amlodipine. Monitor.  - Persistent hypertension; Changed lisinopril to valsartan on 8/9 - holding zoila-op on 8/10    Cellulitis of right lower extremity  Acute  Continued Rocephin and vancomycin initially  LE U/S performed: No evidence  of deep venous thrombosis in the right lower extremity.  ID de-escalated antibiotic to vancomycin.      VTE Risk Mitigation (From admission, onward)         Ordered     enoxaparin injection 40 mg  Every 12 hours         08/03/22 0833     IP VTE HIGH RISK PATIENT  Once         08/03/22 0141     Place sequential compression device  Until discontinued         08/03/22 0141                High Risk Conditions:  Patient is currently on drug therapy requiring intensive monitoring for toxicity: Vancomycin    I have assessed these findings virtually using a telemed platform and with assistance of the bedside nurse.      The attending portion of this evaluation, treatment, and documentation was performed per Mamta Cadena MD via Telemedicine AudioVisual using the secure Queue-[employer] software platform with 2 way audio/video. The provider was located off-site and the patient is located in the hospital. The aforementioned video software was utilized to document the relevant history and physical exam    Mamta Cadena MD  Department of Hospital Medicine   [facility]

## 2022-08-13 NOTE — SUBJECTIVE & OBJECTIVE
This encounter was provided through telemedicine.  Patient was transferred to the telemedicine service on:  08/5/2022   The patient location is: 651/651 A admitted 8/2/2022  6:24 PM.  Present with the patient at the time of the telemed/virtual assessment: Telepresenter    Interval History/Overnight Events:   Clinical record since admit reviewed.  Patient able to provide history.     Uneventful night - pain and ROM to RUE continue to improve; nursing educating on insulin pen use; patient plans to go to hot from hospital as unclear when his apartment electricity will be turned on; post op boot too small and HME contacted for larger size    Review of Systems   Constitutional:  Positive for activity change. Negative for fever.   Respiratory:  Negative for shortness of breath.    Cardiovascular:  Negative for chest pain.   Gastrointestinal:  Negative for diarrhea and vomiting.   Musculoskeletal:  Positive for arthralgias and myalgias.      Inpatient Medications:  Scheduled Meds:   amLODIPine  5 mg Oral Daily    enoxaparin  40 mg Subcutaneous Q12H    insulin aspart U-100  12 Units Subcutaneous TIDWM    insulin detemir U-100  32 Units Subcutaneous Daily    polyethylene glycol  17 g Oral BID    senna-docusate 8.6-50 mg  1 tablet Oral BID    sodium chloride 0.9%  10 mL Intravenous Q6H    valsartan  80 mg Oral BID     Continuous Infusions:  PRN Meds:.acetaminophen, albuterol-ipratropium, cadexomer iodine, dextrose 10%, dextrose 10%, glucagon (human recombinant), glucose, glucose, hydrALAZINE, HYDROcodone-acetaminophen, insulin aspart U-100, melatonin, morphine, naloxone, ondansetron, polyethylene glycol, prochlorperazine, simethicone, sodium chloride 0.9%, Flushing PICC Protocol **AND** sodium chloride 0.9% **AND** sodium chloride 0.9%, Pharmacy to dose Vancomycin consult **AND** vancomycin - pharmacy to dose      Objective:     Temp:  [97.8 °F (36.6 °C)-98.4 °F (36.9 °C)] 97.8 °F (36.6 °C)  Pulse:  [80-95] 82  Resp:   [16-18] 16  SpO2:  [92 %-99 %] 92 %  BP: (135-164)/(60-85) 135/62      Intake/Output Summary (Last 24 hours) at 8/13/2022 1558  Last data filed at 8/13/2022 1209  Gross per 24 hour   Intake 350 ml   Output 1200 ml   Net -850 ml          Body mass index is 43.4 kg/m².    Physical Exam  Vitals and nursing note reviewed.   Constitutional:       General: He is not in acute distress.     Appearance: Normal appearance. He is normal weight. He is not ill-appearing.   HENT:      Head: Normocephalic and atraumatic.      Right Ear: Hearing normal.      Left Ear: Hearing normal.      Nose: Nose normal.   Eyes:      General: No scleral icterus.        Right eye: No discharge.         Left eye: No discharge.      Extraocular Movements: Extraocular movements intact.   Cardiovascular:      Rate and Rhythm: Normal rate.   Pulmonary:      Effort: Pulmonary effort is normal. No accessory muscle usage or respiratory distress.   Musculoskeletal:      Comments: Right UE bandaged   Neurological:      General: No focal deficit present.      Mental Status: He is alert and oriented to person, place, and time.      Cranial Nerves: No cranial nerve deficit.      Motor: No weakness.   Psychiatric:         Attention and Perception: Attention normal.         Mood and Affect: Mood normal.         Speech: Speech normal.         Behavior: Behavior is cooperative.        Labs:  Recent Results (from the past 24 hour(s))   POCT glucose    Collection Time: 08/12/22  4:17 PM   Result Value Ref Range    POCT Glucose 205 (H) 70 - 110 mg/dL   POCT glucose    Collection Time: 08/12/22  8:07 PM   Result Value Ref Range    POCT Glucose 133 (H) 70 - 110 mg/dL   Vancomycin, random    Collection Time: 08/13/22  4:22 AM   Result Value Ref Range    Vancomycin, Random 13.8 Not established ug/mL   Creatinine, serum    Collection Time: 08/13/22  4:22 AM   Result Value Ref Range    Creatinine 1.2 0.5 - 1.4 mg/dL    eGFR >60.0 >60 mL/min/1.73 m^2   POCT glucose     Collection Time: 08/13/22  7:14 AM   Result Value Ref Range    POCT Glucose 138 (H) 70 - 110 mg/dL   POCT glucose    Collection Time: 08/13/22 11:20 AM   Result Value Ref Range    POCT Glucose 201 (H) 70 - 110 mg/dL   C-reactive protein    Collection Time: 08/13/22 12:09 PM   Result Value Ref Range    CRP 71.4 (H) 0.0 - 8.2 mg/L        No results found for: [id number]    Recent Labs   Lab 08/09/22  1730 08/10/22  0557 08/12/22  0345   WBC 9.74 9.76 6.92   LYMPH 18.4  1.8 18.4  1.8 23.1  1.6   HGB 10.1* 10.4* 9.9*   HCT 31.6* 31.8* 31.4*   * 614* 640*       Recent Labs   Lab 08/08/22  0528 08/10/22  0612 08/12/22  0345 08/13/22  0422   * 137 136  --    K 3.9 4.1 4.2  --    CL 96 99 101  --    CO2 27 28 26  --    BUN 14 16 15  --    CREATININE 1.2 1.3 1.3 1.2   * 150* 113*  --    CALCIUM 9.4 9.6 9.1  --    MG 1.7 1.7 1.7  --    PHOS 4.0 3.9 4.0  --        Recent Labs   Lab 08/08/22  0528 08/10/22  0612 08/12/22  0345   ALKPHOS 89 99 86   ALT 54* 56* 32   AST 43* 40 29   ALBUMIN 2.2* 2.4* 2.2*   PROT 7.9 8.8* 8.3   BILITOT 0.4 0.5 0.3          Recent Labs     08/13/22  1209   CRP 71.4*         All labs within the last 24 hours were reviewed.     Microbiology:  Microbiology Results (last 7 days)       Procedure Component Value Units Date/Time    Aerobic culture [[id number]]  (Abnormal)  (Susceptibility) Collected: 08/10/22 0859    Order Status: Completed Specimen: Incision site from Arm, Right Updated: 08/13/22 1326     Aerobic Bacterial Culture STREPTOCOCCUS DYSGALACTIAE  Rare      Culture, Anaerobe [[id number]] Collected: 08/10/22 0859    Order Status: Completed Specimen: Incision site from Arm, Right Updated: 08/12/22 1037     Anaerobic Culture Culture in progress    AFB Culture & Smear [[id number]] Collected: 08/10/22 0859    Order Status: Completed Specimen: Incision site from Arm, Right Updated: 08/11/22 2127     AFB Culture & Smear Culture in progress     AFB CULTURE STAIN No acid fast  bacilli seen.    Fungus culture [[id number]] Collected: 08/10/22 0859    Order Status: Completed Specimen: Incision site from Arm, Right Updated: 08/11/22 0747     Fungus (Mycology) Culture Culture in progress    Gram stain [[id number]] Collected: 08/10/22 0859    Order Status: Completed Specimen: Incision site from Arm, Right Updated: 08/10/22 1313     Gram Stain Result Moderate WBC's      No organisms seen    Fungus culture [[id number]] Collected: 08/04/22 1145    Order Status: Completed Specimen: Bone from Toe, Right Foot Updated: 08/09/22 1307     Fungus (Mycology) Culture Culture in progress    Narrative:      R Great toe bone    Aerobic culture [[id number]]  (Abnormal)  (Susceptibility) Collected: 08/04/22 1145    Order Status: Completed Specimen: Bone from Toe, Right Foot Updated: 08/08/22 1228     Aerobic Bacterial Culture STAPHYLOCOCCUS COHNII SUBSPECIES COHNII  Moderate  Skin sabine also present  Skin sabine also present      Narrative:      R great toe bone    Culture, Anaerobe [[id number]] Collected: 08/03/22 1241    Order Status: Completed Specimen: Wound from Toe, Right Foot Updated: 08/08/22 1155     Anaerobic Culture No anaerobes isolated    Culture, Anaerobe [[id number]] Collected: 08/04/22 1145    Order Status: Completed Specimen: Bone from Toe, Right Foot Updated: 08/08/22 0949     Anaerobic Culture No anaerobes isolated    Narrative:      R Great toe bone    Blood culture #1 [[id number]] Collected: 08/02/22 1935    Order Status: Completed Specimen: Blood from Peripheral, Wrist, Left Updated: 08/07/22 2022     Blood Culture, Routine No growth after 5 days.    Narrative:      Blood Culture #1    Blood culture #2 [[id number]] Collected: 08/02/22 1927    Order Status: Completed Specimen: Blood from Peripheral, Hand, Left Updated: 08/07/22 2022     Blood Culture, Routine No growth after 5 days.    Narrative:      Blood Culture #2              Imaging  ECG Results    None         No results found for this or any  previous visit.      MRI Forearm W WO Contrast Right  Narrative: EXAMINATION:  MRI FOREARM W WO CONTRAST RIGHT    CLINICAL HISTORY:  Soft tissue mass, forearm, deep;    TECHNIQUE:  Multisequence, multi planar magnetic resonance imaging of the right forearm before and after the intravenous administration of 10 mL Gadavist.    COMPARISON:  Radiographs from earlier today.  Ultrasound of the right forearm from 08/08/2022 and 08/04/2022.    FINDINGS:  Soft tissues: There is a large rim enhancing fluid collection within the right anterior forearm soft tissues measuring 9.3 x 5.7 x 3.2 cm (cc by AP by TR).  The collection is centered within the musculature of the anterior compartment of the proximal forearm.  There is some associated T1 hyperintensity suspicious for blood products or proteinaceous content.  There is adjacent enhancement and edema of the anterior compartment musculature and the subcutaneous fat of the anterior forearm, with overlying skin thickening and enhancement.    Bone: There is no evidence of acute osteomyelitis.  Bone marrow signal is normal.  There is no T1 hypointense marrow signal.  There is no abnormal marrow enhancement.  There is no acute fracture or dislocation.    Articulations: The right elbow and right wrist joints are grossly unremarkable.  No joint effusion.    Miscellaneous: Neurovascular structures are grossly intact.  Impression: Large rim enhancing fluid collection within the musculature of the anterior compartment of the proximal forearm, compatible with an intramuscular abscess or infected hematoma.  Adjacent myositis and cellulitis as above.  No evidence of acute osteomyelitis.  Correlate clinically for evidence of compartment syndrome.    This report was flagged in Epic as abnormal.    Electronically signed by: Jesse Heredia  Date:    08/10/2022  Time:    00:40      All imaging within the last 24 hours was reviewed.       Discharge Planning   JOSÉ ANTONIO: 8/13/2022     Code Status: Full  Code   Is the patient medically ready for discharge?: No    Reason for patient still in hospital (select all that apply): Patient trending condition, Laboratory test, Treatment, Consult recommendations, and Pending disposition  Discharge Plan A: Home Health

## 2022-08-13 NOTE — SUBJECTIVE & OBJECTIVE
Principal Problem:Osteomyelitis of great toe of right foot    Principal Orthopedic Problem: s/p I&D right proximal forearm abscess (0810)    Interval History: Overnight events: NAEON    Vitals: VSS    PT/OT update:  PT/OT, walks 15ft with rolling walker    Need to know: Cx result Strep dysgalactiae 08/10, susceptibility pending, currently receiving vanc        Review of patient's allergies indicates:  No Known Allergies    Current Facility-Administered Medications   Medication    acetaminophen tablet 650 mg    albuterol-ipratropium 2.5 mg-0.5 mg/3 mL nebulizer solution 3 mL    amLODIPine tablet 5 mg    cadexomer iodine 0.9 % gel    dextrose 10% bolus 125 mL    dextrose 10% bolus 250 mL    enoxaparin injection 40 mg    glucagon (human recombinant) injection 1 mg    glucose chewable tablet 16 g    glucose chewable tablet 24 g    hydrALAZINE tablet 25 mg    HYDROcodone-acetaminophen 7.5-325 mg per tablet 1 tablet    insulin aspart U-100 pen 0-5 Units    insulin aspart U-100 pen 12 Units    insulin detemir U-100 pen 32 Units    melatonin tablet 6 mg    morphine injection 2 mg    naloxone 0.4 mg/mL injection 0.02 mg    ondansetron disintegrating tablet 4 mg    polyethylene glycol packet 17 g    polyethylene glycol packet 17 g    prochlorperazine injection Soln 5 mg    senna-docusate 8.6-50 mg per tablet 1 tablet    simethicone chewable tablet 80 mg    sodium chloride 0.9% flush 10 mL    sodium chloride 0.9% flush 10 mL    And    sodium chloride 0.9% flush 10 mL    valsartan tablet 80 mg    vancomycin - pharmacy to dose     Objective:     Vital Signs (Most Recent):  Temp: 98.4 °F (36.9 °C) (08/13/22 0420)  Pulse: 93 (08/13/22 0420)  Resp: 18 (08/13/22 0420)  BP: 136/61 (08/13/22 0420)  SpO2: 96 % (08/13/22 0420) Vital Signs (24h Range):  Temp:  [97.4 °F (36.3 °C)-98.5 °F (36.9 °C)] 98.4 °F (36.9 °C)  Pulse:  [78-95] 93  Resp:  [16-18] 18  SpO2:  [94 %-99 %] 96 %  BP: (112-164)/(60-85) 136/61     Weight: (!) 162.2 kg  "(357 lb 9.4 oz)  Height: 6' 3" (190.5 cm)  Body mass index is 44.7 kg/m².      Intake/Output Summary (Last 24 hours) at 8/13/2022 0702  Last data filed at 8/13/2022 0000  Gross per 24 hour   Intake --   Output 600 ml   Net -600 ml     Constitutional: Denies fever/chills  Neurological: Denies numbness/tingling  Cardiovascular: Denies chest pain  Respiratory: Denies shortness of breath  Musculoskeletal: see HPI      Ortho/SPM Exam  Gen:  No acute distress, well-developed, well nourished.  CV:  Peripherally well-perfused. 2+ radial pulses, symmetric.  Respiratory:  Normal respiratory effort. No accessory muscle use.       MSK:  R Upper extremity  Inspection  - dressing CDI  - mild swelling in right palm, most likely do to dressing wrap  - No ecchymosis, erythema, or signs of cellulitis  Palpation  - mild TTP over surgical incision site  Range of motion  - AROM and PROM of the shoulder, elbow, wrist, and hand intact without pain  Stability  - No evidence of joint dislocation or abnormal laxity  Neurovascular  - AIN/PIN/Radial/Median/Ulnar Nerves assessed in isolation without deficit  - Able to give thumbs up, make "OK" sign, cross IF/LF, abduct/adduct fingers, make fist  - SILT throughout  - Compartments soft  - Radial artery palpated  - Capillary Refill <3s  - Muscle tone normal      Significant Labs: CBC:   Recent Labs   Lab 08/12/22  0345   WBC 6.92   HGB 9.9*   HCT 31.4*   *     CMP:   Recent Labs   Lab 08/12/22  0345 08/13/22  0422     --    K 4.2  --      --    CO2 26  --    *  --    BUN 15  --    CREATININE 1.3 1.2   CALCIUM 9.1  --    PROT 8.3  --    ALBUMIN 2.2*  --    BILITOT 0.3  --    ALKPHOS 86  --    AST 29  --    ALT 32  --    ANIONGAP 9  --          Significant Imaging: I have reviewed and interpreted all pertinent imaging results/findings.  "

## 2022-08-13 NOTE — PROGRESS NOTES
[facility] - [facility]  Endocrinology  Progress Note    Admit Date: 8/2/2022     Reason for Consult: Management of T2DM, Hyperglycemia     Diabetes diagnosis year: 2010    Home Diabetes Medications:  No current medication use. Patient states > 3 years since he has been on Metformin.     How often checking glucose at home?  Patient not checking his BG at home.     BG readings on regimen: ANURAG  Hypoglycemia on the regimen?  No  Missed doses on regimen?  Yes    Diabetes Complications include:     Hyperglycemia, Diabetic peripheral neuropathy , Diabetic dermatitis, and Foot ulcer      Complicating diabetes co morbidities:   Infection; HTN      HPI: 55 yo male with diabetes, HTN presenting 2/2 R foot swelling that started acutely 3 days ago and progressively has gotten worse. He states he had a wound on his right great toe and 5th toe that has been there for a while. Today it had not gotten any better so he decided to come in for evaluation. He has not been on any antibiotics for it. He states that he was on medications at one point but no longer takes them as he thought he had gotten them under control. In the ED, concern with elevated WBC, ESR, CRP, xr of foot showed osteo of great toe. General surgery consulted to rule nec fasc 2/2 gas in the 5th toe. Medicine called for admission. Rocephin started. Endocrine consulted to manage hyperglycemia and type 2 diabetes.       Hemoglobin A1C   Date Value Ref Range Status   08/03/2022 12.4 (H) 4.0 - 5.6 % Final     Comment:     ADA Screening Guidelines:  5.7-6.4%  Consistent with prediabetes  >or=6.5%  Consistent with diabetes    High levels of fetal hemoglobin interfere with the HbA1C  assay. Heterozygous hemoglobin variants (HbS, HgC, etc)do  not significantly interfere with this assay.   However, presence of multiple variants may affect accuracy.     03/19/2015 15.0 (H) 4.5 - 6.2 % Final   01/26/2010 15.4 (H) 4.0 - 6.2 % Final          Interval HPI:   Overnight events: Remains  "in [facility]. Tentative discharge today. Antibiotics. BG reasonably well controlled with current regimen. 3 Days Post-Op  Eating:  Diet diabetic [facility]; 2000 Calorie   100%  Nausea: No  Hypoglycemia and intervention: No  Fever: No  TPN and/or TF: No      BP (!) 141/65 (Patient Position: Lying)   Pulse 83   Temp 98.1 °F (36.7 °C) (Oral)   Resp 16   Ht 6' 3" (1.905 m)   Wt (!) 157.5 kg (347 lb 3.6 oz)   SpO2 (!) 92%   BMI 43.40 kg/m²     Labs Reviewed and Include    Recent Labs   Lab 08/13/22 0422   CREATININE 1.2     Lab Results   Component Value Date    WBC 6.92 08/12/2022    HGB 9.9 (L) 08/12/2022    HCT 31.4 (L) 08/12/2022    MCV 84 08/12/2022     (H) 08/12/2022     No results for input(s): TSH, FREET4 in the last 168 hours.  Lab Results   Component Value Date    HGBA1C 12.4 (H) 08/03/2022       Nutritional status:   Body mass index is 43.4 kg/m².  Lab Results   Component Value Date    ALBUMIN 2.2 (L) 08/12/2022    ALBUMIN 2.4 (L) 08/10/2022    ALBUMIN 2.2 (L) 08/08/2022     Lab Results   Component Value Date    PREALBUMIN 6 (L) 02/01/2010       Estimated Creatinine Clearance: 113.2 mL/min (based on SCr of 1.2 mg/dL).    Accu-Checks  Recent Labs     08/10/22  1604 08/10/22  2053 08/11/22  0727 08/11/22  1139 08/11/22  1554 08/12/22  0655 08/12/22  1116 08/12/22  1617 08/12/22  2007 08/13/22  0714   POCTGLUCOSE 164* 117* 170* 223* 177* 128* 196* 205* 133* 138*       Current Medications and/or Treatments Impacting Glycemic Control  Immunotherapy:    Immunosuppressants       None          Steroids:   Hormones (From admission, onward)                Start     Stop Route Frequency Ordered    08/03/22 0239  melatonin tablet 6 mg         -- Oral Nightly PRN 08/03/22 0141          Pressors:    Autonomic Drugs (From admission, onward)                None          Hyperglycemia/Diabetes Medications:   Antihyperglycemics (From admission, onward)                Start     Stop Route Frequency Ordered "    08/07/22 1035  insulin aspart U-100 pen 0-5 Units         -- SubQ Before meals & nightly PRN 08/07/22 0935    08/06/22 1130  insulin aspart U-100 pen 12 Units         -- SubQ 3 times daily with meals 08/06/22 0835    08/06/22 0900  insulin detemir U-100 pen 32 Units         -- SubQ Daily 08/06/22 0835            ASSESSMENT and PLAN    * Osteomyelitis of great toe of right foot  Infection may elevate BG readings  On IV antibiotics  Managed per primary team  Avoid hypoglycemia        Uncontrolled type 2 diabetes mellitus with hyperglycemia, without long-term current use of insulin  Endocrinology consulted for BG management.   BG goal 140-180    Weight-based at 0.5 units/kg/day   - Levemir Flex Pen 32 units daily  - Novolog (aspart) insulin 12 Units SQ TIDWM and prn for BG excursions LDC (150/50) SSI  - BG checks AC/HS  - Hypoglycemia protocol in place    ** Please notify Endocrine for any change and/or advance in diet**  ** Please call Endocrine for any BG related issues **    Discharge Planning: tresiba 32 units daily. novolog 12 units with meals plus correction scael 180/25.   Metformin  mg pills:  1 pill once daily with breakfast or a week THEN  1 pill with breakfast, 1 pill with dinner for a week THEN  1 pill with breakfast, 2 pills with dinner for a week THEN  2 pills with breakfast, 2 pills with dinner          Reviewed topics related to DM including: the need for insulin, how insulin works, what makes it a high risk medication, the importance of follow up with either PCP or endocrine provider, importance of and how to check BG, how to record BG on logs, how to administer insulin, appropriate insulin administration sites, importance of rotating injection sites, hyper/hypoglycemia, how and when to treat hypoglycemia, when to hold insulin,  importance of storing unused insulin in the refrigerator, and when to seek medical attention.  Patient verbalized understanding, answered all questions to patient's  satisfaction.            Severe obesity (BMI >= 40)  Body mass index is 43.4 kg/m².  Increased abdominal adiposity can increase insulin resistance.           Jessica Wharton NP  Endocrinology  [facility]

## 2022-08-13 NOTE — ASSESSMENT & PLAN NOTE
Endocrinology consulted for BG management.   BG goal 140-180    Weight-based at 0.5 units/kg/day   - Levemir Flex Pen 32 units daily  - Novolog (aspart) insulin 12 Units SQ TIDWM and prn for BG excursions LDC (150/50) SSI  - BG checks AC/HS  - Hypoglycemia protocol in place    ** Please notify Endocrine for any change and/or advance in diet**  ** Please call Endocrine for any BG related issues **    Discharge Planning: tresiba 32 units daily. novolog 12 units with meals plus correction scael 180/25.   Metformin  mg pills:  1 pill once daily with breakfast or a week THEN  1 pill with breakfast, 1 pill with dinner for a week THEN  1 pill with breakfast, 2 pills with dinner for a week THEN  2 pills with breakfast, 2 pills with dinner          Reviewed topics related to DM including: the need for insulin, how insulin works, what makes it a high risk medication, the importance of follow up with either PCP or endocrine provider, importance of and how to check BG, how to record BG on logs, how to administer insulin, appropriate insulin administration sites, importance of rotating injection sites, hyper/hypoglycemia, how and when to treat hypoglycemia, when to hold insulin,  importance of storing unused insulin in the refrigerator, and when to seek medical attention.  Patient verbalized understanding, answered all questions to patient's satisfaction.

## 2022-08-13 NOTE — ASSESSMENT & PLAN NOTE
Began developing about 2 weeks ago. Now s/p I&D with Ortho.  Cultures pending (patient receiving vancomycin) - now with strep dysgalactiae; sensitive to Vanc

## 2022-08-14 LAB
ALBUMIN SERPL BCP-MCNC: 2.3 G/DL (ref 3.5–5.2)
ALP SERPL-CCNC: 86 U/L (ref 55–135)
ALT SERPL W/O P-5'-P-CCNC: 25 U/L (ref 10–44)
ANION GAP SERPL CALC-SCNC: 9 MMOL/L (ref 8–16)
AST SERPL-CCNC: 27 U/L (ref 10–40)
BASOPHILS # BLD AUTO: 0.05 K/UL (ref 0–0.2)
BASOPHILS NFR BLD: 0.9 % (ref 0–1.9)
BILIRUB SERPL-MCNC: 0.4 MG/DL (ref 0.1–1)
BUN SERPL-MCNC: 17 MG/DL (ref 6–20)
CALCIUM SERPL-MCNC: 9.2 MG/DL (ref 8.7–10.5)
CHLORIDE SERPL-SCNC: 100 MMOL/L (ref 95–110)
CO2 SERPL-SCNC: 27 MMOL/L (ref 23–29)
CREAT SERPL-MCNC: 1.2 MG/DL (ref 0.5–1.4)
DIFFERENTIAL METHOD: ABNORMAL
EOSINOPHIL # BLD AUTO: 0.3 K/UL (ref 0–0.5)
EOSINOPHIL NFR BLD: 6 % (ref 0–8)
ERYTHROCYTE [DISTWIDTH] IN BLOOD BY AUTOMATED COUNT: 13.8 % (ref 11.5–14.5)
EST. GFR  (NO RACE VARIABLE): >60 ML/MIN/1.73 M^2
GLUCOSE SERPL-MCNC: 143 MG/DL (ref 70–110)
HCT VFR BLD AUTO: 31 % (ref 40–54)
HGB BLD-MCNC: 9.9 G/DL (ref 14–18)
IMM GRANULOCYTES # BLD AUTO: 0.02 K/UL (ref 0–0.04)
IMM GRANULOCYTES NFR BLD AUTO: 0.4 % (ref 0–0.5)
LYMPHOCYTES # BLD AUTO: 1.4 K/UL (ref 1–4.8)
LYMPHOCYTES NFR BLD: 24.7 % (ref 18–48)
MAGNESIUM SERPL-MCNC: 1.7 MG/DL (ref 1.6–2.6)
MCH RBC QN AUTO: 26.8 PG (ref 27–31)
MCHC RBC AUTO-ENTMCNC: 31.9 G/DL (ref 32–36)
MCV RBC AUTO: 84 FL (ref 82–98)
MONOCYTES # BLD AUTO: 0.9 K/UL (ref 0.3–1)
MONOCYTES NFR BLD: 16.1 % (ref 4–15)
NEUTROPHILS # BLD AUTO: 2.9 K/UL (ref 1.8–7.7)
NEUTROPHILS NFR BLD: 51.9 % (ref 38–73)
NRBC BLD-RTO: 0 /100 WBC
PHOSPHATE SERPL-MCNC: 4.2 MG/DL (ref 2.7–4.5)
PLATELET # BLD AUTO: 584 K/UL (ref 150–450)
PMV BLD AUTO: 9.1 FL (ref 9.2–12.9)
POCT GLUCOSE: 146 MG/DL (ref 70–110)
POCT GLUCOSE: 146 MG/DL (ref 70–110)
POCT GLUCOSE: 148 MG/DL (ref 70–110)
POCT GLUCOSE: 254 MG/DL (ref 70–110)
POTASSIUM SERPL-SCNC: 4.2 MMOL/L (ref 3.5–5.1)
PROT SERPL-MCNC: 8.3 G/DL (ref 6–8.4)
RBC # BLD AUTO: 3.7 M/UL (ref 4.6–6.2)
SODIUM SERPL-SCNC: 136 MMOL/L (ref 136–145)
VANCOMYCIN SERPL-MCNC: 12.5 UG/ML
VANCOMYCIN TROUGH SERPL-MCNC: 11.5 UG/ML (ref 10–22)
WBC # BLD AUTO: 5.54 K/UL (ref 3.9–12.7)

## 2022-08-14 PROCEDURE — 63600175 PHARM REV CODE 636 W HCPCS: Performed by: INTERNAL MEDICINE

## 2022-08-14 PROCEDURE — 80202 ASSAY OF VANCOMYCIN: CPT | Mod: 91 | Performed by: INTERNAL MEDICINE

## 2022-08-14 PROCEDURE — 83735 ASSAY OF MAGNESIUM: CPT | Performed by: INTERNAL MEDICINE

## 2022-08-14 PROCEDURE — 11000001 HC ACUTE MED/SURG PRIVATE ROOM

## 2022-08-14 PROCEDURE — 85025 COMPLETE CBC W/AUTO DIFF WBC: CPT | Performed by: INTERNAL MEDICINE

## 2022-08-14 PROCEDURE — A4216 STERILE WATER/SALINE, 10 ML: HCPCS | Performed by: INTERNAL MEDICINE

## 2022-08-14 PROCEDURE — 84100 ASSAY OF PHOSPHORUS: CPT | Performed by: INTERNAL MEDICINE

## 2022-08-14 PROCEDURE — 99233 SBSQ HOSP IP/OBS HIGH 50: CPT | Mod: 95,,, | Performed by: INTERNAL MEDICINE

## 2022-08-14 PROCEDURE — 80053 COMPREHEN METABOLIC PANEL: CPT | Performed by: INTERNAL MEDICINE

## 2022-08-14 PROCEDURE — 80202 ASSAY OF VANCOMYCIN: CPT | Performed by: INTERNAL MEDICINE

## 2022-08-14 PROCEDURE — 25000003 PHARM REV CODE 250: Performed by: INTERNAL MEDICINE

## 2022-08-14 PROCEDURE — 25000003 PHARM REV CODE 250: Performed by: NURSE PRACTITIONER

## 2022-08-14 PROCEDURE — 63600175 PHARM REV CODE 636 W HCPCS: Performed by: HOSPITALIST

## 2022-08-14 PROCEDURE — 99233 PR SUBSEQUENT HOSPITAL CARE,LEVL III: ICD-10-PCS | Mod: 95,,, | Performed by: INTERNAL MEDICINE

## 2022-08-14 RX ADMIN — HYDROCODONE BITARTRATE AND ACETAMINOPHEN 1 TABLET: 7.5; 325 TABLET ORAL at 06:08

## 2022-08-14 RX ADMIN — SENNOSIDES AND DOCUSATE SODIUM 1 TABLET: 50; 8.6 TABLET ORAL at 09:08

## 2022-08-14 RX ADMIN — VALSARTAN 80 MG: 40 TABLET, FILM COATED ORAL at 09:08

## 2022-08-14 RX ADMIN — AMLODIPINE BESYLATE 5 MG: 5 TABLET ORAL at 09:08

## 2022-08-14 RX ADMIN — VANCOMYCIN HYDROCHLORIDE 1250 MG: 1.25 INJECTION, POWDER, LYOPHILIZED, FOR SOLUTION INTRAVENOUS at 09:08

## 2022-08-14 RX ADMIN — SENNOSIDES AND DOCUSATE SODIUM 1 TABLET: 50; 8.6 TABLET ORAL at 08:08

## 2022-08-14 RX ADMIN — ENOXAPARIN SODIUM 40 MG: 40 INJECTION SUBCUTANEOUS at 09:08

## 2022-08-14 RX ADMIN — POLYETHYLENE GLYCOL 3350 17 G: 17 POWDER, FOR SOLUTION ORAL at 08:08

## 2022-08-14 RX ADMIN — Medication 10 ML: at 05:08

## 2022-08-14 RX ADMIN — INSULIN ASPART 12 UNITS: 100 INJECTION, SOLUTION INTRAVENOUS; SUBCUTANEOUS at 12:08

## 2022-08-14 RX ADMIN — ENOXAPARIN SODIUM 40 MG: 40 INJECTION SUBCUTANEOUS at 08:08

## 2022-08-14 RX ADMIN — VALSARTAN 80 MG: 40 TABLET, FILM COATED ORAL at 08:08

## 2022-08-14 RX ADMIN — Medication 10 ML: at 06:08

## 2022-08-14 RX ADMIN — Medication 10 ML: at 12:08

## 2022-08-14 RX ADMIN — INSULIN DETEMIR 32 UNITS: 100 INJECTION, SOLUTION SUBCUTANEOUS at 09:08

## 2022-08-14 RX ADMIN — POLYETHYLENE GLYCOL 3350 17 G: 17 POWDER, FOR SOLUTION ORAL at 09:08

## 2022-08-14 RX ADMIN — HYDROCODONE BITARTRATE AND ACETAMINOPHEN 1 TABLET: 7.5; 325 TABLET ORAL at 05:08

## 2022-08-14 NOTE — PROGRESS NOTES
Pharmacokinetic Assessment Follow Up: IV Vancomycin    Vancomycin serum concentration assessment(s):    -Vancomycin random level was drawn ~ 8 hrs from the previous dose and can be used to guide therapy.  -A level of 12.5 mcg/mL is below goal 15-20 mcg/mL for OM.  -Renal function stable. Previous accumulation.     Vancomycin Regimen Plan:    -Schedule vancomycin 1250 mg IV Q24H.  -Trough on 8/16 @ 0830.  -Monitor for changes in renal function closely.    Drug levels (last 3 results):  Recent Labs   Lab Result Units 08/12/22 0345 08/13/22 0422 08/14/22 0458   Vancomycin, Random ug/mL 14.9 13.8 12.5       Pharmacy will continue to follow and monitor vancomycin.    Please contact pharmacy at extension [phone] for questions regarding this assessment.    Thank you for the consult,   Sachin Serna       Patient brief summary:  Renee Caceres is a 56 y.o. male initiated on antimicrobial therapy with IV Vancomycin for treatment of bone/joint infection    Drug Allergies:   Review of patient's allergies indicates:  No Known Allergies    Actual Body Weight:   157.5 kg    Renal Function:   Estimated Creatinine Clearance: 113.2 mL/min (based on SCr of 1.2 mg/dL).,     Dialysis Method (if applicable):  N/A    CBC (last 72 hours):  Recent Labs   Lab Result Units 08/12/22 0345 08/14/22 0458   WBC K/uL 6.92 5.54   Hemoglobin g/dL 9.9* 9.9*   Hematocrit % 31.4* 31.0*   Platelets K/uL 640* 584*   Gran % % 58.5 51.9   Lymph % % 23.1 24.7   Mono % % 12.9 16.1*   Eosinophil % % 4.2 6.0   Basophil % % 0.9 0.9   Differential Method  Automated Automated       Metabolic Panel (last 72 hours):  Recent Labs   Lab Result Units 08/12/22 0345 08/13/22 0422 08/14/22 0458   Sodium mmol/L 136  --  136   Potassium mmol/L 4.2  --  4.2   Chloride mmol/L 101  --  100   CO2 mmol/L 26  --  27   Glucose mg/dL 113*  --  143*   BUN mg/dL 15  --  17   Creatinine mg/dL 1.3 1.2 1.2   Albumin g/dL 2.2*  --  2.3*   Total Bilirubin mg/dL 0.3  --  0.4    Alkaline Phosphatase U/L 86  --  86   AST U/L 29  --  27   ALT U/L 32  --  25   Magnesium mg/dL 1.7  --  1.7   Phosphorus mg/dL 4.0  --  4.2       Vancomycin Administrations:  vancomycin given in the last 96 hours                   vancomycin in dextrose 5 % 1 gram/250 mL IVPB 1,000 mg (mg) 1,000 mg New Bag 08/13/22 0912    vancomycin in dextrose 5 % 1 gram/250 mL IVPB 1,000 mg (mg) 1,000 mg New Bag 08/12/22 0756    vancomycin 1.25 g in dextrose 5% 250 mL IVPB (ready to mix) (mg) 1,250 mg New Bag 08/10/22 1110                Microbiologic Results:  Microbiology Results (last 7 days)     Procedure Component Value Units Date/Time    Aerobic culture [[id number]]  (Abnormal)  (Susceptibility) Collected: 08/10/22 0859    Order Status: Completed Specimen: Incision site from Arm, Right Updated: 08/13/22 1326     Aerobic Bacterial Culture STREPTOCOCCUS DYSGALACTIAE  Rare      Culture, Anaerobe [[id number]] Collected: 08/10/22 0859    Order Status: Completed Specimen: Incision site from Arm, Right Updated: 08/12/22 1037     Anaerobic Culture Culture in progress    AFB Culture & Smear [[id number]] Collected: 08/10/22 0859    Order Status: Completed Specimen: Incision site from Arm, Right Updated: 08/11/22 2127     AFB Culture & Smear Culture in progress     AFB CULTURE STAIN No acid fast bacilli seen.    Fungus culture [[id number]] Collected: 08/10/22 0859    Order Status: Completed Specimen: Incision site from Arm, Right Updated: 08/11/22 0747     Fungus (Mycology) Culture Culture in progress    Gram stain [[id number]] Collected: 08/10/22 0859    Order Status: Completed Specimen: Incision site from Arm, Right Updated: 08/10/22 1313     Gram Stain Result Moderate WBC's      No organisms seen    Fungus culture [[id number]] Collected: 08/04/22 1145    Order Status: Completed Specimen: Bone from Toe, Right Foot Updated: 08/09/22 1307     Fungus (Mycology) Culture Culture in progress    Narrative:      R Great toe bone    Aerobic  culture [[id number]]  (Abnormal)  (Susceptibility) Collected: 08/04/22 1145    Order Status: Completed Specimen: Bone from Toe, Right Foot Updated: 08/08/22 1228     Aerobic Bacterial Culture STAPHYLOCOCCUS COHNII SUBSPECIES COHNII  Moderate  Skin sabine also present  Skin sabine also present      Narrative:      R great toe bone    Culture, Anaerobe [[id number]] Collected: 08/03/22 1241    Order Status: Completed Specimen: Wound from Toe, Right Foot Updated: 08/08/22 1155     Anaerobic Culture No anaerobes isolated    Culture, Anaerobe [[id number]] Collected: 08/04/22 1145    Order Status: Completed Specimen: Bone from Toe, Right Foot Updated: 08/08/22 0949     Anaerobic Culture No anaerobes isolated    Narrative:      R Great toe bone    Blood culture #1 [[id number]] Collected: 08/02/22 1935    Order Status: Completed Specimen: Blood from Peripheral, Wrist, Left Updated: 08/07/22 2022     Blood Culture, Routine No growth after 5 days.    Narrative:      Blood Culture #1    Blood culture #2 [[id number]] Collected: 08/02/22 1927    Order Status: Completed Specimen: Blood from Peripheral, Hand, Left Updated: 08/07/22 2022     Blood Culture, Routine No growth after 5 days.    Narrative:      Blood Culture #2

## 2022-08-14 NOTE — PROGRESS NOTES
[facility]  Telemedicine Progress Note    Patient Name: Renee Caceres  MRN: [id number]  Patient Class: IP- Inpatient   Admission Date: 8/2/2022  Length of Stay: 11 days  Attending Physician: Mamta Cadena MD  Primary Care Provider: Primary Doctor No          Subjective:     Principal Problem:Osteomyelitis of great toe of right foot        HPI:  57 yo male with diabetes, HTN presenting 2/2 R foot swelling that started acutely 3 days ago and progressively has gotten worse. He states he had a wound on his right great toe and 5th toe that has been there for a while. Today it had not gotten any better so he decided to come in for evaluation. He has not been on any antibiotics for it. He states that he was on medications at one point but no longer takes them as he thought he had gotten them under control. In the ED, concern with elevated WBC, ESR, CRP, xr of foot showed osteo of great toe. General surgery consulted to rule nec fasc 2/2 gas in the 5th toe. Medicine called for admission. Rocephin started.         Overview/Hospital Course:  No evidence of a necrotizing infection on physical exam. Podiatry and ID consulted. Hyperglycemia managed by Endocrinology.        This encounter was provided through telemedicine.  Patient was transferred to the telemedicine service on:  08/5/2022   The patient location is: [address] [place] [address] A admitted 8/2/2022  6:24 PM.  Present with the patient at the time of the telemed/virtual assessment: Telepresenter    Interval History/Overnight Events:   Clinical record since admit reviewed.  Patient able to provide history.     Uneventful night - pain and ROM to RUE continue to be controlled with oral analgesia; HME unable to get postop boot from usual suppliers so orthotic company will need to provide    Review of Systems   Constitutional:  Positive for activity change. Negative for fever.   Respiratory:  Negative for shortness of breath.    Cardiovascular:  Negative  for chest pain.   Gastrointestinal:  Negative for diarrhea and vomiting.   Musculoskeletal:  Positive for arthralgias and myalgias.      Inpatient Medications:  Scheduled Meds:   amLODIPine  5 mg Oral Daily    enoxaparin  40 mg Subcutaneous Q12H    insulin aspart U-100  12 Units Subcutaneous TIDWM    insulin detemir U-100  32 Units Subcutaneous Daily    polyethylene glycol  17 g Oral BID    senna-docusate 8.6-50 mg  1 tablet Oral BID    sodium chloride 0.9%  10 mL Intravenous Q6H    valsartan  80 mg Oral BID    vancomycin (VANCOCIN) IVPB  1,250 mg Intravenous Q24H     Continuous Infusions:  PRN Meds:.acetaminophen, albuterol-ipratropium, cadexomer iodine, dextrose 10%, dextrose 10%, glucagon (human recombinant), glucose, glucose, hydrALAZINE, HYDROcodone-acetaminophen, insulin aspart U-100, melatonin, morphine, naloxone, ondansetron, polyethylene glycol, prochlorperazine, simethicone, sodium chloride 0.9%, Flushing PICC Protocol **AND** sodium chloride 0.9% **AND** sodium chloride 0.9%, Pharmacy to dose Vancomycin consult **AND** vancomycin - pharmacy to dose      Objective:     Temp:  [97.5 °F (36.4 °C)-98.1 °F (36.7 °C)] 98.1 °F (36.7 °C)  Pulse:  [82-91] 85  Resp:  [16-20] 17  SpO2:  [92 %-100 %] 97 %  BP: (129-141)/(57-64) 134/63      Intake/Output Summary (Last 24 hours) at 8/14/2022 1000  Last data filed at 8/14/2022 0446  Gross per 24 hour   Intake 240 ml   Output 1500 ml   Net -1260 ml          Body mass index is 43.4 kg/m².    Physical Exam  Vitals and nursing note reviewed.   Constitutional:       General: He is not in acute distress.     Appearance: Normal appearance. He is normal weight. He is not ill-appearing.   HENT:      Head: Normocephalic and atraumatic.      Right Ear: Hearing normal.      Left Ear: Hearing normal.      Nose: Nose normal.   Eyes:      General: No scleral icterus.        Right eye: No discharge.         Left eye: No discharge.      Extraocular Movements: Extraocular  movements intact.   Cardiovascular:      Rate and Rhythm: Normal rate.   Pulmonary:      Effort: Pulmonary effort is normal. No accessory muscle usage or respiratory distress.   Musculoskeletal:      Comments: Right UE bandaged   Neurological:      General: No focal deficit present.      Mental Status: He is alert and oriented to person, place, and time.      Cranial Nerves: No cranial nerve deficit.      Motor: No weakness.   Psychiatric:         Attention and Perception: Attention normal.         Mood and Affect: Mood normal.         Speech: Speech normal.         Behavior: Behavior is cooperative.        Labs:  Recent Results (from the past 24 hour(s))   POCT glucose    Collection Time: 08/13/22 11:20 AM   Result Value Ref Range    POCT Glucose 201 (H) 70 - 110 mg/dL   C-reactive protein    Collection Time: 08/13/22 12:09 PM   Result Value Ref Range    CRP 71.4 (H) 0.0 - 8.2 mg/L   POCT glucose    Collection Time: 08/13/22  8:55 PM   Result Value Ref Range    POCT Glucose 166 (H) 70 - 110 mg/dL   CBC Auto Differential    Collection Time: 08/14/22  4:58 AM   Result Value Ref Range    WBC 5.54 3.90 - 12.70 K/uL    RBC 3.70 (L) 4.60 - 6.20 M/uL    Hemoglobin 9.9 (L) 14.0 - 18.0 g/dL    Hematocrit 31.0 (L) 40.0 - 54.0 %    MCV 84 82 - 98 fL    MCH 26.8 (L) 27.0 - 31.0 pg    MCHC 31.9 (L) 32.0 - 36.0 g/dL    RDW 13.8 11.5 - 14.5 %    Platelets 584 (H) 150 - 450 K/uL    MPV 9.1 (L) 9.2 - 12.9 fL    Immature Granulocytes 0.4 0.0 - 0.5 %    Gran # (ANC) 2.9 1.8 - 7.7 K/uL    Immature Grans (Abs) 0.02 0.00 - 0.04 K/uL    Lymph # 1.4 1.0 - 4.8 K/uL    Mono # 0.9 0.3 - 1.0 K/uL    Eos # 0.3 0.0 - 0.5 K/uL    Baso # 0.05 0.00 - 0.20 K/uL    nRBC 0 0 /100 WBC    Gran % 51.9 38.0 - 73.0 %    Lymph % 24.7 18.0 - 48.0 %    Mono % 16.1 (H) 4.0 - 15.0 %    Eosinophil % 6.0 0.0 - 8.0 %    Basophil % 0.9 0.0 - 1.9 %    Differential Method Automated    Comprehensive Metabolic Panel    Collection Time: 08/14/22  4:58 AM   Result Value  Ref Range    Sodium 136 136 - 145 mmol/L    Potassium 4.2 3.5 - 5.1 mmol/L    Chloride 100 95 - 110 mmol/L    CO2 27 23 - 29 mmol/L    Glucose 143 (H) 70 - 110 mg/dL    BUN 17 6 - 20 mg/dL    Creatinine 1.2 0.5 - 1.4 mg/dL    Calcium 9.2 8.7 - 10.5 mg/dL    Total Protein 8.3 6.0 - 8.4 g/dL    Albumin 2.3 (L) 3.5 - 5.2 g/dL    Total Bilirubin 0.4 0.1 - 1.0 mg/dL    Alkaline Phosphatase 86 55 - 135 U/L    AST 27 10 - 40 U/L    ALT 25 10 - 44 U/L    Anion Gap 9 8 - 16 mmol/L    eGFR >60.0 >60 mL/min/1.73 m^2   Magnesium    Collection Time: 08/14/22  4:58 AM   Result Value Ref Range    Magnesium 1.7 1.6 - 2.6 mg/dL   Phosphorus    Collection Time: 08/14/22  4:58 AM   Result Value Ref Range    Phosphorus 4.2 2.7 - 4.5 mg/dL   Vancomycin, random    Collection Time: 08/14/22  4:58 AM   Result Value Ref Range    Vancomycin, Random 12.5 Not established ug/mL   POCT glucose    Collection Time: 08/14/22  6:29 AM   Result Value Ref Range    POCT Glucose 148 (H) 70 - 110 mg/dL        No results found for: [id number]    Recent Labs   Lab 08/10/22  0557 08/12/22  0345 08/14/22  0458   WBC 9.76 6.92 5.54   LYMPH 18.4  1.8 23.1  1.6 24.7  1.4   HGB 10.4* 9.9* 9.9*   HCT 31.8* 31.4* 31.0*   * 640* 584*       Recent Labs   Lab 08/10/22  0612 08/12/22  0345 08/13/22  0422 08/14/22  0458    136  --  136   K 4.1 4.2  --  4.2   CL 99 101  --  100   CO2 28 26  --  27   BUN 16 15  --  17   CREATININE 1.3 1.3 1.2 1.2   * 113*  --  143*   CALCIUM 9.6 9.1  --  9.2   MG 1.7 1.7  --  1.7   PHOS 3.9 4.0  --  4.2       Recent Labs   Lab 08/10/22  0612 08/12/22  0345 08/14/22  0458   ALKPHOS 99 86 86   ALT 56* 32 25   AST 40 29 27   ALBUMIN 2.4* 2.2* 2.3*   PROT 8.8* 8.3 8.3   BILITOT 0.5 0.3 0.4          Recent Labs     08/13/22  1209   CRP 71.4*         All labs within the last 24 hours were reviewed.     Microbiology:  Microbiology Results (last 7 days)       Procedure Component Value Units Date/Time    Aerobic culture  [[id number]]  (Abnormal)  (Susceptibility) Collected: 08/10/22 0859    Order Status: Completed Specimen: Incision site from Arm, Right Updated: 08/13/22 1326     Aerobic Bacterial Culture STREPTOCOCCUS DYSGALACTIAE  Rare      Culture, Anaerobe [[id number]] Collected: 08/10/22 0859    Order Status: Completed Specimen: Incision site from Arm, Right Updated: 08/12/22 1037     Anaerobic Culture Culture in progress    AFB Culture & Smear [[id number]] Collected: 08/10/22 0859    Order Status: Completed Specimen: Incision site from Arm, Right Updated: 08/11/22 2127     AFB Culture & Smear Culture in progress     AFB CULTURE STAIN No acid fast bacilli seen.    Fungus culture [[id number]] Collected: 08/10/22 0859    Order Status: Completed Specimen: Incision site from Arm, Right Updated: 08/11/22 0747     Fungus (Mycology) Culture Culture in progress    Gram stain [[id number]] Collected: 08/10/22 0859    Order Status: Completed Specimen: Incision site from Arm, Right Updated: 08/10/22 1313     Gram Stain Result Moderate WBC's      No organisms seen    Fungus culture [[id number]] Collected: 08/04/22 1145    Order Status: Completed Specimen: Bone from Toe, Right Foot Updated: 08/09/22 1307     Fungus (Mycology) Culture Culture in progress    Narrative:      R Great toe bone    Aerobic culture [[id number]]  (Abnormal)  (Susceptibility) Collected: 08/04/22 1145    Order Status: Completed Specimen: Bone from Toe, Right Foot Updated: 08/08/22 1228     Aerobic Bacterial Culture STAPHYLOCOCCUS COHNII SUBSPECIES COHNII  Moderate  Skin sabine also present  Skin sabine also present      Narrative:      R great toe bone    Culture, Anaerobe [[id number]] Collected: 08/03/22 1241    Order Status: Completed Specimen: Wound from Toe, Right Foot Updated: 08/08/22 1155     Anaerobic Culture No anaerobes isolated    Culture, Anaerobe [[id number]] Collected: 08/04/22 1145    Order Status: Completed Specimen: Bone from Toe, Right Foot Updated: 08/08/22  0949     Anaerobic Culture No anaerobes isolated    Narrative:      R Great toe bone    Blood culture #1 [[id number]] Collected: 08/02/22 1935    Order Status: Completed Specimen: Blood from Peripheral, Wrist, Left Updated: 08/07/22 2022     Blood Culture, Routine No growth after 5 days.    Narrative:      Blood Culture #1    Blood culture #2 [[id number]] Collected: 08/02/22 1927    Order Status: Completed Specimen: Blood from Peripheral, Hand, Left Updated: 08/07/22 2022     Blood Culture, Routine No growth after 5 days.    Narrative:      Blood Culture #2              Imaging  ECG Results    None         No results found for this or any previous visit.      MRI Forearm W WO Contrast Right  Narrative: EXAMINATION:  MRI FOREARM W WO CONTRAST RIGHT    CLINICAL HISTORY:  Soft tissue mass, forearm, deep;    TECHNIQUE:  Multisequence, multi planar magnetic resonance imaging of the right forearm before and after the intravenous administration of 10 mL Gadavist.    COMPARISON:  Radiographs from earlier today.  Ultrasound of the right forearm from 08/08/2022 and 08/04/2022.    FINDINGS:  Soft tissues: There is a large rim enhancing fluid collection within the right anterior forearm soft tissues measuring 9.3 x 5.7 x 3.2 cm (cc by AP by TR).  The collection is centered within the musculature of the anterior compartment of the proximal forearm.  There is some associated T1 hyperintensity suspicious for blood products or proteinaceous content.  There is adjacent enhancement and edema of the anterior compartment musculature and the subcutaneous fat of the anterior forearm, with overlying skin thickening and enhancement.    Bone: There is no evidence of acute osteomyelitis.  Bone marrow signal is normal.  There is no T1 hypointense marrow signal.  There is no abnormal marrow enhancement.  There is no acute fracture or dislocation.    Articulations: The right elbow and right wrist joints are grossly unremarkable.  No joint  effusion.    Miscellaneous: Neurovascular structures are grossly intact.  Impression: Large rim enhancing fluid collection within the musculature of the anterior compartment of the proximal forearm, compatible with an intramuscular abscess or infected hematoma.  Adjacent myositis and cellulitis as above.  No evidence of acute osteomyelitis.  Correlate clinically for evidence of compartment syndrome.    This report was flagged in Epic as abnormal.    Electronically signed by: Jesse Heredia  Date:    08/10/2022  Time:    00:40      All imaging within the last 24 hours was reviewed.       Discharge Planning   JOSÉ ANTONIO: 8/13/2022     Code Status: Full Code   Is the patient medically ready for discharge?: No    Reason for patient still in hospital (select all that apply): Patient trending condition, Laboratory test, Treatment, Consult recommendations, and Pending disposition  Discharge Plan A: Home Health            Assessment/Plan:      * Osteomyelitis of great toe of right foot  Per surgery, no evidence of a necrotizing infection at physical exam. Patient with a chronic wound opened to air, this may be the reason there is some soft tissue gas seen in the foot Xray. Wound looks chronic in nature.   - Continued ceftriaxone, vancomycin initially  - Podiatry consulted, patient s/p bone bx   - MRI consistent with osteomyelitis  - ID consulted: Right hallux bone biopsy. Bone cultures pending.  - Unable to get vascular SANDEE due to pain.   - ID de-escalated antibiotic to vancomycin. Placed PICC. Wound care per Podiatry.  - XL post op shoe to small for patient so ordered with E but they are unable to procure; will need to consult an orthotic company    Abscess of right forearm  Began developing about 2 weeks ago. Now s/p I&D with Ortho.  Cultures pending (patient receiving vancomycin) - now with strep dysgalactiae; sensitive to Vanc    Ulcer of right fifth toe due to diabetes mellitus  Possible osteomyelitis, continuing wound care  and antibiotics    Uncontrolled type 2 diabetes mellitus with hyperglycemia, without long-term current use of insulin  Patient's FSGs are uncontrolled due to hyperglycemia on current medication regimen.  Last A1c reviewed- uncontrolled since 2010 per available records.  Current correctional scale  Low  Endocrinology consulted and managing with anti-hyperglycemics as follows-   Antihyperglycemics (From admission, onward)            Start     Stop Route Frequency Ordered    08/07/22 1035  insulin aspart U-100 pen 0-5 Units         -- SubQ Before meals & nightly PRN 08/07/22 0935    08/06/22 1130  insulin aspart U-100 pen 12 Units         -- SubQ 3 times daily with meals 08/06/22 0835    08/06/22 0900  insulin detemir U-100 pen 32 Units         -- SubQ Daily 08/06/22 0835      Hold Oral hypoglycemics while patient is in the hospital - anticipate insulin at discharge  Consulted Endocrinology for management and discharge recommendations, follow up.  Management per Endocrinology.    Primary hypertension  Uncontrolled, not on medication at home  - Continued amlodipine 5mg daily  - Continued lisinopril 5mg daily; increased to 20 mg for 8/6  - BP remained elevated; increased lisinopril to 40 mg. Increased amlodipine. Monitor.  - Persistent hypertension; Changed lisinopril to valsartan on 8/9 - holding zoila-op on 8/10    Cellulitis of right lower extremity  Acute  Continued Rocephin and vancomycin initially  LE U/S performed: No evidence of deep venous thrombosis in the right lower extremity.  ID de-escalated antibiotic to vancomycin.      VTE Risk Mitigation (From admission, onward)         Ordered     enoxaparin injection 40 mg  Every 12 hours         08/03/22 0833     IP VTE HIGH RISK PATIENT  Once         08/03/22 0141     Place sequential compression device  Until discontinued         08/03/22 0141              High Risk Conditions:  Patient is currently on drug therapy requiring intensive monitoring for toxicity:  Vancomycin      I have assessed these findings virtually using a telemed platform and with assistance of the bedside nurse.          The attending portion of this evaluation, treatment, and documentation was performed per Mamta Cadena MD via Telemedicine AudioVisual using the secure [employer] software platform with 2 way audio/video. The provider was located off-site and the patient is located in the hospital. The aforementioned video software was utilized to document the relevant history and physical exam    Mamta Cadena MD  Department of Hospital Medicine   [facility]

## 2022-08-14 NOTE — SUBJECTIVE & OBJECTIVE
This encounter was provided through telemedicine.  Patient was transferred to the telemedicine service on:  08/5/2022   The patient location is: 651/651 A admitted 8/2/2022  6:24 PM.  Present with the patient at the time of the telemed/virtual assessment: Telepresenter    Interval History/Overnight Events:   Clinical record since admit reviewed.  Patient able to provide history.     Uneventful night - pain and ROM to RUE continue to be controlled with oral analgesia; HME unable to get postop boot from usual suppliers so orthotic company will need to provide    Review of Systems   Constitutional:  Positive for activity change. Negative for fever.   Respiratory:  Negative for shortness of breath.    Cardiovascular:  Negative for chest pain.   Gastrointestinal:  Negative for diarrhea and vomiting.   Musculoskeletal:  Positive for arthralgias and myalgias.      Inpatient Medications:  Scheduled Meds:   amLODIPine  5 mg Oral Daily    enoxaparin  40 mg Subcutaneous Q12H    insulin aspart U-100  12 Units Subcutaneous TIDWM    insulin detemir U-100  32 Units Subcutaneous Daily    polyethylene glycol  17 g Oral BID    senna-docusate 8.6-50 mg  1 tablet Oral BID    sodium chloride 0.9%  10 mL Intravenous Q6H    valsartan  80 mg Oral BID    vancomycin (VANCOCIN) IVPB  1,250 mg Intravenous Q24H     Continuous Infusions:  PRN Meds:.acetaminophen, albuterol-ipratropium, cadexomer iodine, dextrose 10%, dextrose 10%, glucagon (human recombinant), glucose, glucose, hydrALAZINE, HYDROcodone-acetaminophen, insulin aspart U-100, melatonin, morphine, naloxone, ondansetron, polyethylene glycol, prochlorperazine, simethicone, sodium chloride 0.9%, Flushing PICC Protocol **AND** sodium chloride 0.9% **AND** sodium chloride 0.9%, Pharmacy to dose Vancomycin consult **AND** vancomycin - pharmacy to dose      Objective:     Temp:  [97.5 °F (36.4 °C)-98.1 °F (36.7 °C)] 98.1 °F (36.7 °C)  Pulse:  [82-91] 85  Resp:  [16-20] 17  SpO2:  [92  %-100 %] 97 %  BP: (129-141)/(57-64) 134/63      Intake/Output Summary (Last 24 hours) at 8/14/2022 1000  Last data filed at 8/14/2022 0446  Gross per 24 hour   Intake 240 ml   Output 1500 ml   Net -1260 ml          Body mass index is 43.4 kg/m².    Physical Exam  Vitals and nursing note reviewed.   Constitutional:       General: He is not in acute distress.     Appearance: Normal appearance. He is normal weight. He is not ill-appearing.   HENT:      Head: Normocephalic and atraumatic.      Right Ear: Hearing normal.      Left Ear: Hearing normal.      Nose: Nose normal.   Eyes:      General: No scleral icterus.        Right eye: No discharge.         Left eye: No discharge.      Extraocular Movements: Extraocular movements intact.   Cardiovascular:      Rate and Rhythm: Normal rate.   Pulmonary:      Effort: Pulmonary effort is normal. No accessory muscle usage or respiratory distress.   Musculoskeletal:      Comments: Right UE bandaged   Neurological:      General: No focal deficit present.      Mental Status: He is alert and oriented to person, place, and time.      Cranial Nerves: No cranial nerve deficit.      Motor: No weakness.   Psychiatric:         Attention and Perception: Attention normal.         Mood and Affect: Mood normal.         Speech: Speech normal.         Behavior: Behavior is cooperative.        Labs:  Recent Results (from the past 24 hour(s))   POCT glucose    Collection Time: 08/13/22 11:20 AM   Result Value Ref Range    POCT Glucose 201 (H) 70 - 110 mg/dL   C-reactive protein    Collection Time: 08/13/22 12:09 PM   Result Value Ref Range    CRP 71.4 (H) 0.0 - 8.2 mg/L   POCT glucose    Collection Time: 08/13/22  8:55 PM   Result Value Ref Range    POCT Glucose 166 (H) 70 - 110 mg/dL   CBC Auto Differential    Collection Time: 08/14/22  4:58 AM   Result Value Ref Range    WBC 5.54 3.90 - 12.70 K/uL    RBC 3.70 (L) 4.60 - 6.20 M/uL    Hemoglobin 9.9 (L) 14.0 - 18.0 g/dL    Hematocrit 31.0 (L)  40.0 - 54.0 %    MCV 84 82 - 98 fL    MCH 26.8 (L) 27.0 - 31.0 pg    MCHC 31.9 (L) 32.0 - 36.0 g/dL    RDW 13.8 11.5 - 14.5 %    Platelets 584 (H) 150 - 450 K/uL    MPV 9.1 (L) 9.2 - 12.9 fL    Immature Granulocytes 0.4 0.0 - 0.5 %    Gran # (ANC) 2.9 1.8 - 7.7 K/uL    Immature Grans (Abs) 0.02 0.00 - 0.04 K/uL    Lymph # 1.4 1.0 - 4.8 K/uL    Mono # 0.9 0.3 - 1.0 K/uL    Eos # 0.3 0.0 - 0.5 K/uL    Baso # 0.05 0.00 - 0.20 K/uL    nRBC 0 0 /100 WBC    Gran % 51.9 38.0 - 73.0 %    Lymph % 24.7 18.0 - 48.0 %    Mono % 16.1 (H) 4.0 - 15.0 %    Eosinophil % 6.0 0.0 - 8.0 %    Basophil % 0.9 0.0 - 1.9 %    Differential Method Automated    Comprehensive Metabolic Panel    Collection Time: 08/14/22  4:58 AM   Result Value Ref Range    Sodium 136 136 - 145 mmol/L    Potassium 4.2 3.5 - 5.1 mmol/L    Chloride 100 95 - 110 mmol/L    CO2 27 23 - 29 mmol/L    Glucose 143 (H) 70 - 110 mg/dL    BUN 17 6 - 20 mg/dL    Creatinine 1.2 0.5 - 1.4 mg/dL    Calcium 9.2 8.7 - 10.5 mg/dL    Total Protein 8.3 6.0 - 8.4 g/dL    Albumin 2.3 (L) 3.5 - 5.2 g/dL    Total Bilirubin 0.4 0.1 - 1.0 mg/dL    Alkaline Phosphatase 86 55 - 135 U/L    AST 27 10 - 40 U/L    ALT 25 10 - 44 U/L    Anion Gap 9 8 - 16 mmol/L    eGFR >60.0 >60 mL/min/1.73 m^2   Magnesium    Collection Time: 08/14/22  4:58 AM   Result Value Ref Range    Magnesium 1.7 1.6 - 2.6 mg/dL   Phosphorus    Collection Time: 08/14/22  4:58 AM   Result Value Ref Range    Phosphorus 4.2 2.7 - 4.5 mg/dL   Vancomycin, random    Collection Time: 08/14/22  4:58 AM   Result Value Ref Range    Vancomycin, Random 12.5 Not established ug/mL   POCT glucose    Collection Time: 08/14/22  6:29 AM   Result Value Ref Range    POCT Glucose 148 (H) 70 - 110 mg/dL        No results found for: [id number]    Recent Labs   Lab 08/10/22  0557 08/12/22  0345 08/14/22  0458   WBC 9.76 6.92 5.54   LYMPH 18.4  1.8 23.1  1.6 24.7  1.4   HGB 10.4* 9.9* 9.9*   HCT 31.8* 31.4* 31.0*   * 640* 584*        Recent Labs   Lab 08/10/22  0612 08/12/22  0345 08/13/22  0422 08/14/22  0458    136  --  136   K 4.1 4.2  --  4.2   CL 99 101  --  100   CO2 28 26  --  27   BUN 16 15  --  17   CREATININE 1.3 1.3 1.2 1.2   * 113*  --  143*   CALCIUM 9.6 9.1  --  9.2   MG 1.7 1.7  --  1.7   PHOS 3.9 4.0  --  4.2       Recent Labs   Lab 08/10/22  0612 08/12/22  0345 08/14/22  0458   ALKPHOS 99 86 86   ALT 56* 32 25   AST 40 29 27   ALBUMIN 2.4* 2.2* 2.3*   PROT 8.8* 8.3 8.3   BILITOT 0.5 0.3 0.4          Recent Labs     08/13/22  1209   CRP 71.4*         All labs within the last 24 hours were reviewed.     Microbiology:  Microbiology Results (last 7 days)       Procedure Component Value Units Date/Time    Aerobic culture [[id number]]  (Abnormal)  (Susceptibility) Collected: 08/10/22 0859    Order Status: Completed Specimen: Incision site from Arm, Right Updated: 08/13/22 1326     Aerobic Bacterial Culture STREPTOCOCCUS DYSGALACTIAE  Rare      Culture, Anaerobe [[id number]] Collected: 08/10/22 0859    Order Status: Completed Specimen: Incision site from Arm, Right Updated: 08/12/22 1037     Anaerobic Culture Culture in progress    AFB Culture & Smear [[id number]] Collected: 08/10/22 0859    Order Status: Completed Specimen: Incision site from Arm, Right Updated: 08/11/22 2127     AFB Culture & Smear Culture in progress     AFB CULTURE STAIN No acid fast bacilli seen.    Fungus culture [[id number]] Collected: 08/10/22 0859    Order Status: Completed Specimen: Incision site from Arm, Right Updated: 08/11/22 0747     Fungus (Mycology) Culture Culture in progress    Gram stain [[id number]] Collected: 08/10/22 0859    Order Status: Completed Specimen: Incision site from Arm, Right Updated: 08/10/22 1313     Gram Stain Result Moderate WBC's      No organisms seen    Fungus culture [[id number]] Collected: 08/04/22 1145    Order Status: Completed Specimen: Bone from Toe, Right Foot Updated: 08/09/22 1307     Fungus (Mycology)  Culture Culture in progress    Narrative:      R Great toe bone    Aerobic culture [[id number]]  (Abnormal)  (Susceptibility) Collected: 08/04/22 1145    Order Status: Completed Specimen: Bone from Toe, Right Foot Updated: 08/08/22 1228     Aerobic Bacterial Culture STAPHYLOCOCCUS COHNII SUBSPECIES COHNII  Moderate  Skin sabine also present  Skin sabine also present      Narrative:      R great toe bone    Culture, Anaerobe [[id number]] Collected: 08/03/22 1241    Order Status: Completed Specimen: Wound from Toe, Right Foot Updated: 08/08/22 1155     Anaerobic Culture No anaerobes isolated    Culture, Anaerobe [[id number]] Collected: 08/04/22 1145    Order Status: Completed Specimen: Bone from Toe, Right Foot Updated: 08/08/22 0949     Anaerobic Culture No anaerobes isolated    Narrative:      R Great toe bone    Blood culture #1 [[id number]] Collected: 08/02/22 1935    Order Status: Completed Specimen: Blood from Peripheral, Wrist, Left Updated: 08/07/22 2022     Blood Culture, Routine No growth after 5 days.    Narrative:      Blood Culture #1    Blood culture #2 [[id number]] Collected: 08/02/22 1927    Order Status: Completed Specimen: Blood from Peripheral, Hand, Left Updated: 08/07/22 2022     Blood Culture, Routine No growth after 5 days.    Narrative:      Blood Culture #2              Imaging  ECG Results    None         No results found for this or any previous visit.      MRI Forearm W WO Contrast Right  Narrative: EXAMINATION:  MRI FOREARM W WO CONTRAST RIGHT    CLINICAL HISTORY:  Soft tissue mass, forearm, deep;    TECHNIQUE:  Multisequence, multi planar magnetic resonance imaging of the right forearm before and after the intravenous administration of 10 mL Gadavist.    COMPARISON:  Radiographs from earlier today.  Ultrasound of the right forearm from 08/08/2022 and 08/04/2022.    FINDINGS:  Soft tissues: There is a large rim enhancing fluid collection within the right anterior forearm soft tissues measuring  9.3 x 5.7 x 3.2 cm (cc by AP by TR).  The collection is centered within the musculature of the anterior compartment of the proximal forearm.  There is some associated T1 hyperintensity suspicious for blood products or proteinaceous content.  There is adjacent enhancement and edema of the anterior compartment musculature and the subcutaneous fat of the anterior forearm, with overlying skin thickening and enhancement.    Bone: There is no evidence of acute osteomyelitis.  Bone marrow signal is normal.  There is no T1 hypointense marrow signal.  There is no abnormal marrow enhancement.  There is no acute fracture or dislocation.    Articulations: The right elbow and right wrist joints are grossly unremarkable.  No joint effusion.    Miscellaneous: Neurovascular structures are grossly intact.  Impression: Large rim enhancing fluid collection within the musculature of the anterior compartment of the proximal forearm, compatible with an intramuscular abscess or infected hematoma.  Adjacent myositis and cellulitis as above.  No evidence of acute osteomyelitis.  Correlate clinically for evidence of compartment syndrome.    This report was flagged in Epic as abnormal.    Electronically signed by: Jesse Heredia  Date:    08/10/2022  Time:    00:40      All imaging within the last 24 hours was reviewed.       Discharge Planning   JOSÉ ANTONIO: 8/13/2022     Code Status: Full Code   Is the patient medically ready for discharge?: No    Reason for patient still in hospital (select all that apply): Patient trending condition, Laboratory test, Treatment, Consult recommendations, and Pending disposition  Discharge Plan A: Home Health

## 2022-08-14 NOTE — CARE UPDATE
BG goal 140-180    -A1C    Lab Results   Component Value Date    HGBA1C 12.4 (H) 08/03/2022           -HOME REGIMEN: none    -INPATIENT REGIMEN: Levemir 32 units daily and novolog 12 units with meals.     -GLUCOSE TREND FOR THE PAST 24HRS:138-201    -NO HYPOGYCEMIAS NOTED     -TOLERATING 50% OF PO DIET     -Diet: Diet diabetic [facility]; 2000 Calorie        Remains in MSU, NAEON. BG reasonably well controlled with scheduled insulin and prn correction scale. No changes to plan of care; endocrine to continue to follow.       Plan:  Continue levemir 32 units daily.   Continue novolog 12 units with meals.   BG monitoring ac/hs and low dose correction scale.     Bedside nurse to instruct on insulin pen use and blood sugar monitor. Have patient administer own injections after education completed supervised by nurse.    Have patient watch insulin educatoin video's via i-pad if available on unit       Discharge planning: tresiba 32 units daily. novolog 12 units with meals plus correction scael 180/25.   Metformin  mg pills:  1 pill once daily with breakfast or a week THEN  1 pill with breakfast, 1 pill with dinner for a week THEN  1 pill with breakfast, 2 pills with dinner for a week THEN  2 pills with breakfast, 2 pills with dinner          Reviewed topics related to DM including: the need for insulin, how insulin works, what makes it a high risk medication, the importance of follow up with either PCP or endocrine provider, importance of and how to check BG, how to record BG on logs, how to administer insulin, appropriate insulin administration sites, importance of rotating injection sites, hyper/hypoglycemia, how and when to treat hypoglycemia, when to hold insulin,  importance of storing unused insulin in the refrigerator, and when to seek medical attention.  Patient verbalized understanding, answered all questions to patient's satisfaction.            Endocrine to continue to follow    ** Please call  Endocrine for any BG related issues **

## 2022-08-14 NOTE — NURSING
Picc line dressing changed. Purple hub will not flush but red hub does. Right foot wound cleaned and dressing changed. Will continue to monitor patient.

## 2022-08-14 NOTE — ASSESSMENT & PLAN NOTE
Per surgery, no evidence of a necrotizing infection at physical exam. Patient with a chronic wound opened to air, this may be the reason there is some soft tissue gas seen in the foot Xray. Wound looks chronic in nature.   - Continued ceftriaxone, vancomycin initially  - Podiatry consulted, patient s/p bone bx   - MRI consistent with osteomyelitis  - ID consulted: Right hallux bone biopsy. Bone cultures pending.  - Unable to get vascular SANDEE due to pain.   - ID de-escalated antibiotic to vancomycin. Placed PICC. Wound care per Podiatry.  - XL post op shoe to small for patient so ordered with E but they are unable to procure; will need to consult an orthotic company   no

## 2022-08-14 NOTE — PROGRESS NOTES
[facility]  Orthopedics  Progress Note    Patient Name: Renee Caceres  MRN: [id number]  Admission Date: 8/2/2022  Hospital Length of Stay: 11 days  Attending Provider: Mamta Cadena MD  Primary Care Provider: Primary Doctor No  Follow-up For: Procedure(s) (LRB):  INCISION AND DRAINAGE, UPPER EXTREMITY - RIGHT, Hand table, Ancef/clinda (hold until cx collected), Cysto tubing (Right)    Post-Operative Day: 4 Days Post-Op  Subjective:     Principal Problem:Osteomyelitis of great toe of right foot    Principal Orthopedic Problem: s/p I&D right proximal forearm abscess (0810)    Interval History: Overnight events: NAEON    Vitals: VSS    PT/OT update:  PT/OT, walks 15ft with rolling walker    Need to know: Cx result Strep dysgalactiae 08/10, susceptibility pending, currently receiving vanc        Review of patient's allergies indicates:  No Known Allergies    Current Facility-Administered Medications   Medication    acetaminophen tablet 650 mg    albuterol-ipratropium 2.5 mg-0.5 mg/3 mL nebulizer solution 3 mL    amLODIPine tablet 5 mg    cadexomer iodine 0.9 % gel    dextrose 10% bolus 125 mL    dextrose 10% bolus 250 mL    enoxaparin injection 40 mg    glucagon (human recombinant) injection 1 mg    glucose chewable tablet 16 g    glucose chewable tablet 24 g    hydrALAZINE tablet 25 mg    HYDROcodone-acetaminophen 7.5-325 mg per tablet 1 tablet    insulin aspart U-100 pen 0-5 Units    insulin aspart U-100 pen 12 Units    insulin detemir U-100 pen 32 Units    melatonin tablet 6 mg    morphine injection 2 mg    naloxone 0.4 mg/mL injection 0.02 mg    ondansetron disintegrating tablet 4 mg    polyethylene glycol packet 17 g    polyethylene glycol packet 17 g    prochlorperazine injection Soln 5 mg    senna-docusate 8.6-50 mg per tablet 1 tablet    simethicone chewable tablet 80 mg    sodium chloride 0.9% flush 10 mL    sodium chloride 0.9% flush 10 mL    And    sodium chloride 0.9%  "flush 10 mL    valsartan tablet 80 mg    vancomycin - pharmacy to dose     Objective:     Vital Signs (Most Recent):  Temp: 98.1 °F (36.7 °C) (08/14/22 0441)  Pulse: 83 (08/14/22 0441)  Resp: 19 (08/14/22 0611)  BP: (!) 136/57 (08/14/22 0441)  SpO2: 99 % (08/14/22 0441) Vital Signs (24h Range):  Temp:  [97.5 °F (36.4 °C)-98.1 °F (36.7 °C)] 98.1 °F (36.7 °C)  Pulse:  [82-91] 83  Resp:  [16-20] 19  SpO2:  [92 %-100 %] 99 %  BP: (129-141)/(57-64) 136/57     Weight: (!) 157.5 kg (347 lb 3.6 oz)  Height: 6' 3" (190.5 cm)  Body mass index is 43.4 kg/m².      Intake/Output Summary (Last 24 hours) at 8/14/2022 0716  Last data filed at 8/14/2022 0446  Gross per 24 hour   Intake 240 ml   Output 1500 ml   Net -1260 ml       Constitutional: Denies fever/chills  Neurological: Denies numbness/tingling  Cardiovascular: Denies chest pain  Respiratory: Denies shortness of breath  Musculoskeletal: see HPI      Ortho/SPM Exam  Gen:  No acute distress, well-developed, well nourished.  CV:  Peripherally well-perfused. 2+ radial pulses, symmetric.  Respiratory:  Normal respiratory effort. No accessory muscle use.       MSK:  R Upper extremity  Inspection  - dressing CDI, incision site does not express discharge  - No swelling  - No ecchymosis, erythema, or signs of cellulitis  Palpation  - No TTP over surgical incision site  Range of motion  - AROM and PROM of the shoulder, elbow, wrist, and hand intact without pain  Stability  - No evidence of joint dislocation or abnormal laxity  Neurovascular  - AIN/PIN/Radial/Median/Ulnar Nerves assessed in isolation without deficit  - Able to give thumbs up, make "OK" sign, cross IF/LF, abduct/adduct fingers, make fist  - SILT throughout  - Compartments soft  - Radial artery palpated  - Capillary Refill <3s  - Muscle tone normal      Significant Labs: CBC:   Recent Labs   Lab 08/14/22 0458   WBC 5.54   HGB 9.9*   HCT 31.0*   *       CMP:   Recent Labs   Lab 08/13/22  0422 08/14/22  0458 "   NA  --  136   K  --  4.2   CL  --  100   CO2  --  27   GLU  --  143*   BUN  --  17   CREATININE 1.2 1.2   CALCIUM  --  9.2   PROT  --  8.3   ALBUMIN  --  2.3*   BILITOT  --  0.4   ALKPHOS  --  86   AST  --  27   ALT  --  25   ANIONGAP  --  9           Significant Imaging: I have reviewed and interpreted all pertinent imaging results/findings.    Assessment/Plan:     Abscess of right forearm  Renee Caceres is a 56 y.o. male with morbid obesity and poorly controlled T2DM admitted for right great toe osteo. MRI obtained today shows R forearm intra-muscular abscess. Signs of developing phlegmon on RUE U/S on 8/8. CRP 95 today. Has been on IV vancomycin for toe osteo. Educated patient he is at high risk for infection recurrence and wound healing issues in the setting of his high A1c. S/p I&D with Dr. Oleary on 8/10/22.    - ID following for R great toe osteo  - Toe cx + staph, Urine cx 8/5 + Proteus   - IV vanc ongoing per ID, at high risk for sterile OR culture RUE in setting of recent IV abx   - Right forearm culture: strep dysgalactiae  - Pain control MM  - DVT ppx per primary, enoxaparin 40mg BID  - Elevate, ROMAT, WBAT RUE    Dispo: follow primary team, PT/OT rec            KATHRIN NORMAN MD  Orthopedics  [facility]

## 2022-08-14 NOTE — SUBJECTIVE & OBJECTIVE
Principal Problem:Osteomyelitis of great toe of right foot    Principal Orthopedic Problem: s/p I&D right proximal forearm abscess (0810)    Interval History: Overnight events: NAEON    Vitals: VSS    PT/OT update:  PT/OT, walks 15ft with rolling walker    Need to know: Cx result Strep dysgalactiae 08/10, susceptibility pending, currently receiving vanc        Review of patient's allergies indicates:  No Known Allergies    Current Facility-Administered Medications   Medication    acetaminophen tablet 650 mg    albuterol-ipratropium 2.5 mg-0.5 mg/3 mL nebulizer solution 3 mL    amLODIPine tablet 5 mg    cadexomer iodine 0.9 % gel    dextrose 10% bolus 125 mL    dextrose 10% bolus 250 mL    enoxaparin injection 40 mg    glucagon (human recombinant) injection 1 mg    glucose chewable tablet 16 g    glucose chewable tablet 24 g    hydrALAZINE tablet 25 mg    HYDROcodone-acetaminophen 7.5-325 mg per tablet 1 tablet    insulin aspart U-100 pen 0-5 Units    insulin aspart U-100 pen 12 Units    insulin detemir U-100 pen 32 Units    melatonin tablet 6 mg    morphine injection 2 mg    naloxone 0.4 mg/mL injection 0.02 mg    ondansetron disintegrating tablet 4 mg    polyethylene glycol packet 17 g    polyethylene glycol packet 17 g    prochlorperazine injection Soln 5 mg    senna-docusate 8.6-50 mg per tablet 1 tablet    simethicone chewable tablet 80 mg    sodium chloride 0.9% flush 10 mL    sodium chloride 0.9% flush 10 mL    And    sodium chloride 0.9% flush 10 mL    valsartan tablet 80 mg    vancomycin - pharmacy to dose     Objective:     Vital Signs (Most Recent):  Temp: 98.1 °F (36.7 °C) (08/14/22 0441)  Pulse: 83 (08/14/22 0441)  Resp: 19 (08/14/22 0611)  BP: (!) 136/57 (08/14/22 0441)  SpO2: 99 % (08/14/22 0441) Vital Signs (24h Range):  Temp:  [97.5 °F (36.4 °C)-98.1 °F (36.7 °C)] 98.1 °F (36.7 °C)  Pulse:  [82-91] 83  Resp:  [16-20] 19  SpO2:  [92 %-100 %] 99 %  BP: (129-141)/(57-64) 136/57     Weight: (!) 157.5  "kg (347 lb 3.6 oz)  Height: 6' 3" (190.5 cm)  Body mass index is 43.4 kg/m².      Intake/Output Summary (Last 24 hours) at 8/14/2022 0716  Last data filed at 8/14/2022 0446  Gross per 24 hour   Intake 240 ml   Output 1500 ml   Net -1260 ml       Constitutional: Denies fever/chills  Neurological: Denies numbness/tingling  Cardiovascular: Denies chest pain  Respiratory: Denies shortness of breath  Musculoskeletal: see HPI      Ortho/SPM Exam  Gen:  No acute distress, well-developed, well nourished.  CV:  Peripherally well-perfused. 2+ radial pulses, symmetric.  Respiratory:  Normal respiratory effort. No accessory muscle use.       MSK:  R Upper extremity  Inspection  - dressing CDI, incision site does not express discharge  - No swelling  - No ecchymosis, erythema, or signs of cellulitis  Palpation  - No TTP over surgical incision site  Range of motion  - AROM and PROM of the shoulder, elbow, wrist, and hand intact without pain  Stability  - No evidence of joint dislocation or abnormal laxity  Neurovascular  - AIN/PIN/Radial/Median/Ulnar Nerves assessed in isolation without deficit  - Able to give thumbs up, make "OK" sign, cross IF/LF, abduct/adduct fingers, make fist  - SILT throughout  - Compartments soft  - Radial artery palpated  - Capillary Refill <3s  - Muscle tone normal      Significant Labs: CBC:   Recent Labs   Lab 08/14/22 0458   WBC 5.54   HGB 9.9*   HCT 31.0*   *       CMP:   Recent Labs   Lab 08/13/22 0422 08/14/22 0458   NA  --  136   K  --  4.2   CL  --  100   CO2  --  27   GLU  --  143*   BUN  --  17   CREATININE 1.2 1.2   CALCIUM  --  9.2   PROT  --  8.3   ALBUMIN  --  2.3*   BILITOT  --  0.4   ALKPHOS  --  86   AST  --  27   ALT  --  25   ANIONGAP  --  9           Significant Imaging: I have reviewed and interpreted all pertinent imaging results/findings.  "

## 2022-08-15 ENCOUNTER — TELEPHONE (OUTPATIENT)
Dept: PHARMACY | Facility: CLINIC | Age: 57
End: 2022-08-15
Payer: COMMERCIAL

## 2022-08-15 LAB
BACTERIA SPEC ANAEROBE CULT: NORMAL
CRP SERPL-MCNC: 49 MG/L (ref 0–8.2)
POCT GLUCOSE: 131 MG/DL (ref 70–110)
POCT GLUCOSE: 135 MG/DL (ref 70–110)
POCT GLUCOSE: 146 MG/DL (ref 70–110)
POCT GLUCOSE: 150 MG/DL (ref 70–110)
POCT GLUCOSE: 160 MG/DL (ref 70–110)
VANCOMYCIN TROUGH SERPL-MCNC: 12.5 UG/ML (ref 10–22)

## 2022-08-15 PROCEDURE — 63600175 PHARM REV CODE 636 W HCPCS: Performed by: INTERNAL MEDICINE

## 2022-08-15 PROCEDURE — 63600175 PHARM REV CODE 636 W HCPCS: Performed by: HOSPITALIST

## 2022-08-15 PROCEDURE — 25000003 PHARM REV CODE 250: Performed by: NURSE PRACTITIONER

## 2022-08-15 PROCEDURE — 99232 PR SUBSEQUENT HOSPITAL CARE,LEVL II: ICD-10-PCS | Mod: 95,,, | Performed by: INTERNAL MEDICINE

## 2022-08-15 PROCEDURE — 80202 ASSAY OF VANCOMYCIN: CPT | Performed by: INTERNAL MEDICINE

## 2022-08-15 PROCEDURE — 25000003 PHARM REV CODE 250: Performed by: INTERNAL MEDICINE

## 2022-08-15 PROCEDURE — C9399 UNCLASSIFIED DRUGS OR BIOLOG: HCPCS | Performed by: NURSE PRACTITIONER

## 2022-08-15 PROCEDURE — 86140 C-REACTIVE PROTEIN: CPT

## 2022-08-15 PROCEDURE — 99232 SBSQ HOSP IP/OBS MODERATE 35: CPT | Mod: 95,,, | Performed by: INTERNAL MEDICINE

## 2022-08-15 PROCEDURE — A4216 STERILE WATER/SALINE, 10 ML: HCPCS | Performed by: INTERNAL MEDICINE

## 2022-08-15 PROCEDURE — 11000001 HC ACUTE MED/SURG PRIVATE ROOM

## 2022-08-15 RX ORDER — DEXTROSE 4 G
TABLET,CHEWABLE ORAL
Qty: 1 EACH | Refills: 0 | Status: SHIPPED | OUTPATIENT
Start: 2022-08-15

## 2022-08-15 RX ORDER — PEN NEEDLE, DIABETIC 30 GX3/16"
1 NEEDLE, DISPOSABLE MISCELLANEOUS
Qty: 100 EACH | Refills: 11 | Status: SHIPPED | OUTPATIENT
Start: 2022-08-15

## 2022-08-15 RX ORDER — INSULIN DEGLUDEC 200 U/ML
32 INJECTION, SOLUTION SUBCUTANEOUS DAILY
Qty: 2 PEN | Refills: 9 | Status: SHIPPED | OUTPATIENT
Start: 2022-08-15 | End: 2023-08-15

## 2022-08-15 RX ORDER — METFORMIN HYDROCHLORIDE 500 MG/1
500 TABLET, EXTENDED RELEASE ORAL SEE ADMIN INSTRUCTIONS
Qty: 60 TABLET | Refills: 10 | Status: SHIPPED | OUTPATIENT
Start: 2022-08-15 | End: 2024-03-06 | Stop reason: SDUPTHER

## 2022-08-15 RX ORDER — VANCOMYCIN HYDROCHLORIDE
1500 DAILY
Qty: 7500 ML | Refills: 0 | Status: SHIPPED | OUTPATIENT
Start: 2022-08-16 | End: 2022-09-15

## 2022-08-15 RX ORDER — LANCETS
EACH MISCELLANEOUS
Qty: 100 EACH | Refills: 11 | Status: SHIPPED | OUTPATIENT
Start: 2022-08-15

## 2022-08-15 RX ORDER — INDOCYANINE GREEN AND WATER 25 MG
KIT INJECTION
Status: DISPENSED
Start: 2022-08-15 | End: 2022-08-15

## 2022-08-15 RX ADMIN — VANCOMYCIN HYDROCHLORIDE 1500 MG: 1.5 INJECTION, POWDER, LYOPHILIZED, FOR SOLUTION INTRAVENOUS at 11:08

## 2022-08-15 RX ADMIN — POLYETHYLENE GLYCOL 3350 17 G: 17 POWDER, FOR SOLUTION ORAL at 08:08

## 2022-08-15 RX ADMIN — SENNOSIDES AND DOCUSATE SODIUM 1 TABLET: 50; 8.6 TABLET ORAL at 08:08

## 2022-08-15 RX ADMIN — Medication 10 ML: at 11:08

## 2022-08-15 RX ADMIN — Medication 10 ML: at 12:08

## 2022-08-15 RX ADMIN — VALSARTAN 80 MG: 40 TABLET, FILM COATED ORAL at 08:08

## 2022-08-15 RX ADMIN — INSULIN ASPART 12 UNITS: 100 INJECTION, SOLUTION INTRAVENOUS; SUBCUTANEOUS at 04:08

## 2022-08-15 RX ADMIN — Medication 10 ML: at 06:08

## 2022-08-15 RX ADMIN — INSULIN ASPART 12 UNITS: 100 INJECTION, SOLUTION INTRAVENOUS; SUBCUTANEOUS at 08:08

## 2022-08-15 RX ADMIN — INSULIN DETEMIR 32 UNITS: 100 INJECTION, SOLUTION SUBCUTANEOUS at 08:08

## 2022-08-15 RX ADMIN — ENOXAPARIN SODIUM 40 MG: 40 INJECTION SUBCUTANEOUS at 08:08

## 2022-08-15 RX ADMIN — HYDROCODONE BITARTRATE AND ACETAMINOPHEN 1 TABLET: 7.5; 325 TABLET ORAL at 12:08

## 2022-08-15 RX ADMIN — INSULIN ASPART 12 UNITS: 100 INJECTION, SOLUTION INTRAVENOUS; SUBCUTANEOUS at 11:08

## 2022-08-15 RX ADMIN — HYDROCODONE BITARTRATE AND ACETAMINOPHEN 1 TABLET: 7.5; 325 TABLET ORAL at 10:08

## 2022-08-15 RX ADMIN — AMLODIPINE BESYLATE 5 MG: 5 TABLET ORAL at 08:08

## 2022-08-15 NOTE — PLAN OF CARE
Faxed over request for an extra large orthopedic shoe at [facility] at fax number [phone].  Will continue to follow-up.     Charu Moran RN [facility] [phone]    1532: notified [facility] that he will not discharge till tomorrow morning.  A nurse will be able to come out there on Wednesday.  Education for iv antibiotics needs to be done with the wife in the morning since the patient is uncomfortable with the teaching.     DCP: [facility]    Charu Moran RN [facility] [phone]

## 2022-08-15 NOTE — PLAN OF CARE
Patient had an uneventful day. Patient will be discharging home tomorrow morning after education is taught with patient and girlfriend about IV antibiotics.  Problem: Adult Inpatient Plan of Care  Goal: Plan of Care Review  Outcome: Ongoing, Progressing  Goal: Patient-Specific Goal (Individualized)  Outcome: Ongoing, Progressing  Goal: Absence of Hospital-Acquired Illness or Injury  Outcome: Ongoing, Progressing  Goal: Optimal Comfort and Wellbeing  Outcome: Ongoing, Progressing  Goal: Readiness for Transition of Care  Outcome: Ongoing, Progressing     Problem: Diabetes Comorbidity  Goal: Blood Glucose Level Within Targeted Range  Outcome: Ongoing, Progressing     Problem: Bariatric Environmental Safety  Goal: Safety Maintained with Care  Outcome: Ongoing, Progressing     Problem: Infection  Goal: Absence of Infection Signs and Symptoms  Outcome: Ongoing, Progressing

## 2022-08-15 NOTE — PROGRESS NOTES
Pharmacokinetic Assessment Follow Up: IV Vancomycin    Vancomycin serum concentration assessment(s):    The trough level was drawn correctly and can be used to guide therapy at this time. The measurement is below the desired definitive target range of 15 to 20 mcg/mL.    Vancomycin Regimen Plan:    Patient only received one dose of the 1.25 g IV q24h regimen  Anticipate patient will not become therapeutic on the 1.25 g IV q24h regimen due to the change between levels on 8/14 and 8/15  Will change patient's vancomycin regimen to 1.5 g IV q24h   Plan for trough prior to third dose on 8/17 at 1100    Drug levels (last 3 results):  Recent Labs   Lab Result Units 08/13/22 0422 08/14/22  0458 08/14/22  0913 08/15/22  0847   Vancomycin, Random ug/mL 13.8 12.5  --   --    Vancomycin-Trough ug/mL  --   --  11.5 12.5       Pharmacy will continue to follow and monitor vancomycin.    Please contact pharmacy at extension [phone] for questions regarding this assessment.    Thank you for the consult,   Juan Yi, PharmD, [facility]      Patient brief summary:  Renee Caceres is a 56 y.o. male initiated on antimicrobial therapy with IV Vancomycin for treatment of bone/joint infection    Drug Allergies:   Review of patient's allergies indicates:  No Known Allergies    Actual Body Weight:   155.2 kg    Renal Function:   Estimated Creatinine Clearance: 112.3 mL/min (based on SCr of 1.2 mg/dL).,     Dialysis Method (if applicable):  N/A    CBC (last 72 hours):  Recent Labs   Lab Result Units 08/14/22  0458   WBC K/uL 5.54   Hemoglobin g/dL 9.9*   Hematocrit % 31.0*   Platelets K/uL 584*   Gran % % 51.9   Lymph % % 24.7   Mono % % 16.1*   Eosinophil % % 6.0   Basophil % % 0.9   Differential Method  Automated       Metabolic Panel (last 72 hours):  Recent Labs   Lab Result Units 08/13/22 0422 08/14/22  0458   Sodium mmol/L  --  136   Potassium mmol/L  --  4.2   Chloride mmol/L  --  100   CO2 mmol/L  --  27   Glucose mg/dL  --  143*    BUN mg/dL  --  17   Creatinine mg/dL 1.2 1.2   Albumin g/dL  --  2.3*   Total Bilirubin mg/dL  --  0.4   Alkaline Phosphatase U/L  --  86   AST U/L  --  27   ALT U/L  --  25   Magnesium mg/dL  --  1.7   Phosphorus mg/dL  --  4.2       Vancomycin Administrations:  vancomycin given in the last 96 hours                   vancomycin 1.25 g in dextrose 5% 250 mL IVPB (ready to mix) (mg) 1,250 mg New Bag 08/14/22 0921    vancomycin in dextrose 5 % 1 gram/250 mL IVPB 1,000 mg (mg) 1,000 mg New Bag 08/13/22 0912    vancomycin in dextrose 5 % 1 gram/250 mL IVPB 1,000 mg (mg) 1,000 mg New Bag 08/12/22 0756                Microbiologic Results:  Microbiology Results (last 7 days)     Procedure Component Value Units Date/Time    Aerobic culture [[id number]]  (Abnormal)  (Susceptibility) Collected: 08/10/22 0859    Order Status: Completed Specimen: Incision site from Arm, Right Updated: 08/13/22 1326     Aerobic Bacterial Culture STREPTOCOCCUS DYSGALACTIAE  Rare      Culture, Anaerobe [[id number]] Collected: 08/10/22 0859    Order Status: Completed Specimen: Incision site from Arm, Right Updated: 08/12/22 1037     Anaerobic Culture Culture in progress    AFB Culture & Smear [[id number]] Collected: 08/10/22 0859    Order Status: Completed Specimen: Incision site from Arm, Right Updated: 08/11/22 2127     AFB Culture & Smear Culture in progress     AFB CULTURE STAIN No acid fast bacilli seen.    Fungus culture [[id number]] Collected: 08/10/22 0859    Order Status: Completed Specimen: Incision site from Arm, Right Updated: 08/11/22 0747     Fungus (Mycology) Culture Culture in progress    Gram stain [[id number]] Collected: 08/10/22 0859    Order Status: Completed Specimen: Incision site from Arm, Right Updated: 08/10/22 1313     Gram Stain Result Moderate WBC's      No organisms seen    Fungus culture [[id number]] Collected: 08/04/22 1145    Order Status: Completed Specimen: Bone from Toe, Right Foot Updated: 08/09/22 1307     Fungus  (Mycology) Culture Culture in progress    Narrative:      R Great toe bone    Aerobic culture [[id number]]  (Abnormal)  (Susceptibility) Collected: 08/04/22 1145    Order Status: Completed Specimen: Bone from Toe, Right Foot Updated: 08/08/22 1228     Aerobic Bacterial Culture STAPHYLOCOCCUS COHNII SUBSPECIES COHNII  Moderate  Skin sabine also present  Skin sabine also present      Narrative:      R great toe bone    Culture, Anaerobe [[id number]] Collected: 08/03/22 1241    Order Status: Completed Specimen: Wound from Toe, Right Foot Updated: 08/08/22 1155     Anaerobic Culture No anaerobes isolated    Culture, Anaerobe [[id number]] Collected: 08/04/22 1145    Order Status: Completed Specimen: Bone from Toe, Right Foot Updated: 08/08/22 0949     Anaerobic Culture No anaerobes isolated    Narrative:      R Great toe bone

## 2022-08-15 NOTE — PLAN OF CARE
[facility]    Went to the bedside to discuss getting a hold of Apoorva (friend). He called her from his cell phone and she answered. She can be at the bedside for 9:45-10:00am tomorrow to educate. I attempted to educate the patient again and he was too nervous even with extensions added to the PICC.

## 2022-08-15 NOTE — PLAN OF CARE
[facility]  Discharge Reassessment    Primary Care Provider: Primary Doctor No    Expected Discharge Date: 8/15/2022    Reassessment (most recent)     Discharge Reassessment - 08/15/22 1551        Discharge Reassessment    Assessment Type Discharge Planning Reassessment     Did the patient's condition or plan change since previous assessment? No     Discharge Plan discussed with: Patient     Discharge Plan A Home Health     Discharge Plan B Home Health     DME Needed Upon Discharge  none     Discharge Barriers Identified None     Why the patient remains in the hospital Requires continued medical care        Post-Acute Status    Home Health Status Set-up Complete/Auth obtained     IV Infusion Status Set-up Complete/Auth obtained     Hospital Resources/Appts/Education Provided Provided patient/caregiver with written discharge plan information;Appointments scheduled and added to AVS     Discharge Delays None known at this time               Uneventful night - pain and ROM to RUE continue to be controlled with oral analgesia; HME unable to get postop boot from usual suppliers so orthotic company will need to provide     Dcp: home health    Charu Moran RN [facility] [phone]

## 2022-08-15 NOTE — ASSESSMENT & PLAN NOTE
Began developing about 2 weeks ago. Now s/p I&D with Ortho.  Cultures (patient receiving vancomycin) - now with strep dysgalactiae; sensitive to Vanc

## 2022-08-15 NOTE — SUBJECTIVE & OBJECTIVE
"Interval HPI:   Overnight events: Remains in [facility]. NAEON. Antibiotics. BG reasonably well controlled with scheduled insulin and prn correction scale. Patient has been on stable dose of insulin for > 7 days.     Eatin%  Nausea: No  Hypoglycemia and intervention: No  Fever: No  TPN and/or TF: No      BP (!) 156/66   Pulse 99   Temp 98.2 °F (36.8 °C)   Resp 16   Ht 6' 3" (1.905 m)   Wt (!) 155.2 kg (342 lb 2.5 oz)   SpO2 98%   BMI 42.77 kg/m²     Labs Reviewed and Include    No results for input(s): GLU, CALCIUM, ALBUMIN, PROT, NA, K, CO2, CL, BUN, CREATININE, ALKPHOS, ALT, AST, BILITOT in the last 24 hours.  Lab Results   Component Value Date    WBC 5.54 2022    HGB 9.9 (L) 2022    HCT 31.0 (L) 2022    MCV 84 2022     (H) 2022     No results for input(s): TSH, FREET4 in the last 168 hours.  Lab Results   Component Value Date    HGBA1C 12.4 (H) 2022       Nutritional status:   Body mass index is 42.77 kg/m².  Lab Results   Component Value Date    ALBUMIN 2.3 (L) 2022    ALBUMIN 2.2 (L) 2022    ALBUMIN 2.4 (L) 08/10/2022     Lab Results   Component Value Date    PREALBUMIN 6 (L) 2010       Estimated Creatinine Clearance: 112.3 mL/min (based on SCr of 1.2 mg/dL).    Accu-Checks  Recent Labs     22  1116 22  1617 22  2007 22  0714 22  1120 22  2055 22  0629 22  1106 22  1508 22  1945   POCTGLUCOSE 196* 205* 133* 138* 201* 166* 148* 254* 146* 146*       Current Medications and/or Treatments Impacting Glycemic Control  Immunotherapy:    Immunosuppressants       None          Steroids:   Hormones (From admission, onward)                Start     Stop Route Frequency Ordered    22 0239  melatonin tablet 6 mg         -- Oral Nightly PRN 22 0141          Pressors:    Autonomic Drugs (From admission, onward)                None          Hyperglycemia/Diabetes Medications: "   Antihyperglycemics (From admission, onward)                Start     Stop Route Frequency Ordered    08/07/22 1035  insulin aspart U-100 pen 0-5 Units         -- SubQ Before meals & nightly PRN 08/07/22 0935    08/06/22 1130  insulin aspart U-100 pen 12 Units         -- SubQ 3 times daily with meals 08/06/22 0835    08/06/22 0900  insulin detemir U-100 pen 32 Units         -- SubQ Daily 08/06/22 0835

## 2022-08-15 NOTE — SUBJECTIVE & OBJECTIVE
This encounter was provided through telemedicine.  Patient was transferred to the telemedicine service on:  08/5/2022   The patient location is: [id number]/651 A admitted 8/2/2022  6:24 PM.  Present with the patient at the time of the telemed/virtual assessment: Telepresenter    Interval History/Overnight Events:   Clinical record since admit reviewed.  Patient able to provide history.     Uneventful night - pain and ROM to RUE continue to be controlled with oral analgesia; orthotic company able to provide surgical shoe; IV infusion teacher unable to be coordinated with he and family so will be discharged on 8/16 once done    Review of Systems   Constitutional:  Positive for activity change. Negative for fever.   Respiratory:  Negative for shortness of breath.    Cardiovascular:  Negative for chest pain.   Gastrointestinal:  Negative for diarrhea and vomiting.   Musculoskeletal:  Positive for arthralgias and myalgias.      Inpatient Medications:  Scheduled Meds:   amLODIPine  5 mg Oral Daily    enoxaparin  40 mg Subcutaneous Q12H    indocyanine green        insulin aspart U-100  12 Units Subcutaneous TIDWM    insulin detemir U-100  32 Units Subcutaneous Daily    polyethylene glycol  17 g Oral BID    senna-docusate 8.6-50 mg  1 tablet Oral BID    sodium chloride 0.9%  10 mL Intravenous Q6H    valsartan  80 mg Oral BID    vancomycin (VANCOCIN) IVPB  1,500 mg Intravenous Q24H     Continuous Infusions:  PRN Meds:.acetaminophen, albuterol-ipratropium, cadexomer iodine, dextrose 10%, dextrose 10%, glucagon (human recombinant), glucose, glucose, hydrALAZINE, HYDROcodone-acetaminophen, insulin aspart U-100, melatonin, morphine, naloxone, ondansetron, polyethylene glycol, prochlorperazine, simethicone, sodium chloride 0.9%, Flushing PICC Protocol **AND** sodium chloride 0.9% **AND** sodium chloride 0.9%, Pharmacy to dose Vancomycin consult **AND** vancomycin - pharmacy to dose      Objective:     Temp:  [97.8 °F (36.6 °C)-98.3 °F  (36.8 °C)] 98.3 °F (36.8 °C)  Pulse:  [80-99] 82  Resp:  [16-18] 16  SpO2:  [95 %-98 %] 95 %  BP: (113-156)/(55-74) 138/74      Intake/Output Summary (Last 24 hours) at 8/15/2022 1613  Last data filed at 8/15/2022 0630  Gross per 24 hour   Intake 200 ml   Output 450 ml   Net -250 ml          Body mass index is 42.77 kg/m².    Physical Exam  Vitals and nursing note reviewed.   Constitutional:       General: He is not in acute distress.     Appearance: Normal appearance. He is normal weight. He is not ill-appearing.   HENT:      Head: Normocephalic and atraumatic.      Right Ear: Hearing normal.      Left Ear: Hearing normal.      Nose: Nose normal.   Eyes:      General: No scleral icterus.        Right eye: No discharge.         Left eye: No discharge.      Extraocular Movements: Extraocular movements intact.   Cardiovascular:      Rate and Rhythm: Normal rate.   Pulmonary:      Effort: Pulmonary effort is normal. No accessory muscle usage or respiratory distress.   Musculoskeletal:      Comments: Right UE bandaged   Neurological:      General: No focal deficit present.      Mental Status: He is alert and oriented to person, place, and time.      Cranial Nerves: No cranial nerve deficit.      Motor: No weakness.   Psychiatric:         Attention and Perception: Attention normal.         Mood and Affect: Mood normal.         Speech: Speech normal.         Behavior: Behavior is cooperative.        Labs:  Recent Results (from the past 24 hour(s))   POCT glucose    Collection Time: 08/14/22  7:45 PM   Result Value Ref Range    POCT Glucose 146 (H) 70 - 110 mg/dL   POCT glucose    Collection Time: 08/15/22  7:14 AM   Result Value Ref Range    POCT Glucose 146 (H) 70 - 110 mg/dL   C-reactive protein    Collection Time: 08/15/22  8:47 AM   Result Value Ref Range    CRP 49.0 (H) 0.0 - 8.2 mg/L   VANCOMYCIN, TROUGH    Collection Time: 08/15/22  8:47 AM   Result Value Ref Range    Vancomycin-Trough 12.5 10.0 - 22.0 ug/mL   POCT  glucose    Collection Time: 08/15/22 10:55 AM   Result Value Ref Range    POCT Glucose 131 (H) 70 - 110 mg/dL        No results found for: [id number]    Recent Labs   Lab 08/10/22  0557 08/12/22  0345 08/14/22  0458   WBC 9.76 6.92 5.54   LYMPH 18.4  1.8 23.1  1.6 24.7  1.4   HGB 10.4* 9.9* 9.9*   HCT 31.8* 31.4* 31.0*   * 640* 584*       Recent Labs   Lab 08/10/22  0612 08/12/22  0345 08/13/22  0422 08/14/22  0458    136  --  136   K 4.1 4.2  --  4.2   CL 99 101  --  100   CO2 28 26  --  27   BUN 16 15  --  17   CREATININE 1.3 1.3 1.2 1.2   * 113*  --  143*   CALCIUM 9.6 9.1  --  9.2   MG 1.7 1.7  --  1.7   PHOS 3.9 4.0  --  4.2       Recent Labs   Lab 08/10/22  0612 08/12/22  0345 08/14/22  0458   ALKPHOS 99 86 86   ALT 56* 32 25   AST 40 29 27   ALBUMIN 2.4* 2.2* 2.3*   PROT 8.8* 8.3 8.3   BILITOT 0.5 0.3 0.4          Recent Labs     08/13/22  1209 08/15/22  0847   CRP 71.4* 49.0*         All labs within the last 24 hours were reviewed.     Microbiology:  Microbiology Results (last 7 days)       Procedure Component Value Units Date/Time    Culture, Anaerobe [[id number]] Collected: 08/10/22 0859    Order Status: Completed Specimen: Incision site from Arm, Right Updated: 08/15/22 1113     Anaerobic Culture No anaerobes isolated    Aerobic culture [[id number]]  (Abnormal)  (Susceptibility) Collected: 08/10/22 0859    Order Status: Completed Specimen: Incision site from Arm, Right Updated: 08/13/22 1326     Aerobic Bacterial Culture STREPTOCOCCUS DYSGALACTIAE  Rare      AFB Culture & Smear [[id number]] Collected: 08/10/22 0859    Order Status: Completed Specimen: Incision site from Arm, Right Updated: 08/11/22 2127     AFB Culture & Smear Culture in progress     AFB CULTURE STAIN No acid fast bacilli seen.    Fungus culture [[id number]] Collected: 08/10/22 0859    Order Status: Completed Specimen: Incision site from Arm, Right Updated: 08/11/22 0747     Fungus (Mycology) Culture Culture in  progress    Gram stain [[id number]] Collected: 08/10/22 0859    Order Status: Completed Specimen: Incision site from Arm, Right Updated: 08/10/22 1313     Gram Stain Result Moderate WBC's      No organisms seen    Fungus culture [[id number]] Collected: 08/04/22 1145    Order Status: Completed Specimen: Bone from Toe, Right Foot Updated: 08/09/22 1307     Fungus (Mycology) Culture Culture in progress    Narrative:      R Great toe bone              Imaging  ECG Results    None         No results found for this or any previous visit.      MRI Forearm W WO Contrast Right  Narrative: EXAMINATION:  MRI FOREARM W WO CONTRAST RIGHT    CLINICAL HISTORY:  Soft tissue mass, forearm, deep;    TECHNIQUE:  Multisequence, multi planar magnetic resonance imaging of the right forearm before and after the intravenous administration of 10 mL Gadavist.    COMPARISON:  Radiographs from earlier today.  Ultrasound of the right forearm from 08/08/2022 and 08/04/2022.    FINDINGS:  Soft tissues: There is a large rim enhancing fluid collection within the right anterior forearm soft tissues measuring 9.3 x 5.7 x 3.2 cm (cc by AP by TR).  The collection is centered within the musculature of the anterior compartment of the proximal forearm.  There is some associated T1 hyperintensity suspicious for blood products or proteinaceous content.  There is adjacent enhancement and edema of the anterior compartment musculature and the subcutaneous fat of the anterior forearm, with overlying skin thickening and enhancement.    Bone: There is no evidence of acute osteomyelitis.  Bone marrow signal is normal.  There is no T1 hypointense marrow signal.  There is no abnormal marrow enhancement.  There is no acute fracture or dislocation.    Articulations: The right elbow and right wrist joints are grossly unremarkable.  No joint effusion.    Miscellaneous: Neurovascular structures are grossly intact.  Impression: Large rim enhancing fluid collection within  the musculature of the anterior compartment of the proximal forearm, compatible with an intramuscular abscess or infected hematoma.  Adjacent myositis and cellulitis as above.  No evidence of acute osteomyelitis.  Correlate clinically for evidence of compartment syndrome.    This report was flagged in Epic as abnormal.    Electronically signed by: Jesse Heredia  Date:    08/10/2022  Time:    00:40      All imaging within the last 24 hours was reviewed.       Discharge Planning   JOSÉ ANTONIO: 8/16/2022     Code Status: Full Code   Is the patient medically ready for discharge?: No    Reason for patient still in hospital (select all that apply): Patient trending condition, Laboratory test, Treatment, Consult recommendations, and Pending disposition  Discharge Plan A: Home Health   Discharge Delays: None known at this time

## 2022-08-15 NOTE — CARE UPDATE
BG goal 140-180    -A1C    Lab Results   Component Value Date    HGBA1C 12.4 (H) 08/03/2022           -HOME REGIMEN: none    -INPATIENT REGIMEN: Levemir 32 units daily and novolog 12 units with meals.     -GLUCOSE TREND FOR THE PAST 24HRS:146-254    -NO HYPOGYCEMIAS NOTED     -TOLERATING 50% OF PO DIET     -Diet: Diet diabetic [facility]; 2000 Calorie        Remains in [facility], NAEON. BG reasonably well controlled with scheduled insulin and prn correction scale. No changes to plan of care; endocrine to continue to follow.       Plan:  Continue levemir 32 units daily.   Continue novolog 12 units with meals.   BG monitoring ac/hs and low dose correction scale.     Bedside nurse to instruct on insulin pen use and blood sugar monitor. Have patient administer own injections after education completed supervised by nurse.    Have patient watch insulin educatoin video's via i-pad if available on unit       Discharge planning: tresiba 32 units daily. novolog 12 units with meals plus correction scael 180/25.   Metformin  mg pills:  1 pill once daily with breakfast or a week THEN  1 pill with breakfast, 1 pill with dinner for a week THEN  1 pill with breakfast, 2 pills with dinner for a week THEN  2 pills with breakfast, 2 pills with dinner          Reviewed topics related to DM including: the need for insulin, how insulin works, what makes it a high risk medication, the importance of follow up with either PCP or endocrine provider, importance of and how to check BG, how to record BG on logs, how to administer insulin, appropriate insulin administration sites, importance of rotating injection sites, hyper/hypoglycemia, how and when to treat hypoglycemia, when to hold insulin,  importance of storing unused insulin in the refrigerator, and when to seek medical attention.  Patient verbalized understanding, answered all questions to patient's satisfaction.            Endocrine to continue to follow    ** Please call  Endocrine for any BG related issues **

## 2022-08-15 NOTE — PROGRESS NOTES
[facility] Medicine  Telemedicine Progress Note    Patient Name: Renee Caceres  MRN: [id number]  Patient Class: IP- Inpatient   Admission Date: 8/2/2022  Length of Stay: 12 days  Attending Physician: Mamta Cadena MD  Primary Care Provider: Primary Doctor No          Subjective:     Principal Problem:Osteomyelitis of great toe of right foot        HPI:  55 yo male with diabetes, HTN presenting 2/2 R foot swelling that started acutely 3 days ago and progressively has gotten worse. He states he had a wound on his right great toe and 5th toe that has been there for a while. Today it had not gotten any better so he decided to come in for evaluation. He has not been on any antibiotics for it. He states that he was on medications at one point but no longer takes them as he thought he had gotten them under control. In the ED, concern with elevated WBC, ESR, CRP, xr of foot showed osteo of great toe. General surgery consulted to rule nec fasc 2/2 gas in the 5th toe. Medicine called for admission. Rocephin started.         Overview/Hospital Course:  No evidence of a necrotizing infection on physical exam. Podiatry and ID consulted. Hyperglycemia managed by Endocrinology.        This encounter was provided through telemedicine.  Patient was transferred to the telemedicine service on:  08/5/2022   The patient location is: [address] [place] [address] A admitted 8/2/2022  6:24 PM.  Present with the patient at the time of the telemed/virtual assessment: Telepresenter    Interval History/Overnight Events:   Clinical record since admit reviewed.  Patient able to provide history.     Uneventful night - pain and ROM to RUE continue to be controlled with oral analgesia; orthotic company able to provide surgical shoe; IV infusion teacher unable to be coordinated with he and family so will be discharged on 8/16 once done    Review of Systems   Constitutional:  Positive for activity change. Negative for fever.   Respiratory:   Negative for shortness of breath.    Cardiovascular:  Negative for chest pain.   Gastrointestinal:  Negative for diarrhea and vomiting.   Musculoskeletal:  Positive for arthralgias and myalgias.      Inpatient Medications:  Scheduled Meds:   amLODIPine  5 mg Oral Daily    enoxaparin  40 mg Subcutaneous Q12H    indocyanine green        insulin aspart U-100  12 Units Subcutaneous TIDWM    insulin detemir U-100  32 Units Subcutaneous Daily    polyethylene glycol  17 g Oral BID    senna-docusate 8.6-50 mg  1 tablet Oral BID    sodium chloride 0.9%  10 mL Intravenous Q6H    valsartan  80 mg Oral BID    vancomycin (VANCOCIN) IVPB  1,500 mg Intravenous Q24H     Continuous Infusions:  PRN Meds:.acetaminophen, albuterol-ipratropium, cadexomer iodine, dextrose 10%, dextrose 10%, glucagon (human recombinant), glucose, glucose, hydrALAZINE, HYDROcodone-acetaminophen, insulin aspart U-100, melatonin, morphine, naloxone, ondansetron, polyethylene glycol, prochlorperazine, simethicone, sodium chloride 0.9%, Flushing PICC Protocol **AND** sodium chloride 0.9% **AND** sodium chloride 0.9%, Pharmacy to dose Vancomycin consult **AND** vancomycin - pharmacy to dose      Objective:     Temp:  [97.8 °F (36.6 °C)-98.3 °F (36.8 °C)] 98.3 °F (36.8 °C)  Pulse:  [80-99] 82  Resp:  [16-18] 16  SpO2:  [95 %-98 %] 95 %  BP: (113-156)/(55-74) 138/74      Intake/Output Summary (Last 24 hours) at 8/15/2022 1613  Last data filed at 8/15/2022 0630  Gross per 24 hour   Intake 200 ml   Output 450 ml   Net -250 ml          Body mass index is 42.77 kg/m².    Physical Exam  Vitals and nursing note reviewed.   Constitutional:       General: He is not in acute distress.     Appearance: Normal appearance. He is normal weight. He is not ill-appearing.   HENT:      Head: Normocephalic and atraumatic.      Right Ear: Hearing normal.      Left Ear: Hearing normal.      Nose: Nose normal.   Eyes:      General: No scleral icterus.        Right eye: No  discharge.         Left eye: No discharge.      Extraocular Movements: Extraocular movements intact.   Cardiovascular:      Rate and Rhythm: Normal rate.   Pulmonary:      Effort: Pulmonary effort is normal. No accessory muscle usage or respiratory distress.   Musculoskeletal:      Comments: Right UE bandaged   Neurological:      General: No focal deficit present.      Mental Status: He is alert and oriented to person, place, and time.      Cranial Nerves: No cranial nerve deficit.      Motor: No weakness.   Psychiatric:         Attention and Perception: Attention normal.         Mood and Affect: Mood normal.         Speech: Speech normal.         Behavior: Behavior is cooperative.        Labs:  Recent Results (from the past 24 hour(s))   POCT glucose    Collection Time: 08/14/22  7:45 PM   Result Value Ref Range    POCT Glucose 146 (H) 70 - 110 mg/dL   POCT glucose    Collection Time: 08/15/22  7:14 AM   Result Value Ref Range    POCT Glucose 146 (H) 70 - 110 mg/dL   C-reactive protein    Collection Time: 08/15/22  8:47 AM   Result Value Ref Range    CRP 49.0 (H) 0.0 - 8.2 mg/L   VANCOMYCIN, TROUGH    Collection Time: 08/15/22  8:47 AM   Result Value Ref Range    Vancomycin-Trough 12.5 10.0 - 22.0 ug/mL   POCT glucose    Collection Time: 08/15/22 10:55 AM   Result Value Ref Range    POCT Glucose 131 (H) 70 - 110 mg/dL        No results found for: [id number]    Recent Labs   Lab 08/10/22  0557 08/12/22  0345 08/14/22  0458   WBC 9.76 6.92 5.54   LYMPH 18.4  1.8 23.1  1.6 24.7  1.4   HGB 10.4* 9.9* 9.9*   HCT 31.8* 31.4* 31.0*   * 640* 584*       Recent Labs   Lab 08/10/22  0612 08/12/22  0345 08/13/22  0422 08/14/22  0458    136  --  136   K 4.1 4.2  --  4.2   CL 99 101  --  100   CO2 28 26  --  27   BUN 16 15  --  17   CREATININE 1.3 1.3 1.2 1.2   * 113*  --  143*   CALCIUM 9.6 9.1  --  9.2   MG 1.7 1.7  --  1.7   PHOS 3.9 4.0  --  4.2       Recent Labs   Lab 08/10/22  0612 08/12/22  1491  08/14/22  0458   ALKPHOS 99 86 86   ALT 56* 32 25   AST 40 29 27   ALBUMIN 2.4* 2.2* 2.3*   PROT 8.8* 8.3 8.3   BILITOT 0.5 0.3 0.4          Recent Labs     08/13/22  1209 08/15/22  0847   CRP 71.4* 49.0*         All labs within the last 24 hours were reviewed.     Microbiology:  Microbiology Results (last 7 days)       Procedure Component Value Units Date/Time    Culture, Anaerobe [[id number]] Collected: 08/10/22 0859    Order Status: Completed Specimen: Incision site from Arm, Right Updated: 08/15/22 1113     Anaerobic Culture No anaerobes isolated    Aerobic culture [[id number]]  (Abnormal)  (Susceptibility) Collected: 08/10/22 0859    Order Status: Completed Specimen: Incision site from Arm, Right Updated: 08/13/22 1326     Aerobic Bacterial Culture STREPTOCOCCUS DYSGALACTIAE  Rare      AFB Culture & Smear [[id number]] Collected: 08/10/22 0859    Order Status: Completed Specimen: Incision site from Arm, Right Updated: 08/11/22 2127     AFB Culture & Smear Culture in progress     AFB CULTURE STAIN No acid fast bacilli seen.    Fungus culture [[id number]] Collected: 08/10/22 0859    Order Status: Completed Specimen: Incision site from Arm, Right Updated: 08/11/22 0747     Fungus (Mycology) Culture Culture in progress    Gram stain [[id number]] Collected: 08/10/22 0859    Order Status: Completed Specimen: Incision site from Arm, Right Updated: 08/10/22 1313     Gram Stain Result Moderate WBC's      No organisms seen    Fungus culture [[id number]] Collected: 08/04/22 1145    Order Status: Completed Specimen: Bone from Toe, Right Foot Updated: 08/09/22 1307     Fungus (Mycology) Culture Culture in progress    Narrative:      R Great toe bone              Imaging  ECG Results    None         No results found for this or any previous visit.      MRI Forearm W WO Contrast Right  Narrative: EXAMINATION:  MRI FOREARM W WO CONTRAST RIGHT    CLINICAL HISTORY:  Soft tissue mass, forearm, deep;    TECHNIQUE:  Multisequence, multi  planar magnetic resonance imaging of the right forearm before and after the intravenous administration of 10 mL Gadavist.    COMPARISON:  Radiographs from earlier today.  Ultrasound of the right forearm from 08/08/2022 and 08/04/2022.    FINDINGS:  Soft tissues: There is a large rim enhancing fluid collection within the right anterior forearm soft tissues measuring 9.3 x 5.7 x 3.2 cm (cc by AP by TR).  The collection is centered within the musculature of the anterior compartment of the proximal forearm.  There is some associated T1 hyperintensity suspicious for blood products or proteinaceous content.  There is adjacent enhancement and edema of the anterior compartment musculature and the subcutaneous fat of the anterior forearm, with overlying skin thickening and enhancement.    Bone: There is no evidence of acute osteomyelitis.  Bone marrow signal is normal.  There is no T1 hypointense marrow signal.  There is no abnormal marrow enhancement.  There is no acute fracture or dislocation.    Articulations: The right elbow and right wrist joints are grossly unremarkable.  No joint effusion.    Miscellaneous: Neurovascular structures are grossly intact.  Impression: Large rim enhancing fluid collection within the musculature of the anterior compartment of the proximal forearm, compatible with an intramuscular abscess or infected hematoma.  Adjacent myositis and cellulitis as above.  No evidence of acute osteomyelitis.  Correlate clinically for evidence of compartment syndrome.    This report was flagged in Epic as abnormal.    Electronically signed by: Jesse Heredia  Date:    08/10/2022  Time:    00:40      All imaging within the last 24 hours was reviewed.       Discharge Planning   JOSÉ ANTONIO: 8/16/2022     Code Status: Full Code   Is the patient medically ready for discharge?: No    Reason for patient still in hospital (select all that apply): Patient trending condition, Laboratory test, Treatment, Consult recommendations,  and Pending disposition  Discharge Plan A: Home Health   Discharge Delays: None known at this time        Assessment/Plan:      * Osteomyelitis of great toe of right foot  Per surgery, no evidence of a necrotizing infection at physical exam. Patient with a chronic wound opened to air, this may be the reason there is some soft tissue gas seen in the foot Xray. Wound looks chronic in nature.   - Continued ceftriaxone, vancomycin initially  - Podiatry consulted, patient s/p bone bx   - MRI consistent with osteomyelitis  - ID consulted: Right hallux bone biopsy. Bone cultures pending.  - Unable to get vascular SANDEE due to pain.   - ID de-escalated antibiotic to vancomycin. Placed PICC. Wound care per Podiatry.  - XL post op shoe to small for patient so ordered with HME but they are unable to procure; will need to consult an orthotic company    Abscess of right forearm  Began developing about 2 weeks ago. Now s/p I&D with Ortho.  Cultures (patient receiving vancomycin) - now with strep dysgalactiae; sensitive to Vanc    Ulcer of right fifth toe due to diabetes mellitus  Possible osteomyelitis, continuing wound care and antibiotics    Uncontrolled type 2 diabetes mellitus with hyperglycemia, without long-term current use of insulin  Patient's FSGs are uncontrolled due to hyperglycemia on current medication regimen.  Last A1c reviewed- uncontrolled since 2010 per available records.  Current correctional scale  Low  Endocrinology consulted and managing with anti-hyperglycemics as follows-   Antihyperglycemics (From admission, onward)            Start     Stop Route Frequency Ordered    08/07/22 1035  insulin aspart U-100 pen 0-5 Units         -- SubQ Before meals & nightly PRN 08/07/22 0935    08/06/22 1130  insulin aspart U-100 pen 12 Units         -- SubQ 3 times daily with meals 08/06/22 0835    08/06/22 0900  insulin detemir U-100 pen 32 Units         -- SubQ Daily 08/06/22 0835      Hold Oral hypoglycemics while patient  is in the hospital - anticipate insulin at discharge  Consulted Endocrinology for management and discharge recommendations, follow up.  Management per Endocrinology.    Primary hypertension  Uncontrolled, not on medication at home  - Continued amlodipine 5mg daily  - Continued lisinopril 5mg daily; increased to 20 mg for 8/6  - BP remained elevated; increased lisinopril to 40 mg. Increased amlodipine. Monitor.  - Persistent hypertension; Changed lisinopril to valsartan on 8/9 - holding zoila-op on 8/10    Cellulitis of right lower extremity  Acute  Continued Rocephin and vancomycin initially  LE U/S performed: No evidence of deep venous thrombosis in the right lower extremity.  ID de-escalated antibiotic to vancomycin.      VTE Risk Mitigation (From admission, onward)         Ordered     enoxaparin injection 40 mg  Every 12 hours         08/03/22 0833     IP VTE HIGH RISK PATIENT  Once         08/03/22 0141     Place sequential compression device  Until discontinued         08/03/22 0141              High Risk Conditions:  Patient is currently on drug therapy requiring intensive monitoring for toxicity: Vancomycin    I have assessed these findings virtually using a telemed platform and with assistance of the bedside nurse.        The attending portion of this evaluation, treatment, and documentation was performed per Mamta Cadena MD via Telemedicine AudioVisual using the secure [employer] software platform with 2 way audio/video. The provider was located off-site and the patient is located in the hospital. The aforementioned video software was utilized to document the relevant history and physical exam    Mamta Cadena MD  Department of Hospital Medicine   [facility]

## 2022-08-16 VITALS
TEMPERATURE: 98 F | WEIGHT: 315 LBS | SYSTOLIC BLOOD PRESSURE: 169 MMHG | OXYGEN SATURATION: 99 % | BODY MASS INDEX: 39.17 KG/M2 | DIASTOLIC BLOOD PRESSURE: 70 MMHG | RESPIRATION RATE: 18 BRPM | HEIGHT: 75 IN | HEART RATE: 76 BPM

## 2022-08-16 LAB
ALBUMIN SERPL BCP-MCNC: 2.4 G/DL (ref 3.5–5.2)
ALP SERPL-CCNC: 83 U/L (ref 55–135)
ALT SERPL W/O P-5'-P-CCNC: 28 U/L (ref 10–44)
ANION GAP SERPL CALC-SCNC: 9 MMOL/L (ref 8–16)
AST SERPL-CCNC: 30 U/L (ref 10–40)
BASOPHILS # BLD AUTO: 0.08 K/UL (ref 0–0.2)
BASOPHILS NFR BLD: 1.6 % (ref 0–1.9)
BILIRUB SERPL-MCNC: 0.3 MG/DL (ref 0.1–1)
BUN SERPL-MCNC: 16 MG/DL (ref 6–20)
CALCIUM SERPL-MCNC: 9.4 MG/DL (ref 8.7–10.5)
CHLORIDE SERPL-SCNC: 97 MMOL/L (ref 95–110)
CO2 SERPL-SCNC: 27 MMOL/L (ref 23–29)
CREAT SERPL-MCNC: 1.2 MG/DL (ref 0.5–1.4)
DIFFERENTIAL METHOD: ABNORMAL
EOSINOPHIL # BLD AUTO: 0.5 K/UL (ref 0–0.5)
EOSINOPHIL NFR BLD: 8.8 % (ref 0–8)
ERYTHROCYTE [DISTWIDTH] IN BLOOD BY AUTOMATED COUNT: 13.9 % (ref 11.5–14.5)
EST. GFR  (NO RACE VARIABLE): >60 ML/MIN/1.73 M^2
GLUCOSE SERPL-MCNC: 121 MG/DL (ref 70–110)
HCT VFR BLD AUTO: 28.8 % (ref 40–54)
HGB BLD-MCNC: 9.4 G/DL (ref 14–18)
IMM GRANULOCYTES # BLD AUTO: 0.02 K/UL (ref 0–0.04)
IMM GRANULOCYTES NFR BLD AUTO: 0.4 % (ref 0–0.5)
LYMPHOCYTES # BLD AUTO: 1.4 K/UL (ref 1–4.8)
LYMPHOCYTES NFR BLD: 26.7 % (ref 18–48)
MAGNESIUM SERPL-MCNC: 1.8 MG/DL (ref 1.6–2.6)
MCH RBC QN AUTO: 26.4 PG (ref 27–31)
MCHC RBC AUTO-ENTMCNC: 32.6 G/DL (ref 32–36)
MCV RBC AUTO: 81 FL (ref 82–98)
MONOCYTES # BLD AUTO: 1 K/UL (ref 0.3–1)
MONOCYTES NFR BLD: 18.7 % (ref 4–15)
NEUTROPHILS # BLD AUTO: 2.2 K/UL (ref 1.8–7.7)
NEUTROPHILS NFR BLD: 43.8 % (ref 38–73)
NRBC BLD-RTO: 0 /100 WBC
PHOSPHATE SERPL-MCNC: 4.4 MG/DL (ref 2.7–4.5)
PLATELET # BLD AUTO: 492 K/UL (ref 150–450)
PMV BLD AUTO: 9 FL (ref 9.2–12.9)
POCT GLUCOSE: 116 MG/DL (ref 70–110)
POCT GLUCOSE: 143 MG/DL (ref 70–110)
POTASSIUM SERPL-SCNC: 4.1 MMOL/L (ref 3.5–5.1)
PROT SERPL-MCNC: 8.3 G/DL (ref 6–8.4)
RBC # BLD AUTO: 3.56 M/UL (ref 4.6–6.2)
SODIUM SERPL-SCNC: 133 MMOL/L (ref 136–145)
WBC # BLD AUTO: 5.09 K/UL (ref 3.9–12.7)

## 2022-08-16 PROCEDURE — 99239 HOSP IP/OBS DSCHRG MGMT >30: CPT | Mod: 95,,, | Performed by: INTERNAL MEDICINE

## 2022-08-16 PROCEDURE — 80053 COMPREHEN METABOLIC PANEL: CPT | Performed by: INTERNAL MEDICINE

## 2022-08-16 PROCEDURE — 25000003 PHARM REV CODE 250: Performed by: INTERNAL MEDICINE

## 2022-08-16 PROCEDURE — 83735 ASSAY OF MAGNESIUM: CPT | Performed by: INTERNAL MEDICINE

## 2022-08-16 PROCEDURE — 99239 PR HOSPITAL DISCHARGE DAY,>30 MIN: ICD-10-PCS | Mod: 95,,, | Performed by: INTERNAL MEDICINE

## 2022-08-16 PROCEDURE — 99232 SBSQ HOSP IP/OBS MODERATE 35: CPT | Mod: ,,, | Performed by: NURSE PRACTITIONER

## 2022-08-16 PROCEDURE — 99232 PR SUBSEQUENT HOSPITAL CARE,LEVL II: ICD-10-PCS | Mod: ,,, | Performed by: NURSE PRACTITIONER

## 2022-08-16 PROCEDURE — A4216 STERILE WATER/SALINE, 10 ML: HCPCS | Performed by: INTERNAL MEDICINE

## 2022-08-16 PROCEDURE — 84100 ASSAY OF PHOSPHORUS: CPT | Performed by: INTERNAL MEDICINE

## 2022-08-16 PROCEDURE — 63600175 PHARM REV CODE 636 W HCPCS: Performed by: HOSPITALIST

## 2022-08-16 PROCEDURE — 85025 COMPLETE CBC W/AUTO DIFF WBC: CPT | Performed by: INTERNAL MEDICINE

## 2022-08-16 RX ADMIN — Medication 10 ML: at 07:08

## 2022-08-16 RX ADMIN — SENNOSIDES AND DOCUSATE SODIUM 1 TABLET: 50; 8.6 TABLET ORAL at 08:08

## 2022-08-16 RX ADMIN — INSULIN ASPART 12 UNITS: 100 INJECTION, SOLUTION INTRAVENOUS; SUBCUTANEOUS at 08:08

## 2022-08-16 RX ADMIN — POLYETHYLENE GLYCOL 3350 17 G: 17 POWDER, FOR SOLUTION ORAL at 08:08

## 2022-08-16 RX ADMIN — AMLODIPINE BESYLATE 5 MG: 5 TABLET ORAL at 08:08

## 2022-08-16 RX ADMIN — Medication 10 ML: at 12:08

## 2022-08-16 RX ADMIN — ENOXAPARIN SODIUM 40 MG: 40 INJECTION SUBCUTANEOUS at 08:08

## 2022-08-16 RX ADMIN — INSULIN DETEMIR 32 UNITS: 100 INJECTION, SOLUTION SUBCUTANEOUS at 08:08

## 2022-08-16 RX ADMIN — VALSARTAN 80 MG: 40 TABLET, FILM COATED ORAL at 08:08

## 2022-08-16 RX ADMIN — Medication 10 ML: at 01:08

## 2022-08-16 NOTE — SUBJECTIVE & OBJECTIVE
Principal Problem:Osteomyelitis of great toe of right foot    Principal Orthopedic Problem: s/p I&D right proximal forearm abscess (08/10)    Interval History: SAMMY, VSS. Fitted with orthotic shoe. Cx result Strep dysgalactiae 08/10. Abx de-escalated to vanc only. Anticipating discharge today.      Review of patient's allergies indicates:  No Known Allergies    Current Facility-Administered Medications   Medication    acetaminophen tablet 650 mg    albuterol-ipratropium 2.5 mg-0.5 mg/3 mL nebulizer solution 3 mL    amLODIPine tablet 5 mg    cadexomer iodine 0.9 % gel    dextrose 10% bolus 125 mL    dextrose 10% bolus 250 mL    enoxaparin injection 40 mg    glucagon (human recombinant) injection 1 mg    glucose chewable tablet 16 g    glucose chewable tablet 24 g    hydrALAZINE tablet 25 mg    HYDROcodone-acetaminophen 7.5-325 mg per tablet 1 tablet    insulin aspart U-100 pen 0-5 Units    insulin aspart U-100 pen 12 Units    insulin detemir U-100 pen 32 Units    melatonin tablet 6 mg    morphine injection 2 mg    naloxone 0.4 mg/mL injection 0.02 mg    ondansetron disintegrating tablet 4 mg    polyethylene glycol packet 17 g    polyethylene glycol packet 17 g    prochlorperazine injection Soln 5 mg    senna-docusate 8.6-50 mg per tablet 1 tablet    simethicone chewable tablet 80 mg    sodium chloride 0.9% flush 10 mL    sodium chloride 0.9% flush 10 mL    And    sodium chloride 0.9% flush 10 mL    valsartan tablet 80 mg    vancomycin - pharmacy to dose    vancomycin 1.5 g in dextrose 5 % 250 mL IVPB (ready to mix)     Objective:     Vital Signs (Most Recent):  Temp: 98.1 °F (36.7 °C) (08/16/22 0032)  Pulse: 85 (08/16/22 0032)  Resp: 18 (08/16/22 0032)  BP: (!) 136/59 (08/16/22 0032)  SpO2: (!) 94 % (08/16/22 0032) Vital Signs (24h Range):  Temp:  [98.1 °F (36.7 °C)-98.8 °F (37.1 °C)] 98.1 °F (36.7 °C)  Pulse:  [80-97] 85  Resp:  [16-18] 18  SpO2:  [94 %-95 %] 94 %  BP: (113-138)/(57-82) 136/59     Weight: (!) 155  "kg (341 lb 11.4 oz)  Height: 6' 3" (190.5 cm)  Body mass index is 42.71 kg/m².      Intake/Output Summary (Last 24 hours) at 8/16/2022 0643  Last data filed at 8/16/2022 0626  Gross per 24 hour   Intake 400 ml   Output 1100 ml   Net -700 ml       Constitutional: Denies fever/chills  Neurological: Denies numbness/tingling  Cardiovascular: Denies chest pain  Respiratory: Denies shortness of breath  Musculoskeletal: see HPI      Ortho/SPM Exam  Gen:  No acute distress, well-developed, well nourished.  CV:  Peripherally well-perfused. 2+ radial pulses, symmetric.  Respiratory:  Normal respiratory effort. No accessory muscle use.       MSK:  R Upper extremity  Inspection  - dressing CDI, incision site does not express discharge  - No swelling  - No ecchymosis, erythema, or signs of cellulitis  Palpation  - No TTP over surgical incision site  Range of motion  - AROM and PROM of the shoulder, elbow, wrist, and hand intact without pain  Stability  - No evidence of joint dislocation or abnormal laxity  Neurovascular  - AIN/PIN/Radial/Median/Ulnar Nerves assessed in isolation without deficit  - Able to give thumbs up, make "OK" sign, cross IF/LF, abduct/adduct fingers, make fist  - SILT throughout  - Compartments soft  - Radial artery palpated  - Capillary Refill <3s  - Muscle tone normal      Significant Labs: CBC:   Recent Labs   Lab 08/16/22  0450   WBC 5.09   HGB 9.4*   HCT 28.8*   *       CMP:   Recent Labs   Lab 08/16/22  0450   *   K 4.1   CL 97   CO2 27   *   BUN 16   CREATININE 1.2   CALCIUM 9.4   PROT 8.3   ALBUMIN 2.4*   BILITOT 0.3   ALKPHOS 83   AST 30   ALT 28   ANIONGAP 9           Significant Imaging: I have reviewed and interpreted all pertinent imaging results/findings.  "

## 2022-08-16 NOTE — PROGRESS NOTES
[facility] - Med Surg  Endocrinology  Progress Note    Admit Date: 8/2/2022     Reason for Consult: Management of T2DM, Hyperglycemia     Diabetes diagnosis year: 2010    Home Diabetes Medications:  No current medication use. Patient states > 3 years since he has been on Metformin.     How often checking glucose at home?  Patient not checking his BG at home.     BG readings on regimen: ANURAG  Hypoglycemia on the regimen?  No  Missed doses on regimen?  Yes    Diabetes Complications include:     Hyperglycemia, Diabetic peripheral neuropathy , Diabetic dermatitis, and Foot ulcer      Complicating diabetes co morbidities:   Infection; HTN      HPI: 57 yo male with diabetes, HTN presenting 2/2 R foot swelling that started acutely 3 days ago and progressively has gotten worse. He states he had a wound on his right great toe and 5th toe that has been there for a while. Today it had not gotten any better so he decided to come in for evaluation. He has not been on any antibiotics for it. He states that he was on medications at one point but no longer takes them as he thought he had gotten them under control. In the ED, concern with elevated WBC, ESR, CRP, xr of foot showed osteo of great toe. General surgery consulted to rule nec fasc 2/2 gas in the 5th toe. Medicine called for admission. Rocephin started. Endocrine consulted to manage hyperglycemia and type 2 diabetes.       Hemoglobin A1C   Date Value Ref Range Status   08/03/2022 12.4 (H) 4.0 - 5.6 % Final     Comment:     ADA Screening Guidelines:  5.7-6.4%  Consistent with prediabetes  >or=6.5%  Consistent with diabetes    High levels of fetal hemoglobin interfere with the HbA1C  assay. Heterozygous hemoglobin variants (HbS, HgC, etc)do  not significantly interfere with this assay.   However, presence of multiple variants may affect accuracy.     03/19/2015 15.0 (H) 4.5 - 6.2 % Final   01/26/2010 15.4 (H) 4.0 - 6.2 % Final          Interval HPI:   Overnight events: Remains  "in [facility]. [facility]. Antibiotics. BG reasonably well controlled with scheduled insulin and prn correction scale. Patient has been on stable dose of insulin for > 7 days.     Eatin%  Nausea: No  Hypoglycemia and intervention: No  Fever: No  TPN and/or TF: No      BP (!) 156/66   Pulse 99   Temp 98.2 °F (36.8 °C)   Resp 16   Ht 6' 3" (1.905 m)   Wt (!) 155.2 kg (342 lb 2.5 oz)   SpO2 98%   BMI 42.77 kg/m²     Labs Reviewed and Include    No results for input(s): GLU, CALCIUM, ALBUMIN, PROT, NA, K, CO2, CL, BUN, CREATININE, ALKPHOS, ALT, AST, BILITOT in the last 24 hours.  Lab Results   Component Value Date    WBC 5.54 2022    HGB 9.9 (L) 2022    HCT 31.0 (L) 2022    MCV 84 2022     (H) 2022     No results for input(s): TSH, FREET4 in the last 168 hours.  Lab Results   Component Value Date    HGBA1C 12.4 (H) 2022       Nutritional status:   Body mass index is 42.77 kg/m².  Lab Results   Component Value Date    ALBUMIN 2.3 (L) 2022    ALBUMIN 2.2 (L) 2022    ALBUMIN 2.4 (L) 08/10/2022     Lab Results   Component Value Date    PREALBUMIN 6 (L) 2010       Estimated Creatinine Clearance: 112.3 mL/min (based on SCr of 1.2 mg/dL).    Accu-Checks  Recent Labs     22  1116 22  1617 22  2007 22  0714 22  1120 22  2055 22  0629 22  1106 22  1508 22  1945   POCTGLUCOSE 196* 205* 133* 138* 201* 166* 148* 254* 146* 146*       Current Medications and/or Treatments Impacting Glycemic Control  Immunotherapy:    Immunosuppressants       None          Steroids:   Hormones (From admission, onward)                Start     Stop Route Frequency Ordered    22 0239  melatonin tablet 6 mg         -- Oral Nightly PRN 22 0141          Pressors:    Autonomic Drugs (From admission, onward)                None          Hyperglycemia/Diabetes Medications:   Antihyperglycemics (From admission, onward)          "       Start     Stop Route Frequency Ordered    08/07/22 1035  insulin aspart U-100 pen 0-5 Units         -- SubQ Before meals & nightly PRN 08/07/22 0935    08/06/22 1130  insulin aspart U-100 pen 12 Units         -- SubQ 3 times daily with meals 08/06/22 0835    08/06/22 0900  insulin detemir U-100 pen 32 Units         -- SubQ Daily 08/06/22 0835            ASSESSMENT and PLAN    * Osteomyelitis of great toe of right foot  Infection may elevate BG readings  On IV antibiotics  Managed per primary team  Avoid hypoglycemia        Uncontrolled type 2 diabetes mellitus with hyperglycemia, without long-term current use of insulin  Endocrinology consulted for BG management.   BG goal 140-180    Weight-based at 0.5 units/kg/day   - Levemir Flex Pen 32 units daily  - Novolog (aspart) insulin 12 Units SQ TIDWM and prn for BG excursions LDC (150/50) SSI  - BG checks AC/HS  - Hypoglycemia protocol in place    ** Please notify Endocrine for any change and/or advance in diet**  ** Please call Endocrine for any BG related issues **    Discharge Planning: tresiba 32 units daily. novolog 12 units with meals plus correction scael 180/25.   Metformin  mg pills:  1 pill once daily with breakfast or a week THEN  1 pill with breakfast, 1 pill with dinner for a week THEN  1 pill with breakfast, 2 pills with dinner for a week THEN  2 pills with breakfast, 2 pills with dinner          Reviewed topics related to DM including: the need for insulin, how insulin works, what makes it a high risk medication, the importance of follow up with either PCP or endocrine provider, importance of and how to check BG, how to record BG on logs, how to administer insulin, appropriate insulin administration sites, importance of rotating injection sites, hyper/hypoglycemia, how and when to treat hypoglycemia, when to hold insulin,  importance of storing unused insulin in the refrigerator, and when to seek medical attention.  Patient verbalized  understanding, answered all questions to patient's satisfaction.            Primary hypertension  Willy contribute to insulin resistance.       Severe obesity (BMI >= 40)  Body mass index is 42.71 kg/m².  Increased abdominal adiposity can increase insulin resistance.            Brandon Gage DNP, FNP  Endocrinology  [facility] - Med Surg

## 2022-08-16 NOTE — ASSESSMENT & PLAN NOTE
Body mass index is 42.71 kg/m².  Increased abdominal adiposity can increase insulin resistance.

## 2022-08-16 NOTE — PT/OT/SLP PROGRESS
Physical Therapy      Patient Name:  Renee Caceres   MRN:  [id number]    Patient not seen today secondary to: anticipated d/c today. Will follow-up if pt does not d/c.    8/16/2022

## 2022-08-16 NOTE — PLAN OF CARE
Patient and home care taker educated on how to administer IVABX Vancomycin and flushing. Patient medications was delivered bedside.   Problem: Adult Inpatient Plan of Care  Goal: Plan of Care Review  Outcome: Met  Goal: Patient-Specific Goal (Individualized)  Outcome: Met  Goal: Absence of Hospital-Acquired Illness or Injury  Outcome: Met  Goal: Optimal Comfort and Wellbeing  Outcome: Met  Goal: Readiness for Transition of Care  Outcome: Met     Problem: Diabetes Comorbidity  Goal: Blood Glucose Level Within Targeted Range  Outcome: Met     Problem: Bariatric Environmental Safety  Goal: Safety Maintained with Care  Outcome: Met     Problem: Infection  Goal: Absence of Infection Signs and Symptoms  Outcome: Met

## 2022-08-16 NOTE — PLAN OF CARE
[facility] - Med Surg  Discharge Final Note    Primary Care Provider: Primary Doctor No    Expected Discharge Date: 8/16/2022    Final Discharge Note (most recent)     Final Note - 08/16/22 1607        Final Note    Assessment Type Final Discharge Note     Anticipated Discharge Disposition Home-Health Care [facility]     Hospital Resources/Appts/Education Provided Provided patient/caregiver with written discharge plan information;Appointments scheduled and added to AVS        Post-Acute Status    Home Health Status Set-up Complete/Auth obtained     IV Infusion Status Set-up Complete/Auth obtained     Discharge Delays None known at this time                 Important Message from Medicare             Contact Info     [facility]    [address] [place] [id number]   Phone: [phone]       Next Steps: Follow up    Instructions: home health: a nurse or the company will reach out to you by weds. .  thanks    [facility]    [address] [facility] [address]   Phone: [phone]       Next Steps: Follow up    Instructions: home infusion: please call if you have any questions about your IV anti-biotics. thanks.    No, Primary Doctor   Relationship: PCP - General        Next Steps: Follow up        Pt went home with home iv antibiotics with [id number] and with [facility].  Family able to provide transportation.  Follow-up appointment made.  No other needs at this time.     DCP: home health    Charu Moran RN [facility] [phone]

## 2022-08-16 NOTE — PLAN OF CARE
[facility]    [facility] educator met with significant other discussed discharge plan for home IVABX. Renee Caceres will dc home with family support. Patient will infuse medication via Elastomeric Pump. significant other on S.A.S.H procedure. S.A.S.H mat provided.  Patient education checklist reviewed and acknowledged by above person(s) above and are agreeable to discharge with home infusion plan of care. IV administration process using aspetic technique was reviewed with successful return demonstration. Patient feels comfortable with infusion. Patient will dc home with Vancomycin 1.5 G Q24  hours for estimated end of therapy date 9/15/22 . Dosing schedule times are 1300.  [facility] will follow patient or will come to for weekly dressing changes and lab draws. Time allotted for questions. Patients nurse and case management team notified teaching has been completed.     Medication delivery will be made to bedside    Patient accepted to care by [facility] and report called to [facility]   Phone number [phone]

## 2022-08-16 NOTE — ASSESSMENT & PLAN NOTE
Renee Caceres is a 56 y.o. male with morbid obesity and poorly controlled T2DM admitted for right great toe osteo. MRI obtained today shows R forearm intra-muscular abscess. Signs of developing phlegmon on RUE U/S on 8/8. CRP 95 today. Has been on IV vancomycin for toe osteo. Educated patient he is at high risk for infection recurrence and wound healing issues in the setting of his high A1c. S/p I&D with Dr. Oleary on 8/10/22.    - ID following for R great toe osteo  - IV vanc per ID  - Right forearm culture: strep dysgalactiae  - Pain control MM  - DVT ppx per primary  - Elevate, ROMAT, WBAT RUE    Dispo: per primary team, dc today

## 2022-08-16 NOTE — PROGRESS NOTES
[facility] S[facility]  Orthopedics  Progress Note    Attg Note:  I agree with the resident's assessment and plan.    Usman Oleary MD      Patient Name: Renee Caceres  MRN: [id number]  Admission Date: 8/2/2022  Hospital Length of Stay: 13 days  Attending Provider: Mamta Cadena MD  Primary Care Provider: Primary Doctor No  Follow-up For: Procedure(s) (LRB):  INCISION AND DRAINAGE, UPPER EXTREMITY - RIGHT, Hand table, Ancef/clinda (hold until cx collected), Cysto tubing (Right)    Post-Operative Day: 6 Days Post-Op  Subjective:     Principal Problem:Osteomyelitis of great toe of right foot    Principal Orthopedic Problem: s/p I&D right proximal forearm abscess (08/10)    Interval History: NARASHARD, VSS. Fitted with orthotic shoe. Cx result Strep dysgalactiae 08/10. Abx de-escalated to vanc only. Anticipating discharge today.      Review of patient's allergies indicates:  No Known Allergies    Current Facility-Administered Medications   Medication    acetaminophen tablet 650 mg    albuterol-ipratropium 2.5 mg-0.5 mg/3 mL nebulizer solution 3 mL    amLODIPine tablet 5 mg    cadexomer iodine 0.9 % gel    dextrose 10% bolus 125 mL    dextrose 10% bolus 250 mL    enoxaparin injection 40 mg    glucagon (human recombinant) injection 1 mg    glucose chewable tablet 16 g    glucose chewable tablet 24 g    hydrALAZINE tablet 25 mg    HYDROcodone-acetaminophen 7.5-325 mg per tablet 1 tablet    insulin aspart U-100 pen 0-5 Units    insulin aspart U-100 pen 12 Units    insulin detemir U-100 pen 32 Units    melatonin tablet 6 mg    morphine injection 2 mg    naloxone 0.4 mg/mL injection 0.02 mg    ondansetron disintegrating tablet 4 mg    polyethylene glycol packet 17 g    polyethylene glycol packet 17 g    prochlorperazine injection Soln 5 mg    senna-docusate 8.6-50 mg per tablet 1 tablet    simethicone chewable tablet 80 mg    sodium chloride 0.9% flush 10 mL    sodium chloride 0.9% flush 10 mL    And    sodium chloride 0.9%  "flush 10 mL    valsartan tablet 80 mg    vancomycin - pharmacy to dose    vancomycin 1.5 g in dextrose 5 % 250 mL IVPB (ready to mix)     Objective:     Vital Signs (Most Recent):  Temp: 98.1 °F (36.7 °C) (08/16/22 0032)  Pulse: 85 (08/16/22 0032)  Resp: 18 (08/16/22 0032)  BP: (!) 136/59 (08/16/22 0032)  SpO2: (!) 94 % (08/16/22 0032) Vital Signs (24h Range):  Temp:  [98.1 °F (36.7 °C)-98.8 °F (37.1 °C)] 98.1 °F (36.7 °C)  Pulse:  [80-97] 85  Resp:  [16-18] 18  SpO2:  [94 %-95 %] 94 %  BP: (113-138)/(57-82) 136/59     Weight: (!) 155 kg (341 lb 11.4 oz)  Height: 6' 3" (190.5 cm)  Body mass index is 42.71 kg/m².      Intake/Output Summary (Last 24 hours) at 8/16/2022 0643  Last data filed at 8/16/2022 0626  Gross per 24 hour   Intake 400 ml   Output 1100 ml   Net -700 ml       Constitutional: Denies fever/chills  Neurological: Denies numbness/tingling  Cardiovascular: Denies chest pain  Respiratory: Denies shortness of breath  Musculoskeletal: see HPI      Ortho/SPM Exam  Gen:  No acute distress, well-developed, well nourished.  CV:  Peripherally well-perfused. 2+ radial pulses, symmetric.  Respiratory:  Normal respiratory effort. No accessory muscle use.       MSK:  R Upper extremity  Inspection  - dressing CDI, incision site does not express discharge  - No swelling  - No ecchymosis, erythema, or signs of cellulitis  Palpation  - No TTP over surgical incision site  Range of motion  - AROM and PROM of the shoulder, elbow, wrist, and hand intact without pain  Stability  - No evidence of joint dislocation or abnormal laxity  Neurovascular  - AIN/PIN/Radial/Median/Ulnar Nerves assessed in isolation without deficit  - Able to give thumbs up, make "OK" sign, cross IF/LF, abduct/adduct fingers, make fist  - SILT throughout  - Compartments soft  - Radial artery palpated  - Capillary Refill <3s  - Muscle tone normal      Significant Labs: CBC:   Recent Labs   Lab 08/16/22  0450   WBC 5.09   HGB 9.4*   HCT 28.8*   * "       CMP:   Recent Labs   Lab 08/16/22  0450   *   K 4.1   CL 97   CO2 27   *   BUN 16   CREATININE 1.2   CALCIUM 9.4   PROT 8.3   ALBUMIN 2.4*   BILITOT 0.3   ALKPHOS 83   AST 30   ALT 28   ANIONGAP 9           Significant Imaging: I have reviewed and interpreted all pertinent imaging results/findings.    Assessment/Plan:     Abscess of right forearm  Renee Caceres is a 56 y.o. male with morbid obesity and poorly controlled T2DM admitted for right great toe osteo. MRI obtained today shows R forearm intra-muscular abscess. Signs of developing phlegmon on RUE U/S on 8/8. CRP 95 today. Has been on IV vancomycin for toe osteo. Educated patient he is at high risk for infection recurrence and wound healing issues in the setting of his high A1c. S/p I&D with Dr. Oleary on 8/10/22.    - ID following for R great toe osteo  - IV vanc per ID  - Right forearm culture: strep dysgalactiae  - Pain control MM  - DVT ppx per primary  - Elevate, ROMAT, WBAT RUE    Dispo: per primary team, dc today          Brandon Valera MD  Orthopedics  [facility] - Med S[facility]

## 2022-08-16 NOTE — NURSING
Patient significant other who is at bedside finished patient vanc and flushed line per teaching by [facility] outpatient nurse. Pt is awaiting medication from pharmacist. Pt is AAox4 and have no complaints. Will change dressing on right arm before patient is discharged.

## 2022-08-17 ENCOUNTER — PATIENT OUTREACH (OUTPATIENT)
Dept: ADMINISTRATIVE | Facility: CLINIC | Age: 57
End: 2022-08-17
Payer: COMMERCIAL

## 2022-08-17 PROCEDURE — G0180 PR HOME HEALTH MD CERTIFICATION: ICD-10-PCS | Mod: ,,, | Performed by: INTERNAL MEDICINE

## 2022-08-17 PROCEDURE — G0180 MD CERTIFICATION HHA PATIENT: HCPCS | Mod: ,,, | Performed by: INTERNAL MEDICINE

## 2022-08-19 ENCOUNTER — LAB VISIT (OUTPATIENT)
Dept: LAB | Facility: HOSPITAL | Age: 57
End: 2022-08-19
Attending: INTERNAL MEDICINE
Payer: COMMERCIAL

## 2022-08-19 DIAGNOSIS — M86.8X6: Primary | ICD-10-CM

## 2022-08-19 LAB — VANCOMYCIN TROUGH SERPL-MCNC: 16.4 UG/ML (ref 10–22)

## 2022-08-19 PROCEDURE — 80202 ASSAY OF VANCOMYCIN: CPT | Performed by: INTERNAL MEDICINE

## 2022-08-22 ENCOUNTER — LAB VISIT (OUTPATIENT)
Dept: LAB | Facility: HOSPITAL | Age: 57
End: 2022-08-22
Attending: PHYSICIAN ASSISTANT
Payer: COMMERCIAL

## 2022-08-22 DIAGNOSIS — M86.10 ACUTE BONE INFECTION: Primary | ICD-10-CM

## 2022-08-22 LAB
ALBUMIN SERPL BCP-MCNC: 3 G/DL (ref 3.5–5.2)
ALP SERPL-CCNC: 96 U/L (ref 55–135)
ALT SERPL W/O P-5'-P-CCNC: 20 U/L (ref 10–44)
ANION GAP SERPL CALC-SCNC: 12 MMOL/L (ref 8–16)
AST SERPL-CCNC: 20 U/L (ref 10–40)
BILIRUB SERPL-MCNC: 0.5 MG/DL (ref 0.1–1)
BUN SERPL-MCNC: 16 MG/DL (ref 6–20)
CALCIUM SERPL-MCNC: 9.7 MG/DL (ref 8.7–10.5)
CHLORIDE SERPL-SCNC: 97 MMOL/L (ref 95–110)
CO2 SERPL-SCNC: 23 MMOL/L (ref 23–29)
CREAT SERPL-MCNC: 2.1 MG/DL (ref 0.5–1.4)
CRP SERPL-MCNC: 48.2 MG/L (ref 0–8.2)
ERYTHROCYTE [DISTWIDTH] IN BLOOD BY AUTOMATED COUNT: 13.9 % (ref 11.5–14.5)
EST. GFR  (NO RACE VARIABLE): 36.7 ML/MIN/1.73 M^2
GLUCOSE SERPL-MCNC: 134 MG/DL (ref 70–110)
HCT VFR BLD AUTO: 31.3 % (ref 40–54)
HGB BLD-MCNC: 10.3 G/DL (ref 14–18)
MCH RBC QN AUTO: 26.8 PG (ref 27–31)
MCHC RBC AUTO-ENTMCNC: 32.9 G/DL (ref 32–36)
MCV RBC AUTO: 81 FL (ref 82–98)
PLATELET # BLD AUTO: 335 K/UL (ref 150–450)
PMV BLD AUTO: 10.1 FL (ref 9.2–12.9)
POTASSIUM SERPL-SCNC: 4.2 MMOL/L (ref 3.5–5.1)
PROT SERPL-MCNC: 9.7 G/DL (ref 6–8.4)
RBC # BLD AUTO: 3.85 M/UL (ref 4.6–6.2)
SODIUM SERPL-SCNC: 132 MMOL/L (ref 136–145)
VANCOMYCIN TROUGH SERPL-MCNC: 19.6 UG/ML (ref 10–22)
WBC # BLD AUTO: 5.58 K/UL (ref 3.9–12.7)

## 2022-08-22 PROCEDURE — 80202 ASSAY OF VANCOMYCIN: CPT | Performed by: PHYSICIAN ASSISTANT

## 2022-08-22 PROCEDURE — 80053 COMPREHEN METABOLIC PANEL: CPT | Performed by: PHYSICIAN ASSISTANT

## 2022-08-22 PROCEDURE — 86140 C-REACTIVE PROTEIN: CPT | Performed by: PHYSICIAN ASSISTANT

## 2022-08-22 PROCEDURE — 36415 COLL VENOUS BLD VENIPUNCTURE: CPT | Performed by: PHYSICIAN ASSISTANT

## 2022-08-22 PROCEDURE — 85027 COMPLETE CBC AUTOMATED: CPT | Performed by: PHYSICIAN ASSISTANT

## 2022-08-24 ENCOUNTER — TELEPHONE (OUTPATIENT)
Dept: ORTHOPEDICS | Facility: CLINIC | Age: 57
End: 2022-08-24
Payer: COMMERCIAL

## 2022-08-24 ENCOUNTER — LAB VISIT (OUTPATIENT)
Dept: LAB | Facility: HOSPITAL | Age: 57
End: 2022-08-24
Attending: PHYSICIAN ASSISTANT
Payer: COMMERCIAL

## 2022-08-24 DIAGNOSIS — I10 ESSENTIAL HYPERTENSION, MALIGNANT: ICD-10-CM

## 2022-08-24 DIAGNOSIS — E11.69 DIABETES MELLITUS ASSOCIATED WITH HORMONAL ETIOLOGY: Primary | ICD-10-CM

## 2022-08-24 LAB
ANION GAP SERPL CALC-SCNC: 7 MMOL/L (ref 8–16)
BUN SERPL-MCNC: 16 MG/DL (ref 6–20)
CALCIUM SERPL-MCNC: 8 MG/DL (ref 8.7–10.5)
CHLORIDE SERPL-SCNC: 108 MMOL/L (ref 95–110)
CO2 SERPL-SCNC: 22 MMOL/L (ref 23–29)
CREAT SERPL-MCNC: 1.8 MG/DL (ref 0.5–1.4)
EST. GFR  (NO RACE VARIABLE): 44.2 ML/MIN/1.73 M^2
GLUCOSE SERPL-MCNC: 148 MG/DL (ref 70–110)
POTASSIUM SERPL-SCNC: 3.6 MMOL/L (ref 3.5–5.1)
SODIUM SERPL-SCNC: 137 MMOL/L (ref 136–145)

## 2022-08-24 PROCEDURE — 80048 BASIC METABOLIC PNL TOTAL CA: CPT | Performed by: PHYSICIAN ASSISTANT

## 2022-08-24 NOTE — ASSESSMENT & PLAN NOTE
Patient's FSGs are uncontrolled due to hyperglycemia on current medication regimen.  Last A1c reviewed- uncontrolled since 2010 per available records.  Current correctional scale  Low  Endocrinology consulted and managing with anti-hyperglycemics as follows-   Antihyperglycemics (From admission, onward)            None      Hold Oral hypoglycemics while patient is in the hospital - anticipate insulin at discharge  Consulted Endocrinology for management and discharge recommendations, follow up.  Management per Endocrinology.

## 2022-08-24 NOTE — ASSESSMENT & PLAN NOTE
Per surgery, no evidence of a necrotizing infection at physical exam. Patient with a chronic wound opened to air, this may be the reason there is some soft tissue gas seen in the foot Xray. Wound looks chronic in nature.   - Continued ceftriaxone, vancomycin initially  - Podiatry consulted, patient s/p bone bx   - MRI consistent with osteomyelitis  - ID consulted: Right hallux bone biopsy. Bone cultures pending.  - Unable to get vascular SANDEE due to pain.   - ID de-escalated antibiotic to vancomycin. Placed PICC. Wound care per Podiatry.  - XL post op shoe to small for patient so ordered with E but they are unable to procure; boot provided by Orthotic company

## 2022-08-24 NOTE — TELEPHONE ENCOUNTER
Reschedule missed appointment.  Called and Informed patient of appointment on 8/29/22 @ 11:00 am. Patient states verbal understanding and has no further questions.

## 2022-08-24 NOTE — DISCHARGE SUMMARY
[facility] Medicine  Discharge Summary      Patient Name: Renee Caceres  MRN: [id number]  Patient Class: IP- Inpatient  Admission Date: 8/2/2022  Hospital Length of Stay: 13 days  Discharge Date and Time: 8/16/2022  3:08 PM  Attending Physician: Rhea att. providers found   Discharging Provider: Mamta Cadena MD  Primary Care Provider: Primary Doctor Rhea      HPI:   55 yo male with diabetes, HTN presenting 2/2 R foot swelling that started acutely 3 days ago and progressively has gotten worse. He states he had a wound on his right great toe and 5th toe that has been there for a while. Today it had not gotten any better so he decided to come in for evaluation. He has not been on any antibiotics for it. He states that he was on medications at one point but no longer takes them as he thought he had gotten them under control. In the ED, concern with elevated WBC, ESR, CRP, xr of foot showed osteo of great toe. General surgery consulted to rule nec fasc 2/2 gas in the 5th toe. Medicine called for admission. Rocephin started.         Procedure(s) (LRB):  INCISION AND DRAINAGE, UPPER EXTREMITY - RIGHT, Hand table, Ancef/clinda (hold until cx collected), Cysto tubing (Right)      Hospital Course:   No evidence of a necrotizing infection on physical exam. Podiatry and ID consulted. Hyperglycemia managed by Endocrinology.  Bone culture of right foot showed Staph Cohnii.  6 weeks antibiotics with Vanc recommended by ID.  Patient also with right forearm abscess requiring I&D by Ortho on 8/10/2022.  Cultures with Strep Dysgalactiae.  ID reviewed and patient to be treated with Vanc for 6 weeks as previously recommended.        Goals of Care Treatment Preferences:  Code Status: Full Code      Consults:   Consults (From admission, onward)        Status Ordering Provider     Inpatient consult to Orthopedic Surgery  Once        Provider:  (Not yet assigned)    Completed DIANE BONILLA     Inpatient consult to PICC  team (KRUPA)  Once        Provider:  (Not yet assigned)    Completed DIANE BONILLA     Inpatient consult to Endocrinology  Once        Provider:  (Not yet assigned)    Completed DIANE BONILLA     Inpatient virtual consult to Hospital Medicine  Once        Provider:  (Not yet assigned)    Completed CAYETANO TENORIO     Inpatient consult to Registered Dietitian/Nutritionist  Once        Provider:  (Not yet assigned)    Completed KACI NAVARRO     Inpatient consult to Infectious Diseases  Once        Provider:  (Not yet assigned)    Completed KACI NAVARRO     Inpatient consult to Podiatry  Once        Provider:  (Not yet assigned)    Completed SHANNA ELENA     Inpatient consult to General surgery  Once        Provider:  (Not yet assigned)    Completed RAGHAVENDRA RAYMOND          * Osteomyelitis of great toe of right foot  Per surgery, no evidence of a necrotizing infection at physical exam. Patient with a chronic wound opened to air, this may be the reason there is some soft tissue gas seen in the foot Xray. Wound looks chronic in nature.   - Continued ceftriaxone, vancomycin initially  - Podiatry consulted, patient s/p bone bx   - MRI consistent with osteomyelitis  - ID consulted: Right hallux bone biopsy. Bone cultures pending.  - Unable to get vascular SANDEE due to pain.   - ID de-escalated antibiotic to vancomycin. Placed PICC. Wound care per Podiatry.  - XL post op shoe to small for patient so ordered with HME but they are unable to procure; boot provided by [employer]    Abscess of right forearm  Began developing about 2 weeks ago. Now s/p I&D with Ortho.  Cultures (patient receiving vancomycin) - now with strep dysgalactiae; sensitive to Vanc    Ulcer of right fifth toe due to diabetes mellitus  Possible osteomyelitis, continuing wound care and antibiotics    Uncontrolled type 2 diabetes mellitus with hyperglycemia, without long-term current use of insulin  Patient's FSGs are uncontrolled  due to hyperglycemia on current medication regimen.  Last A1c reviewed- uncontrolled since 2010 per available records.  Current correctional scale  Low  Endocrinology consulted and managing with anti-hyperglycemics as follows-   Antihyperglycemics (From admission, onward)            None      Hold Oral hypoglycemics while patient is in the hospital - anticipate insulin at discharge  Consulted Endocrinology for management and discharge recommendations, follow up.  Management per Endocrinology.    Primary hypertension  Uncontrolled, not on medication at home  - Continued amlodipine 5mg daily  - Continued lisinopril 5mg daily; increased to 20 mg for 8/6  - BP remained elevated; increased lisinopril to 40 mg. Increased amlodipine. Monitor.  - Persistent hypertension; Changed lisinopril to valsartan on 8/9 - holding zoila-op on 8/10    Cellulitis of right lower extremity  Acute  Continued Rocephin and vancomycin initially  LE U/S performed: No evidence of deep venous thrombosis in the right lower extremity.  ID de-escalated antibiotic to vancomycin.      Final Active Diagnoses:    Diagnosis Date Noted POA    PRINCIPAL PROBLEM:  Osteomyelitis of great toe of right foot [M86.9] 08/03/2022 Yes    Abscess of right forearm [L02.413] 08/10/2022 Yes    Severe obesity (BMI >= 40) [E66.01] 08/06/2022 Yes    Ulcer of right fifth toe due to diabetes mellitus [E11.621, L97.519] 08/05/2022 Yes    Cellulitis of right lower extremity [L03.115] 08/03/2022 Yes    Primary hypertension [I10] 08/03/2022 Yes    Uncontrolled type 2 diabetes mellitus with hyperglycemia, without long-term current use of insulin [E11.65] 08/03/2022 Yes      Problems Resolved During this Admission:    Diagnosis Date Noted Date Resolved POA    Swelling of right upper extremity [M79.89] 08/03/2022 08/11/2022 Yes       Discharged Condition: stable    Disposition: Home-Health Care [facility]    Follow Up:   Follow-up Information     [facility]  [facility]    Why: home health: a nurse or the company will reach out to you by weds. .  thanks  Contact information:  [address] [facility] [address] [phone]           [facility] Follow up.    Why: home infusion: please call if you have any questions about your IV anti-biotics. thanks.  Contact information:  [facility] [facility] [address] [phone]             Primary Doctor No Follow up.                     Patient Instructions:      WALKER FOR HOME USE     Order Specific Question Answer Comments   Type of Walker: Heavy duty (300+ lbs)    With wheels? Yes    Height: 6' 3" (1.905 m)    Weight: 162.2 kg (357 lb 9.4 oz)    Length of need (1-99 months): 12    Does patient have medical equipment at home? none    Please check all that apply: Patient's condition impairs ambulation.    Please check all that apply: Patient is unable to safely ambulate without equipment.    Please check all that apply: Walker will be used for gait training.      COMMODE FOR HOME USE   Order Comments: Heavy duty     Order Specific Question Answer Comments   Type: Standard    Height: 6' 3" (1.905 m)    Weight: 162.2 kg (357 lb 9.4 oz)    Does patient have medical equipment at home? none    Length of need (1-99 months): 12      POST-SURGICAL BOOT/SHOE FOR HOME USE     Order Specific Question Answer Comments   Height: 6' 3" (1.905 m)    Weight: 157.5 kg (347 lb 3.6 oz)    Does patient have medical equipment at home? none    Quantity: 1    Size: shoe size 16-17    Length of need (1-99 months): 99      Ambulatory referral/consult to Endocrinology   Standing Status: Future   Referral Priority: Routine Referral Type: Consultation   Requested Specialty: Endocrinology   Number of Visits Requested: 1     Ambulatory referral/consult to Internal Medicine   Standing Status: Future   Referral Priority: Routine Referral Type: Consultation   Referral Reason: Specialty " Services Required   Requested Specialty: Internal Medicine   Number of Visits Requested: 1     Ambulatory referral/consult to Podiatry   Standing Status: Future   Referral Priority: Urgent Referral Type: Consultation   Referral Reason: Specialty Services Required   Referred to Provider: SUKHWINDER DYER Requested Specialty: Podiatry     Diet diabetic     Notify your health care provider if you experience any of the following:  temperature >100.4     Notify your health care provider if you experience any of the following:  persistent nausea and vomiting or diarrhea     Notify your health care provider if you experience any of the following:  severe uncontrolled pain     Notify your health care provider if you experience any of the following:  redness, tenderness, or signs of infection (pain, swelling, redness, odor or green/yellow discharge around incision site)     Notify your health care provider if you experience any of the following:  difficulty breathing or increased cough     Notify your health care provider if you experience any of the following:  severe persistent headache     Notify your health care provider if you experience any of the following:  worsening rash     Notify your health care provider if you experience any of the following:  persistent dizziness, light-headedness, or visual disturbances     Notify your health care provider if you experience any of the following:  increased confusion or weakness     Weight bearing restrictions (specify):   Order Comments: To right foot:  heel touch for short distance and transfer in football dressing and post op shoe  To right arm: weight bearing as tolerated with range of motion as tolerated       Significant Diagnostic Studies: as above    Pending Diagnostic Studies:     Procedure Component Value Units Date/Time    Specimen to Pathology, Surgery Orthopedics [[id number]] Collected: 08/04/22 1138    Order Status: Sent Lab Status: In process Updated:  "08/04/22 1517    Specimen: Tissue          Medications:  Reconciled Home Medications:      Medication List      START taking these medications    ACCU-CHEK GUIDE GLUCOSE METER Misc  Generic drug: blood-glucose meter  Use to check blood glucose 4 times daily.     ACCU-CHEK GUIDE TEST STRIPS Strp  Generic drug: blood sugar diagnostic  Use to check blood glucose 4 times daily     ACCU-CHEK SOFTCLIX LANCETS Misc  Generic drug: lancets  Use to check blood glucose 4 times daily     amLODIPine 5 MG tablet  Commonly known as: NORVASC  Take 1 tablet (5 mg total) by mouth once daily.     BD ULTRA-FINE KARTHIK PEN NEEDLE 32 gauge x 5/32" Ndle  Generic drug: pen needle, diabetic  1 each by Misc.(Non-Drug; Combo Route) route 5 (five) times daily.     HYDROcodone-acetaminophen 7.5-325 mg per tablet  Commonly known as: NORCO  Take 1 tablet by mouth every 6 (six) hours as needed for Pain.     IODOSORB 0.9 % gel  Generic drug: cadexomer iodine  Apply topically daily as needed for Wound Care. Apply to distal R great to and lateral aspect of great toe at boarder of interspace, and R 5th toe. Follow with 4x4, kerlix x 2, and secure with ace.     metFORMIN 500 MG ER 24hr tablet  Commonly known as: GLUCOPHAGE-XR  Take 1 tablet daily with breakfast x 7 days, THEN 1 tab with breakfast and dinner for a week THEN, 1 tab with breakfast and 2 tabs with dinner for 7 days, THEN 2 tabs with breakfast and with dinner     NovoLOG Flexpen U-100 Insulin 100 unit/mL (3 mL) Inpn pen  Generic drug: insulin aspart U-100  Inject 12 Units into the skin 3 (three) times daily.     polyethylene glycol 17 gram Pwpk  Commonly known as: GLYCOLAX  Take 17 g by mouth 3 (three) times daily as needed (constipation).     senna-docusate 8.6-50 mg 8.6-50 mg per tablet  Commonly known as: PERICOLACE  Take 1 tablet by mouth 2 (two) times daily.     TRESIBA FLEXTOUCH U-200 200 unit/mL (3 mL) insulin pen  Generic drug: insulin degludec  Inject 32 Units into the skin once " daily.     valsartan 80 MG tablet  Commonly known as: DIOVAN  Take 1 tablet (80 mg total) by mouth 2 (two) times daily.     vancomycin in 0.9 % sodium chl 1.5 gram/250 mL Soln  Inject 250 mLs (1,500 mg total) into the vein once daily. Ending on 9/15/2022        STOP taking these medications    acetaminophen 325 MG tablet  Commonly known as: TYLENOL     ibuprofen 200 MG tablet  Commonly known as: ADVIL,MOTRIN            Indwelling Lines/Drains at time of discharge:   Lines/Drains/Airways     Peripherally Inserted Central Catheter Line  Duration           PICC Double Lumen 08/09/22 1154 left brachial 14 days                Time spent on the discharge of patient: 37 minutes      The attending portion of this evaluation, treatment, and documentation was performed per Mamta Cadena MD via Telemedicine AudioVisual using the secure Vidyo software platform with 2 way audio/video. The provider was located off-site and the patient is located in the hospital. The aforementioned video software was utilized to document the relevant history and physical exam    Mamta Cadena MD  Department of Hospital Medicine  [facility]

## 2022-08-29 ENCOUNTER — LAB VISIT (OUTPATIENT)
Dept: LAB | Facility: HOSPITAL | Age: 57
End: 2022-08-29
Attending: PHYSICIAN ASSISTANT
Payer: COMMERCIAL

## 2022-08-29 DIAGNOSIS — E11.69 DIABETES MELLITUS ASSOCIATED WITH HORMONAL ETIOLOGY: ICD-10-CM

## 2022-08-29 DIAGNOSIS — M86.171 ACUTE OSTEOMYELITIS OF RIGHT ANKLE OR FOOT: Primary | ICD-10-CM

## 2022-08-29 LAB
ALBUMIN SERPL BCP-MCNC: 3 G/DL (ref 3.5–5.2)
ALP SERPL-CCNC: 83 U/L (ref 55–135)
ALT SERPL W/O P-5'-P-CCNC: 9 U/L (ref 10–44)
ANION GAP SERPL CALC-SCNC: 7 MMOL/L (ref 8–16)
AST SERPL-CCNC: 11 U/L (ref 10–40)
BASOPHILS # BLD AUTO: 0.12 K/UL (ref 0–0.2)
BASOPHILS NFR BLD: 2.3 % (ref 0–1.9)
BILIRUB SERPL-MCNC: 0.5 MG/DL (ref 0.1–1)
BUN SERPL-MCNC: 20 MG/DL (ref 6–20)
CALCIUM SERPL-MCNC: 9.3 MG/DL (ref 8.7–10.5)
CHLORIDE SERPL-SCNC: 103 MMOL/L (ref 95–110)
CO2 SERPL-SCNC: 24 MMOL/L (ref 23–29)
CREAT SERPL-MCNC: 2 MG/DL (ref 0.5–1.4)
CRP SERPL-MCNC: 6.1 MG/L (ref 0–8.2)
DIFFERENTIAL METHOD: ABNORMAL
EOSINOPHIL # BLD AUTO: 0.2 K/UL (ref 0–0.5)
EOSINOPHIL NFR BLD: 4.6 % (ref 0–8)
ERYTHROCYTE [DISTWIDTH] IN BLOOD BY AUTOMATED COUNT: 14.1 % (ref 11.5–14.5)
EST. GFR  (NO RACE VARIABLE): 38.9 ML/MIN/1.73 M^2
GLUCOSE SERPL-MCNC: 170 MG/DL (ref 70–110)
HCT VFR BLD AUTO: 28.4 % (ref 40–54)
HGB BLD-MCNC: 9.3 G/DL (ref 14–18)
IMM GRANULOCYTES # BLD AUTO: 0.04 K/UL (ref 0–0.04)
IMM GRANULOCYTES NFR BLD AUTO: 0.8 % (ref 0–0.5)
LYMPHOCYTES # BLD AUTO: 1.9 K/UL (ref 1–4.8)
LYMPHOCYTES NFR BLD: 36.5 % (ref 18–48)
MCH RBC QN AUTO: 25.9 PG (ref 27–31)
MCHC RBC AUTO-ENTMCNC: 32.7 G/DL (ref 32–36)
MCV RBC AUTO: 79 FL (ref 82–98)
MONOCYTES # BLD AUTO: 0.6 K/UL (ref 0.3–1)
MONOCYTES NFR BLD: 11.4 % (ref 4–15)
NEUTROPHILS # BLD AUTO: 2.3 K/UL (ref 1.8–7.7)
NEUTROPHILS NFR BLD: 44.4 % (ref 38–73)
NRBC BLD-RTO: 0 /100 WBC
PLATELET # BLD AUTO: 403 K/UL (ref 150–450)
PMV BLD AUTO: 9.7 FL (ref 9.2–12.9)
POTASSIUM SERPL-SCNC: 4 MMOL/L (ref 3.5–5.1)
PROT SERPL-MCNC: 8.3 G/DL (ref 6–8.4)
RBC # BLD AUTO: 3.59 M/UL (ref 4.6–6.2)
SODIUM SERPL-SCNC: 134 MMOL/L (ref 136–145)
VANCOMYCIN TROUGH SERPL-MCNC: 14.4 UG/ML (ref 10–22)
WBC # BLD AUTO: 5.26 K/UL (ref 3.9–12.7)

## 2022-08-29 PROCEDURE — 86140 C-REACTIVE PROTEIN: CPT | Performed by: PHYSICIAN ASSISTANT

## 2022-08-29 PROCEDURE — 85025 COMPLETE CBC W/AUTO DIFF WBC: CPT | Performed by: PHYSICIAN ASSISTANT

## 2022-08-29 PROCEDURE — 80053 COMPREHEN METABOLIC PANEL: CPT | Performed by: PHYSICIAN ASSISTANT

## 2022-08-29 PROCEDURE — 80202 ASSAY OF VANCOMYCIN: CPT | Performed by: PHYSICIAN ASSISTANT

## 2022-08-30 ENCOUNTER — TELEPHONE (OUTPATIENT)
Dept: INFECTIOUS DISEASES | Facility: HOSPITAL | Age: 57
End: 2022-08-30
Payer: COMMERCIAL

## 2022-08-30 NOTE — TELEPHONE ENCOUNTER
Labs reviewed    Vancomycin trough 14  Creatinine elevated 2.0 today  Will discontinue vancomycin today   Hold vancomycin. Start daptomycin 8mg/kg 9/1  Discuss with [facility]  ID will follow

## 2022-08-31 ENCOUNTER — EXTERNAL HOME HEALTH (OUTPATIENT)
Dept: HOME HEALTH SERVICES | Facility: HOSPITAL | Age: 57
End: 2022-08-31
Payer: COMMERCIAL

## 2022-09-05 ENCOUNTER — LAB VISIT (OUTPATIENT)
Dept: LAB | Facility: HOSPITAL | Age: 57
End: 2022-09-05
Attending: PHYSICIAN ASSISTANT
Payer: COMMERCIAL

## 2022-09-05 DIAGNOSIS — E11.69 DIABETES MELLITUS ASSOCIATED WITH HORMONAL ETIOLOGY: ICD-10-CM

## 2022-09-05 DIAGNOSIS — M86.171 ACUTE OSTEOMYELITIS OF RIGHT ANKLE OR FOOT: Primary | ICD-10-CM

## 2022-09-05 LAB
ALBUMIN SERPL BCP-MCNC: 3.3 G/DL (ref 3.5–5.2)
ALP SERPL-CCNC: 78 U/L (ref 55–135)
ALT SERPL W/O P-5'-P-CCNC: 18 U/L (ref 10–44)
ANION GAP SERPL CALC-SCNC: 12 MMOL/L (ref 8–16)
AST SERPL-CCNC: 18 U/L (ref 10–40)
BASOPHILS # BLD AUTO: 0.11 K/UL (ref 0–0.2)
BASOPHILS NFR BLD: 2.2 % (ref 0–1.9)
BILIRUB SERPL-MCNC: 0.4 MG/DL (ref 0.1–1)
BUN SERPL-MCNC: 18 MG/DL (ref 6–20)
CALCIUM SERPL-MCNC: 9.7 MG/DL (ref 8.7–10.5)
CHLORIDE SERPL-SCNC: 101 MMOL/L (ref 95–110)
CK SERPL-CCNC: 71 U/L (ref 20–200)
CO2 SERPL-SCNC: 26 MMOL/L (ref 23–29)
CREAT SERPL-MCNC: 1.9 MG/DL (ref 0.5–1.4)
CRP SERPL-MCNC: 23 MG/L (ref 0–8.2)
DIFFERENTIAL METHOD: ABNORMAL
EOSINOPHIL # BLD AUTO: 0.4 K/UL (ref 0–0.5)
EOSINOPHIL NFR BLD: 8.2 % (ref 0–8)
ERYTHROCYTE [DISTWIDTH] IN BLOOD BY AUTOMATED COUNT: 14.5 % (ref 11.5–14.5)
ERYTHROCYTE [SEDIMENTATION RATE] IN BLOOD BY PHOTOMETRIC METHOD: >120 MM/HR (ref 0–23)
EST. GFR  (NO RACE VARIABLE): 41.4 ML/MIN/1.73 M^2
GLUCOSE SERPL-MCNC: 113 MG/DL (ref 70–110)
HCT VFR BLD AUTO: 30.1 % (ref 40–54)
HGB BLD-MCNC: 9.6 G/DL (ref 14–18)
IMM GRANULOCYTES # BLD AUTO: 0.01 K/UL (ref 0–0.04)
IMM GRANULOCYTES NFR BLD AUTO: 0.2 % (ref 0–0.5)
LYMPHOCYTES # BLD AUTO: 1.6 K/UL (ref 1–4.8)
LYMPHOCYTES NFR BLD: 32.6 % (ref 18–48)
MCH RBC QN AUTO: 25.4 PG (ref 27–31)
MCHC RBC AUTO-ENTMCNC: 31.9 G/DL (ref 32–36)
MCV RBC AUTO: 80 FL (ref 82–98)
MONOCYTES # BLD AUTO: 0.7 K/UL (ref 0.3–1)
MONOCYTES NFR BLD: 13.8 % (ref 4–15)
NEUTROPHILS # BLD AUTO: 2.2 K/UL (ref 1.8–7.7)
NEUTROPHILS NFR BLD: 43 % (ref 38–73)
NRBC BLD-RTO: 0 /100 WBC
PLATELET # BLD AUTO: 315 K/UL (ref 150–450)
PMV BLD AUTO: 11.3 FL (ref 9.2–12.9)
POTASSIUM SERPL-SCNC: 3.9 MMOL/L (ref 3.5–5.1)
PROT SERPL-MCNC: 8.4 G/DL (ref 6–8.4)
RBC # BLD AUTO: 3.78 M/UL (ref 4.6–6.2)
SODIUM SERPL-SCNC: 139 MMOL/L (ref 136–145)
WBC # BLD AUTO: 5 K/UL (ref 3.9–12.7)

## 2022-09-05 PROCEDURE — 85652 RBC SED RATE AUTOMATED: CPT | Performed by: PHYSICIAN ASSISTANT

## 2022-09-05 PROCEDURE — 85025 COMPLETE CBC W/AUTO DIFF WBC: CPT | Performed by: PHYSICIAN ASSISTANT

## 2022-09-05 PROCEDURE — 36415 COLL VENOUS BLD VENIPUNCTURE: CPT | Performed by: PHYSICIAN ASSISTANT

## 2022-09-05 PROCEDURE — 86140 C-REACTIVE PROTEIN: CPT | Performed by: PHYSICIAN ASSISTANT

## 2022-09-05 PROCEDURE — 80053 COMPREHEN METABOLIC PANEL: CPT | Performed by: PHYSICIAN ASSISTANT

## 2022-09-05 PROCEDURE — 82550 ASSAY OF CK (CPK): CPT | Performed by: PHYSICIAN ASSISTANT

## 2022-09-06 LAB — POCT GLUCOSE: 141 MG/DL (ref 70–110)

## 2022-09-07 ENCOUNTER — DOCUMENT SCAN (OUTPATIENT)
Dept: HOME HEALTH SERVICES | Facility: HOSPITAL | Age: 57
End: 2022-09-07
Payer: COMMERCIAL

## 2022-09-07 LAB — FUNGUS SPEC CULT: NORMAL

## 2022-09-12 ENCOUNTER — LAB VISIT (OUTPATIENT)
Dept: LAB | Facility: HOSPITAL | Age: 57
End: 2022-09-12
Attending: PHYSICIAN ASSISTANT
Payer: COMMERCIAL

## 2022-09-12 DIAGNOSIS — M86.171 ACUTE OSTEOMYELITIS OF RIGHT ANKLE OR FOOT: ICD-10-CM

## 2022-09-12 DIAGNOSIS — E11.69 DIABETES MELLITUS ASSOCIATED WITH HORMONAL ETIOLOGY: Primary | ICD-10-CM

## 2022-09-12 LAB
ALBUMIN SERPL BCP-MCNC: 3.4 G/DL (ref 3.5–5.2)
ALP SERPL-CCNC: 64 U/L (ref 55–135)
ALT SERPL W/O P-5'-P-CCNC: 21 U/L (ref 10–44)
ANION GAP SERPL CALC-SCNC: 9 MMOL/L (ref 8–16)
AST SERPL-CCNC: 41 U/L (ref 10–40)
BASOPHILS # BLD AUTO: 0.06 K/UL (ref 0–0.2)
BASOPHILS NFR BLD: 1.2 % (ref 0–1.9)
BILIRUB SERPL-MCNC: 0.5 MG/DL (ref 0.1–1)
BUN SERPL-MCNC: 24 MG/DL (ref 6–20)
CALCIUM SERPL-MCNC: 9.7 MG/DL (ref 8.7–10.5)
CHLORIDE SERPL-SCNC: 104 MMOL/L (ref 95–110)
CK SERPL-CCNC: 1305 U/L (ref 20–200)
CO2 SERPL-SCNC: 22 MMOL/L (ref 23–29)
CREAT SERPL-MCNC: 2 MG/DL (ref 0.5–1.4)
CRP SERPL-MCNC: 1.7 MG/L (ref 0–8.2)
DIFFERENTIAL METHOD: ABNORMAL
EOSINOPHIL # BLD AUTO: 0.4 K/UL (ref 0–0.5)
EOSINOPHIL NFR BLD: 9.1 % (ref 0–8)
ERYTHROCYTE [DISTWIDTH] IN BLOOD BY AUTOMATED COUNT: 14.6 % (ref 11.5–14.5)
ERYTHROCYTE [SEDIMENTATION RATE] IN BLOOD BY PHOTOMETRIC METHOD: >120 MM/HR (ref 0–23)
EST. GFR  (NO RACE VARIABLE): 38.7 ML/MIN/1.73 M^2
GLUCOSE SERPL-MCNC: 120 MG/DL (ref 70–110)
HCT VFR BLD AUTO: 28.5 % (ref 40–54)
HGB BLD-MCNC: 9.4 G/DL (ref 14–18)
IMM GRANULOCYTES # BLD AUTO: 0.01 K/UL (ref 0–0.04)
IMM GRANULOCYTES NFR BLD AUTO: 0.2 % (ref 0–0.5)
LYMPHOCYTES # BLD AUTO: 2.1 K/UL (ref 1–4.8)
LYMPHOCYTES NFR BLD: 43.8 % (ref 18–48)
MCH RBC QN AUTO: 26.6 PG (ref 27–31)
MCHC RBC AUTO-ENTMCNC: 33 G/DL (ref 32–36)
MCV RBC AUTO: 81 FL (ref 82–98)
MONOCYTES # BLD AUTO: 0.4 K/UL (ref 0.3–1)
MONOCYTES NFR BLD: 8.9 % (ref 4–15)
NEUTROPHILS # BLD AUTO: 1.8 K/UL (ref 1.8–7.7)
NEUTROPHILS NFR BLD: 36.8 % (ref 38–73)
NRBC BLD-RTO: 0 /100 WBC
PLATELET # BLD AUTO: 357 K/UL (ref 150–450)
PMV BLD AUTO: 10.2 FL (ref 9.2–12.9)
POTASSIUM SERPL-SCNC: 4.5 MMOL/L (ref 3.5–5.1)
PROT SERPL-MCNC: 8.1 G/DL (ref 6–8.4)
RBC # BLD AUTO: 3.53 M/UL (ref 4.6–6.2)
SODIUM SERPL-SCNC: 135 MMOL/L (ref 136–145)
WBC # BLD AUTO: 4.84 K/UL (ref 3.9–12.7)

## 2022-09-12 PROCEDURE — 86140 C-REACTIVE PROTEIN: CPT | Performed by: PHYSICIAN ASSISTANT

## 2022-09-12 PROCEDURE — 85025 COMPLETE CBC W/AUTO DIFF WBC: CPT | Performed by: PHYSICIAN ASSISTANT

## 2022-09-12 PROCEDURE — 85652 RBC SED RATE AUTOMATED: CPT | Performed by: PHYSICIAN ASSISTANT

## 2022-09-12 PROCEDURE — 82550 ASSAY OF CK (CPK): CPT | Performed by: PHYSICIAN ASSISTANT

## 2022-09-12 PROCEDURE — 80053 COMPREHEN METABOLIC PANEL: CPT | Performed by: PHYSICIAN ASSISTANT

## 2022-09-13 ENCOUNTER — TELEPHONE (OUTPATIENT)
Dept: INFECTIOUS DISEASES | Facility: HOSPITAL | Age: 57
End: 2022-09-13
Payer: COMMERCIAL

## 2022-09-13 LAB — FUNGUS SPEC CULT: NORMAL

## 2022-09-13 NOTE — TELEPHONE ENCOUNTER
Labs reviewed  CPK levels elevated  Pt denies any muscle weakness or symptoms  Pt reports exercising the last 4 days and has some soreness but nothing out of ordinary  Will monitor closely  Will schedule podiatry follow up as pt cancelled his prior appointments due to transportation difficulties  ID f/u on 9/19

## 2022-09-16 ENCOUNTER — DOCUMENT SCAN (OUTPATIENT)
Dept: HOME HEALTH SERVICES | Facility: HOSPITAL | Age: 57
End: 2022-09-16
Payer: COMMERCIAL

## 2022-09-19 ENCOUNTER — INFUSION (OUTPATIENT)
Dept: INFECTIOUS DISEASES | Facility: HOSPITAL | Age: 57
End: 2022-09-19
Attending: INTERNAL MEDICINE
Payer: COMMERCIAL

## 2022-09-19 ENCOUNTER — OFFICE VISIT (OUTPATIENT)
Dept: INFECTIOUS DISEASES | Facility: CLINIC | Age: 57
End: 2022-09-19
Payer: COMMERCIAL

## 2022-09-19 VITALS
WEIGHT: 315 LBS | HEART RATE: 86 BPM | SYSTOLIC BLOOD PRESSURE: 175 MMHG | TEMPERATURE: 98 F | BODY MASS INDEX: 39.17 KG/M2 | DIASTOLIC BLOOD PRESSURE: 93 MMHG | HEIGHT: 75 IN

## 2022-09-19 DIAGNOSIS — M86.9 OSTEOMYELITIS OF GREAT TOE OF RIGHT FOOT: Primary | ICD-10-CM

## 2022-09-19 PROCEDURE — 99999 PR PBB SHADOW E&M-EST. PATIENT-LVL III: CPT | Mod: PBBFAC,,, | Performed by: PHYSICIAN ASSISTANT

## 2022-09-19 PROCEDURE — 4010F ACE/ARB THERAPY RXD/TAKEN: CPT | Mod: CPTII,S$GLB,, | Performed by: PHYSICIAN ASSISTANT

## 2022-09-19 PROCEDURE — 3046F PR MOST RECENT HEMOGLOBIN A1C LEVEL > 9.0%: ICD-10-PCS | Mod: CPTII,S$GLB,, | Performed by: PHYSICIAN ASSISTANT

## 2022-09-19 PROCEDURE — 3008F BODY MASS INDEX DOCD: CPT | Mod: CPTII,S$GLB,, | Performed by: PHYSICIAN ASSISTANT

## 2022-09-19 PROCEDURE — 99214 PR OFFICE/OUTPT VISIT, EST, LEVL IV, 30-39 MIN: ICD-10-PCS | Mod: S$GLB,,, | Performed by: PHYSICIAN ASSISTANT

## 2022-09-19 PROCEDURE — 3077F SYST BP >= 140 MM HG: CPT | Mod: CPTII,S$GLB,, | Performed by: PHYSICIAN ASSISTANT

## 2022-09-19 PROCEDURE — 4010F PR ACE/ARB THEARPY RXD/TAKEN: ICD-10-PCS | Mod: CPTII,S$GLB,, | Performed by: PHYSICIAN ASSISTANT

## 2022-09-19 PROCEDURE — 3008F PR BODY MASS INDEX (BMI) DOCUMENTED: ICD-10-PCS | Mod: CPTII,S$GLB,, | Performed by: PHYSICIAN ASSISTANT

## 2022-09-19 PROCEDURE — 3080F PR MOST RECENT DIASTOLIC BLOOD PRESSURE >= 90 MM HG: ICD-10-PCS | Mod: CPTII,S$GLB,, | Performed by: PHYSICIAN ASSISTANT

## 2022-09-19 PROCEDURE — 3046F HEMOGLOBIN A1C LEVEL >9.0%: CPT | Mod: CPTII,S$GLB,, | Performed by: PHYSICIAN ASSISTANT

## 2022-09-19 PROCEDURE — 3080F DIAST BP >= 90 MM HG: CPT | Mod: CPTII,S$GLB,, | Performed by: PHYSICIAN ASSISTANT

## 2022-09-19 PROCEDURE — 1159F PR MEDICATION LIST DOCUMENTED IN MEDICAL RECORD: ICD-10-PCS | Mod: CPTII,S$GLB,, | Performed by: PHYSICIAN ASSISTANT

## 2022-09-19 PROCEDURE — 99999 PR PBB SHADOW E&M-EST. PATIENT-LVL III: ICD-10-PCS | Mod: PBBFAC,,, | Performed by: PHYSICIAN ASSISTANT

## 2022-09-19 PROCEDURE — 99214 OFFICE O/P EST MOD 30 MIN: CPT | Mod: S$GLB,,, | Performed by: PHYSICIAN ASSISTANT

## 2022-09-19 PROCEDURE — 1159F MED LIST DOCD IN RCRD: CPT | Mod: CPTII,S$GLB,, | Performed by: PHYSICIAN ASSISTANT

## 2022-09-19 PROCEDURE — 3077F PR MOST RECENT SYSTOLIC BLOOD PRESSURE >= 140 MM HG: ICD-10-PCS | Mod: CPTII,S$GLB,, | Performed by: PHYSICIAN ASSISTANT

## 2022-09-19 NOTE — PROGRESS NOTES
Subjective:       Patient ID: Renee Caceres is a 57 y.o. male.    Chief Complaint: Hospital Follow Up    HPI    Mr. Caceres is a 57 y/o male with poorly controlled DMII, HTN admitted for osteomyelitis of right hallux and 5th toe. He underwent right hallux bone biopsy. Bone cultures +Staph cohnii. He was found to have a right forearm abscess s/p I&D with orthopedics 8/10. Cultures + Strep dysgalactiae. He has been on IV vancomycin. He is seen today for end of care. He has been on IV vancomycin <6 weeks. He is doing well. No fever chills or night sweats. Right hallux wound healed well. Sutures remain in place of right forearm. He has f/u with orthopedics Wednesday. No issues with his abx. No issues with his picc line. No increase swelling or pain of his right forearm. Pt reports controlling his sugars really well.     Review of Systems   Constitutional:  Negative for activity change, appetite change, chills, diaphoresis, fatigue and fever.   Respiratory:  Negative for cough and shortness of breath.    Gastrointestinal:  Negative for abdominal pain, diarrhea, nausea and vomiting.   Genitourinary:  Negative for dysuria.   Musculoskeletal:  Negative for arthralgias, back pain and joint swelling.   Integumentary:  Negative for rash.   Neurological:  Negative for dizziness and headaches.       Objective:      Physical Exam  Vitals and nursing note reviewed.   Constitutional:       General: He is not in acute distress.     Appearance: He is well-developed. He is not diaphoretic.   HENT:      Head: Normocephalic and atraumatic.   Eyes:      Pupils: Pupils are equal, round, and reactive to light.   Cardiovascular:      Rate and Rhythm: Normal rate and regular rhythm.      Heart sounds: Normal heart sounds. No murmur heard.    No friction rub. No gallop.   Pulmonary:      Effort: Pulmonary effort is normal. No respiratory distress.      Breath sounds: Normal breath sounds. No wheezing or rales.   Chest:      Chest wall:  No tenderness.   Abdominal:      General: Bowel sounds are normal. There is no distension.      Palpations: There is no mass.      Tenderness: There is no abdominal tenderness. There is no guarding.   Musculoskeletal:         General: Swelling (right forearm. nontender. sutures in tact. no drainage) present. No tenderness or deformity. Normal range of motion.      Cervical back: Normal range of motion and neck supple.   Skin:     General: Skin is warm and dry.      Findings: No erythema or rash.      Comments: Right hallux wound healed well. No open wounds.    Neurological:      Mental Status: He is alert and oriented to person, place, and time.   Psychiatric:         Behavior: Behavior normal.         Thought Content: Thought content normal.         Judgment: Judgment normal.       Assessment:       Problem List Items Addressed This Visit          ID    Osteomyelitis of great toe of right foot - Primary    Relevant Orders    PICC line removal (Completed)    Nursing communication       Plan:         Remove picc line today  F/u with orthopedics on Wednesday  Sutures removed in clinic  F/u with podiatry as scheduled  No further abx today    >45 minutes of total time spent on the encounter, which includes face to face time and non-face to face time preparing to see the patient (eg, review of tests), Obtaining and/or reviewing separately obtained history, Documenting clinical information in the electronic or other health record, Independently interpreting results (not separately reported) and communicating results to the patient/family/caregiver, or Care coordination (not separately reported).

## 2022-09-19 NOTE — PROGRESS NOTES
PICC LINE REMOVED.  PT TOLERATED WELL.  PRESSURE BANDAGE APPLIED TO SITE.  SITE RECHECKED 30 MINUTES LATER, NO BLEEDING NOTED.  EDUCATED PT ON SITE CARE OVER THE NEXT COUPLE OF DAYS. PT VERBALIZED UNDERSTANDING AND LEFT IN NAD.

## 2022-09-21 ENCOUNTER — TELEPHONE (OUTPATIENT)
Dept: INFECTIOUS DISEASES | Facility: CLINIC | Age: 57
End: 2022-09-21
Payer: COMMERCIAL

## 2022-09-21 ENCOUNTER — DOCUMENT SCAN (OUTPATIENT)
Dept: HOME HEALTH SERVICES | Facility: HOSPITAL | Age: 57
End: 2022-09-21
Payer: COMMERCIAL

## 2022-09-21 ENCOUNTER — OFFICE VISIT (OUTPATIENT)
Dept: ORTHOPEDICS | Facility: CLINIC | Age: 57
End: 2022-09-21
Payer: COMMERCIAL

## 2022-09-21 VITALS — BODY MASS INDEX: 39.17 KG/M2 | WEIGHT: 315 LBS | HEIGHT: 75 IN

## 2022-09-21 DIAGNOSIS — L02.413 ABSCESS OF RIGHT FOREARM: Primary | ICD-10-CM

## 2022-09-21 LAB
ACID FAST MOD KINY STN SPEC: NORMAL
MYCOBACTERIUM SPEC QL CULT: NORMAL

## 2022-09-21 PROCEDURE — 4010F PR ACE/ARB THEARPY RXD/TAKEN: ICD-10-PCS | Mod: CPTII,S$GLB,, | Performed by: PHYSICIAN ASSISTANT

## 2022-09-21 PROCEDURE — 99024 PR POST-OP FOLLOW-UP VISIT: ICD-10-PCS | Mod: S$GLB,,, | Performed by: PHYSICIAN ASSISTANT

## 2022-09-21 PROCEDURE — 99999 PR PBB SHADOW E&M-EST. PATIENT-LVL III: CPT | Mod: PBBFAC,,, | Performed by: PHYSICIAN ASSISTANT

## 2022-09-21 PROCEDURE — 99024 POSTOP FOLLOW-UP VISIT: CPT | Mod: S$GLB,,, | Performed by: PHYSICIAN ASSISTANT

## 2022-09-21 PROCEDURE — 3046F HEMOGLOBIN A1C LEVEL >9.0%: CPT | Mod: CPTII,S$GLB,, | Performed by: PHYSICIAN ASSISTANT

## 2022-09-21 PROCEDURE — 1159F MED LIST DOCD IN RCRD: CPT | Mod: CPTII,S$GLB,, | Performed by: PHYSICIAN ASSISTANT

## 2022-09-21 PROCEDURE — 1159F PR MEDICATION LIST DOCUMENTED IN MEDICAL RECORD: ICD-10-PCS | Mod: CPTII,S$GLB,, | Performed by: PHYSICIAN ASSISTANT

## 2022-09-21 PROCEDURE — 3046F PR MOST RECENT HEMOGLOBIN A1C LEVEL > 9.0%: ICD-10-PCS | Mod: CPTII,S$GLB,, | Performed by: PHYSICIAN ASSISTANT

## 2022-09-21 PROCEDURE — 4010F ACE/ARB THERAPY RXD/TAKEN: CPT | Mod: CPTII,S$GLB,, | Performed by: PHYSICIAN ASSISTANT

## 2022-09-21 PROCEDURE — 3008F BODY MASS INDEX DOCD: CPT | Mod: CPTII,S$GLB,, | Performed by: PHYSICIAN ASSISTANT

## 2022-09-21 PROCEDURE — 99999 PR PBB SHADOW E&M-EST. PATIENT-LVL III: ICD-10-PCS | Mod: PBBFAC,,, | Performed by: PHYSICIAN ASSISTANT

## 2022-09-21 PROCEDURE — 3008F PR BODY MASS INDEX (BMI) DOCUMENTED: ICD-10-PCS | Mod: CPTII,S$GLB,, | Performed by: PHYSICIAN ASSISTANT

## 2022-09-21 NOTE — TELEPHONE ENCOUNTER
----- [facility] sent at 9/20/2022  5:21 PM CDT -----  Regarding: RE: orders  Contact: Christina@  [phone]  Ok to discontinue thank you     ----- Message -----  From: Arina Sharp MA  Sent: 9/20/2022   5:03 PM CDT  To: OneilMarycruzmanuelito Serna PA-C  Subject: FW: orders                                         ----- Message -----  From: Bina Thomas  Sent: 9/20/2022   5:02 PM CDT  To: [facility] Staff  Subject: orders                                           Rec'd call from Christina (home health nurse) calling to speak with someone in Dr. Serna's office regarding whether the wound care services can be discontinued since the wound has healed. Please call.

## 2022-09-22 LAB
ACID FAST MOD KINY STN SPEC: NORMAL
MYCOBACTERIUM SPEC QL CULT: NORMAL

## 2022-09-25 ENCOUNTER — DOCUMENT SCAN (OUTPATIENT)
Dept: HOME HEALTH SERVICES | Facility: HOSPITAL | Age: 57
End: 2022-09-25
Payer: COMMERCIAL

## 2022-09-26 NOTE — PROGRESS NOTES
08/10/22: Dr. Oleary:    Incision drainage deep abscess right volar forearm   Fasciotomy right volar forearm anterior compartment       Mr. Caceres is here today for a post-operative visit    Interval History:  he reports that he is doing ok.   he is at home . he is  participating in PT/OT.   Pain is controlled.  he is  taking pain medication.    He is taking antibiotics as prescribed by ID  Sutures removed by home health   he denies fever, chills, and sweats .       Physical exam:    Patient arrives to exam room: wheelchair accompanied .    Dressing taken down.  Incision is clean, dry and intact.  Sutures removed without difficulty.   Healing well no signs of breakdown or infection. Full range of motion     RADS: All pertinent images were reviewed by myself:   none done today    Assessment:  Post-op visit ( 6 weeks)    Plan:  Current care, treatment plan, precautions, activity level/ modifications, limitations, rehabilitation exercises and proposed future treatment were discussed with the patient. We discussed the need to monitor for changes in symptoms and condition and report them to the physician.  Discussed importance of compliance with all appointments and follow up examinations.     WOUND CARE ORDERS    -Patient was advised to monitor wound closely and multiple times daily for any problems. Call clinic immediately or report to ED for immediate medical attention for any complications including reopening of wound, drainage, purulence, redness, streaking, odor, pain out of proportion, fever, chills, etc.       ACTIVITY:   - as tolerated  -range of motion as tolerated    - WBAT      -PT/OT, home health , Patient is responsible to establish and continue care      PAIN MEDICATION:   - Multimodal pain control  - Pain medication: refill was not needed  - Pain medication refill policy provided to patient for review, yes.    - Patient was informed a multi-modal approach is used to treat their pain. With the goal to  get off of narcotic pain medication and discontinue as soon as possible.   - ice and elevation to reduce pain and swelling     FOLLOW UP:   - Patient will follow up in the clinic prn   - follow up with ID      If there are any questions prior to scheduled follow up, the patient was instructed to contact the office

## 2022-09-28 LAB
ACID FAST MOD KINY STN SPEC: NORMAL
MYCOBACTERIUM SPEC QL CULT: NORMAL

## 2022-10-05 ENCOUNTER — OFFICE VISIT (OUTPATIENT)
Dept: PODIATRY | Facility: CLINIC | Age: 57
End: 2022-10-05
Payer: COMMERCIAL

## 2022-10-05 VITALS
HEIGHT: 75 IN | DIASTOLIC BLOOD PRESSURE: 88 MMHG | SYSTOLIC BLOOD PRESSURE: 165 MMHG | RESPIRATION RATE: 18 BRPM | HEART RATE: 84 BPM | BODY MASS INDEX: 39.17 KG/M2 | WEIGHT: 315 LBS

## 2022-10-05 DIAGNOSIS — E11.65 UNCONTROLLED TYPE 2 DIABETES MELLITUS WITH HYPERGLYCEMIA, WITHOUT LONG-TERM CURRENT USE OF INSULIN: Primary | ICD-10-CM

## 2022-10-05 DIAGNOSIS — Z87.2 HEALED FOOT ULCER: ICD-10-CM

## 2022-10-05 DIAGNOSIS — L60.9 DISEASE OF NAIL: ICD-10-CM

## 2022-10-05 PROCEDURE — 3079F PR MOST RECENT DIASTOLIC BLOOD PRESSURE 80-89 MM HG: ICD-10-PCS | Mod: CPTII,S$GLB,, | Performed by: PODIATRIST

## 2022-10-05 PROCEDURE — 3046F HEMOGLOBIN A1C LEVEL >9.0%: CPT | Mod: CPTII,S$GLB,, | Performed by: PODIATRIST

## 2022-10-05 PROCEDURE — 4010F ACE/ARB THERAPY RXD/TAKEN: CPT | Mod: CPTII,S$GLB,, | Performed by: PODIATRIST

## 2022-10-05 PROCEDURE — 99213 OFFICE O/P EST LOW 20 MIN: CPT | Mod: 25,S$GLB,, | Performed by: PODIATRIST

## 2022-10-05 PROCEDURE — 3046F PR MOST RECENT HEMOGLOBIN A1C LEVEL > 9.0%: ICD-10-PCS | Mod: CPTII,S$GLB,, | Performed by: PODIATRIST

## 2022-10-05 PROCEDURE — 11721 DEBRIDE NAIL 6 OR MORE: CPT | Mod: S$GLB,,, | Performed by: PODIATRIST

## 2022-10-05 PROCEDURE — 99999 PR PBB SHADOW E&M-EST. PATIENT-LVL III: CPT | Mod: PBBFAC,,, | Performed by: PODIATRIST

## 2022-10-05 PROCEDURE — 99999 PR PBB SHADOW E&M-EST. PATIENT-LVL III: ICD-10-PCS | Mod: PBBFAC,,, | Performed by: PODIATRIST

## 2022-10-05 PROCEDURE — 4010F PR ACE/ARB THEARPY RXD/TAKEN: ICD-10-PCS | Mod: CPTII,S$GLB,, | Performed by: PODIATRIST

## 2022-10-05 PROCEDURE — 3077F SYST BP >= 140 MM HG: CPT | Mod: CPTII,S$GLB,, | Performed by: PODIATRIST

## 2022-10-05 PROCEDURE — 3079F DIAST BP 80-89 MM HG: CPT | Mod: CPTII,S$GLB,, | Performed by: PODIATRIST

## 2022-10-05 PROCEDURE — 99213 PR OFFICE/OUTPT VISIT, EST, LEVL III, 20-29 MIN: ICD-10-PCS | Mod: 25,S$GLB,, | Performed by: PODIATRIST

## 2022-10-05 PROCEDURE — 11721 PR DEBRIDEMENT OF NAILS, 6 OR MORE: ICD-10-PCS | Mod: S$GLB,,, | Performed by: PODIATRIST

## 2022-10-05 PROCEDURE — 3077F PR MOST RECENT SYSTOLIC BLOOD PRESSURE >= 140 MM HG: ICD-10-PCS | Mod: CPTII,S$GLB,, | Performed by: PODIATRIST

## 2022-10-15 ENCOUNTER — HOSPITAL ENCOUNTER (EMERGENCY)
Facility: HOSPITAL | Age: 57
Discharge: HOME | End: 2022-10-15
Attending: STUDENT IN AN ORGANIZED HEALTH CARE EDUCATION/TRAINING PROGRAM
Payer: COMMERCIAL

## 2022-10-15 VITALS
TEMPERATURE: 98 F | HEART RATE: 101 BPM | WEIGHT: 315 LBS | OXYGEN SATURATION: 100 % | BODY MASS INDEX: 40 KG/M2 | DIASTOLIC BLOOD PRESSURE: 74 MMHG | RESPIRATION RATE: 18 BRPM | SYSTOLIC BLOOD PRESSURE: 130 MMHG

## 2022-10-15 DIAGNOSIS — M25.552 LEFT HIP PAIN: ICD-10-CM

## 2022-10-15 DIAGNOSIS — M25.559 HIP PAIN: Primary | ICD-10-CM

## 2022-10-15 PROCEDURE — 25000003 PHARM REV CODE 250: Performed by: STUDENT IN AN ORGANIZED HEALTH CARE EDUCATION/TRAINING PROGRAM

## 2022-10-15 PROCEDURE — 99283 EMERGENCY DEPT VISIT LOW MDM: CPT | Mod: 25

## 2022-10-15 PROCEDURE — 99284 EMERGENCY DEPT VISIT MOD MDM: CPT | Mod: ,,, | Performed by: STUDENT IN AN ORGANIZED HEALTH CARE EDUCATION/TRAINING PROGRAM

## 2022-10-15 PROCEDURE — 99284 PR EMERGENCY DEPT VISIT,LEVEL IV: ICD-10-PCS | Mod: ,,, | Performed by: STUDENT IN AN ORGANIZED HEALTH CARE EDUCATION/TRAINING PROGRAM

## 2022-10-15 RX ORDER — LIDOCAINE 50 MG/G
1 PATCH TOPICAL
Status: DISCONTINUED | OUTPATIENT
Start: 2022-10-15 | End: 2022-10-16 | Stop reason: HOSPADM

## 2022-10-15 RX ORDER — ACETAMINOPHEN 325 MG/1
650 TABLET ORAL
Status: COMPLETED | OUTPATIENT
Start: 2022-10-15 | End: 2022-10-15

## 2022-10-15 RX ORDER — LIDOCAINE 50 MG/G
1 PATCH TOPICAL DAILY
Qty: 10 PATCH | Refills: 0 | Status: SHIPPED | OUTPATIENT
Start: 2022-10-15 | End: 2022-10-25

## 2022-10-15 RX ADMIN — ACETAMINOPHEN 650 MG: 325 TABLET ORAL at 08:10

## 2022-10-15 RX ADMIN — LIDOCAINE 1 PATCH: 50 PATCH CUTANEOUS at 08:10

## 2022-10-16 NOTE — ED TRIAGE NOTES
The pt is a 57-year-old male who presents to the ED from home via personal transportation. The pt reports aching left hip pain that has been present for the last 5-6 days. The pt states it first began a few months ago but had gone away but states it has now reemerged. The pain intensity is reported to be 5/10 on exam today.

## 2022-10-16 NOTE — DISCHARGE INSTRUCTIONS
You were evaluated in the emergency department today for hip pain.  Although there were no findings of concern to necessitate admission to the hospital or warrant immediate surgical intervention, disease exists on a spectrum and your disease process may progress.  If this is the case, please watch your symptoms and return to the emergency department if you feel worse and are unable to discuss care with your primary care doctor in follow up in the next several days.  Specific information regarding your complaint has been provided.  Thank you for choosing [facility]!    You likely have arthritis of the hip.  You can take acetaminophen or tylenol as needed for pain.  Avoid ibuprofen or motrin due to your kidney issues.  You could also use lidocaine patch as well.  Follow up with primary care doctor

## 2022-10-16 NOTE — ED NOTES
"Patient identifiers for Renee Caceres 57 y.o. male checked and correct.  Chief Complaint   Patient presents with    Hip Pain     Pt states that he is having 5/10 "inflamed" pain in L hip since last Sunday. Recently hospitalized for two weeks due to an "infected toe" on R foot in August. Hx diabetes     Past Medical History:   Diagnosis Date    Primary hypertension 8/3/2022     Allergies reported: Review of patient's allergies indicates:  No Known Allergies      LOC: Patient is awake, alert, and aware of environment with an appropriate affect. Patient is oriented x 4 and speaking appropriately.  APPEARANCE: Patient resting comfortably and in no acute distress. Patient is clean and well groomed, patient's clothing is properly fastened.  HEENT: Pt presents with surgical mask on.   SKIN: The skin is warm and dry. Patient has normal skin turgor and moist mucus membranes.   MUSKULOSKELETAL: Patient is moving all extremities well, no obvious deformities noted. Pulses intact. Pt reports left hip pain.   RESPIRATORY: Airway is open and patent. Respirations are spontaneous and non-labored with normal effort and rate.  CARDIAC: Patient has an elevated rate and rhythm. 111 on cardiac monitor. No peripheral edema noted. Pt is hypertensive.  ABDOMEN: No distention noted. Soft and non-tender upon palpation.  NEUROLOGICAL: Facial expression is symmetrical. Hand grasps are equal bilaterally. Normal sensation in all extremities when touched with finger.         "
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
## 2022-10-17 NOTE — PROGRESS NOTES
"  Subjective:      Patient ID: Renee Caceres is a 57 y.o. male.    Chief Complaint: Follow-up (Wound check RT great toe )    Renee VILLATORO is a 57 y.o. male who presents to the clinic for evaluation and treatment of high risk feet. Renee VILLATORO has a past medical history of Primary hypertension (8/3/2022). The patient's chief complaint is long, thick toenails. And follow up r great toe . Seen as inpatient. No complaints  This patient has documented high risk feet requiring routine maintenance secondary to diabetes mellitis and those secondary complications of diabetes, as mentioned..    PCP: Primary Doctor Rhea Serna, 9/19/22  Date Last Seen by PCP:   Chief Complaint   Patient presents with    Follow-up     Wound check RT great toe        Hemoglobin A1C   Date Value Ref Range Status   08/03/2022 12.4 (H) 4.0 - 5.6 % Final     Comment:     ADA Screening Guidelines:  5.7-6.4%  Consistent with prediabetes  >or=6.5%  Consistent with diabetes    High levels of fetal hemoglobin interfere with the HbA1C  assay. Heterozygous hemoglobin variants (HbS, HgC, etc)do  not significantly interfere with this assay.   However, presence of multiple variants may affect accuracy.     03/19/2015 15.0 (H) 4.5 - 6.2 % Final   01/26/2010 15.4 (H) 4.0 - 6.2 % Final       Review of Systems   Constitutional: Negative for chills, decreased appetite and fever.   Cardiovascular:  Negative for leg swelling.   Skin:  Positive for dry skin, nail changes and poor wound healing.   Musculoskeletal:  Negative for arthritis, joint pain, joint swelling and myalgias.   Gastrointestinal:  Negative for nausea and vomiting.   Neurological:  Negative for loss of balance, numbness and paresthesias.         Objective:      Vitals:    10/05/22 1342   BP: (!) 165/88   Pulse: 84   Resp: 18   Weight: (!) 149.7 kg (330 lb)   Height: 6' 3" (1.905 m)   PainSc: 0-No pain        Physical Exam  Vitals reviewed.   Constitutional:       Appearance: He is well-developed. "   Cardiovascular:      Comments: Dorsalis pedis and posterior tibial pulses are diminished Bilaterally. Toes are cool to touch. Feet are warm proximally.There is decreased digital hair. Skin is atrophic, slightly hyperpigmented, and mildly edematous      Musculoskeletal:         General: No tenderness. Normal range of motion.      Comments: Adequate joint range of motion without pain, limitation, nor crepitation Bilateral feet and ankle joints. Muscle strength is 5/5 in all groups bilaterally.         Skin:     General: Skin is warm and dry.      Findings: No ecchymosis, erythema or lesion.      Comments: Nails x10 are elongated by  2-3mm's, thickened by 1-4 mm's, dystrophic, and are darkened in  coloration . Xerosis Bilaterally. No open lesions noted.    Healed r foot ulcer    Neurological:      Mental Status: He is alert and oriented to person, place, and time.      Comments: Crane-Emily 5.07 monofilamant testing is diminished Erick feet. Sharp/dull sensation diminished Bilaterally. Light touch absent Bilaterally.       Psychiatric:         Behavior: Behavior normal.       R great toe 8.3.22        Assessment:       Encounter Diagnoses   Name Primary?    Uncontrolled type 2 diabetes mellitus with hyperglycemia, without long-term current use of insulin Yes    Healed foot ulcer     Disease of nail          Plan:       Renee VILLATORO was seen today for follow-up.    Diagnoses and all orders for this visit:    Uncontrolled type 2 diabetes mellitus with hyperglycemia, without long-term current use of insulin    Healed foot ulcer    Disease of nail    I counseled the patient on his conditions, their implications and medical management.    - Healed R great toe ulcer, has completed abx     - Shoe inspection. Diabetic Foot Education. Patient reminded of the importance of good nutrition and blood sugar control to help prevent podiatric complications of diabetes. Patient instructed on proper foot hygeine. We discussed  wearing proper shoe gear, daily foot inspections, never walking without protective shoe gear, never putting sharp instruments to feet, routine podiatric nail visits every 2-3 months.      - With patient's permission, nails were aggressively reduced and debrided x 10 to their soft tissue attachment mechanically and with electric , removing all offending nail and debris. Patient relates relief following the procedure. He will continue to monitor the areas daily, inspect his feet, wear protective shoe gear when ambulatory, moisturizer to maintain skin integrity and follow in this office in approximately 2-3 months, sooner p.r.n.

## 2024-03-06 ENCOUNTER — OFFICE VISIT (OUTPATIENT)
Dept: INTERNAL MEDICINE | Facility: CLINIC | Age: 59
End: 2024-03-06

## 2024-03-06 VITALS
WEIGHT: 315 LBS | OXYGEN SATURATION: 98 % | SYSTOLIC BLOOD PRESSURE: 172 MMHG | DIASTOLIC BLOOD PRESSURE: 86 MMHG | HEIGHT: 75 IN | BODY MASS INDEX: 39.17 KG/M2 | HEART RATE: 80 BPM

## 2024-03-06 DIAGNOSIS — Z12.5 ENCOUNTER FOR SCREENING FOR MALIGNANT NEOPLASM OF PROSTATE: ICD-10-CM

## 2024-03-06 DIAGNOSIS — E66.01 SEVERE OBESITY (BMI >= 40): ICD-10-CM

## 2024-03-06 DIAGNOSIS — Z00.01 ENCOUNTER FOR ANNUAL GENERAL MEDICAL EXAMINATION WITH ABNORMAL FINDINGS IN ADULT: Primary | ICD-10-CM

## 2024-03-06 DIAGNOSIS — I10 PRIMARY HYPERTENSION: ICD-10-CM

## 2024-03-06 DIAGNOSIS — Z79.4 TYPE 2 DIABETES MELLITUS WITHOUT COMPLICATION, WITH LONG-TERM CURRENT USE OF INSULIN: ICD-10-CM

## 2024-03-06 DIAGNOSIS — E11.9 TYPE 2 DIABETES MELLITUS WITHOUT COMPLICATION, WITH LONG-TERM CURRENT USE OF INSULIN: ICD-10-CM

## 2024-03-06 DIAGNOSIS — Z76.89 ENCOUNTER TO ESTABLISH CARE WITH NEW DOCTOR: ICD-10-CM

## 2024-03-06 PROCEDURE — 99214 OFFICE O/P EST MOD 30 MIN: CPT | Mod: PBBFAC | Performed by: FAMILY MEDICINE

## 2024-03-06 PROCEDURE — 99386 PREV VISIT NEW AGE 40-64: CPT | Mod: S$PBB,,, | Performed by: FAMILY MEDICINE

## 2024-03-06 PROCEDURE — 99999 PR PBB SHADOW E&M-EST. PATIENT-LVL IV: CPT | Mod: PBBFAC,,, | Performed by: FAMILY MEDICINE

## 2024-03-06 RX ORDER — VALSARTAN 80 MG/1
80 TABLET ORAL 2 TIMES DAILY
Qty: 180 TABLET | Refills: 3 | Status: SHIPPED | OUTPATIENT
Start: 2024-03-06 | End: 2025-03-06

## 2024-03-06 RX ORDER — AMLODIPINE BESYLATE 5 MG/1
5 TABLET ORAL DAILY
Qty: 90 TABLET | Refills: 3 | Status: SHIPPED | OUTPATIENT
Start: 2024-03-06 | End: 2025-03-06

## 2024-03-06 RX ORDER — METFORMIN HYDROCHLORIDE 500 MG/1
500 TABLET, EXTENDED RELEASE ORAL SEE ADMIN INSTRUCTIONS
Qty: 60 TABLET | Refills: 10 | Status: SHIPPED | OUTPATIENT
Start: 2024-03-06 | End: 2025-03-06
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
## 2024-04-10 DIAGNOSIS — E11.9 TYPE 2 DIABETES MELLITUS WITHOUT COMPLICATION, UNSPECIFIED WHETHER LONG TERM INSULIN USE: ICD-10-CM

## (undated) DEVICE — SUT VICRYL PLUS 3-0 SH 18IN

## (undated) DEVICE — DRAPE HAND STERILE

## (undated) DEVICE — TOWEL OR DISP STRL BLUE 4/PK

## (undated) DEVICE — DRAPE THREE-QUARTER 53X77IN

## (undated) DEVICE — TAPE SURG DURAPORE 2 X10YD

## (undated) DEVICE — DRAPE STERI-DRAPE 1000 17X11IN

## (undated) DEVICE — TRAY MINOR ORTHO

## (undated) DEVICE — TIP YANKAUERS BULB NO VENT

## (undated) DEVICE — GAUZE SPONGE 4X4 12PLY

## (undated) DEVICE — SET IRR URLGY 2LINE UNIV SPIKE

## (undated) DEVICE — ELECTRODE REM PLYHSV RETURN 9

## (undated) DEVICE — SUT ETHICON 3-0 BLK MONO PS

## (undated) DEVICE — DRAPE STERI U-SHAPED 47X51IN

## (undated) DEVICE — DRAPE TOP 53X102IN

## (undated) DEVICE — DRESSING N ADH OIL EMUL 3X8

## (undated) DEVICE — DRAPE U SPLIT SHEET 54X76IN